# Patient Record
Sex: FEMALE | Race: WHITE | Employment: OTHER | URBAN - NONMETROPOLITAN AREA
[De-identification: names, ages, dates, MRNs, and addresses within clinical notes are randomized per-mention and may not be internally consistent; named-entity substitution may affect disease eponyms.]

---

## 2017-02-27 ENCOUNTER — OFFICE VISIT (OUTPATIENT)
Dept: FAMILY MEDICINE CLINIC | Age: 77
End: 2017-02-27

## 2017-02-27 VITALS
TEMPERATURE: 98.1 F | RESPIRATION RATE: 12 BRPM | SYSTOLIC BLOOD PRESSURE: 120 MMHG | OXYGEN SATURATION: 97 % | HEIGHT: 63 IN | DIASTOLIC BLOOD PRESSURE: 74 MMHG | BODY MASS INDEX: 28.88 KG/M2 | HEART RATE: 93 BPM | WEIGHT: 163 LBS

## 2017-02-27 DIAGNOSIS — L30.9 DERMATITIS: ICD-10-CM

## 2017-02-27 DIAGNOSIS — L73.9 HAIR FOLLICLE INFECTION: ICD-10-CM

## 2017-02-27 DIAGNOSIS — L29.9 PRURITUS: Primary | ICD-10-CM

## 2017-02-27 PROCEDURE — 1036F TOBACCO NON-USER: CPT | Performed by: NURSE PRACTITIONER

## 2017-02-27 PROCEDURE — 99214 OFFICE O/P EST MOD 30 MIN: CPT | Performed by: NURSE PRACTITIONER

## 2017-02-27 PROCEDURE — G8427 DOCREV CUR MEDS BY ELIG CLIN: HCPCS | Performed by: NURSE PRACTITIONER

## 2017-02-27 PROCEDURE — G8484 FLU IMMUNIZE NO ADMIN: HCPCS | Performed by: NURSE PRACTITIONER

## 2017-02-27 PROCEDURE — G8399 PT W/DXA RESULTS DOCUMENT: HCPCS | Performed by: NURSE PRACTITIONER

## 2017-02-27 PROCEDURE — 3014F SCREEN MAMMO DOC REV: CPT | Performed by: NURSE PRACTITIONER

## 2017-02-27 PROCEDURE — G8420 CALC BMI NORM PARAMETERS: HCPCS | Performed by: NURSE PRACTITIONER

## 2017-02-27 PROCEDURE — 1090F PRES/ABSN URINE INCON ASSESS: CPT | Performed by: NURSE PRACTITIONER

## 2017-02-27 PROCEDURE — 3017F COLORECTAL CA SCREEN DOC REV: CPT | Performed by: NURSE PRACTITIONER

## 2017-02-27 PROCEDURE — 4040F PNEUMOC VAC/ADMIN/RCVD: CPT | Performed by: NURSE PRACTITIONER

## 2017-02-27 PROCEDURE — 1123F ACP DISCUSS/DSCN MKR DOCD: CPT | Performed by: NURSE PRACTITIONER

## 2017-02-27 RX ORDER — SULFAMETHOXAZOLE AND TRIMETHOPRIM 800; 160 MG/1; MG/1
1 TABLET ORAL 2 TIMES DAILY
Qty: 20 TABLET | Refills: 0 | Status: SHIPPED | OUTPATIENT
Start: 2017-02-27 | End: 2017-03-09

## 2017-02-27 RX ORDER — METHYLPREDNISOLONE 4 MG/1
TABLET ORAL
Qty: 1 KIT | Refills: 0 | Status: SHIPPED | OUTPATIENT
Start: 2017-02-27 | End: 2017-03-05

## 2017-02-27 ASSESSMENT — ENCOUNTER SYMPTOMS
ALLERGIC/IMMUNOLOGIC NEGATIVE: 1
SINUS PRESSURE: 0
CHEST TIGHTNESS: 0
VOMITING: 0
NAUSEA: 0
EYES NEGATIVE: 1
RESPIRATORY NEGATIVE: 1
CONSTIPATION: 0
TROUBLE SWALLOWING: 0
COUGH: 0
DIARRHEA: 0
SHORTNESS OF BREATH: 0
GASTROINTESTINAL NEGATIVE: 1
ABDOMINAL PAIN: 0
COLOR CHANGE: 1

## 2017-03-21 ENCOUNTER — OFFICE VISIT (OUTPATIENT)
Dept: FAMILY MEDICINE CLINIC | Age: 77
End: 2017-03-21
Payer: MEDICARE

## 2017-03-21 VITALS
RESPIRATION RATE: 14 BRPM | DIASTOLIC BLOOD PRESSURE: 86 MMHG | HEIGHT: 63 IN | HEART RATE: 86 BPM | WEIGHT: 164.2 LBS | BODY MASS INDEX: 29.09 KG/M2 | SYSTOLIC BLOOD PRESSURE: 124 MMHG

## 2017-03-21 DIAGNOSIS — Z12.11 SCREENING FOR COLON CANCER: ICD-10-CM

## 2017-03-21 DIAGNOSIS — R21 RASH: Primary | ICD-10-CM

## 2017-03-21 DIAGNOSIS — I10 ESSENTIAL HYPERTENSION: ICD-10-CM

## 2017-03-21 PROCEDURE — 4040F PNEUMOC VAC/ADMIN/RCVD: CPT | Performed by: FAMILY MEDICINE

## 2017-03-21 PROCEDURE — 1036F TOBACCO NON-USER: CPT | Performed by: FAMILY MEDICINE

## 2017-03-21 PROCEDURE — 1123F ACP DISCUSS/DSCN MKR DOCD: CPT | Performed by: FAMILY MEDICINE

## 2017-03-21 PROCEDURE — 99213 OFFICE O/P EST LOW 20 MIN: CPT | Performed by: FAMILY MEDICINE

## 2017-03-21 PROCEDURE — G8420 CALC BMI NORM PARAMETERS: HCPCS | Performed by: FAMILY MEDICINE

## 2017-03-21 PROCEDURE — 3014F SCREEN MAMMO DOC REV: CPT | Performed by: FAMILY MEDICINE

## 2017-03-21 PROCEDURE — G8427 DOCREV CUR MEDS BY ELIG CLIN: HCPCS | Performed by: FAMILY MEDICINE

## 2017-03-21 PROCEDURE — 1090F PRES/ABSN URINE INCON ASSESS: CPT | Performed by: FAMILY MEDICINE

## 2017-03-21 PROCEDURE — G8484 FLU IMMUNIZE NO ADMIN: HCPCS | Performed by: FAMILY MEDICINE

## 2017-03-21 PROCEDURE — 3017F COLORECTAL CA SCREEN DOC REV: CPT | Performed by: FAMILY MEDICINE

## 2017-03-21 PROCEDURE — G8399 PT W/DXA RESULTS DOCUMENT: HCPCS | Performed by: FAMILY MEDICINE

## 2017-03-28 ENCOUNTER — TELEPHONE (OUTPATIENT)
Dept: FAMILY MEDICINE CLINIC | Age: 77
End: 2017-03-28

## 2017-03-30 ENCOUNTER — TELEPHONE (OUTPATIENT)
Dept: FAMILY MEDICINE CLINIC | Age: 77
End: 2017-03-30

## 2017-03-30 DIAGNOSIS — R21 RASH: Primary | ICD-10-CM

## 2017-03-30 RX ORDER — CEPHALEXIN 500 MG/1
500 CAPSULE ORAL 3 TIMES DAILY
Qty: 30 CAPSULE | Refills: 0 | Status: SHIPPED | OUTPATIENT
Start: 2017-03-30 | End: 2017-04-09

## 2017-04-04 ENCOUNTER — TELEPHONE (OUTPATIENT)
Dept: FAMILY MEDICINE CLINIC | Age: 77
End: 2017-04-04

## 2017-04-11 ENCOUNTER — TELEPHONE (OUTPATIENT)
Dept: FAMILY MEDICINE CLINIC | Age: 77
End: 2017-04-11

## 2017-04-18 ENCOUNTER — TELEPHONE (OUTPATIENT)
Dept: PRIMARY CARE CLINIC | Age: 77
End: 2017-04-18

## 2017-04-18 DIAGNOSIS — I10 ESSENTIAL HYPERTENSION: Primary | ICD-10-CM

## 2017-04-18 RX ORDER — LISINOPRIL 40 MG/1
40 TABLET ORAL DAILY
Qty: 30 TABLET | Refills: 3 | Status: SHIPPED | OUTPATIENT
Start: 2017-04-18 | End: 2017-04-26 | Stop reason: SDUPTHER

## 2017-04-19 LAB
ABSOLUTE EOS #: 0.5 K/UL (ref 0–0.4)
ABSOLUTE LYMPH #: 2 K/UL (ref 1–4.8)
ABSOLUTE MONO #: 0.7 K/UL (ref 0.1–1.2)
ALBUMIN SERPL-MCNC: 4.2 G/DL (ref 3.5–5.2)
ALBUMIN/GLOBULIN RATIO: 1.8 (ref 1–2.5)
ALP BLD-CCNC: 78 U/L (ref 35–104)
ALT SERPL-CCNC: 17 U/L (ref 5–33)
ANION GAP SERPL CALCULATED.3IONS-SCNC: 12 MMOL/L (ref 9–17)
AST SERPL-CCNC: 21 U/L
BASOPHILS # BLD: 1 % (ref 0–2)
BASOPHILS ABSOLUTE: 0 K/UL (ref 0–0.2)
BILIRUB SERPL-MCNC: 0.74 MG/DL (ref 0.3–1.2)
BUN BLDV-MCNC: 19 MG/DL (ref 8–23)
BUN/CREAT BLD: 24 (ref 9–20)
CALCIUM SERPL-MCNC: 9 MG/DL (ref 8.6–10.4)
CHLORIDE BLD-SCNC: 93 MMOL/L (ref 98–107)
CO2: 29 MMOL/L (ref 20–31)
CREAT SERPL-MCNC: 0.79 MG/DL (ref 0.5–0.9)
DIFFERENTIAL TYPE: ABNORMAL
EOSINOPHILS RELATIVE PERCENT: 9 % (ref 1–4)
GFR AFRICAN AMERICAN: >60 ML/MIN
GFR NON-AFRICAN AMERICAN: >60 ML/MIN
GFR SERPL CREATININE-BSD FRML MDRD: ABNORMAL ML/MIN/{1.73_M2}
GFR SERPL CREATININE-BSD FRML MDRD: ABNORMAL ML/MIN/{1.73_M2}
GLUCOSE BLD-MCNC: 110 MG/DL (ref 70–99)
HCT VFR BLD CALC: 41.4 % (ref 36–46)
HEMOGLOBIN: 13.6 G/DL (ref 12–16)
LACTATE DEHYDROGENASE: 189 U/L (ref 135–214)
LYMPHOCYTES # BLD: 35 % (ref 24–44)
MCH RBC QN AUTO: 29.8 PG (ref 26–34)
MCHC RBC AUTO-ENTMCNC: 32.8 G/DL (ref 31–37)
MCV RBC AUTO: 91 FL (ref 80–100)
MONOCYTES # BLD: 12 % (ref 1–7)
PDW BLD-RTO: 13.6 % (ref 11–14.5)
PLATELET # BLD: 269 K/UL (ref 140–450)
PLATELET ESTIMATE: ABNORMAL
PMV BLD AUTO: 7.2 FL (ref 6–12)
POTASSIUM SERPL-SCNC: 3.9 MMOL/L (ref 3.7–5.3)
RBC # BLD: 4.55 M/UL (ref 4–5.2)
RBC # BLD: ABNORMAL 10*6/UL
SEG NEUTROPHILS: 43 % (ref 36–66)
SEGMENTED NEUTROPHILS ABSOLUTE COUNT: 2.5 K/UL (ref 1.8–7.7)
SODIUM BLD-SCNC: 134 MMOL/L (ref 135–144)
TOTAL PROTEIN: 6.6 G/DL (ref 6.4–8.3)
TSH SERPL DL<=0.05 MIU/L-ACNC: 1.47 MIU/L (ref 0.3–5)
WBC # BLD: 5.7 K/UL (ref 3.5–11)
WBC # BLD: ABNORMAL 10*3/UL

## 2017-04-26 ENCOUNTER — OFFICE VISIT (OUTPATIENT)
Dept: FAMILY MEDICINE CLINIC | Age: 77
End: 2017-04-26
Payer: MEDICARE

## 2017-04-26 VITALS
BODY MASS INDEX: 29.23 KG/M2 | HEART RATE: 82 BPM | HEIGHT: 63 IN | DIASTOLIC BLOOD PRESSURE: 68 MMHG | RESPIRATION RATE: 16 BRPM | SYSTOLIC BLOOD PRESSURE: 142 MMHG | WEIGHT: 165 LBS

## 2017-04-26 DIAGNOSIS — I10 ESSENTIAL HYPERTENSION: Primary | ICD-10-CM

## 2017-04-26 DIAGNOSIS — M85.80 OSTEOPENIA: ICD-10-CM

## 2017-04-26 DIAGNOSIS — R73.01 ELEVATED FASTING GLUCOSE: ICD-10-CM

## 2017-04-26 PROCEDURE — 4040F PNEUMOC VAC/ADMIN/RCVD: CPT | Performed by: FAMILY MEDICINE

## 2017-04-26 PROCEDURE — G8420 CALC BMI NORM PARAMETERS: HCPCS | Performed by: FAMILY MEDICINE

## 2017-04-26 PROCEDURE — G8427 DOCREV CUR MEDS BY ELIG CLIN: HCPCS | Performed by: FAMILY MEDICINE

## 2017-04-26 PROCEDURE — 1036F TOBACCO NON-USER: CPT | Performed by: FAMILY MEDICINE

## 2017-04-26 PROCEDURE — 1123F ACP DISCUSS/DSCN MKR DOCD: CPT | Performed by: FAMILY MEDICINE

## 2017-04-26 PROCEDURE — 3017F COLORECTAL CA SCREEN DOC REV: CPT | Performed by: FAMILY MEDICINE

## 2017-04-26 PROCEDURE — G8399 PT W/DXA RESULTS DOCUMENT: HCPCS | Performed by: FAMILY MEDICINE

## 2017-04-26 PROCEDURE — 1090F PRES/ABSN URINE INCON ASSESS: CPT | Performed by: FAMILY MEDICINE

## 2017-04-26 PROCEDURE — 99214 OFFICE O/P EST MOD 30 MIN: CPT | Performed by: FAMILY MEDICINE

## 2017-04-26 PROCEDURE — 3014F SCREEN MAMMO DOC REV: CPT | Performed by: FAMILY MEDICINE

## 2017-04-26 RX ORDER — CETIRIZINE HYDROCHLORIDE 10 MG/1
10 TABLET ORAL DAILY
COMMUNITY
End: 2020-02-18

## 2017-04-26 RX ORDER — RANITIDINE 150 MG/1
150 TABLET ORAL 2 TIMES DAILY
COMMUNITY
End: 2020-02-18

## 2017-04-26 RX ORDER — LISINOPRIL 40 MG/1
40 TABLET ORAL DAILY
Qty: 30 TABLET | Refills: 6 | Status: SHIPPED | OUTPATIENT
Start: 2017-04-26 | End: 2017-05-17 | Stop reason: ALTCHOICE

## 2017-04-26 RX ORDER — AMLODIPINE BESYLATE 10 MG/1
10 TABLET ORAL DAILY
Qty: 90 TABLET | Refills: 1 | Status: SHIPPED | OUTPATIENT
Start: 2017-04-26 | End: 2017-06-28 | Stop reason: DRUGHIGH

## 2017-05-02 ENCOUNTER — TELEPHONE (OUTPATIENT)
Dept: FAMILY MEDICINE CLINIC | Age: 77
End: 2017-05-02

## 2017-05-10 ENCOUNTER — TELEPHONE (OUTPATIENT)
Dept: FAMILY MEDICINE CLINIC | Age: 77
End: 2017-05-10

## 2017-05-10 DIAGNOSIS — I10 ESSENTIAL HYPERTENSION: Primary | ICD-10-CM

## 2017-05-10 RX ORDER — METOPROLOL SUCCINATE 25 MG/1
25 TABLET, EXTENDED RELEASE ORAL DAILY
Qty: 30 TABLET | Refills: 3 | Status: SHIPPED | OUTPATIENT
Start: 2017-05-10 | End: 2017-08-29 | Stop reason: SDUPTHER

## 2017-05-16 DIAGNOSIS — Z12.11 SCREENING FOR COLON CANCER: ICD-10-CM

## 2017-05-16 LAB
DATE, STOOL #1: ABNORMAL
DATE, STOOL #2: ABNORMAL
DATE, STOOL #3: ABNORMAL
HEMOCCULT SP1 STL QL: POSITIVE
HEMOCCULT SP2 STL QL: ABNORMAL
HEMOCCULT SP3 STL QL: ABNORMAL
TIME, STOOL #1: ABNORMAL
TIME, STOOL #2: ABNORMAL
TIME, STOOL #3: ABNORMAL

## 2017-05-16 PROCEDURE — 82274 ASSAY TEST FOR BLOOD FECAL: CPT | Performed by: FAMILY MEDICINE

## 2017-05-17 ENCOUNTER — TELEPHONE (OUTPATIENT)
Dept: FAMILY MEDICINE CLINIC | Age: 77
End: 2017-05-17

## 2017-05-17 RX ORDER — LISINOPRIL AND HYDROCHLOROTHIAZIDE 25; 20 MG/1; MG/1
1 TABLET ORAL 2 TIMES DAILY
COMMUNITY
End: 2017-07-31 | Stop reason: SDUPTHER

## 2017-05-24 ENCOUNTER — TELEPHONE (OUTPATIENT)
Dept: FAMILY MEDICINE CLINIC | Age: 77
End: 2017-05-24

## 2017-05-24 DIAGNOSIS — K92.1 BLOOD IN STOOL: Primary | ICD-10-CM

## 2017-06-14 ENCOUNTER — INITIAL CONSULT (OUTPATIENT)
Dept: SURGERY | Age: 77
End: 2017-06-14
Payer: MEDICARE

## 2017-06-14 VITALS
RESPIRATION RATE: 16 BRPM | TEMPERATURE: 97.8 F | HEIGHT: 64 IN | BODY MASS INDEX: 27.9 KG/M2 | SYSTOLIC BLOOD PRESSURE: 128 MMHG | WEIGHT: 163.4 LBS | DIASTOLIC BLOOD PRESSURE: 68 MMHG

## 2017-06-14 DIAGNOSIS — K92.1 BLOOD IN THE STOOL: Primary | ICD-10-CM

## 2017-06-14 DIAGNOSIS — Z12.11 ENCOUNTER FOR SCREENING COLONOSCOPY: ICD-10-CM

## 2017-06-14 PROCEDURE — G8399 PT W/DXA RESULTS DOCUMENT: HCPCS | Performed by: SURGERY

## 2017-06-14 PROCEDURE — 1123F ACP DISCUSS/DSCN MKR DOCD: CPT | Performed by: SURGERY

## 2017-06-14 PROCEDURE — 1090F PRES/ABSN URINE INCON ASSESS: CPT | Performed by: SURGERY

## 2017-06-14 PROCEDURE — 3014F SCREEN MAMMO DOC REV: CPT | Performed by: SURGERY

## 2017-06-14 PROCEDURE — 3017F COLORECTAL CA SCREEN DOC REV: CPT | Performed by: SURGERY

## 2017-06-14 PROCEDURE — 1036F TOBACCO NON-USER: CPT | Performed by: SURGERY

## 2017-06-14 PROCEDURE — G8419 CALC BMI OUT NRM PARAM NOF/U: HCPCS | Performed by: SURGERY

## 2017-06-14 PROCEDURE — G8427 DOCREV CUR MEDS BY ELIG CLIN: HCPCS | Performed by: SURGERY

## 2017-06-14 PROCEDURE — 99203 OFFICE O/P NEW LOW 30 MIN: CPT | Performed by: SURGERY

## 2017-06-14 PROCEDURE — 4040F PNEUMOC VAC/ADMIN/RCVD: CPT | Performed by: SURGERY

## 2017-06-14 RX ORDER — BIOTIN 10 MG
10 TABLET ORAL DAILY
COMMUNITY
End: 2017-10-26

## 2017-06-28 DIAGNOSIS — I10 ESSENTIAL HYPERTENSION: ICD-10-CM

## 2017-06-28 RX ORDER — AMLODIPINE BESYLATE 5 MG/1
TABLET ORAL
Qty: 30 TABLET | Refills: 4 | Status: SHIPPED | OUTPATIENT
Start: 2017-06-28 | End: 2017-10-26 | Stop reason: SDUPTHER

## 2017-07-26 ENCOUNTER — ANESTHESIA (OUTPATIENT)
Dept: OPERATING ROOM | Age: 77
End: 2017-07-26
Payer: MEDICARE

## 2017-07-26 ENCOUNTER — ANESTHESIA EVENT (OUTPATIENT)
Dept: OPERATING ROOM | Age: 77
End: 2017-07-26
Payer: MEDICARE

## 2017-07-26 ENCOUNTER — HOSPITAL ENCOUNTER (OUTPATIENT)
Age: 77
Setting detail: OUTPATIENT SURGERY
Discharge: HOME | End: 2017-07-26
Attending: SURGERY | Admitting: SURGERY
Payer: MEDICARE

## 2017-07-26 VITALS
SYSTOLIC BLOOD PRESSURE: 123 MMHG | RESPIRATION RATE: 16 BRPM | HEIGHT: 64 IN | TEMPERATURE: 96.8 F | WEIGHT: 161.8 LBS | HEART RATE: 63 BPM | BODY MASS INDEX: 27.62 KG/M2 | DIASTOLIC BLOOD PRESSURE: 63 MMHG | OXYGEN SATURATION: 100 %

## 2017-07-26 VITALS
OXYGEN SATURATION: 100 % | SYSTOLIC BLOOD PRESSURE: 82 MMHG | RESPIRATION RATE: 10 BRPM | DIASTOLIC BLOOD PRESSURE: 44 MMHG

## 2017-07-26 PROCEDURE — 45380 COLONOSCOPY AND BIOPSY: CPT | Performed by: SURGERY

## 2017-07-26 PROCEDURE — 7100000010 HC PHASE II RECOVERY - FIRST 15 MIN: Performed by: SURGERY

## 2017-07-26 PROCEDURE — 88305 TISSUE EXAM BY PATHOLOGIST: CPT

## 2017-07-26 PROCEDURE — 2580000003 HC RX 258: Performed by: SURGERY

## 2017-07-26 PROCEDURE — 3700000000 HC ANESTHESIA ATTENDED CARE: Performed by: SURGERY

## 2017-07-26 PROCEDURE — 3609010300 HC COLONOSCOPY W/BIOPSY SINGLE/MULTIPLE: Performed by: SURGERY

## 2017-07-26 PROCEDURE — 6360000002 HC RX W HCPCS: Performed by: NURSE ANESTHETIST, CERTIFIED REGISTERED

## 2017-07-26 PROCEDURE — 00810 PR ANESTH,INTESTINE,SCOPE,LOW: CPT | Performed by: NURSE ANESTHETIST, CERTIFIED REGISTERED

## 2017-07-26 PROCEDURE — 7100000011 HC PHASE II RECOVERY - ADDTL 15 MIN: Performed by: SURGERY

## 2017-07-26 PROCEDURE — 3700000001 HC ADD 15 MINUTES (ANESTHESIA): Performed by: SURGERY

## 2017-07-26 RX ORDER — PROPOFOL 10 MG/ML
INJECTION, EMULSION INTRAVENOUS PRN
Status: DISCONTINUED | OUTPATIENT
Start: 2017-07-26 | End: 2017-07-26 | Stop reason: SDUPTHER

## 2017-07-26 RX ORDER — FENTANYL CITRATE 50 UG/ML
INJECTION, SOLUTION INTRAMUSCULAR; INTRAVENOUS PRN
Status: DISCONTINUED | OUTPATIENT
Start: 2017-07-26 | End: 2017-07-26 | Stop reason: SDUPTHER

## 2017-07-26 RX ORDER — MIDAZOLAM HYDROCHLORIDE 1 MG/ML
INJECTION INTRAMUSCULAR; INTRAVENOUS PRN
Status: DISCONTINUED | OUTPATIENT
Start: 2017-07-26 | End: 2017-07-26 | Stop reason: SDUPTHER

## 2017-07-26 RX ORDER — SODIUM CHLORIDE, SODIUM LACTATE, POTASSIUM CHLORIDE, CALCIUM CHLORIDE 600; 310; 30; 20 MG/100ML; MG/100ML; MG/100ML; MG/100ML
INJECTION, SOLUTION INTRAVENOUS CONTINUOUS
Status: DISCONTINUED | OUTPATIENT
Start: 2017-07-26 | End: 2017-07-26 | Stop reason: HOSPADM

## 2017-07-26 RX ADMIN — MIDAZOLAM 2 MG: 1 INJECTION INTRAMUSCULAR; INTRAVENOUS at 08:14

## 2017-07-26 RX ADMIN — FENTANYL CITRATE 100 MCG: 50 INJECTION, SOLUTION INTRAMUSCULAR; INTRAVENOUS at 08:14

## 2017-07-26 RX ADMIN — PROPOFOL 30 MG: 10 INJECTION, EMULSION INTRAVENOUS at 08:14

## 2017-07-26 RX ADMIN — PROPOFOL 50 MG: 10 INJECTION, EMULSION INTRAVENOUS at 08:31

## 2017-07-26 RX ADMIN — PROPOFOL 40 MG: 10 INJECTION, EMULSION INTRAVENOUS at 08:23

## 2017-07-26 RX ADMIN — SODIUM CHLORIDE, POTASSIUM CHLORIDE, SODIUM LACTATE AND CALCIUM CHLORIDE: 600; 310; 30; 20 INJECTION, SOLUTION INTRAVENOUS at 07:47

## 2017-07-26 RX ADMIN — PROPOFOL 30 MG: 10 INJECTION, EMULSION INTRAVENOUS at 08:20

## 2017-07-26 ASSESSMENT — PAIN - FUNCTIONAL ASSESSMENT: PAIN_FUNCTIONAL_ASSESSMENT: 0-10

## 2017-07-26 ASSESSMENT — PAIN SCALES - GENERAL
PAINLEVEL_OUTOF10: 0

## 2017-07-28 LAB — SURGICAL PATHOLOGY REPORT: NORMAL

## 2017-07-31 DIAGNOSIS — I10 ESSENTIAL HYPERTENSION: ICD-10-CM

## 2017-07-31 RX ORDER — LISINOPRIL AND HYDROCHLOROTHIAZIDE 25; 20 MG/1; MG/1
TABLET ORAL
Qty: 60 TABLET | Refills: 3 | Status: SHIPPED | OUTPATIENT
Start: 2017-07-31 | End: 2017-10-26 | Stop reason: SDUPTHER

## 2017-08-04 ENCOUNTER — OFFICE VISIT (OUTPATIENT)
Dept: SURGERY | Age: 77
End: 2017-08-04
Payer: MEDICARE

## 2017-08-04 VITALS
DIASTOLIC BLOOD PRESSURE: 62 MMHG | TEMPERATURE: 97.4 F | HEART RATE: 68 BPM | SYSTOLIC BLOOD PRESSURE: 112 MMHG | HEIGHT: 63 IN | WEIGHT: 166 LBS | BODY MASS INDEX: 29.41 KG/M2

## 2017-08-04 DIAGNOSIS — K92.1 BLOOD IN THE STOOL: ICD-10-CM

## 2017-08-04 DIAGNOSIS — D36.9 TUBULAR ADENOMA: Primary | ICD-10-CM

## 2017-08-04 PROCEDURE — 1036F TOBACCO NON-USER: CPT | Performed by: SURGERY

## 2017-08-04 PROCEDURE — 4040F PNEUMOC VAC/ADMIN/RCVD: CPT | Performed by: SURGERY

## 2017-08-04 PROCEDURE — 3014F SCREEN MAMMO DOC REV: CPT | Performed by: SURGERY

## 2017-08-04 PROCEDURE — G8419 CALC BMI OUT NRM PARAM NOF/U: HCPCS | Performed by: SURGERY

## 2017-08-04 PROCEDURE — 3017F COLORECTAL CA SCREEN DOC REV: CPT | Performed by: SURGERY

## 2017-08-04 PROCEDURE — 1090F PRES/ABSN URINE INCON ASSESS: CPT | Performed by: SURGERY

## 2017-08-04 PROCEDURE — G8427 DOCREV CUR MEDS BY ELIG CLIN: HCPCS | Performed by: SURGERY

## 2017-08-04 PROCEDURE — 99214 OFFICE O/P EST MOD 30 MIN: CPT | Performed by: SURGERY

## 2017-08-04 PROCEDURE — 1123F ACP DISCUSS/DSCN MKR DOCD: CPT | Performed by: SURGERY

## 2017-08-04 PROCEDURE — G8399 PT W/DXA RESULTS DOCUMENT: HCPCS | Performed by: SURGERY

## 2017-08-14 PROBLEM — D12.6 TUBULAR ADENOMA OF COLON: Status: ACTIVE | Noted: 2017-08-14

## 2017-08-29 DIAGNOSIS — I10 ESSENTIAL HYPERTENSION: ICD-10-CM

## 2017-08-29 RX ORDER — METOPROLOL SUCCINATE 25 MG/1
TABLET, EXTENDED RELEASE ORAL
Qty: 30 TABLET | Refills: 1 | Status: SHIPPED | OUTPATIENT
Start: 2017-08-29 | End: 2017-10-26 | Stop reason: SDUPTHER

## 2017-10-23 ENCOUNTER — HOSPITAL ENCOUNTER (OUTPATIENT)
Dept: LAB | Age: 77
Setting detail: SPECIMEN
Discharge: HOME | End: 2017-10-23
Payer: MEDICARE

## 2017-10-23 DIAGNOSIS — M85.80 OSTEOPENIA: ICD-10-CM

## 2017-10-23 DIAGNOSIS — R73.01 ELEVATED FASTING GLUCOSE: ICD-10-CM

## 2017-10-23 DIAGNOSIS — I10 ESSENTIAL HYPERTENSION: ICD-10-CM

## 2017-10-23 LAB
ALBUMIN SERPL-MCNC: 4.2 G/DL (ref 3.5–5.2)
ALBUMIN/GLOBULIN RATIO: 1.6 (ref 1–2.5)
ALP BLD-CCNC: 77 U/L (ref 35–104)
ALT SERPL-CCNC: 15 U/L (ref 5–33)
ANION GAP SERPL CALCULATED.3IONS-SCNC: 11 MMOL/L (ref 9–17)
AST SERPL-CCNC: 19 U/L
BILIRUB SERPL-MCNC: 0.58 MG/DL (ref 0.3–1.2)
BUN BLDV-MCNC: 16 MG/DL (ref 8–23)
BUN/CREAT BLD: 21 (ref 9–20)
CALCIUM SERPL-MCNC: 9.2 MG/DL (ref 8.6–10.4)
CHLORIDE BLD-SCNC: 91 MMOL/L (ref 98–107)
CHOLESTEROL/HDL RATIO: 2.8
CHOLESTEROL: 196 MG/DL
CO2: 30 MMOL/L (ref 20–31)
CREAT SERPL-MCNC: 0.77 MG/DL (ref 0.5–0.9)
ESTIMATED AVERAGE GLUCOSE: 105 MG/DL
GFR AFRICAN AMERICAN: >60 ML/MIN
GFR NON-AFRICAN AMERICAN: >60 ML/MIN
GFR SERPL CREATININE-BSD FRML MDRD: ABNORMAL ML/MIN/{1.73_M2}
GFR SERPL CREATININE-BSD FRML MDRD: ABNORMAL ML/MIN/{1.73_M2}
GLUCOSE BLD-MCNC: 101 MG/DL (ref 70–99)
HBA1C MFR BLD: 5.3 % (ref 4.8–5.9)
HDLC SERPL-MCNC: 69 MG/DL
LDL CHOLESTEROL: 106 MG/DL (ref 0–130)
POTASSIUM SERPL-SCNC: 4.4 MMOL/L (ref 3.7–5.3)
SODIUM BLD-SCNC: 132 MMOL/L (ref 135–144)
TOTAL PROTEIN: 6.9 G/DL (ref 6.4–8.3)
TRIGL SERPL-MCNC: 106 MG/DL
VITAMIN D 25-HYDROXY: 27.7 NG/ML (ref 30–100)
VLDLC SERPL CALC-MCNC: NORMAL MG/DL (ref 1–30)

## 2017-10-23 PROCEDURE — 80061 LIPID PANEL: CPT

## 2017-10-23 PROCEDURE — 80053 COMPREHEN METABOLIC PANEL: CPT

## 2017-10-23 PROCEDURE — 82306 VITAMIN D 25 HYDROXY: CPT

## 2017-10-23 PROCEDURE — 83036 HEMOGLOBIN GLYCOSYLATED A1C: CPT

## 2017-10-23 PROCEDURE — 36415 COLL VENOUS BLD VENIPUNCTURE: CPT

## 2017-10-26 ENCOUNTER — OFFICE VISIT (OUTPATIENT)
Dept: FAMILY MEDICINE CLINIC | Age: 77
End: 2017-10-26
Payer: MEDICARE

## 2017-10-26 VITALS
HEART RATE: 72 BPM | SYSTOLIC BLOOD PRESSURE: 118 MMHG | DIASTOLIC BLOOD PRESSURE: 68 MMHG | WEIGHT: 166 LBS | HEIGHT: 63 IN | RESPIRATION RATE: 16 BRPM | BODY MASS INDEX: 29.41 KG/M2

## 2017-10-26 DIAGNOSIS — L12.0 BULLOUS PEMPHIGOID: ICD-10-CM

## 2017-10-26 DIAGNOSIS — I10 ESSENTIAL HYPERTENSION: Primary | ICD-10-CM

## 2017-10-26 DIAGNOSIS — Z23 NEED FOR INFLUENZA VACCINATION: ICD-10-CM

## 2017-10-26 DIAGNOSIS — E55.9 VITAMIN D DEFICIENCY: ICD-10-CM

## 2017-10-26 DIAGNOSIS — R21 RASH: ICD-10-CM

## 2017-10-26 DIAGNOSIS — M94.9 DISORDER OF BONE AND ARTICULAR CARTILAGE: ICD-10-CM

## 2017-10-26 DIAGNOSIS — M89.9 DISORDER OF BONE AND ARTICULAR CARTILAGE: ICD-10-CM

## 2017-10-26 DIAGNOSIS — R73.01 ELEVATED FASTING GLUCOSE: ICD-10-CM

## 2017-10-26 DIAGNOSIS — M85.80 OSTEOPENIA, UNSPECIFIED LOCATION: ICD-10-CM

## 2017-10-26 PROCEDURE — 90662 IIV NO PRSV INCREASED AG IM: CPT | Performed by: FAMILY MEDICINE

## 2017-10-26 PROCEDURE — 4040F PNEUMOC VAC/ADMIN/RCVD: CPT | Performed by: FAMILY MEDICINE

## 2017-10-26 PROCEDURE — G0008 ADMIN INFLUENZA VIRUS VAC: HCPCS | Performed by: FAMILY MEDICINE

## 2017-10-26 PROCEDURE — G8399 PT W/DXA RESULTS DOCUMENT: HCPCS | Performed by: FAMILY MEDICINE

## 2017-10-26 PROCEDURE — 1123F ACP DISCUSS/DSCN MKR DOCD: CPT | Performed by: FAMILY MEDICINE

## 2017-10-26 PROCEDURE — G8484 FLU IMMUNIZE NO ADMIN: HCPCS | Performed by: FAMILY MEDICINE

## 2017-10-26 PROCEDURE — G8427 DOCREV CUR MEDS BY ELIG CLIN: HCPCS | Performed by: FAMILY MEDICINE

## 2017-10-26 PROCEDURE — 3017F COLORECTAL CA SCREEN DOC REV: CPT | Performed by: FAMILY MEDICINE

## 2017-10-26 PROCEDURE — 1090F PRES/ABSN URINE INCON ASSESS: CPT | Performed by: FAMILY MEDICINE

## 2017-10-26 PROCEDURE — G8417 CALC BMI ABV UP PARAM F/U: HCPCS | Performed by: FAMILY MEDICINE

## 2017-10-26 PROCEDURE — 1036F TOBACCO NON-USER: CPT | Performed by: FAMILY MEDICINE

## 2017-10-26 PROCEDURE — 99214 OFFICE O/P EST MOD 30 MIN: CPT | Performed by: FAMILY MEDICINE

## 2017-10-26 RX ORDER — HYDROXYZINE HYDROCHLORIDE 25 MG/1
25 TABLET, FILM COATED ORAL 3 TIMES DAILY PRN
Qty: 30 TABLET | Refills: 1 | Status: SHIPPED | OUTPATIENT
Start: 2017-10-26 | End: 2019-04-24 | Stop reason: SDUPTHER

## 2017-10-26 RX ORDER — TRIAMCINOLONE ACETONIDE 5 MG/G
CREAM TOPICAL
Qty: 1 TUBE | Refills: 0 | Status: SHIPPED | OUTPATIENT
Start: 2017-10-26 | End: 2018-06-28 | Stop reason: SDUPTHER

## 2017-10-26 RX ORDER — LISINOPRIL AND HYDROCHLOROTHIAZIDE 25; 20 MG/1; MG/1
TABLET ORAL
Qty: 60 TABLET | Refills: 6 | Status: SHIPPED | OUTPATIENT
Start: 2017-10-26 | End: 2018-03-29 | Stop reason: SDUPTHER

## 2017-10-26 RX ORDER — METOPROLOL SUCCINATE 25 MG/1
TABLET, EXTENDED RELEASE ORAL
Qty: 30 TABLET | Refills: 6 | Status: SHIPPED | OUTPATIENT
Start: 2017-10-26 | End: 2018-05-24 | Stop reason: SDUPTHER

## 2017-10-26 RX ORDER — AMLODIPINE BESYLATE 5 MG/1
TABLET ORAL
Qty: 30 TABLET | Refills: 6 | Status: SHIPPED | OUTPATIENT
Start: 2017-10-26 | End: 2018-06-28 | Stop reason: SDUPTHER

## 2017-10-26 NOTE — PATIENT INSTRUCTIONS
Warning:  One or more of the medications that you have been prescribed may cause drowsiness and impair your ability to operate vehicles or machinery. Do not drive or operate machinery while taking Atarax. Do not use in combination with alcohol.

## 2017-10-27 NOTE — PROGRESS NOTES
amLODIPine (NORVASC) 5 MG tablet TAKE ONE TABLET BY MOUTH ONCE DAILY 30 tablet 6    triamcinolone (ARISTOCORT) 0.5 % cream Apply topically 3 times daily. 1 Tube 0    hydrOXYzine (ATARAX) 25 MG tablet Take 1 tablet by mouth 3 times daily as needed for Itching 30 tablet 1    ranitidine (ZANTAC) 150 MG tablet Take 150 mg by mouth 2 times daily      cetirizine (ZYRTEC) 10 MG tablet Take 10 mg by mouth daily      albuterol sulfate  (90 BASE) MCG/ACT inhaler Inhale 2 puffs into the lungs every 4 hours as needed for Wheezing or Shortness of Breath 1 Inhaler 0    vitamin D-3 (CHOLECALCIFEROL) 5000 UNITS TABS Take 5,000 Units by mouth daily.  Coenzyme Q10 (CO Q 10 PO) Take  by mouth daily.  Psyllium (METAMUCIL PO) Take  by mouth. She is allergic to hydrochlorothiazide. She  reports that she has quit smoking. She has never used smokeless tobacco.      Objective:    Vitals:    10/26/17 1534   BP: 118/68   Site: Right Arm   Position: Sitting   Cuff Size: Large Adult   Pulse: 72   Resp: 16   Weight: 166 lb (75.3 kg)   Height: 5' 3\" (1.6 m)     Body mass index is 29.41 kg/m². Overweight elderly  female, healthy-appearing, alert, and cooperative and anxious, but otherwise in no acute distress. There are excoriations and some lichenified patches around the neck. Neck is supple, with no lymphadenopathy. Chest is clear to auscultation, no wheezes, rales, or rhonchi. Heart sounds are regular rate and rhythm, no murmurs. Lower extremities have no edema.     Labs done 10/23/2017 were reviewed with the patient:   Hospital Outpatient Visit on 10/23/2017   Component Date Value Ref Range Status    Glucose 10/23/2017 101* 70 - 99 mg/dL Final    BUN 10/23/2017 16  8 - 23 mg/dL Final    CREATININE 10/23/2017 0.77  0.50 - 0.90 mg/dL Final    Bun/Cre Ratio 10/23/2017 21* 9 - 20 Final    Calcium 10/23/2017 9.2  8.6 - 10.4 mg/dL Final    Sodium 10/23/2017 132* 135 - 144 mmol/L Final    Potassium 10/23/2017 4.4  3.7 - 5.3 mmol/L Final    Chloride 10/23/2017 91* 98 - 107 mmol/L Final    CO2 10/23/2017 30  20 - 31 mmol/L Final    Anion Gap 10/23/2017 11  9 - 17 mmol/L Final    Alkaline Phosphatase 10/23/2017 77  35 - 104 U/L Final    ALT 10/23/2017 15  5 - 33 U/L Final    AST 10/23/2017 19  <32 U/L Final    Total Bilirubin 10/23/2017 0.58  0.3 - 1.2 mg/dL Final    Total Protein 10/23/2017 6.9  6.4 - 8.3 g/dL Final    Alb 10/23/2017 4.2  3.5 - 5.2 g/dL Final    Albumin/Globulin Ratio 10/23/2017 1.6  1.0 - 2.5 Final    GFR Non- 10/23/2017 >60  >60 mL/min Final    GFR  10/23/2017 >60  >60 mL/min Final    GFR Comment 10/23/2017        Final    Comment: Average GFR for 79or more years old:   76 mL/min/1.73sq m  Chronic Kidney Disease:   <60 mL/min/1.73sq m  Kidney failure:   <15 mL/min/1.73sq m              eGFR calculated using average adult body mass. Additional eGFR calculator   available at:        [email or web address]        Performed at [facility] [address] [facility] [address] [place] [phone]      GFR Staging 10/23/2017 NOT REPORTED   Final    Hemoglobin A1C 10/23/2017 5.3  4.8 - 5.9 % Final    Estimated Avg Glucose 10/23/2017 105  mg/dL Final    Comment: Performed at [facility] [address] [place] [address] [address] [phone] [facility] Cholesterol 10/23/2017 196  <200 mg/dL Final    Comment:    Cholesterol Guidelines:      <200  Desirable   200-240  Borderline      >240  Undesirable         HDL 10/23/2017 69  >40 mg/dL Final    Comment:    HDL Guidelines:    <40     Undesirable   40-59    Borderline    >59     Desirable         LDL Cholesterol 10/23/2017 106  0 - 130 mg/dL Final    Comment:    LDL Guidelines:     <100    Desirable   100-129   Near to/above Desirable   130-159   Borderline      >159   Undesirable     Direct (measured) LDL and calculated LDL are not interchangeable tests.  Chol/HDL Ratio 10/23/2017 2.8  <5 Final    Triglycerides 10/23/2017 106  <150 mg/dL Final    Comment:    Triglyceride Guidelines:     <150   Desirable   150-199  Borderline   200-499  High     >499   Very high   Based on AHA Guidelines for fasting triglyceride, October 2012. Performed at [facility] [address] [address] [phone]      VLDL 10/23/2017 NOT REPORTED  1 - 30 mg/dL Final    Vit D, 25-Hydroxy 10/23/2017 27.7* 30.0 - 100.0 ng/mL Final    Comment:    Reference Range:  Vitamin D status         Range   Deficiency              <20 ng/mL   Mild Deficiency       20-30 ng/mL   Sufficiency           ng/mL   Toxicity               >100 ng/mL  Performed at [address] [place] [address], [address] [place] [address] [phone]         Assessment and Plan:    1. Essential hypertension  Her blood pressure is very well-controlled. (BP: 118/68)   She was advised to continue current medications. Toprol XL, Lisinopril-Hydrochlorothiazide and Norvasc were refilled:   - metoprolol succinate (TOPROL XL) 25 MG extended release tablet; TAKE ONE TABLET BY MOUTH ONCE DAILY  Dispense: 30 tablet; Refill: 6  - lisinopril-hydrochlorothiazide (PRINZIDE;ZESTORETIC) 20-25 MG per tablet; TAKE TWO TABLETS BY MOUTH ONCE DAILY  Dispense: 60 tablet; Refill: 6  - amLODIPine (NORVASC) 5 MG tablet; TAKE ONE TABLET BY MOUTH ONCE DAILY  Dispense: 30 tablet; Refill: 6    - Comprehensive Metabolic Panel; Future prior to her return visit in 6 months. 2. Osteopenia, unspecified location  3. Disorder of bone and articular cartilage  She is due for a DEXA scan; this was ordered:  - DEXA BONE DENSITY AXIAL SKELETON; Future    4. Rash  5. Bullous pemphigoid  I reviewed the notes and the pathology report from Dr. Alejandro Kay.   I recommended Triamcinolone cream and Atarax prn:  - triamcinolone (ARISTOCORT) 0.5 % cream; Apply topically 3 times daily. Dispense: 1 Tube; Refill: 0  - hydrOXYzine (ATARAX) 25 MG tablet; Take 1 tablet by mouth 3 times daily as needed for Itching  Dispense: 30 tablet; Refill: 1    If her symptoms do not improve, I have recommended follow up with dermatology and/or allergy. 6. Elevated fasting glucose  Her fasting glucose is elevated, but her HgbA1c is within normal limits. I recommended that she decrease her intake of processed carbohydrates, begin a regular exercise program, and attempt to lose weight.  - Hemoglobin A1C; Future prior to her return visit in 6 months. 7. Vitamin D deficiency  Her 25-hydroxy Vitamin D level was decreased (27.7 ng/mL) on  her recent lab work. She was advised to continue with her current dose of Vitamin D. Vitamin D was refilled:  - Vitamin D 25 Hydroxy; Future prior to her return visit in 6 months. 8.  Routine health maintenance  Health maintenance was reviewed with the patient. Annual influenza vaccine was recommended and ordered. Zostavax was discussed. She would like to wait to see if her insurance will cover the new shingles vaccine. All other health maintenance, including Pneumovax, Prevnar-13, and Tdap, is up to date at this time.          (Please note that portions of this note were completed with a voice-recognition program. Efforts were made to edit the dictation but occasionally words are mis-transcribed.)

## 2018-03-29 DIAGNOSIS — I10 ESSENTIAL HYPERTENSION: ICD-10-CM

## 2018-03-29 RX ORDER — LISINOPRIL AND HYDROCHLOROTHIAZIDE 25; 20 MG/1; MG/1
TABLET ORAL
Qty: 180 TABLET | Refills: 0 | Status: SHIPPED | OUTPATIENT
Start: 2018-03-29 | End: 2018-06-28 | Stop reason: SDUPTHER

## 2018-05-24 DIAGNOSIS — I10 ESSENTIAL HYPERTENSION: ICD-10-CM

## 2018-05-24 RX ORDER — METOPROLOL SUCCINATE 25 MG/1
TABLET, EXTENDED RELEASE ORAL
Qty: 30 TABLET | Refills: 1 | Status: SHIPPED | OUTPATIENT
Start: 2018-05-24 | End: 2018-06-28 | Stop reason: SDUPTHER

## 2018-06-01 ENCOUNTER — HOSPITAL ENCOUNTER (OUTPATIENT)
Dept: LAB | Age: 78
Setting detail: SPECIMEN
Discharge: HOME | End: 2018-06-01
Payer: MEDICARE

## 2018-06-01 DIAGNOSIS — I10 ESSENTIAL HYPERTENSION: ICD-10-CM

## 2018-06-01 DIAGNOSIS — R73.01 ELEVATED FASTING GLUCOSE: ICD-10-CM

## 2018-06-01 DIAGNOSIS — E55.9 VITAMIN D DEFICIENCY: ICD-10-CM

## 2018-06-01 LAB
ALBUMIN SERPL-MCNC: 3.9 G/DL (ref 3.5–5.2)
ALBUMIN/GLOBULIN RATIO: 1.5 (ref 1–2.5)
ALP BLD-CCNC: 69 U/L (ref 35–104)
ALT SERPL-CCNC: 11 U/L (ref 5–33)
ANION GAP SERPL CALCULATED.3IONS-SCNC: 14 MMOL/L (ref 9–17)
AST SERPL-CCNC: 17 U/L
BILIRUB SERPL-MCNC: 0.58 MG/DL (ref 0.3–1.2)
BUN BLDV-MCNC: 17 MG/DL (ref 8–23)
BUN/CREAT BLD: 20 (ref 9–20)
CALCIUM SERPL-MCNC: 9 MG/DL (ref 8.6–10.4)
CHLORIDE BLD-SCNC: 95 MMOL/L (ref 98–107)
CO2: 27 MMOL/L (ref 20–31)
CREAT SERPL-MCNC: 0.83 MG/DL (ref 0.5–0.9)
ESTIMATED AVERAGE GLUCOSE: 120 MG/DL
GFR AFRICAN AMERICAN: >60 ML/MIN
GFR NON-AFRICAN AMERICAN: >60 ML/MIN
GFR SERPL CREATININE-BSD FRML MDRD: ABNORMAL ML/MIN/{1.73_M2}
GFR SERPL CREATININE-BSD FRML MDRD: ABNORMAL ML/MIN/{1.73_M2}
GLUCOSE BLD-MCNC: 104 MG/DL (ref 70–99)
HBA1C MFR BLD: 5.8 % (ref 4.8–5.9)
POTASSIUM SERPL-SCNC: 3.7 MMOL/L (ref 3.7–5.3)
SODIUM BLD-SCNC: 136 MMOL/L (ref 135–144)
TOTAL PROTEIN: 6.5 G/DL (ref 6.4–8.3)
VITAMIN D 25-HYDROXY: 40.4 NG/ML (ref 30–100)

## 2018-06-01 PROCEDURE — 36415 COLL VENOUS BLD VENIPUNCTURE: CPT

## 2018-06-01 PROCEDURE — 82306 VITAMIN D 25 HYDROXY: CPT

## 2018-06-01 PROCEDURE — 80053 COMPREHEN METABOLIC PANEL: CPT

## 2018-06-01 PROCEDURE — 83036 HEMOGLOBIN GLYCOSYLATED A1C: CPT

## 2018-06-28 ENCOUNTER — OFFICE VISIT (OUTPATIENT)
Dept: FAMILY MEDICINE CLINIC | Age: 78
End: 2018-06-28
Payer: MEDICARE

## 2018-06-28 VITALS
BODY MASS INDEX: 30.83 KG/M2 | WEIGHT: 174 LBS | RESPIRATION RATE: 16 BRPM | HEART RATE: 68 BPM | DIASTOLIC BLOOD PRESSURE: 86 MMHG | SYSTOLIC BLOOD PRESSURE: 120 MMHG | HEIGHT: 63 IN

## 2018-06-28 DIAGNOSIS — Z12.31 ENCOUNTER FOR SCREENING MAMMOGRAM FOR BREAST CANCER: ICD-10-CM

## 2018-06-28 DIAGNOSIS — Z00.00 ROUTINE GENERAL MEDICAL EXAMINATION AT A HEALTH CARE FACILITY: Primary | ICD-10-CM

## 2018-06-28 DIAGNOSIS — K21.9 GASTROESOPHAGEAL REFLUX DISEASE, ESOPHAGITIS PRESENCE NOT SPECIFIED: ICD-10-CM

## 2018-06-28 DIAGNOSIS — Z23 NEED FOR SHINGLES VACCINE: ICD-10-CM

## 2018-06-28 DIAGNOSIS — R73.01 ELEVATED FASTING GLUCOSE: ICD-10-CM

## 2018-06-28 DIAGNOSIS — E55.9 VITAMIN D DEFICIENCY: ICD-10-CM

## 2018-06-28 DIAGNOSIS — I10 ESSENTIAL HYPERTENSION: ICD-10-CM

## 2018-06-28 DIAGNOSIS — R21 RASH: ICD-10-CM

## 2018-06-28 PROCEDURE — 4040F PNEUMOC VAC/ADMIN/RCVD: CPT | Performed by: FAMILY MEDICINE

## 2018-06-28 PROCEDURE — 1090F PRES/ABSN URINE INCON ASSESS: CPT | Performed by: FAMILY MEDICINE

## 2018-06-28 PROCEDURE — G8399 PT W/DXA RESULTS DOCUMENT: HCPCS | Performed by: FAMILY MEDICINE

## 2018-06-28 PROCEDURE — G8417 CALC BMI ABV UP PARAM F/U: HCPCS | Performed by: FAMILY MEDICINE

## 2018-06-28 PROCEDURE — 1123F ACP DISCUSS/DSCN MKR DOCD: CPT | Performed by: FAMILY MEDICINE

## 2018-06-28 PROCEDURE — G0439 PPPS, SUBSEQ VISIT: HCPCS | Performed by: FAMILY MEDICINE

## 2018-06-28 PROCEDURE — G8427 DOCREV CUR MEDS BY ELIG CLIN: HCPCS | Performed by: FAMILY MEDICINE

## 2018-06-28 PROCEDURE — 1036F TOBACCO NON-USER: CPT | Performed by: FAMILY MEDICINE

## 2018-06-28 PROCEDURE — 3017F COLORECTAL CA SCREEN DOC REV: CPT | Performed by: FAMILY MEDICINE

## 2018-06-28 PROCEDURE — 99214 OFFICE O/P EST MOD 30 MIN: CPT | Performed by: FAMILY MEDICINE

## 2018-06-28 RX ORDER — OMEPRAZOLE 20 MG/1
20 CAPSULE, DELAYED RELEASE ORAL DAILY PRN
Qty: 90 CAPSULE | Refills: 1 | Status: SHIPPED | OUTPATIENT
Start: 2018-06-28 | End: 2019-11-05 | Stop reason: SDUPTHER

## 2018-06-28 RX ORDER — TRIAMCINOLONE ACETONIDE 5 MG/G
CREAM TOPICAL
Qty: 1 TUBE | Refills: 0 | Status: SHIPPED | OUTPATIENT
Start: 2018-06-28 | End: 2020-02-18

## 2018-06-28 RX ORDER — LISINOPRIL AND HYDROCHLOROTHIAZIDE 25; 20 MG/1; MG/1
TABLET ORAL
Qty: 180 TABLET | Refills: 1 | Status: SHIPPED | OUTPATIENT
Start: 2018-06-28 | End: 2019-02-05 | Stop reason: SDUPTHER

## 2018-06-28 RX ORDER — OMEPRAZOLE 20 MG/1
20 CAPSULE, DELAYED RELEASE ORAL DAILY PRN
COMMUNITY
End: 2018-06-28 | Stop reason: SDUPTHER

## 2018-06-28 RX ORDER — AMLODIPINE BESYLATE 5 MG/1
TABLET ORAL
Qty: 90 TABLET | Refills: 1 | Status: SHIPPED | OUTPATIENT
Start: 2018-06-28 | End: 2018-12-20 | Stop reason: SDUPTHER

## 2018-06-28 RX ORDER — METOPROLOL SUCCINATE 25 MG/1
TABLET, EXTENDED RELEASE ORAL
Qty: 90 TABLET | Refills: 1 | Status: SHIPPED | OUTPATIENT
Start: 2018-06-28 | End: 2019-01-28 | Stop reason: SDUPTHER

## 2018-06-28 ASSESSMENT — LIFESTYLE VARIABLES: HOW OFTEN DO YOU HAVE A DRINK CONTAINING ALCOHOL: 0

## 2018-06-28 ASSESSMENT — ANXIETY QUESTIONNAIRES: GAD7 TOTAL SCORE: 0

## 2018-06-28 ASSESSMENT — PATIENT HEALTH QUESTIONNAIRE - PHQ9: SUM OF ALL RESPONSES TO PHQ QUESTIONS 1-9: 0

## 2018-07-06 ENCOUNTER — TELEPHONE (OUTPATIENT)
Dept: FAMILY MEDICINE CLINIC | Age: 78
End: 2018-07-06

## 2018-07-26 ENCOUNTER — HOSPITAL ENCOUNTER (OUTPATIENT)
Dept: MAMMOGRAPHY | Age: 78
Discharge: HOME | End: 2018-07-28
Payer: MEDICARE

## 2018-07-26 DIAGNOSIS — Z12.31 ENCOUNTER FOR SCREENING MAMMOGRAM FOR BREAST CANCER: ICD-10-CM

## 2018-07-26 PROCEDURE — 77067 SCR MAMMO BI INCL CAD: CPT

## 2018-10-19 ENCOUNTER — NURSE ONLY (OUTPATIENT)
Dept: LAB | Age: 78
End: 2018-10-19
Payer: MEDICARE

## 2018-10-19 DIAGNOSIS — Z23 NEED FOR VACCINATION: Primary | ICD-10-CM

## 2018-10-19 PROCEDURE — G0008 ADMIN INFLUENZA VIRUS VAC: HCPCS | Performed by: FAMILY MEDICINE

## 2018-10-19 PROCEDURE — 90662 IIV NO PRSV INCREASED AG IM: CPT | Performed by: FAMILY MEDICINE

## 2018-12-20 DIAGNOSIS — I10 ESSENTIAL HYPERTENSION: ICD-10-CM

## 2018-12-21 RX ORDER — AMLODIPINE BESYLATE 5 MG/1
TABLET ORAL
Qty: 90 TABLET | Refills: 0 | Status: SHIPPED | OUTPATIENT
Start: 2018-12-21 | End: 2019-02-05 | Stop reason: SDUPTHER

## 2018-12-28 ENCOUNTER — HOSPITAL ENCOUNTER (OUTPATIENT)
Dept: LAB | Age: 78
Discharge: HOME | End: 2018-12-28
Payer: MEDICARE

## 2018-12-28 DIAGNOSIS — R73.01 ELEVATED FASTING GLUCOSE: ICD-10-CM

## 2018-12-28 DIAGNOSIS — I10 ESSENTIAL HYPERTENSION: ICD-10-CM

## 2018-12-28 LAB
ALBUMIN SERPL-MCNC: 4.2 G/DL (ref 3.5–5.2)
ALBUMIN/GLOBULIN RATIO: 1.5 (ref 1–2.5)
ALP BLD-CCNC: 75 U/L (ref 35–104)
ALT SERPL-CCNC: 9 U/L (ref 5–33)
ANION GAP SERPL CALCULATED.3IONS-SCNC: 13 MMOL/L (ref 9–17)
AST SERPL-CCNC: 16 U/L
BILIRUB SERPL-MCNC: 0.47 MG/DL (ref 0.3–1.2)
BUN BLDV-MCNC: 20 MG/DL (ref 8–23)
BUN/CREAT BLD: 25 (ref 9–20)
CALCIUM SERPL-MCNC: 9 MG/DL (ref 8.6–10.4)
CHLORIDE BLD-SCNC: 96 MMOL/L (ref 98–107)
CHOLESTEROL/HDL RATIO: 2.9
CHOLESTEROL: 206 MG/DL
CO2: 26 MMOL/L (ref 20–31)
CREAT SERPL-MCNC: 0.79 MG/DL (ref 0.5–0.9)
ESTIMATED AVERAGE GLUCOSE: 117 MG/DL
GFR AFRICAN AMERICAN: >60 ML/MIN
GFR NON-AFRICAN AMERICAN: >60 ML/MIN
GFR SERPL CREATININE-BSD FRML MDRD: ABNORMAL ML/MIN/{1.73_M2}
GFR SERPL CREATININE-BSD FRML MDRD: ABNORMAL ML/MIN/{1.73_M2}
GLUCOSE BLD-MCNC: 97 MG/DL (ref 70–99)
HBA1C MFR BLD: 5.7 % (ref 4.8–5.9)
HDLC SERPL-MCNC: 71 MG/DL
LDL CHOLESTEROL: 117 MG/DL (ref 0–130)
POTASSIUM SERPL-SCNC: 4.2 MMOL/L (ref 3.7–5.3)
SODIUM BLD-SCNC: 135 MMOL/L (ref 135–144)
TOTAL PROTEIN: 7 G/DL (ref 6.4–8.3)
TRIGL SERPL-MCNC: 89 MG/DL
VLDLC SERPL CALC-MCNC: ABNORMAL MG/DL (ref 1–30)

## 2018-12-28 PROCEDURE — 36415 COLL VENOUS BLD VENIPUNCTURE: CPT

## 2018-12-28 PROCEDURE — 83036 HEMOGLOBIN GLYCOSYLATED A1C: CPT

## 2018-12-28 PROCEDURE — 80053 COMPREHEN METABOLIC PANEL: CPT

## 2018-12-28 PROCEDURE — 80061 LIPID PANEL: CPT

## 2019-01-27 DIAGNOSIS — I10 ESSENTIAL HYPERTENSION: ICD-10-CM

## 2019-01-28 RX ORDER — METOPROLOL SUCCINATE 25 MG/1
25 TABLET, EXTENDED RELEASE ORAL DAILY
Qty: 90 TABLET | Refills: 0 | Status: SHIPPED | OUTPATIENT
Start: 2019-01-28 | End: 2019-02-05 | Stop reason: SDUPTHER

## 2019-02-05 ENCOUNTER — OFFICE VISIT (OUTPATIENT)
Dept: FAMILY MEDICINE CLINIC | Age: 79
End: 2019-02-05
Payer: MEDICARE

## 2019-02-05 ENCOUNTER — HOSPITAL ENCOUNTER (OUTPATIENT)
Dept: GENERAL RADIOLOGY | Age: 79
Discharge: HOME | End: 2019-02-07
Payer: MEDICARE

## 2019-02-05 VITALS
WEIGHT: 169 LBS | DIASTOLIC BLOOD PRESSURE: 60 MMHG | BODY MASS INDEX: 29.95 KG/M2 | HEIGHT: 63 IN | SYSTOLIC BLOOD PRESSURE: 124 MMHG | HEART RATE: 72 BPM | RESPIRATION RATE: 16 BRPM

## 2019-02-05 DIAGNOSIS — M54.50 BILATERAL LOW BACK PAIN WITHOUT SCIATICA, UNSPECIFIED CHRONICITY: ICD-10-CM

## 2019-02-05 DIAGNOSIS — M25.531 BILATERAL WRIST PAIN: ICD-10-CM

## 2019-02-05 DIAGNOSIS — M25.532 BILATERAL WRIST PAIN: ICD-10-CM

## 2019-02-05 DIAGNOSIS — Z23 NEED FOR TDAP VACCINATION: ICD-10-CM

## 2019-02-05 DIAGNOSIS — Z23 NEED FOR SHINGLES VACCINE: ICD-10-CM

## 2019-02-05 DIAGNOSIS — I10 ESSENTIAL HYPERTENSION: Primary | ICD-10-CM

## 2019-02-05 DIAGNOSIS — M85.80 OSTEOPENIA, UNSPECIFIED LOCATION: ICD-10-CM

## 2019-02-05 DIAGNOSIS — M89.9 DISORDER OF BONE: ICD-10-CM

## 2019-02-05 PROCEDURE — G8427 DOCREV CUR MEDS BY ELIG CLIN: HCPCS | Performed by: FAMILY MEDICINE

## 2019-02-05 PROCEDURE — 1036F TOBACCO NON-USER: CPT | Performed by: FAMILY MEDICINE

## 2019-02-05 PROCEDURE — G8482 FLU IMMUNIZE ORDER/ADMIN: HCPCS | Performed by: FAMILY MEDICINE

## 2019-02-05 PROCEDURE — 1123F ACP DISCUSS/DSCN MKR DOCD: CPT | Performed by: FAMILY MEDICINE

## 2019-02-05 PROCEDURE — G8417 CALC BMI ABV UP PARAM F/U: HCPCS | Performed by: FAMILY MEDICINE

## 2019-02-05 PROCEDURE — 72100 X-RAY EXAM L-S SPINE 2/3 VWS: CPT

## 2019-02-05 PROCEDURE — 99214 OFFICE O/P EST MOD 30 MIN: CPT | Performed by: FAMILY MEDICINE

## 2019-02-05 PROCEDURE — 73120 X-RAY EXAM OF HAND: CPT

## 2019-02-05 PROCEDURE — 1101F PT FALLS ASSESS-DOCD LE1/YR: CPT | Performed by: FAMILY MEDICINE

## 2019-02-05 PROCEDURE — 1090F PRES/ABSN URINE INCON ASSESS: CPT | Performed by: FAMILY MEDICINE

## 2019-02-05 PROCEDURE — G8399 PT W/DXA RESULTS DOCUMENT: HCPCS | Performed by: FAMILY MEDICINE

## 2019-02-05 PROCEDURE — 73100 X-RAY EXAM OF WRIST: CPT

## 2019-02-05 PROCEDURE — 4040F PNEUMOC VAC/ADMIN/RCVD: CPT | Performed by: FAMILY MEDICINE

## 2019-02-05 RX ORDER — LISINOPRIL AND HYDROCHLOROTHIAZIDE 25; 20 MG/1; MG/1
TABLET ORAL
Qty: 180 TABLET | Refills: 1 | Status: SHIPPED | OUTPATIENT
Start: 2019-02-05 | End: 2019-08-17 | Stop reason: SDUPTHER

## 2019-02-05 RX ORDER — AMLODIPINE BESYLATE 5 MG/1
TABLET ORAL
Qty: 90 TABLET | Refills: 1 | Status: SHIPPED | OUTPATIENT
Start: 2019-02-05 | End: 2019-09-16 | Stop reason: SDUPTHER

## 2019-02-05 RX ORDER — METOPROLOL SUCCINATE 25 MG/1
25 TABLET, EXTENDED RELEASE ORAL DAILY
Qty: 90 TABLET | Refills: 1 | Status: SHIPPED | OUTPATIENT
Start: 2019-02-05 | End: 2019-10-23 | Stop reason: SDUPTHER

## 2019-02-05 ASSESSMENT — PATIENT HEALTH QUESTIONNAIRE - PHQ9
SUM OF ALL RESPONSES TO PHQ QUESTIONS 1-9: 0
SUM OF ALL RESPONSES TO PHQ9 QUESTIONS 1 & 2: 0
2. FEELING DOWN, DEPRESSED OR HOPELESS: 0
SUM OF ALL RESPONSES TO PHQ QUESTIONS 1-9: 0
1. LITTLE INTEREST OR PLEASURE IN DOING THINGS: 0

## 2019-02-06 DIAGNOSIS — M54.50 BILATERAL LOW BACK PAIN WITHOUT SCIATICA, UNSPECIFIED CHRONICITY: Primary | ICD-10-CM

## 2019-02-07 ENCOUNTER — HOSPITAL ENCOUNTER (OUTPATIENT)
Dept: BONE DENSITY | Age: 79
Discharge: HOME | End: 2019-02-09
Payer: MEDICARE

## 2019-02-07 DIAGNOSIS — M85.80 OSTEOPENIA, UNSPECIFIED LOCATION: ICD-10-CM

## 2019-02-07 DIAGNOSIS — M89.9 DISORDER OF BONE: ICD-10-CM

## 2019-02-07 PROCEDURE — 77085 DXA BONE DENSITY AXL VRT FX: CPT

## 2019-04-24 DIAGNOSIS — R21 RASH: ICD-10-CM

## 2019-04-24 RX ORDER — HYDROXYZINE HYDROCHLORIDE 25 MG/1
TABLET, FILM COATED ORAL
Qty: 90 TABLET | Refills: 0 | Status: SHIPPED | OUTPATIENT
Start: 2019-04-24 | End: 2020-08-19 | Stop reason: SDUPTHER

## 2019-07-30 ENCOUNTER — TELEPHONE (OUTPATIENT)
Dept: FAMILY MEDICINE CLINIC | Age: 79
End: 2019-07-30

## 2019-07-30 DIAGNOSIS — R73.01 ELEVATED FASTING GLUCOSE: ICD-10-CM

## 2019-07-30 DIAGNOSIS — I10 ESSENTIAL HYPERTENSION: ICD-10-CM

## 2019-07-30 DIAGNOSIS — E55.9 VITAMIN D DEFICIENCY: ICD-10-CM

## 2019-07-30 DIAGNOSIS — E66.3 OVERWEIGHT (BMI 25.0-29.9): ICD-10-CM

## 2019-07-30 DIAGNOSIS — I10 ESSENTIAL HYPERTENSION: Primary | ICD-10-CM

## 2019-07-30 DIAGNOSIS — M85.80 OSTEOPENIA, UNSPECIFIED LOCATION: Primary | ICD-10-CM

## 2019-08-01 ENCOUNTER — HOSPITAL ENCOUNTER (OUTPATIENT)
Dept: LAB | Age: 79
Discharge: HOME | End: 2019-08-01
Payer: MEDICARE

## 2019-08-01 DIAGNOSIS — E55.9 VITAMIN D DEFICIENCY: ICD-10-CM

## 2019-08-01 DIAGNOSIS — R73.01 ELEVATED FASTING GLUCOSE: ICD-10-CM

## 2019-08-01 DIAGNOSIS — I10 ESSENTIAL HYPERTENSION: ICD-10-CM

## 2019-08-01 LAB
ALBUMIN SERPL-MCNC: 4.1 G/DL (ref 3.5–5.2)
ALBUMIN/GLOBULIN RATIO: 1.4 (ref 1–2.5)
ALP BLD-CCNC: 82 U/L (ref 35–104)
ALT SERPL-CCNC: 9 U/L (ref 5–33)
ANION GAP SERPL CALCULATED.3IONS-SCNC: 13 MMOL/L (ref 9–17)
AST SERPL-CCNC: 15 U/L
BILIRUB SERPL-MCNC: 0.5 MG/DL (ref 0.3–1.2)
BUN BLDV-MCNC: 18 MG/DL (ref 8–23)
BUN/CREAT BLD: 22 (ref 9–20)
CALCIUM SERPL-MCNC: 9.3 MG/DL (ref 8.6–10.4)
CHLORIDE BLD-SCNC: 92 MMOL/L (ref 98–107)
CHOLESTEROL/HDL RATIO: 2.6
CHOLESTEROL: 177 MG/DL
CO2: 26 MMOL/L (ref 20–31)
CREAT SERPL-MCNC: 0.83 MG/DL (ref 0.5–0.9)
ESTIMATED AVERAGE GLUCOSE: 117 MG/DL
GFR AFRICAN AMERICAN: >60 ML/MIN
GFR NON-AFRICAN AMERICAN: >60 ML/MIN
GFR SERPL CREATININE-BSD FRML MDRD: ABNORMAL ML/MIN/{1.73_M2}
GFR SERPL CREATININE-BSD FRML MDRD: ABNORMAL ML/MIN/{1.73_M2}
GLUCOSE BLD-MCNC: 95 MG/DL (ref 70–99)
HBA1C MFR BLD: 5.7 % (ref 4.8–5.9)
HDLC SERPL-MCNC: 69 MG/DL
LDL CHOLESTEROL: 93 MG/DL (ref 0–130)
POTASSIUM SERPL-SCNC: 3.9 MMOL/L (ref 3.7–5.3)
SODIUM BLD-SCNC: 131 MMOL/L (ref 135–144)
TOTAL PROTEIN: 7.1 G/DL (ref 6.4–8.3)
TRIGL SERPL-MCNC: 76 MG/DL
VLDLC SERPL CALC-MCNC: NORMAL MG/DL (ref 1–30)

## 2019-08-01 PROCEDURE — 80061 LIPID PANEL: CPT

## 2019-08-01 PROCEDURE — 82306 VITAMIN D 25 HYDROXY: CPT

## 2019-08-01 PROCEDURE — 80053 COMPREHEN METABOLIC PANEL: CPT

## 2019-08-01 PROCEDURE — 83036 HEMOGLOBIN GLYCOSYLATED A1C: CPT

## 2019-08-01 PROCEDURE — 36415 COLL VENOUS BLD VENIPUNCTURE: CPT

## 2019-08-02 ENCOUNTER — TELEPHONE (OUTPATIENT)
Dept: FAMILY MEDICINE CLINIC | Age: 79
End: 2019-08-02

## 2019-08-02 DIAGNOSIS — E87.1 HYPONATREMIA: Primary | ICD-10-CM

## 2019-08-02 LAB — VITAMIN D 25-HYDROXY: 52 NG/ML (ref 30–100)

## 2019-08-06 ENCOUNTER — OFFICE VISIT (OUTPATIENT)
Dept: FAMILY MEDICINE CLINIC | Age: 79
End: 2019-08-06
Payer: MEDICARE

## 2019-08-06 ENCOUNTER — HOSPITAL ENCOUNTER (OUTPATIENT)
Dept: LAB | Age: 79
Discharge: HOME | End: 2019-08-06
Payer: MEDICARE

## 2019-08-06 VITALS
WEIGHT: 161.8 LBS | BODY MASS INDEX: 28.67 KG/M2 | SYSTOLIC BLOOD PRESSURE: 138 MMHG | DIASTOLIC BLOOD PRESSURE: 72 MMHG | HEIGHT: 63 IN | HEART RATE: 64 BPM

## 2019-08-06 DIAGNOSIS — Z12.39 SCREENING FOR BREAST CANCER: ICD-10-CM

## 2019-08-06 DIAGNOSIS — R73.01 ELEVATED FASTING GLUCOSE: ICD-10-CM

## 2019-08-06 DIAGNOSIS — E87.1 HYPONATREMIA: ICD-10-CM

## 2019-08-06 DIAGNOSIS — I10 ESSENTIAL HYPERTENSION: ICD-10-CM

## 2019-08-06 DIAGNOSIS — Z00.00 ROUTINE GENERAL MEDICAL EXAMINATION AT A HEALTH CARE FACILITY: Primary | ICD-10-CM

## 2019-08-06 LAB
ANION GAP SERPL CALCULATED.3IONS-SCNC: 14 MMOL/L (ref 9–17)
BUN BLDV-MCNC: 19 MG/DL (ref 8–23)
BUN/CREAT BLD: 24 (ref 9–20)
CALCIUM SERPL-MCNC: 9.4 MG/DL (ref 8.6–10.4)
CHLORIDE BLD-SCNC: 96 MMOL/L (ref 98–107)
CO2: 25 MMOL/L (ref 20–31)
CREAT SERPL-MCNC: 0.79 MG/DL (ref 0.5–0.9)
GFR AFRICAN AMERICAN: >60 ML/MIN
GFR NON-AFRICAN AMERICAN: >60 ML/MIN
GFR SERPL CREATININE-BSD FRML MDRD: ABNORMAL ML/MIN/{1.73_M2}
GFR SERPL CREATININE-BSD FRML MDRD: ABNORMAL ML/MIN/{1.73_M2}
GLUCOSE BLD-MCNC: 101 MG/DL (ref 70–99)
POTASSIUM SERPL-SCNC: 3.8 MMOL/L (ref 3.7–5.3)
SODIUM BLD-SCNC: 135 MMOL/L (ref 135–144)

## 2019-08-06 PROCEDURE — 1123F ACP DISCUSS/DSCN MKR DOCD: CPT | Performed by: FAMILY MEDICINE

## 2019-08-06 PROCEDURE — 1090F PRES/ABSN URINE INCON ASSESS: CPT | Performed by: FAMILY MEDICINE

## 2019-08-06 PROCEDURE — G0439 PPPS, SUBSEQ VISIT: HCPCS | Performed by: FAMILY MEDICINE

## 2019-08-06 PROCEDURE — 36415 COLL VENOUS BLD VENIPUNCTURE: CPT

## 2019-08-06 PROCEDURE — 99214 OFFICE O/P EST MOD 30 MIN: CPT | Performed by: FAMILY MEDICINE

## 2019-08-06 PROCEDURE — G8399 PT W/DXA RESULTS DOCUMENT: HCPCS | Performed by: FAMILY MEDICINE

## 2019-08-06 PROCEDURE — G8427 DOCREV CUR MEDS BY ELIG CLIN: HCPCS | Performed by: FAMILY MEDICINE

## 2019-08-06 PROCEDURE — 80048 BASIC METABOLIC PNL TOTAL CA: CPT

## 2019-08-06 PROCEDURE — 1036F TOBACCO NON-USER: CPT | Performed by: FAMILY MEDICINE

## 2019-08-06 PROCEDURE — G8417 CALC BMI ABV UP PARAM F/U: HCPCS | Performed by: FAMILY MEDICINE

## 2019-08-06 PROCEDURE — 4040F PNEUMOC VAC/ADMIN/RCVD: CPT | Performed by: FAMILY MEDICINE

## 2019-08-06 ASSESSMENT — PATIENT HEALTH QUESTIONNAIRE - PHQ9
SUM OF ALL RESPONSES TO PHQ QUESTIONS 1-9: 0
SUM OF ALL RESPONSES TO PHQ QUESTIONS 1-9: 0

## 2019-08-06 ASSESSMENT — LIFESTYLE VARIABLES: HOW OFTEN DO YOU HAVE A DRINK CONTAINING ALCOHOL: 0

## 2019-08-06 NOTE — PROGRESS NOTES
Medicare Annual Wellness Visit  Name: Renee Correa Date: 2019   MRN: [id number] Sex: Female   Age: 70 y.o. Ethnicity: Non-/Non    : 1940 Race: Kay Pham is here for Medicare AWV (medicare annual wellness) and Hypertension (6 month follow up)    Screenings for behavioral, psychosocial and functional/safety risks, and cognitive dysfunction are all negative except as indicated below. These results, as well as other patient data from the [address] form, are documented in Flowsheets linked to this Encounter. Allergies   Allergen Reactions    Hydrochlorothiazide Itching and Rash     See progress note from Dr. Dl Chavez (dermatogist) 2017. Patient restarted 17 not having any issues as of 17     Prior to Visit Medications    Medication Sig Taking? Authorizing Provider   hydrOXYzine (ATARAX) 25 MG tablet TAKE ONE TABLET BY MOUTH THREE TIMES DAILY AS NEEDED FOR ITCHING Yes Cecilia Galvez MD   metoprolol succinate (TOPROL XL) 25 MG extended release tablet Take 1 tablet by mouth daily Yes Cecilia Galvez MD   amLODIPine (NORVASC) 5 MG tablet TAKE 1 TABLET BY MOUTH ONCE DAILY Yes Cecilia Galvez MD   lisinopril-hydrochlorothiazide (PRINZIDE;ZESTORETIC) 20-25 MG per tablet TAKE TWO TABLETS BY MOUTH ONCE DAILY Yes Cecilia Galvez MD   Krill Oil (MAXIMUM RED KRILL PO) Take by mouth daily Yes Historical Provider, MD   triamcinolone (ARISTOCORT) 0.5 % cream Apply topically 3 times daily.  Yes Cecilia Galvez MD   omeprazole (PRILOSEC) 20 MG delayed release capsule Take 1 capsule by mouth daily as needed (reflux) Yes Cecilia Galvez MD   Multiple Vitamins-Minerals (PRESERVISION AREDS 2 PO) Take by mouth daily Yes Historical Provider, MD   ranitidine (ZANTAC) 150 MG tablet Take 150 mg by mouth 2 times daily Yes Historical Provider, MD   albuterol sulfate  (90 BASE) MCG/ACT inhaler Inhale 2 puffs into the lungs every 4 hours as needed for Wheezing or
that she does not need any refills at this time. Labs were ordered to be done prior to her return visit in 6 months:  - Comprehensive Metabolic Panel; Future  - Lipid Panel; Future    3. Elevated fasting glucose  Her fasting glucose is elevated, but her HgbA1c is within normal limits. I recommended that she decrease her intake of processed carbohydrates, begin a regular exercise program, and attempt to lose weight.  - Hemoglobin A1C; Future prior to her return visit in 6 months. 4. Hyponatremia  Her sodium is improved on repeat labs done today. She was encouraged to continue to add salt to her food. Printed information regarding Hyponatremia was provided to the patient with her after visit summary. 5.  Routine health maintenance  Health maintenance was reviewed with the patient. Screening mammogram was recommended and ordered. Tdap and Shingrix were recommended and declined. All other health maintenance, including Pneumovax and Prevnar-13, is up to date at this time.           (Please note that portions of this note were completed with a voice-recognition program. Efforts were made to edit the dictation but occasionally words are mis-transcribed.)

## 2019-08-06 NOTE — PATIENT INSTRUCTIONS
Personalized Preventive Plan for Lauren Akins - 8/6/2019  Medicare offers a range of preventive health benefits. Some of the tests and screenings are paid in full while other may be subject to a deductible, co-insurance, and/or copay. Some of these benefits include a comprehensive review of your medical history including lifestyle, illnesses that may run in your family, and various assessments and screenings as appropriate. After reviewing your medical record and screening and assessments performed today your provider may have ordered immunizations, labs, imaging, and/or referrals for you. A list of these orders (if applicable) as well as your Preventive Care list are included within your After Visit Summary for your review. Other Preventive Recommendations:    · A preventive eye exam performed by an eye specialist is recommended every 1-2 years to screen for glaucoma; cataracts, macular degeneration, and other eye disorders. · A preventive dental visit is recommended every 6 months. · Try to get at least 150 minutes of exercise per week or 10,000 steps per day on a pedometer . · Order or download the FREE \"Exercise & Physical Activity: Your Everyday Guide\" from [employer]. Call 1-831.687.4020 or search [employer] online. · You need 8544-0857 mg of calcium and 6775-2224 IU of vitamin D per day. It is possible to meet your calcium requirement with diet alone, but a vitamin D supplement is usually necessary to meet this goal.  · When exposed to the sun, use a sunscreen that protects against both UVA and UVB radiation with an SPF of 30 or greater. Reapply every 2 to 3 hours or after sweating, drying off with a towel, or swimming. · Always wear a seat belt when traveling in a car. Always wear a helmet when riding a bicycle or motorcycle. Learning About Living Shea Peck  What is a living will?     A living will is a legal form you use to write down the kind of care you want at the end of your life. It is used by the health professionals who will treat you if you aren't able to decide for yourself. If you put your wishes in writing, your loved ones and others will know what kind of care you want. They won't need to guess. This can ease your mind and be helpful to others. A living will is not the same as an estate or property will. An estate will explains what you want to happen with your money and property after you die. Is a living will a legal document? A living will is a legal document. Each state has its own laws about living [place]. If you move to another state, make sure that your living will is legal in the state where you now live. Or you might use a universal form that has been approved by many states. This kind of form can sometimes be completed and stored online. Your electronic copy will then be available wherever you have a connection to the Internet. In most cases, doctors will respect your wishes even if you have a form from a different state. You don't need an  to complete a living will. But legal advice can be helpful if your state's laws are unclear, your health history is complicated, or your family can't agree on what should be in your living will. You can change your living will at any time. Some people find that their wishes about end-of-life care change as their health changes. In addition to making a living will, think about completing a medical power of  form. This form lets you name the person you want to make end-of-life treatment decisions for you (your \"health care agent\") if you're not able to. Many hospitals and nursing homes will give you the forms you need to complete a living will and a medical power of . Your living will is used only if you can't make or communicate decisions for yourself anymore.  If you become able to make decisions again, you can accept or refuse any treatment, no matter what you wrote call if:    · You have a seizure.     · You passed out (lost consciousness).    Call your doctor now or seek immediate medical care if:    · You are confused or it is hard to focus.     · You have little or no appetite.     · You feel sick to your stomach or you vomit.     · You have a headache.     · You have mood changes.     · You feel more tired than usual.    Watch closely for changes in your health, and be sure to contact your doctor if:    · You do not get better as expected. Where can you learn more? Go to https://[employer]. org and sign in to your [employer] account. Enter D618 in the Social IQ (Social Influence Quotient) box to learn more about \"Hyponatremia: Care Instructions. \"     If you do not have an account, please click on the \"Sign Up Now\" link. Current as of: June 25, 2018  Content Version: 12.0  © 0389-1004 [employer]wise, Incorporated. Care instructions adapted under license by [facility] ([facility]). If you have questions about a medical condition or this instruction, always ask your healthcare professional. [address] any warranty or liability for your use of this information.

## 2019-08-08 ENCOUNTER — HOSPITAL ENCOUNTER (OUTPATIENT)
Dept: MAMMOGRAPHY | Age: 79
Discharge: HOME | End: 2019-08-10
Payer: MEDICARE

## 2019-08-08 DIAGNOSIS — Z12.39 SCREENING FOR BREAST CANCER: ICD-10-CM

## 2019-08-08 PROCEDURE — 77063 BREAST TOMOSYNTHESIS BI: CPT

## 2019-08-17 DIAGNOSIS — I10 ESSENTIAL HYPERTENSION: ICD-10-CM

## 2019-08-19 NOTE — TELEPHONE ENCOUNTER
Courtney Meza called requesting a refill of the below medication which has been pended for you:     Requested Prescriptions     Pending Prescriptions Disp Refills    lisinopril-hydrochlorothiazide (PRINZIDE;ZESTORETIC) 20-25 MG per tablet [Pharmacy Med Name: LISINOPRIL/HCTZ 20-25MG TAB] 180 tablet 1     Sig: TAKE 2 TABLETS BY MOUTH ONCE DAILY       Last Appointment Date: 8/6/2019  Next Appointment Date: 2/6/2020    Allergies   Allergen Reactions    Hydrochlorothiazide Itching and Rash     See progress note from Dr. Patrice Blizzard (dermatogist) 4/13/2017.     Patient restarted 7/31/17 not having any issues as of 8/4/17

## 2019-08-20 RX ORDER — LISINOPRIL AND HYDROCHLOROTHIAZIDE 25; 20 MG/1; MG/1
TABLET ORAL
Qty: 180 TABLET | Refills: 1 | Status: SHIPPED | OUTPATIENT
Start: 2019-08-20 | End: 2020-02-18 | Stop reason: SDUPTHER

## 2019-09-16 DIAGNOSIS — I10 ESSENTIAL HYPERTENSION: ICD-10-CM

## 2019-09-17 RX ORDER — AMLODIPINE BESYLATE 5 MG/1
TABLET ORAL
Qty: 90 TABLET | Refills: 1 | Status: SHIPPED | OUTPATIENT
Start: 2019-09-17 | End: 2020-02-18 | Stop reason: SDUPTHER

## 2019-10-01 ENCOUNTER — TELEPHONE (OUTPATIENT)
Dept: FAMILY MEDICINE CLINIC | Age: 79
End: 2019-10-01

## 2019-10-23 DIAGNOSIS — I10 ESSENTIAL HYPERTENSION: ICD-10-CM

## 2019-10-23 RX ORDER — METOPROLOL SUCCINATE 25 MG/1
TABLET, EXTENDED RELEASE ORAL
Qty: 90 TABLET | Refills: 1 | Status: SHIPPED | OUTPATIENT
Start: 2019-10-23 | End: 2020-02-18 | Stop reason: SDUPTHER

## 2019-11-05 DIAGNOSIS — K21.9 GASTROESOPHAGEAL REFLUX DISEASE, ESOPHAGITIS PRESENCE NOT SPECIFIED: ICD-10-CM

## 2019-11-05 RX ORDER — OMEPRAZOLE 20 MG/1
CAPSULE, DELAYED RELEASE ORAL
Qty: 90 CAPSULE | Refills: 1 | Status: SHIPPED | OUTPATIENT
Start: 2019-11-05 | End: 2020-02-18 | Stop reason: SDUPTHER

## 2019-11-07 ENCOUNTER — IMMUNIZATION (OUTPATIENT)
Dept: LAB | Age: 79
End: 2019-11-07
Payer: MEDICARE

## 2019-11-07 PROCEDURE — G0008 ADMIN INFLUENZA VIRUS VAC: HCPCS | Performed by: FAMILY MEDICINE

## 2019-11-07 PROCEDURE — 90653 IIV ADJUVANT VACCINE IM: CPT | Performed by: FAMILY MEDICINE

## 2020-01-30 ENCOUNTER — HOSPITAL ENCOUNTER (OUTPATIENT)
Dept: LAB | Age: 80
Discharge: HOME | End: 2020-01-30
Payer: MEDICARE

## 2020-01-30 LAB
ALBUMIN SERPL-MCNC: 4.4 G/DL (ref 3.5–5.2)
ALBUMIN/GLOBULIN RATIO: 1.4 (ref 1–2.5)
ALP BLD-CCNC: 90 U/L (ref 35–104)
ALT SERPL-CCNC: 13 U/L (ref 5–33)
ANION GAP SERPL CALCULATED.3IONS-SCNC: 13 MMOL/L (ref 9–17)
AST SERPL-CCNC: 19 U/L
BILIRUB SERPL-MCNC: 0.45 MG/DL (ref 0.3–1.2)
BUN BLDV-MCNC: 27 MG/DL (ref 8–23)
BUN/CREAT BLD: 34 (ref 9–20)
CALCIUM SERPL-MCNC: 9.2 MG/DL (ref 8.6–10.4)
CHLORIDE BLD-SCNC: 94 MMOL/L (ref 98–107)
CHOLESTEROL/HDL RATIO: 2.7
CHOLESTEROL: 185 MG/DL
CO2: 27 MMOL/L (ref 20–31)
CREAT SERPL-MCNC: 0.79 MG/DL (ref 0.5–0.9)
ESTIMATED AVERAGE GLUCOSE: 123 MG/DL
GFR AFRICAN AMERICAN: >60 ML/MIN
GFR NON-AFRICAN AMERICAN: >60 ML/MIN
GFR SERPL CREATININE-BSD FRML MDRD: ABNORMAL ML/MIN/{1.73_M2}
GFR SERPL CREATININE-BSD FRML MDRD: ABNORMAL ML/MIN/{1.73_M2}
GLUCOSE BLD-MCNC: 94 MG/DL (ref 70–99)
HBA1C MFR BLD: 5.9 % (ref 4.8–5.9)
HDLC SERPL-MCNC: 69 MG/DL
LDL CHOLESTEROL: 100 MG/DL (ref 0–130)
POTASSIUM SERPL-SCNC: 4.2 MMOL/L (ref 3.7–5.3)
SODIUM BLD-SCNC: 134 MMOL/L (ref 135–144)
TOTAL PROTEIN: 7.5 G/DL (ref 6.4–8.3)
TRIGL SERPL-MCNC: 81 MG/DL
VLDLC SERPL CALC-MCNC: NORMAL MG/DL (ref 1–30)

## 2020-01-30 PROCEDURE — 36415 COLL VENOUS BLD VENIPUNCTURE: CPT

## 2020-01-30 PROCEDURE — 80053 COMPREHEN METABOLIC PANEL: CPT

## 2020-01-30 PROCEDURE — 80061 LIPID PANEL: CPT

## 2020-01-30 PROCEDURE — 83036 HEMOGLOBIN GLYCOSYLATED A1C: CPT

## 2020-02-18 ENCOUNTER — OFFICE VISIT (OUTPATIENT)
Dept: FAMILY MEDICINE CLINIC | Age: 80
End: 2020-02-18
Payer: MEDICARE

## 2020-02-18 VITALS
BODY MASS INDEX: 30.41 KG/M2 | DIASTOLIC BLOOD PRESSURE: 68 MMHG | HEIGHT: 63 IN | WEIGHT: 171.6 LBS | SYSTOLIC BLOOD PRESSURE: 120 MMHG | RESPIRATION RATE: 16 BRPM | HEART RATE: 86 BPM

## 2020-02-18 PROCEDURE — 99212 OFFICE O/P EST SF 10 MIN: CPT

## 2020-02-18 PROCEDURE — G8417 CALC BMI ABV UP PARAM F/U: HCPCS | Performed by: FAMILY MEDICINE

## 2020-02-18 PROCEDURE — 1090F PRES/ABSN URINE INCON ASSESS: CPT | Performed by: FAMILY MEDICINE

## 2020-02-18 PROCEDURE — G8482 FLU IMMUNIZE ORDER/ADMIN: HCPCS | Performed by: FAMILY MEDICINE

## 2020-02-18 PROCEDURE — 99214 OFFICE O/P EST MOD 30 MIN: CPT | Performed by: FAMILY MEDICINE

## 2020-02-18 PROCEDURE — 4040F PNEUMOC VAC/ADMIN/RCVD: CPT | Performed by: FAMILY MEDICINE

## 2020-02-18 PROCEDURE — 1123F ACP DISCUSS/DSCN MKR DOCD: CPT | Performed by: FAMILY MEDICINE

## 2020-02-18 PROCEDURE — G8399 PT W/DXA RESULTS DOCUMENT: HCPCS | Performed by: FAMILY MEDICINE

## 2020-02-18 PROCEDURE — 1036F TOBACCO NON-USER: CPT | Performed by: FAMILY MEDICINE

## 2020-02-18 PROCEDURE — G8427 DOCREV CUR MEDS BY ELIG CLIN: HCPCS | Performed by: FAMILY MEDICINE

## 2020-02-18 RX ORDER — AMLODIPINE BESYLATE 5 MG/1
TABLET ORAL
Qty: 90 TABLET | Refills: 1 | Status: SHIPPED | OUTPATIENT
Start: 2020-02-18 | End: 2020-09-08

## 2020-02-18 RX ORDER — LISINOPRIL AND HYDROCHLOROTHIAZIDE 25; 20 MG/1; MG/1
TABLET ORAL
Qty: 180 TABLET | Refills: 1 | Status: SHIPPED | OUTPATIENT
Start: 2020-02-18 | End: 2020-08-11

## 2020-02-18 RX ORDER — OMEPRAZOLE 20 MG/1
CAPSULE, DELAYED RELEASE ORAL
Qty: 90 CAPSULE | Refills: 1 | Status: SHIPPED | OUTPATIENT
Start: 2020-02-18 | End: 2020-10-26

## 2020-02-18 RX ORDER — METOPROLOL SUCCINATE 25 MG/1
TABLET, EXTENDED RELEASE ORAL
Qty: 90 TABLET | Refills: 1 | Status: SHIPPED | OUTPATIENT
Start: 2020-02-18 | End: 2020-10-20

## 2020-02-18 ASSESSMENT — PATIENT HEALTH QUESTIONNAIRE - PHQ9
SUM OF ALL RESPONSES TO PHQ9 QUESTIONS 1 & 2: 1
2. FEELING DOWN, DEPRESSED OR HOPELESS: 1
SUM OF ALL RESPONSES TO PHQ QUESTIONS 1-9: 1
1. LITTLE INTEREST OR PLEASURE IN DOING THINGS: 0
SUM OF ALL RESPONSES TO PHQ QUESTIONS 1-9: 1

## 2020-02-18 NOTE — PROGRESS NOTES
[address], [address] [place] [address] Drive                        Telephone [phone]             Fax [phone]     Sohail Staton  1940  MRN:  [id number]  Date of visit:  2/18/2020    Subjective:    Sohail Staton is a 70 y.o.  female who presents to [facility] today (2/18/2020) for follow up/evaluation of:  Hypertension and Hyperglycemia      She states that she has been feeling well. She is tolerating her medications well. She states that she has been trying to exercise regularly. She denies chest pain with activity or at rest.  She states that she has had some dyspnea with activity. She has the following problem list:  Patient Active Problem List   Diagnosis    Essential hypertension    GERD (gastroesophageal reflux disease)    Elevated fasting glucose    Osteopenia    Overweight (BMI 25.0-29. 9)    Venous insufficiency (chronic) (peripheral)    Spider vein, symptomatic    Varicose vein    Hyponatremia, mild    Tubular adenoma of colon    Bullous pemphigoid    Vitamin D deficiency        Current medications are:  Outpatient Medications Marked as Taking for the 2/18/20 encounter (Office Visit) with Saeid Keys MD   Medication Sig Dispense Refill    omeprazole (PRILOSEC) 20 MG delayed release capsule TAKE 1 CAPSULE BY MOUTH ONCE DAILY AS NEEDED FOR REFLUX 90 capsule 1    metoprolol succinate (TOPROL XL) 25 MG extended release tablet TAKE 1 TABLET BY MOUTH ONCE DAILY 90 tablet 1    amLODIPine (NORVASC) 5 MG tablet TAKE 1 TABLET BY MOUTH ONCE DAILY 90 tablet 1    lisinopril-hydrochlorothiazide (PRINZIDE;ZESTORETIC) 20-25 MG per tablet TAKE 2 TABLETS BY MOUTH ONCE DAILY 180 tablet 1    hydrOXYzine (ATARAX) 25 MG tablet TAKE ONE TABLET BY MOUTH THREE TIMES DAILY AS NEEDED FOR ITCHING 90 tablet 0    Krill Oil (MAXIMUM RED KRILL PO) Take by mouth daily  Multiple Vitamins-Minerals (PRESERVISION AREDS 2 PO) Take by mouth daily      albuterol sulfate  (90 BASE) MCG/ACT inhaler Inhale 2 puffs into the lungs every 4 hours as needed for Wheezing or Shortness of Breath 1 Inhaler 0    vitamin D-3 (CHOLECALCIFEROL) 5000 UNITS TABS Take 5,000 Units by mouth daily.  Coenzyme Q10 (CO Q 10 PO) Take by mouth daily Indications: prn when remembers to take       Psyllium (METAMUCIL PO) Take  by mouth. She is allergic to hydrochlorothiazide. She is not currently a smoker. She  reports that she quit smoking about 31 years ago. Her smoking use included cigarettes. She has never used smokeless tobacco.      Objective:    Vitals:    02/18/20 1351   BP: 120/68   Site: Right Upper Arm   Position: Sitting   Cuff Size: Large Adult   Pulse: 86   Resp: 16   Weight: 171 lb 9.6 oz (77.8 kg)   Height: 5' 3\" (1.6 m)     Body mass index is 30.4 kg/m². Obese elderly  female, healthy-appearing, alert, cooperative and in no distress. Neck supple. No adenopathy. Thyroid symmetric, normal size. Chest:  Normal expansion. Clear to auscultation. No rales, rhonchi, or wheezing. Heart sounds are normal.  Regular rate and rhythm without murmur, gallop or rub. Lower extremities have no edema.     Labs done 1/30/2020 were reviewed with the patient:   Hospital Outpatient Visit on 01/30/2020   Component Date Value Ref Range Status    Hemoglobin A1C 01/30/2020 5.9  4.8 - 5.9 % Final    Estimated Avg Glucose 01/30/2020 123  mg/dL Final    Cholesterol 01/30/2020 185  <200 mg/dL Final    Comment:    Cholesterol Guidelines:      <200  Desirable   200-240  Borderline      >240  Undesirable         HDL 01/30/2020 69  >40 mg/dL Final    Comment:    HDL Guidelines:    <40     Undesirable   40-59    Borderline    >59     Desirable         LDL Cholesterol 01/30/2020 100  0 - 130 mg/dL Final    Comment:    LDL Guidelines:     <100    Desirable   100-129   Near to/above

## 2020-08-11 RX ORDER — LISINOPRIL AND HYDROCHLOROTHIAZIDE 25; 20 MG/1; MG/1
TABLET ORAL
Qty: 180 TABLET | Refills: 0 | Status: SHIPPED | OUTPATIENT
Start: 2020-08-11 | End: 2020-08-19

## 2020-08-12 ENCOUNTER — TELEPHONE (OUTPATIENT)
Dept: FAMILY MEDICINE CLINIC | Age: 80
End: 2020-08-12

## 2020-08-12 ENCOUNTER — HOSPITAL ENCOUNTER (OUTPATIENT)
Dept: LAB | Age: 80
Discharge: HOME | End: 2020-08-12
Payer: MEDICARE

## 2020-08-12 LAB
ALBUMIN SERPL-MCNC: 4.3 G/DL (ref 3.5–5.2)
ALBUMIN/GLOBULIN RATIO: 1.4 (ref 1–2.5)
ALP BLD-CCNC: 92 U/L (ref 35–104)
ALT SERPL-CCNC: 14 U/L (ref 5–33)
ANION GAP SERPL CALCULATED.3IONS-SCNC: 10 MMOL/L (ref 9–17)
AST SERPL-CCNC: 17 U/L
BILIRUB SERPL-MCNC: 0.49 MG/DL (ref 0.3–1.2)
BUN BLDV-MCNC: 26 MG/DL (ref 8–23)
BUN/CREAT BLD: 27 (ref 9–20)
CALCIUM SERPL-MCNC: 9.9 MG/DL (ref 8.6–10.4)
CHLORIDE BLD-SCNC: 95 MMOL/L (ref 98–107)
CHOLESTEROL, FASTING: 194 MG/DL
CHOLESTEROL/HDL RATIO: 2.9
CO2: 27 MMOL/L (ref 20–31)
CREAT SERPL-MCNC: 0.95 MG/DL (ref 0.5–0.9)
ESTIMATED AVERAGE GLUCOSE: 111 MG/DL
GFR AFRICAN AMERICAN: >60 ML/MIN
GFR NON-AFRICAN AMERICAN: 57 ML/MIN
GFR SERPL CREATININE-BSD FRML MDRD: ABNORMAL ML/MIN/{1.73_M2}
GFR SERPL CREATININE-BSD FRML MDRD: ABNORMAL ML/MIN/{1.73_M2}
GLUCOSE BLD-MCNC: 114 MG/DL (ref 70–99)
HBA1C MFR BLD: 5.5 % (ref 4.8–5.9)
HDLC SERPL-MCNC: 68 MG/DL
LDL CHOLESTEROL: 108 MG/DL (ref 0–130)
POTASSIUM SERPL-SCNC: 4.1 MMOL/L (ref 3.7–5.3)
SODIUM BLD-SCNC: 132 MMOL/L (ref 135–144)
TOTAL PROTEIN: 7.4 G/DL (ref 6.4–8.3)
TRIGLYCERIDE, FASTING: 92 MG/DL
VITAMIN D 25-HYDROXY: 65.7 NG/ML (ref 30–100)
VLDLC SERPL CALC-MCNC: NORMAL MG/DL (ref 1–30)

## 2020-08-12 PROCEDURE — 83036 HEMOGLOBIN GLYCOSYLATED A1C: CPT

## 2020-08-12 PROCEDURE — 80061 LIPID PANEL: CPT

## 2020-08-12 PROCEDURE — 36415 COLL VENOUS BLD VENIPUNCTURE: CPT

## 2020-08-12 PROCEDURE — 82306 VITAMIN D 25 HYDROXY: CPT

## 2020-08-12 PROCEDURE — 80053 COMPREHEN METABOLIC PANEL: CPT

## 2020-08-12 RX ORDER — LISINOPRIL 40 MG/1
40 TABLET ORAL DAILY
Qty: 30 TABLET | Refills: 0 | Status: SHIPPED | OUTPATIENT
Start: 2020-08-12 | End: 2020-09-08

## 2020-08-12 RX ORDER — FUROSEMIDE 20 MG/1
20 TABLET ORAL DAILY
Qty: 30 TABLET | Refills: 0 | Status: SHIPPED | OUTPATIENT
Start: 2020-08-12 | End: 2020-09-08

## 2020-08-12 NOTE — RESULT ENCOUNTER NOTE
Please advise Salinas Lacey that her sodium level is low. This may be due to the Hydrochlorothiazide component of her blood pressure medication. I recommend that she discontinue Lisinopril-Hydrochlorothiazide and begin Lisinopril alone. Please pend Lisinopril 40 mg daily to her pharmacy. Please order a basic metabolic panel to be done before her appointment on 8/19/2020. If she is able to check her blood pressure at home, I recommend that she check her blood pressure once or twice a day and bring the readings to her appointment.

## 2020-08-12 NOTE — TELEPHONE ENCOUNTER
----- Message from Essie Mancuso MD sent at 8/12/2020  4:06 PM EDT -----  Please advise Kyle Nair that her sodium level is low. This may be due to the Hydrochlorothiazide component of her blood pressure medication. I recommend that she discontinue Lisinopril-Hydrochlorothiazide and begin Lisinopril alone. Please pend Lisinopril 40 mg daily to her pharmacy. Please order a basic metabolic panel to be done before her appointment on 8/19/2020. If she is able to check her blood pressure at home, I recommend that she check her blood pressure once or twice a day and bring the readings to her appointment.

## 2020-08-19 ENCOUNTER — OFFICE VISIT (OUTPATIENT)
Dept: FAMILY MEDICINE CLINIC | Age: 80
End: 2020-08-19
Payer: MEDICARE

## 2020-08-19 ENCOUNTER — HOSPITAL ENCOUNTER (OUTPATIENT)
Dept: LAB | Age: 80
Discharge: HOME | End: 2020-08-19
Payer: MEDICARE

## 2020-08-19 VITALS
HEIGHT: 63 IN | BODY MASS INDEX: 29.68 KG/M2 | SYSTOLIC BLOOD PRESSURE: 138 MMHG | RESPIRATION RATE: 16 BRPM | HEART RATE: 74 BPM | DIASTOLIC BLOOD PRESSURE: 72 MMHG | WEIGHT: 167.5 LBS

## 2020-08-19 LAB
ANION GAP SERPL CALCULATED.3IONS-SCNC: 11 MMOL/L (ref 9–17)
BUN BLDV-MCNC: 24 MG/DL (ref 8–23)
BUN/CREAT BLD: 32 (ref 9–20)
CALCIUM SERPL-MCNC: 9.5 MG/DL (ref 8.6–10.4)
CHLORIDE BLD-SCNC: 100 MMOL/L (ref 98–107)
CO2: 27 MMOL/L (ref 20–31)
CREAT SERPL-MCNC: 0.74 MG/DL (ref 0.5–0.9)
GFR AFRICAN AMERICAN: >60 ML/MIN
GFR NON-AFRICAN AMERICAN: >60 ML/MIN
GFR SERPL CREATININE-BSD FRML MDRD: ABNORMAL ML/MIN/{1.73_M2}
GFR SERPL CREATININE-BSD FRML MDRD: ABNORMAL ML/MIN/{1.73_M2}
GLUCOSE BLD-MCNC: 106 MG/DL (ref 70–99)
POTASSIUM SERPL-SCNC: 3.9 MMOL/L (ref 3.7–5.3)
SODIUM BLD-SCNC: 138 MMOL/L (ref 135–144)

## 2020-08-19 PROCEDURE — 1123F ACP DISCUSS/DSCN MKR DOCD: CPT | Performed by: FAMILY MEDICINE

## 2020-08-19 PROCEDURE — G0439 PPPS, SUBSEQ VISIT: HCPCS | Performed by: FAMILY MEDICINE

## 2020-08-19 PROCEDURE — 4040F PNEUMOC VAC/ADMIN/RCVD: CPT | Performed by: FAMILY MEDICINE

## 2020-08-19 PROCEDURE — 80048 BASIC METABOLIC PNL TOTAL CA: CPT

## 2020-08-19 PROCEDURE — 36415 COLL VENOUS BLD VENIPUNCTURE: CPT

## 2020-08-19 RX ORDER — HYDROXYZINE HYDROCHLORIDE 25 MG/1
TABLET, FILM COATED ORAL
Qty: 90 TABLET | Refills: 1 | Status: SHIPPED | OUTPATIENT
Start: 2020-08-19 | End: 2021-10-06

## 2020-08-19 ASSESSMENT — LIFESTYLE VARIABLES: HOW OFTEN DO YOU HAVE A DRINK CONTAINING ALCOHOL: 0

## 2020-08-19 ASSESSMENT — PATIENT HEALTH QUESTIONNAIRE - PHQ9
SUM OF ALL RESPONSES TO PHQ QUESTIONS 1-9: 0
SUM OF ALL RESPONSES TO PHQ QUESTIONS 1-9: 0

## 2020-08-19 NOTE — PROGRESS NOTES
(CHOLECALCIFEROL) 5000 UNITS TABS Take 5,000 Units by mouth daily. Yes Historical Provider, MD   Psyllium (METAMUCIL PO) Take  by mouth. Yes Historical Provider, MD   Coenzyme Q10 (CO Q 10 PO) Take by mouth daily Indications: prn when remembers to take   Historical Provider, MD       Past Medical History:   Diagnosis Date    Elevated fasting glucose     Former smoker     quit in 1987    GERD (gastroesophageal reflux disease)     Hypertension     Osteopenia     took Boniva times 5 years, stopped 2012    Overweight(278.02)     wieght 174 pounds, height 65 inches, BMI 29, 3/31/2013       Past Surgical History:   Procedure Laterality Date    ABDOMINAL EXPLORATION SURGERY  1972    ectopic pregnancy, bilateral salpingectomy    CATARACT REMOVAL WITH IMPLANT Left 07/24/2018    per Dr Shae Early [place] COLONOSCOPY W/BIOPSY SINGLE/MULTIPLE N/A 7/26/2017    COLONOSCOPY WITH BIOPSY performed by Di Hurtado MD at [address] [place] [address], one tubular adenoma on pathology, repeat recommended in 11 years       Family History   Problem Relation Age of Onset    Cancer Mother         unknown type of cancer    Diabetes Mother     Diabetes Brother     Diabetes Brother     Heart Disease Father         CHF       CareTeam (Including outside providers/suppliers regularly involved in providing care):   Patient Care Team:  Kerwin Brown MD as PCP - General (Family Medicine)  Kerwin Brown MD as PCP - [facility] Empaneled Provider    Wt Readings from Last 3 Encounters:   08/19/20 167 lb 8 oz (76 kg)   02/18/20 171 lb 9.6 oz (77.8 kg)   08/06/19 161 lb 12.8 oz (73.4 kg)     Vitals:    08/19/20 1416   BP: 138/72   Site: Right Upper Arm   Position: Sitting   Cuff Size: Large Adult   Pulse: 74   Resp: 16   Weight: 167 lb 8 oz (76 kg)   Height: 5' 3\" (1.6 m)     Body mass index is 29.67 kg/m². Based upon direct observation of the patient, evaluation of cognition reveals recent and remote memory intact.         Patient's complete Health Risk Assessment and screening values have been reviewed and are found in Flowsheets. The following problems were reviewed today and where indicated follow up appointments were made and/or referrals ordered. Positive Risk Factor Screenings with Interventions:     General Health:  General  In general, how would you say your health is?: Good  In the past 7 days, have you experienced any of the following? New or Increased Pain, New or Increased Fatigue, Loneliness, Social Isolation, Stress or Anger?: None of These  Do you get the social and emotional support that you need?: Yes  Do you have a Living Will?: (!) No  General Health Risk Interventions:  · No Living Will: additional information provided with the after visit summary    Health Habits/Nutrition:  Health Habits/Nutrition  Do you exercise for at least 20 minutes 2-3 times per week?: (!) No  Have you lost any weight without trying in the past 3 months?: No  Do you eat fewer than 2 meals per day?: No  Have you seen a dentist within the past year?: (!) No  Body mass index is 29.67 kg/m².   Health Habits/Nutrition Interventions:  · Inadequate physical activity:  patient is not ready to increase his/her physical activity level at this time  · Dental exam overdue:  patient encouraged to make appointment with his/her dentist    Personalized Preventive Plan   Current Health Maintenance Status  Immunization History   Administered Date(s) Administered    Influenza Vaccine, unspecified formulation 10/25/2013, 10/31/2014, 10/29/2015    Influenza Virus Vaccine 12/04/2009, 11/16/2011, 10/31/2014    Influenza Whole 12/04/2009, 11/16/2011    Influenza, High Dose (Fluzone 65 yrs and older) 10/25/2013, 10/29/2015, 11/15/2016, 10/26/2017, 10/19/2018    Influenza, Triv, inactivated, subunit, adjuvanted, IM (Fluad 65 yrs and older) 11/07/2019    Pneumococcal Conjugate 13-valent (Unmidns31) 06/04/2015    Pneumococcal Polysaccharide (Bijrkrden30) 10/30/2012    Tdap (Boostrix, Adacel) 01/30/2009        Health Maintenance   Topic Date Due    Shingles Vaccine (1 of 2) 10/24/1992    DTaP/Tdap/Td vaccine (2 - Td) 01/30/2019    Annual Wellness Visit (AWV)  05/29/2019    Breast cancer screen  08/08/2020    Flu vaccine (1) 09/01/2020    Potassium monitoring  08/12/2021    Creatinine monitoring  08/12/2021    DEXA (modify frequency per FRAX score)  02/07/2022    Colon cancer screen colonoscopy  07/26/2022    Pneumococcal 65+ years Vaccine  Completed    Hepatitis A vaccine  Aged Out    Hepatitis B vaccine  Aged Out    Hib vaccine  Aged Out    Meningococcal (ACWY) vaccine  Aged Out     Recommendations for [employer] Due: see orders and patient instructions/AVS.  . Recommended screening schedule for the next 5-10 years is provided to the patient in written form: see Patient Binh Roche was seen today for medicare awv, hypertension, hyperglycemia and lower back pain.     Diagnoses and all orders for this visit:    Essential hypertension

## 2020-08-19 NOTE — PROGRESS NOTES
[address] [place], [address]                        Telephone [phone]             Fax [phone]     Gaurav Pinzon  1940  MRN:  [id number]  Date of visit:  8/19/2020    Subjective:    Gaurav Pinzon is a 70 y.o.  female who presents to [facility] today (8/19/2020) for follow up/evaluation of:  Medicare AWV; Hypertension; and Hyperglycemia      She was advised to discontinue Hydrochlorothiazide 8/14/2020 due to low sodium level. She has been checking her blood pressure at home, and her readings have been 13 to 130's over 60-70's. She is having intermittent low back pain that radiates down both legs. She states that she has difficulty walking at times. She felt unsteady using a cane, so she began using a walker. She has not had any recent falls. She requests a refill of Hydroxyzine. She uses this occasionally for a pruritic rash. She has the following problem list:  Patient Active Problem List   Diagnosis    Essential hypertension    GERD (gastroesophageal reflux disease)    Elevated fasting glucose    Osteopenia    Overweight (BMI 25.0-29. 9)    Venous insufficiency (chronic) (peripheral)    Spider vein, symptomatic    Varicose vein    Hyponatremia, mild    Tubular adenoma of colon    Bullous pemphigoid    Vitamin D deficiency        Current medications are:  Outpatient Medications Marked as Taking for the 8/19/20 encounter (Office Visit) with Foster Canas MD   Medication Sig Dispense Refill    Acetaminophen (TYLENOL ARTHRITIS PAIN PO) Take by mouth as needed      lisinopril (PRINIVIL;ZESTRIL) 40 MG tablet Take 1 tablet by mouth daily 30 tablet 0    furosemide (LASIX) 20 MG tablet Take 1 tablet by mouth daily 30 tablet 0    omeprazole (PRILOSEC) 20 MG delayed release capsule TAKE 1 CAPSULE BY MOUTH ONCE DAILY AS NEEDED FOR REFLUX 90 capsule 1    metoprolol succinate (TOPROL XL) 25 MG extended release tablet TAKE 1 TABLET BY MOUTH ONCE DAILY 90 tablet 1    amLODIPine (NORVASC) 5 MG tablet Take one tablet by mouth once a day 90 tablet 1    hydrOXYzine (ATARAX) 25 MG tablet TAKE ONE TABLET BY MOUTH THREE TIMES DAILY AS NEEDED FOR ITCHING 90 tablet 0    Krill Oil (MAXIMUM RED KRILL PO) Take by mouth daily      Multiple Vitamins-Minerals (PRESERVISION AREDS 2 PO) Take by mouth daily      albuterol sulfate  (90 BASE) MCG/ACT inhaler Inhale 2 puffs into the lungs every 4 hours as needed for Wheezing or Shortness of Breath 1 Inhaler 0    vitamin D-3 (CHOLECALCIFEROL) 5000 UNITS TABS Take 5,000 Units by mouth daily.  Psyllium (METAMUCIL PO) Take  by mouth. She is allergic to hydrochlorothiazide. She is not currently a smoker. She  reports that she quit smoking about 31 years ago. Her smoking use included cigarettes. She has never used smokeless tobacco.      Objective:    Vitals:    08/19/20 1416   BP: 138/72   Site: Right Upper Arm   Position: Sitting   Cuff Size: Large Adult   Pulse: 74   Resp: 16   Weight: 167 lb 8 oz (76 kg)   Height: 5' 3\" (1.6 m)     Body mass index is 29.67 kg/m². Overweight elderly  female, healthy-appearing, alert, cooperative and in no distress. Neck supple. No adenopathy. Thyroid symmetric, normal size. Chest:  Normal expansion. Clear to auscultation. No rales, rhonchi, or wheezing. Heart sounds are normal.  Regular rate and rhythm without murmur, gallop or rub. Lower extremities have no edema.     Labs done 8/12/2020 and 8/19/2020 were reviewed with the patient:   Hospital Outpatient Visit on 08/19/2020   Component Date Value Ref Range Status    Glucose 08/19/2020 106* 70 - 99 mg/dL Final    BUN 08/19/2020 24* 8 - 23 mg/dL Final    CREATININE 08/19/2020 0.74  0.50 - 0.90 mg/dL Final    Bun/Cre Ratio 08/19/2020 32* 9 - 20 Final    Calcium 08/19/2020 9.5  8.6 - 10.4 mg/dL Final    Sodium 08/19/2020 138  135 - 144 mmol/L Final    Potassium 08/19/2020 3.9  3.7 - 5.3 mmol/L Final    Chloride 08/19/2020 100  98 - 107 mmol/L Final    CO2 08/19/2020 27  20 - 31 mmol/L Final    Anion Gap 08/19/2020 11  9 - 17 mmol/L Final    GFR Non- 08/19/2020 >60  >60 mL/min Final    GFR  08/19/2020 >60  >60 mL/min Final    GFR Comment 08/19/2020        Final    Comment: Average GFR for 79or more years old:   76 mL/min/1.73sq m  Chronic Kidney Disease:   <60 mL/min/1.73sq m  Kidney failure:   <15 mL/min/1.73sq m        The equation has not been validated in patients older than 79, but an MDRD-derived eGFR may   still be a useful tool for providers caring for patients older than 70. GFR is a calculated value that has proven clinically to  be a more effective measure of   kidney function when reported with serum creatinine.  GFR Staging 08/19/2020 NOT REPORTED   Final   Hospital Outpatient Visit on 08/12/2020   Component Date Value Ref Range Status    Cholesterol, Fasting 08/12/2020 194  <200 mg/dL Final    Comment:    Cholesterol Guidelines:      <200  Desirable   200-240  Borderline      >240  Undesirable         HDL 08/12/2020 68  >40 mg/dL Final    Comment:    HDL Guidelines:    <40     Undesirable   40-59    Borderline    >59     Desirable         LDL Cholesterol 08/12/2020 108  0 - 130 mg/dL Final    Comment:    LDL Guidelines:     <100    Desirable   100-129   Near to/above Desirable   130-159   Borderline      >159   Undesirable     Direct (measured) LDL and calculated LDL are not interchangeable tests.  Chol/HDL Ratio 08/12/2020 2.9  <5 Final            Triglyceride, Fasting 08/12/2020 92  <150 mg/dL Final    Comment:    Triglyceride Guidelines:     <150   Desirable   150-199  Borderline   200-499  High     >499   Very high   Based on AHA Guidelines for fasting triglyceride, October 2012.          VLDL 08/12/2020 NOT REPORTED 1 - 30 mg/dL Final    Hemoglobin A1C 08/12/2020 5.5  4.8 - 5.9 % Final    Estimated Avg Glucose 08/12/2020 111  mg/dL Final    Glucose 08/12/2020 114* 70 - 99 mg/dL Final    BUN 08/12/2020 26* 8 - 23 mg/dL Final    CREATININE 08/12/2020 0.95* 0.50 - 0.90 mg/dL Final    Bun/Cre Ratio 08/12/2020 27* 9 - 20 Final    Calcium 08/12/2020 9.9  8.6 - 10.4 mg/dL Final    Sodium 08/12/2020 132* 135 - 144 mmol/L Final    Potassium 08/12/2020 4.1  3.7 - 5.3 mmol/L Final    Chloride 08/12/2020 95* 98 - 107 mmol/L Final    CO2 08/12/2020 27  20 - 31 mmol/L Final    Anion Gap 08/12/2020 10  9 - 17 mmol/L Final    Alkaline Phosphatase 08/12/2020 92  35 - 104 U/L Final    ALT 08/12/2020 14  5 - 33 U/L Final    AST 08/12/2020 17  <32 U/L Final    Total Bilirubin 08/12/2020 0.49  0.3 - 1.2 mg/dL Final    Total Protein 08/12/2020 7.4  6.4 - 8.3 g/dL Final    Alb 08/12/2020 4.3  3.5 - 5.2 g/dL Final    Albumin/Globulin Ratio 08/12/2020 1.4  1.0 - 2.5 Final    GFR Non- 08/12/2020 57* >60 mL/min Final    GFR  08/12/2020 >60  >60 mL/min Final    GFR Comment 08/12/2020        Final    Comment: Average GFR for 79or more years old:   76 mL/min/1.73sq m  Chronic Kidney Disease:   <60 mL/min/1.73sq m  Kidney failure:   <15 mL/min/1.73sq m              eGFR calculated using average adult body mass. Additional eGFR calculator available at:        [employer]            GFR Staging 08/12/2020 NOT REPORTED   Final    Vit D, 25-Hydroxy 08/12/2020 65.7  30.0 - 100.0 ng/mL Final    Comment:    Reference Range:  Vitamin D status         Range   Deficiency              <20 ng/mL   Mild Deficiency       20-30 ng/mL   Sufficiency           ng/mL   Toxicity               >100 ng/mL           Assessment and Plan:    1. Routine general medical examination at a health care facility  See separate progress note for Medicare Wellness Visit.      2. Essential hypertension  Her blood pressure is adequately-controlled. (BP: 138/72)   She was advised to continue current medications. She states that she does not need a refill today. 3. Hyponatremia, mild  Her sodium has improved since discontinuing Hydrochlorothiazide. 4. Rash  Hydroxyzine was refilled:  - hydrOXYzine (ATARAX) 25 MG tablet; TAKE ONE TABLET BY MOUTH THREE TIMES DAILY AS NEEDED FOR ITCHING  Dispense: 90 tablet; Refill: 1    5. Routine health maintenance  Health maintenance was reviewed with the patient. Annual influenza vaccine was recommended. She has an order for a mammogram.   Tdap and Shingrix were recommended and declined. All other health maintenance, including Pneumovax and Prevnar-13, is up to date at this time.         (Please note that portions of this note were completed with a voice-recognition program. Efforts were made to edit the dictation but occasionally words are mis-transcribed.)

## 2020-08-19 NOTE — PATIENT INSTRUCTIONS
Personalized Preventive Plan for Sascha Fass - 8/19/2020  Medicare offers a range of preventive health benefits. Some of the tests and screenings are paid in full while other may be subject to a deductible, co-insurance, and/or copay. Some of these benefits include a comprehensive review of your medical history including lifestyle, illnesses that may run in your family, and various assessments and screenings as appropriate. After reviewing your medical record and screening and assessments performed today your provider may have ordered immunizations, labs, imaging, and/or referrals for you. A list of these orders (if applicable) as well as your Preventive Care list are included within your After Visit Summary for your review. Other Preventive Recommendations:    · A preventive eye exam performed by an eye specialist is recommended every 1-2 years to screen for glaucoma; cataracts, macular degeneration, and other eye disorders. · A preventive dental visit is recommended every 6 months. · Try to get at least 150 minutes of exercise per week or 10,000 steps per day on a pedometer . · Order or download the FREE \"Exercise & Physical Activity: Your Everyday Guide\" from [employer]. Call 9-575.282.4503 or search [employer] online. · You need 2927-4321 mg of calcium and 2217-1738 IU of vitamin D per day. It is possible to meet your calcium requirement with diet alone, but a vitamin D supplement is usually necessary to meet this goal.  · When exposed to the sun, use a sunscreen that protects against both UVA and UVB radiation with an SPF of 30 or greater. Reapply every 2 to 3 hours or after sweating, drying off with a towel, or swimming. · Always wear a seat belt when traveling in a car. Always wear a helmet when riding a bicycle or motorcycle.     Keeping Home a [facility]       As we get older, changes in balance, gait, strength, vision, hearing, and cognition make even the most youthful senior more prone to accidents. Falls are one of the leading health risks for older people. This increased risk of falling is related to:   Aging process (eg, decreased muscle strength, slowed reflexes)   Higher incidence of chronic health problems (eg, arthritis, diabetes) that may limit mobility, agility or sensory awareness   Side effects of medicine (eg, dizziness, blurred vision)especially medicines like prescription pain medicines and drugs used to treat mental health conditions   Depending on the brittleness of your bones, the consequences of a fall can be serious and long lasting. Home Life   Research by the Association of Aging [facility]) shows that some home accidents among older adults can be prevented by making simple lifestyle changes and basic modifications and repairs to the home environment. Here are some lifestyle changes that experts recommend:   Have your hearing and vision checked regularly. Be sure to wear prescription glasses that are right for you. Speak to your doctor or pharmacist about the possible side effects of your medicines. A number of medicines can cause dizziness. If you have problems with sleep, talk to your doctor. Limit your intake of alcohol. If necessary, use a cane or walker to help maintain your balance. Wear supportive, rubber-soled shoes, even at home. If you live in a region that gets wintry weather, you may want to put special cleats on your shoes to prevent you from slipping on the snow and ice. Exercise regularly to help maintain muscle tone, agility, and balance. Always hold the banister when going up or down stairs. Also, use  bars when getting in or out of the bath or shower, or using the toilet. To avoid dizziness, get up slowly from a lying down position. Sit up first, dangling your legs for a minute or two before rising to a standing position.    Overall Home Safety Check   According to the Consumer Product Safety Commision's \"Older Consumer Home Safety Checklist,\" it is important to check for potential hazards in each room. And remember, proper lighting is an essential factor in home safety. If you cannot see clearly, you are more likely to fall. Important questions to ask yourself include:   Are lamp, electric, extension, and telephone cords placed out of the flow of traffic and maintained in good condition? Have frayed cords been replaced? Are all small rugs and runners slip resistant? If not, you can secure them to the floor with a special double-sided carpet tape. Are smoke detectors properly locatedone on every floor of your home and one outside of every sleeping area? Are they in good working order? Are batteries replaced at least once a year? Do you have a well-maintained carbon monoxide detector outside every sleeping are in your home? Does your furniture layout leave plenty of space to maneuver between and around chairs, tables, beds, and sofas? Are hallways, stairs and passages between rooms well lit? Can you reach a lamp without getting out of bed? Are floor surfaces well maintained? Shag rugs, high-pile carpeting, tile floors, and polished wood floors can be particularly slippery. Stairs should always have handrails and be carpeted or fitted with a non-skid tread. Is your telephone easily reachable. Is the cord safely tucked away? Room by Room   According to the Association of Aging, bathrooms and trice are the two most potentially hazardous rooms in your home. In the Kitchen    Be sure your stove is in proper working order and always make sure burners and the oven are off before you go out or go to sleep. Keep pots on the back burners, turn handles away from the front of the stove, and keep stove clean and free of grease build-up. Kitchen ventilation systems and range exhausts should be working properly.     Keep flammable objects such as towels and pot holders away from the cooking area except when in use. Make sure kitchen curtains are tied back. Move cords and appliances away from the sink and hot surfaces. If extension cords are needed, install wiring guides so they do not hang over the sink, range, or working areas. Look for coffee pots, kettles and toaster ovens with automatic shut-offs. Keep a mop handy in the kitchen so you can wipe up spills instantly. You should also have a small fire extinguisher. Arrange your kitchen with frequently used items on lower shelves to avoid the need to stand on a stepstool to reach them. Make sure countertops are well-lit to avoid injuries while cutting and preparing food. In the Bathroom    Use a non-slip mat or decals in the tub and shower, since wet, soapy tile or porcelain surfaces are extremely slippery. Make sure bathroom rugs are non-skid or tape them firmly to the floor. Bathtubs should have at least one, preferably two, grab bars, firmly attached to structural supports in the wall. (Do not use built-in soap holders or glass shower doors as grab bars.)    Tub seats fitted with non-slip material on the legs allow you to wash sitting down. For people with limited mobility, bathtub transfer benches allow you to slide safely into the tub. Raised toilet seats and toilet safety rails are helpful for those with knee or hip problems. In the [facility]    Make sure you use a nightlight and that the area around your bed is clear of potential obstacles. Be careful with electric blankets and never go to sleep with a heating pad, which can cause serious burns even if on a low setting. Use fire-resistant mattress covers and pillows, and NEVER smoke in bed. Keep a phone next to the bed that is programmed to dial 911 at the push of a button. If you have a chronic condition, you may want to sign on with an automatic call-in service.  Typically the system includes a small pendant that connects directly to an emergency medical voice-response system. You should also make arrangements to stay in contact with someonefriend, neighbor, family memberon a regular schedule. Fire Prevention   According to the [employer]. (Smoke Alarms for Every) [facility], senior citizens are one of the two highest risk groups for death and serious injuries due to residential fires. When cooking, wear short-sleeved items, never a bulky long-sleeved robe. The [facility]'s Safety Checklist for Older Consumers emphasizes the importance of checking basements, garages, workshops and storage areas for fire hazards, such as volatile liquids, piles of old rags or clothing and overloaded circuits. Never smoke in bed or when lying down on a couch or recliner chair. Small portable electric or kerosene heaters are responsible for many home fires and should be used cautiously if at all. If you do use one, be sure to keep them away from flammable materials. In case of fire, make sure you have a pre-established emergency exit plan. Have a professional check your fireplace and other fuel-burning appliances yearly. Helping Hands   Baby boomers entering the ledezma years will continue to see the development of new products to help older adults live safely and independently in spite of age-related changes. Making Life More Livable  , by [facility], lists over 1,000 products for \"living well in the mature years,\" such as bathing and mobility aids, household security devices, ergonomically designed knives and peelers, and faucet valves and knobs for temperature control. Medical supply stores and organizations are good sources of information about products that improve your quality of life and insure your safety. Last Reviewed: November 2009 Etelvina Catherine MD   Updated: 3/7/2011     ·        Learning About Living [place]  What is a living will? A living will, also called a declaration, is a legal form.  It tells your family and your doctor your wishes when you can't speak for yourself. It's used by the health professionals who will treat you as you near the end of your life or if you get seriously hurt or ill. If you put your wishes in writing, your loved ones and others will know what kind of care you want. They won't need to guess. This can ease your mind and be helpful to others. And you can change or cancel your living will at any time. A living will is not the same as an estate or property will. An estate will explains what you want to happen with your money and property after you die. How do you use it? A living will is used to describe the kinds of treatment or life support you want as you near the end of your life or if you get seriously hurt or ill. Keep these facts in mind about living arzola. Your living will is used only if you can't speak or make decisions for yourself. Most often, one or more doctors must certify that you can't speak or decide for yourself before your living will takes effect. If you get better and can speak for yourself again, you can accept or refuse any treatment. It doesn't matter what you said in your living will. Some states may limit your right to refuse treatment in certain cases. For example, you may need to clearly state in your living will that you don't want artificial hydration and nutrition, such as being fed through a tube. Is a living will a legal document? A living will is a legal document. Each state has its own laws about living arzola. And a living will may be called something else in your state. Here are some things to know about living arzola. You don't need an  to complete a living will. But legal advice can be helpful if your state's laws are unclear. It can also help if your health history is complicated or your family can't agree on what should be in your living will. You can change your living will at any time.  Some people find that their wishes about end-of-life care change as

## 2020-09-08 RX ORDER — FUROSEMIDE 20 MG/1
TABLET ORAL
Qty: 90 TABLET | Refills: 1 | Status: SHIPPED | OUTPATIENT
Start: 2020-09-08 | End: 2020-11-07 | Stop reason: ALTCHOICE

## 2020-09-08 RX ORDER — AMLODIPINE BESYLATE 5 MG/1
TABLET ORAL
Qty: 90 TABLET | Refills: 1 | Status: SHIPPED | OUTPATIENT
Start: 2020-09-08 | End: 2021-03-04

## 2020-09-08 RX ORDER — LISINOPRIL 40 MG/1
TABLET ORAL
Qty: 90 TABLET | Refills: 1 | Status: SHIPPED | OUTPATIENT
Start: 2020-09-08 | End: 2021-03-04

## 2020-09-10 ENCOUNTER — HOSPITAL ENCOUNTER (OUTPATIENT)
Dept: MAMMOGRAPHY | Age: 80
Discharge: HOME | End: 2020-09-12
Payer: MEDICARE

## 2020-09-10 PROCEDURE — 77063 BREAST TOMOSYNTHESIS BI: CPT

## 2020-09-30 ENCOUNTER — IMMUNIZATION (OUTPATIENT)
Dept: LAB | Age: 80
End: 2020-09-30
Payer: MEDICARE

## 2020-09-30 PROCEDURE — PBSHW INFLUENZA, QUADV, ADJUVANTED, 65 YRS +, IM, PF, PREFILL SYR, 0.5ML (FLUAD): Performed by: FAMILY MEDICINE

## 2020-09-30 PROCEDURE — 90694 VACC AIIV4 NO PRSRV 0.5ML IM: CPT | Performed by: FAMILY MEDICINE

## 2020-10-20 RX ORDER — METOPROLOL SUCCINATE 25 MG/1
TABLET, EXTENDED RELEASE ORAL
Qty: 90 TABLET | Refills: 1 | Status: SHIPPED | OUTPATIENT
Start: 2020-10-20 | End: 2021-04-15

## 2020-10-20 NOTE — TELEPHONE ENCOUNTER
Tomeka Gomez called requesting a refill of the below medication which has been pended for you:     Requested Prescriptions     Pending Prescriptions Disp Refills    metoprolol succinate (TOPROL XL) 25 MG extended release tablet [Pharmacy Med Name: Metoprolol Succinate ER 25 MG Oral Tablet Extended Release 24 Hour] 90 tablet 1     Sig: Take 1 tablet by mouth once daily       Last Appointment Date: 8/19/2020  Next Appointment Date: 2/24/2021    Allergies   Allergen Reactions    Hydrochlorothiazide Itching and Rash     See progress note from Dr. Washington Oglesby (dermatogist) 4/13/2017.     Patient restarted 7/31/17 not having any issues as of 8/4/17

## 2020-10-22 ENCOUNTER — TELEPHONE (OUTPATIENT)
Dept: FAMILY MEDICINE CLINIC | Age: 80
End: 2020-10-22

## 2020-10-22 NOTE — TELEPHONE ENCOUNTER
This AM her blood pressure was 150/76- she reports an increase in blood pressure since the medication change. She does not record them on paper. She is not taking daily weights, but was able to weigh herself today. Her weight is 165 lb     Patient notified of recommendations and verbalized understanding.  Future lab ordered

## 2020-10-22 NOTE — TELEPHONE ENCOUNTER
Pt calling stating she had a med change and was put on Lisinopril and Lasix and pt states prior to that she had a slight bit of swelling in her feet, but since the change she's had a lot of swelling in her feet to the point that the skin on her feet is tight and the swelling goes up to her knee and it's hard for pt to walk. States she has tired increasing and decreasing her water intake, elevating feet in recliner, walking more and there has been no improvement. Please call pt with recommendations at above number, pt uses [facility] pharmacy, please advise at above number.

## 2020-10-22 NOTE — TELEPHONE ENCOUNTER
Previously, she was taking Lisinopril-Hydrochlorothiazide 20-25, two daily. Hydrochlorothiazide was discontinued on 8/12/2020 due to hyponatremia. She was advised to continue Lisinopril 40 mg daily. Lasix 20 mg daily was prescribed also. Has she been checking her blood pressure at home? Has she weighed herself? What have those readings/weights been? I recommend that she increase Lasix to 40 mg daily, and have a basic metabolic panel done next week.

## 2020-10-29 ENCOUNTER — HOSPITAL ENCOUNTER (OUTPATIENT)
Dept: LAB | Age: 80
Discharge: HOME | End: 2020-10-29
Payer: MEDICARE

## 2020-10-29 LAB
ANION GAP SERPL CALCULATED.3IONS-SCNC: 10 MMOL/L (ref 9–17)
BUN BLDV-MCNC: 18 MG/DL (ref 8–23)
BUN/CREAT BLD: 24 (ref 9–20)
CALCIUM SERPL-MCNC: 9.7 MG/DL (ref 8.6–10.4)
CHLORIDE BLD-SCNC: 102 MMOL/L (ref 98–107)
CO2: 29 MMOL/L (ref 20–31)
CREAT SERPL-MCNC: 0.76 MG/DL (ref 0.5–0.9)
GFR AFRICAN AMERICAN: >60 ML/MIN
GFR NON-AFRICAN AMERICAN: >60 ML/MIN
GFR SERPL CREATININE-BSD FRML MDRD: ABNORMAL ML/MIN/{1.73_M2}
GFR SERPL CREATININE-BSD FRML MDRD: ABNORMAL ML/MIN/{1.73_M2}
GLUCOSE BLD-MCNC: 94 MG/DL (ref 70–99)
POTASSIUM SERPL-SCNC: 3.6 MMOL/L (ref 3.7–5.3)
SODIUM BLD-SCNC: 141 MMOL/L (ref 135–144)

## 2020-10-29 PROCEDURE — 36415 COLL VENOUS BLD VENIPUNCTURE: CPT

## 2020-10-29 PROCEDURE — 80048 BASIC METABOLIC PNL TOTAL CA: CPT

## 2020-11-05 ENCOUNTER — HOSPITAL ENCOUNTER (OUTPATIENT)
Dept: LAB | Age: 80
Discharge: HOME | End: 2020-11-05
Payer: MEDICARE

## 2020-11-05 LAB
ANION GAP SERPL CALCULATED.3IONS-SCNC: 7 MMOL/L (ref 9–17)
BUN BLDV-MCNC: 22 MG/DL (ref 8–23)
BUN/CREAT BLD: 28 (ref 9–20)
CALCIUM SERPL-MCNC: 9.6 MG/DL (ref 8.6–10.4)
CHLORIDE BLD-SCNC: 102 MMOL/L (ref 98–107)
CO2: 30 MMOL/L (ref 20–31)
CREAT SERPL-MCNC: 0.78 MG/DL (ref 0.5–0.9)
GFR AFRICAN AMERICAN: >60 ML/MIN
GFR NON-AFRICAN AMERICAN: >60 ML/MIN
GFR SERPL CREATININE-BSD FRML MDRD: ABNORMAL ML/MIN/{1.73_M2}
GFR SERPL CREATININE-BSD FRML MDRD: ABNORMAL ML/MIN/{1.73_M2}
GLUCOSE BLD-MCNC: 93 MG/DL (ref 70–99)
POTASSIUM SERPL-SCNC: 4.2 MMOL/L (ref 3.7–5.3)
SODIUM BLD-SCNC: 139 MMOL/L (ref 135–144)

## 2020-11-05 PROCEDURE — 80048 BASIC METABOLIC PNL TOTAL CA: CPT

## 2020-11-05 PROCEDURE — 36415 COLL VENOUS BLD VENIPUNCTURE: CPT

## 2020-11-05 NOTE — RESULT ENCOUNTER NOTE
Please advise Lizbeth Bryant that her potassium is within normal limits. How is her swelling? How have her blood pressure readings been?

## 2020-11-07 ENCOUNTER — HOSPITAL ENCOUNTER (OUTPATIENT)
Age: 80
Discharge: HOME | End: 2020-11-09
Payer: MEDICARE

## 2020-11-07 ENCOUNTER — HOSPITAL ENCOUNTER (OUTPATIENT)
Dept: GENERAL RADIOLOGY | Age: 80
Discharge: HOME | End: 2020-11-09
Payer: MEDICARE

## 2020-11-07 ENCOUNTER — OFFICE VISIT (OUTPATIENT)
Dept: PRIMARY CARE CLINIC | Age: 80
End: 2020-11-07
Payer: MEDICARE

## 2020-11-07 VITALS
DIASTOLIC BLOOD PRESSURE: 68 MMHG | HEIGHT: 65 IN | BODY MASS INDEX: 26.99 KG/M2 | HEART RATE: 66 BPM | OXYGEN SATURATION: 98 % | RESPIRATION RATE: 16 BRPM | SYSTOLIC BLOOD PRESSURE: 128 MMHG | TEMPERATURE: 97.3 F | WEIGHT: 162 LBS

## 2020-11-07 PROCEDURE — G8427 DOCREV CUR MEDS BY ELIG CLIN: HCPCS | Performed by: FAMILY MEDICINE

## 2020-11-07 PROCEDURE — G8399 PT W/DXA RESULTS DOCUMENT: HCPCS | Performed by: FAMILY MEDICINE

## 2020-11-07 PROCEDURE — 1090F PRES/ABSN URINE INCON ASSESS: CPT | Performed by: FAMILY MEDICINE

## 2020-11-07 PROCEDURE — 4040F PNEUMOC VAC/ADMIN/RCVD: CPT | Performed by: FAMILY MEDICINE

## 2020-11-07 PROCEDURE — 73502 X-RAY EXAM HIP UNI 2-3 VIEWS: CPT

## 2020-11-07 PROCEDURE — G8484 FLU IMMUNIZE NO ADMIN: HCPCS | Performed by: FAMILY MEDICINE

## 2020-11-07 PROCEDURE — 99212 OFFICE O/P EST SF 10 MIN: CPT | Performed by: FAMILY MEDICINE

## 2020-11-07 PROCEDURE — 1036F TOBACCO NON-USER: CPT | Performed by: FAMILY MEDICINE

## 2020-11-07 PROCEDURE — 99214 OFFICE O/P EST MOD 30 MIN: CPT | Performed by: FAMILY MEDICINE

## 2020-11-07 PROCEDURE — G8417 CALC BMI ABV UP PARAM F/U: HCPCS | Performed by: FAMILY MEDICINE

## 2020-11-07 PROCEDURE — 72100 X-RAY EXAM L-S SPINE 2/3 VWS: CPT

## 2020-11-07 PROCEDURE — 1123F ACP DISCUSS/DSCN MKR DOCD: CPT | Performed by: FAMILY MEDICINE

## 2020-11-07 RX ORDER — TORSEMIDE 20 MG/1
20 TABLET ORAL DAILY
Qty: 30 TABLET | Refills: 3 | Status: SHIPPED | OUTPATIENT
Start: 2020-11-07 | End: 2021-03-04

## 2020-11-07 NOTE — PROGRESS NOTES
[facility]             [address] [place] [address], [address]                        Telephone [phone]             Fax [phone]       Austin Cano  1940  MRN:  [id number]  Date of visit:  11/7/2020     Subjective:    Austin Cano is a 68 y.o.  female who presents to [facility] today (11/7/2020) for evaluation of:  Leg Swelling ((Chronic) BLE edema & pain & re-eval Rx d/t no improvement.)      She is here today due to issues with swelling in the lower legs for the past few weeks. She was advised to discontinue Hydrochlorothiazide on 8/12/2020 due to hyponatremia. Her sodium has improved, but she has had increased edema since discontinuing Hydrochlorothiazide. Lasix was prescribed, but the edema persisted. She was advised to increase her dose of Lasix from 20 mg daily to 40 mg daily for 3 days. She states that she had no significant improvement in the edema despite increasing Lasix. She also reports pain in her lower back and in the left hip. She reports pain radiating into the left leg. She has had weakness in the left leg. She states that she used to be very active. She used to walk for exercise. She has had difficulty walking recently. She has been using a walker. She has been seeing a chiropractor for the past few weeks. She is uncertain if she has had any improvement in the back or hip pain. She has the following problem list:  Patient Active Problem List   Diagnosis    Essential hypertension    GERD (gastroesophageal reflux disease)    Elevated fasting glucose    Osteopenia    Overweight (BMI 25.0-29. 9)    Venous insufficiency (chronic) (peripheral)    Spider vein, symptomatic    Varicose vein    Hyponatremia, mild    Tubular adenoma of colon    Bullous pemphigoid    Vitamin D deficiency        Current medications are:  Current Outpatient Medications   Medication Sig Dispense Refill    omeprazole (PRILOSEC) 20 MG delayed release capsule TAKE 1 CAPSULE BY MOUTH ONCE DAILY AS NEEDED FOR  REFLUX 90 capsule 1    metoprolol succinate (TOPROL XL) 25 MG extended release tablet Take 1 tablet by mouth once daily 90 tablet 1    amLODIPine (NORVASC) 5 MG tablet Take 1 tablet by mouth once daily 90 tablet 1    lisinopril (PRINIVIL;ZESTRIL) 40 MG tablet Take 1 tablet by mouth once daily 90 tablet 1    furosemide (LASIX) 20 MG tablet Take 1 tablet by mouth once daily 90 tablet 1    Acetaminophen (TYLENOL ARTHRITIS PAIN PO) Take by mouth as needed      hydrOXYzine (ATARAX) 25 MG tablet TAKE ONE TABLET BY MOUTH THREE TIMES DAILY AS NEEDED FOR ITCHING 90 tablet 1    Krill Oil (MAXIMUM RED KRILL PO) Take by mouth daily      Multiple Vitamins-Minerals (PRESERVISION AREDS 2 PO) Take by mouth daily      albuterol sulfate  (90 BASE) MCG/ACT inhaler Inhale 2 puffs into the lungs every 4 hours as needed for Wheezing or Shortness of Breath 1 Inhaler 0    vitamin D-3 (CHOLECALCIFEROL) 5000 UNITS TABS Take 5,000 Units by mouth daily.  Coenzyme Q10 (CO Q 10 PO) Take by mouth daily Indications: prn when remembers to take       Psyllium (METAMUCIL PO) Take  by mouth. No current facility-administered medications for this visit. She is allergic to hydrochlorothiazide. She  reports that she quit smoking about 31 years ago. Her smoking use included cigarettes. She started smoking about 62 years ago. She has a 77.50 pack-year smoking history. She has never used smokeless tobacco.      Objective:    Vitals:    11/07/20 1348   BP: 128/68   Site: Right Upper Arm   Position: Sitting   Cuff Size: Medium Adult   Pulse: 66   Resp: 16   Temp: 97.3 °F (36.3 °C)   TempSrc: Tympanic   SpO2: 98%   Weight: 162 lb (73.5 kg)   Height: 5' 5\" (1.651 m)     Body mass index is 26.96 kg/m². Overweight elderly  female healthy-appearing, alert, no distress, cooperative.   Neck supple. No adenopathy. Chest is clear to auscultation, no wheezes, rales, or rhonchi. Heart sounds are regular rate and rhythm, no murmurs. There is tenderness to palpation of the back in the lumbar area on the left. There is tenderness to palpation over the sciatic notch on the left. The left leg has decreased strength compared to the right. Lower extremities have 1-2+ edema to the knees bilaterally. Hospital Outpatient Visit on 11/05/2020   Component Date Value Ref Range Status    Glucose 11/05/2020 93  70 - 99 mg/dL Final    BUN 11/05/2020 22  8 - 23 mg/dL Final    CREATININE 11/05/2020 0.78  0.50 - 0.90 mg/dL Final    Bun/Cre Ratio 11/05/2020 28* 9 - 20 Final    Calcium 11/05/2020 9.6  8.6 - 10.4 mg/dL Final    Sodium 11/05/2020 139  135 - 144 mmol/L Final    Potassium 11/05/2020 4.2  3.7 - 5.3 mmol/L Final    Chloride 11/05/2020 102  98 - 107 mmol/L Final    CO2 11/05/2020 30  20 - 31 mmol/L Final    Anion Gap 11/05/2020 7* 9 - 17 mmol/L Final    GFR Non- 11/05/2020 >60  >60 mL/min Final    GFR  11/05/2020 >60  >60 mL/min Final    GFR Comment 11/05/2020        Final    Comment: Average GFR for 79or more years old:   76 mL/min/1.73sq m  Chronic Kidney Disease:   <60 mL/min/1.73sq m  Kidney failure:   <15 mL/min/1.73sq m              eGFR calculated using average adult body mass. Additional eGFR calculator available at:        [email or web address]            GFR Staging 11/05/2020 NOT REPORTED   Final          Assessment and Plan:    1. Bilateral lower extremity edema  As above, she continues to have edema despite using Lasix. She was advised to discontinue Lasix. Demadex was prescribed:  - torsemide (DEMADEX) 20 MG tablet; Take 1 tablet by mouth daily  Dispense: 30 tablet; Refill: 3    2. High risk medication use  - Basic Metabolic Panel; Future was ordered to be done next week.     3. Left-sided low back pain with left-sided sciatica, unspecified chronicity  4. Left hip pain  X-rays were ordered to be done today:  - XR LUMBAR SPINE (2-3 VIEWS); Future  - XR HIP 2-3 VW W PELVIS LEFT; Future    She will be contacted when the radiologist's interpretation of her x-rays is available.     She was also referred to physical therapy:  - Ambulatory referral to Physical Therapy          (Please note that portions of this note were completed with a voice-recognition program. Efforts were made to edit the dictation but occasionally words are mis-transcribed.)

## 2020-11-10 ENCOUNTER — TELEPHONE (OUTPATIENT)
Dept: FAMILY MEDICINE CLINIC | Age: 80
End: 2020-11-10

## 2020-11-11 ENCOUNTER — HOSPITAL ENCOUNTER (OUTPATIENT)
Dept: LAB | Age: 80
Discharge: HOME | End: 2020-11-11
Payer: MEDICARE

## 2020-11-11 LAB
ANION GAP SERPL CALCULATED.3IONS-SCNC: 10 MMOL/L (ref 9–17)
BUN BLDV-MCNC: 27 MG/DL (ref 8–23)
BUN/CREAT BLD: 30 (ref 9–20)
CALCIUM SERPL-MCNC: 9.2 MG/DL (ref 8.6–10.4)
CHLORIDE BLD-SCNC: 102 MMOL/L (ref 98–107)
CO2: 30 MMOL/L (ref 20–31)
CREAT SERPL-MCNC: 0.9 MG/DL (ref 0.5–0.9)
GFR AFRICAN AMERICAN: >60 ML/MIN
GFR NON-AFRICAN AMERICAN: >60 ML/MIN
GFR SERPL CREATININE-BSD FRML MDRD: ABNORMAL ML/MIN/{1.73_M2}
GFR SERPL CREATININE-BSD FRML MDRD: ABNORMAL ML/MIN/{1.73_M2}
GLUCOSE BLD-MCNC: 101 MG/DL (ref 70–99)
POTASSIUM SERPL-SCNC: 4 MMOL/L (ref 3.7–5.3)
SODIUM BLD-SCNC: 142 MMOL/L (ref 135–144)

## 2020-11-11 PROCEDURE — 36415 COLL VENOUS BLD VENIPUNCTURE: CPT

## 2020-11-11 PROCEDURE — 80048 BASIC METABOLIC PNL TOTAL CA: CPT

## 2020-11-11 NOTE — RESULT ENCOUNTER NOTE
Please advise Jennifer Cummnigs that her potassium level is within normal limits. How is the swelling? How have her blood pressure readings been?

## 2020-11-18 ENCOUNTER — HOSPITAL ENCOUNTER (OUTPATIENT)
Dept: PHYSICAL THERAPY | Age: 80
Setting detail: THERAPIES SERIES
Discharge: HOME | End: 2020-11-18
Payer: MEDICARE

## 2020-11-19 ENCOUNTER — TELEPHONE (OUTPATIENT)
Dept: FAMILY MEDICINE CLINIC | Age: 80
End: 2020-11-19

## 2020-11-19 NOTE — TELEPHONE ENCOUNTER
Result note from 11/11/2020:  Patient notified of results. She continues with \"heaviness\" in both her lower legs,swelling is slightly better. She forgot to mention at her last OV that when she was taking increased doses of Lasix she would have 3-4 loose stools a day. Since taking Demadex(11/07/2020) her bowels have improved-she has maybe one loose stool a day. Her blood pressure as follows:11/08/2020=114/52,11/09/2020=131/59,11/10/4422=021/61. Weight has been 162lb each day. Noted.

## 2020-11-19 NOTE — TELEPHONE ENCOUNTER
I recommend that she continue her current medications. How long has she had diarrhea? Has she had any fever or chills?

## 2021-01-26 ENCOUNTER — TELEPHONE (OUTPATIENT)
Dept: FAMILY MEDICINE CLINIC | Age: 81
End: 2021-01-26

## 2021-01-26 DIAGNOSIS — M16.12 OSTEOARTHRITIS OF LEFT HIP, UNSPECIFIED OSTEOARTHRITIS TYPE: Primary | ICD-10-CM

## 2021-01-26 DIAGNOSIS — M47.816 OSTEOARTHRITIS OF LUMBAR SPINE, UNSPECIFIED SPINAL OSTEOARTHRITIS COMPLICATION STATUS: ICD-10-CM

## 2021-01-28 ENCOUNTER — TELEPHONE (OUTPATIENT)
Dept: FAMILY MEDICINE CLINIC | Age: 81
End: 2021-01-28

## 2021-01-28 NOTE — TELEPHONE ENCOUNTER
Bianka Garrison calling stating the diagnosis on pt's [facility] referral is not mentioned in her face to face visit, so we either need to change diagnosis or do addendum to face to face, please advise at [phone], ext [phone]

## 2021-02-03 NOTE — TELEPHONE ENCOUNTER
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 Xrays were done on 11/7 as well.

## 2021-03-02 DIAGNOSIS — R60.0 BILATERAL LOWER EXTREMITY EDEMA: ICD-10-CM

## 2021-03-02 DIAGNOSIS — I10 ESSENTIAL HYPERTENSION: ICD-10-CM

## 2021-03-04 RX ORDER — AMLODIPINE BESYLATE 5 MG/1
TABLET ORAL
Qty: 90 TABLET | Refills: 0 | Status: SHIPPED | OUTPATIENT
Start: 2021-03-04 | End: 2021-06-07

## 2021-03-04 RX ORDER — TORSEMIDE 20 MG/1
TABLET ORAL
Qty: 30 TABLET | Refills: 0 | Status: SHIPPED | OUTPATIENT
Start: 2021-03-04 | End: 2021-03-31 | Stop reason: SDUPTHER

## 2021-03-04 RX ORDER — LISINOPRIL 40 MG/1
TABLET ORAL
Qty: 90 TABLET | Refills: 0 | Status: SHIPPED | OUTPATIENT
Start: 2021-03-04 | End: 2021-06-01

## 2021-03-04 NOTE — TELEPHONE ENCOUNTER
Judit Biswas called requesting a refill of the below medication which has been pended for you:     Requested Prescriptions     Pending Prescriptions Disp Refills    torsemide (DEMADEX) 20 MG tablet [Pharmacy Med Name: Torsemide 20 MG Oral Tablet] 30 tablet 0     Sig: Take 1 tablet by mouth once daily    lisinopril (PRINIVIL;ZESTRIL) 40 MG tablet [Pharmacy Med Name: Lisinopril 40 MG Oral Tablet] 90 tablet 0     Sig: Take 1 tablet by mouth once daily    amLODIPine (NORVASC) 5 MG tablet [Pharmacy Med Name: amLODIPine Besylate 5 MG Oral Tablet] 90 tablet 0     Sig: Take 1 tablet by mouth once daily       Last Appointment Date: 11/7/2020  Next Appointment Date: 3/31/2021    Allergies   Allergen Reactions    Hydrochlorothiazide Itching and Rash     See progress note from Dr. Tr Matthew (dermatogist) 4/13/2017.     Patient restarted 7/31/17 not having any issues as of 8/4/17

## 2021-03-19 ENCOUNTER — TELEPHONE (OUTPATIENT)
Dept: FAMILY MEDICINE CLINIC | Age: 81
End: 2021-03-19

## 2021-03-19 DIAGNOSIS — R73.01 ELEVATED FASTING GLUCOSE: Primary | ICD-10-CM

## 2021-03-19 DIAGNOSIS — I10 ESSENTIAL HYPERTENSION: ICD-10-CM

## 2021-03-19 NOTE — TELEPHONE ENCOUNTER
Lab called stated pt was scheduled for labs next week but that they did not have any labs ordered for the pt

## 2021-03-23 ENCOUNTER — TELEPHONE (OUTPATIENT)
Dept: FAMILY MEDICINE CLINIC | Age: 81
End: 2021-03-23

## 2021-03-23 NOTE — TELEPHONE ENCOUNTER
Writer spoke to patient and she states her daughter is helping her at this time. She is seeing about getting a ramp made (she is having an estimate today). Her next appt in 3/31- which was changed to a virtual. She would like to discuss home health at that time.

## 2021-03-31 ENCOUNTER — VIRTUAL VISIT (OUTPATIENT)
Dept: FAMILY MEDICINE CLINIC | Age: 81
End: 2021-03-31
Payer: MEDICARE

## 2021-03-31 DIAGNOSIS — M47.816 OSTEOARTHRITIS OF LUMBAR SPINE, UNSPECIFIED SPINAL OSTEOARTHRITIS COMPLICATION STATUS: Primary | ICD-10-CM

## 2021-03-31 DIAGNOSIS — I10 ESSENTIAL HYPERTENSION: ICD-10-CM

## 2021-03-31 DIAGNOSIS — M16.12 OSTEOARTHRITIS OF LEFT HIP, UNSPECIFIED OSTEOARTHRITIS TYPE: ICD-10-CM

## 2021-03-31 DIAGNOSIS — R60.0 BILATERAL LOWER EXTREMITY EDEMA: ICD-10-CM

## 2021-03-31 DIAGNOSIS — Z91.81 HISTORY OF FALLING: ICD-10-CM

## 2021-03-31 PROCEDURE — G8399 PT W/DXA RESULTS DOCUMENT: HCPCS | Performed by: FAMILY MEDICINE

## 2021-03-31 PROCEDURE — 1090F PRES/ABSN URINE INCON ASSESS: CPT | Performed by: FAMILY MEDICINE

## 2021-03-31 PROCEDURE — G8427 DOCREV CUR MEDS BY ELIG CLIN: HCPCS | Performed by: FAMILY MEDICINE

## 2021-03-31 PROCEDURE — 99213 OFFICE O/P EST LOW 20 MIN: CPT | Performed by: FAMILY MEDICINE

## 2021-03-31 PROCEDURE — 99212 OFFICE O/P EST SF 10 MIN: CPT

## 2021-03-31 PROCEDURE — 1123F ACP DISCUSS/DSCN MKR DOCD: CPT | Performed by: FAMILY MEDICINE

## 2021-03-31 PROCEDURE — 4040F PNEUMOC VAC/ADMIN/RCVD: CPT | Performed by: FAMILY MEDICINE

## 2021-03-31 RX ORDER — TORSEMIDE 20 MG/1
TABLET ORAL
Qty: 30 TABLET | Refills: 5 | Status: SHIPPED | OUTPATIENT
Start: 2021-03-31 | End: 2021-10-06 | Stop reason: SDUPTHER

## 2021-03-31 RX ORDER — CETIRIZINE HYDROCHLORIDE 10 MG/1
10 TABLET ORAL DAILY
COMMUNITY
End: 2022-04-06

## 2021-03-31 ASSESSMENT — PATIENT HEALTH QUESTIONNAIRE - PHQ9: SUM OF ALL RESPONSES TO PHQ QUESTIONS 1-9: 0

## 2021-03-31 NOTE — PROGRESS NOTES
Does have interest COVID vaccine. No interest in hep c screening or shingles vaccine at this time. Patient is having more issues walking and getting around. Having trouble with bilateral legs and hips. Also having lower back pain. Tried tylenol arthritis and it didn't help, has switched to Advil and that is helping slightly. She is questioning PT? Interested in home health due to not being able to get out of her house because of her stairs. Was wondering if home health could come and draw labs for her. she is needing a ramp for her house, she is on a 6-12 month wait list.
of exposure to COVID-19 and provide necessary medical care. The patient (and/or legal guardian) has also been advised to contact this office for worsening conditions or problems, and seek emergency medical treatment and/or call 911 if deemed necessary. Patient identification was verified at the start of the visit: Yes    Total time spent on this encounter: Not billed by time      Services were provided through a video synchronous discussion virtually to substitute for in-person clinic visit. Patient was in their home setting on their mobile device and I was in my office at [facility] on a secured video interface. --Brice Wu MD on 3/31/2021 at 4:53 PM    An electronic signature was used to authenticate this note.

## 2021-03-31 NOTE — Clinical Note
Please call the patient and schedule an appointment in 6 months. She has referrals for home health and orthopedics. She would like to have home physical therapy also. She is interested in getting a Covid-19 vaccine. Can home health give her a vaccine?

## 2021-04-13 ENCOUNTER — OFFICE VISIT (OUTPATIENT)
Dept: ORTHOPEDIC SURGERY | Age: 81
End: 2021-04-13
Payer: MEDICARE

## 2021-04-13 VITALS
SYSTOLIC BLOOD PRESSURE: 110 MMHG | DIASTOLIC BLOOD PRESSURE: 60 MMHG | HEART RATE: 73 BPM | BODY MASS INDEX: 26.99 KG/M2 | HEIGHT: 65 IN | WEIGHT: 162 LBS | OXYGEN SATURATION: 98 %

## 2021-04-13 DIAGNOSIS — M16.12 PRIMARY OSTEOARTHRITIS OF LEFT HIP: Primary | ICD-10-CM

## 2021-04-13 PROCEDURE — G8427 DOCREV CUR MEDS BY ELIG CLIN: HCPCS | Performed by: NURSE PRACTITIONER

## 2021-04-13 PROCEDURE — 4040F PNEUMOC VAC/ADMIN/RCVD: CPT | Performed by: NURSE PRACTITIONER

## 2021-04-13 PROCEDURE — G8399 PT W/DXA RESULTS DOCUMENT: HCPCS | Performed by: NURSE PRACTITIONER

## 2021-04-13 PROCEDURE — 99214 OFFICE O/P EST MOD 30 MIN: CPT | Performed by: NURSE PRACTITIONER

## 2021-04-13 PROCEDURE — 1123F ACP DISCUSS/DSCN MKR DOCD: CPT | Performed by: NURSE PRACTITIONER

## 2021-04-13 PROCEDURE — 1036F TOBACCO NON-USER: CPT | Performed by: NURSE PRACTITIONER

## 2021-04-13 PROCEDURE — 99203 OFFICE O/P NEW LOW 30 MIN: CPT | Performed by: NURSE PRACTITIONER

## 2021-04-13 PROCEDURE — G8417 CALC BMI ABV UP PARAM F/U: HCPCS | Performed by: NURSE PRACTITIONER

## 2021-04-13 PROCEDURE — 1090F PRES/ABSN URINE INCON ASSESS: CPT | Performed by: NURSE PRACTITIONER

## 2021-04-13 NOTE — H&P
History and Physical        CHIEF COMPLAINT: Left hip pain, left hip OA    HISTORY OF PRESENT ILLNESS:      The patient is a 68 y.o. female  who presents with left hip pain. Patient is here with her daughter and states that she has had hip pain for many years. She has tried Tylenol arthritis for pain. She states her pain is worse with activity and is relieved with rest.  She states she is to the point where she has had to transition from a cane to a walker and for now is even having difficulty walking short distances secondary to severe left groin pain. She rates her pain a 10 out of 10 at its worst.  She has trouble with prolonged walking standing going up and down stairs getting up from a seated position. She states her quality of life has significantly decreased. She is unable to do the things she wants to do. She has tried activity modifications and home exercises. All without relief. She denies taking blood thinners and denies any cardiac history. Past Medical History:    Past Medical History:   Diagnosis Date    Bullous pemphigoid     Elevated fasting glucose     Former smoker     quit in 1987    GERD (gastroesophageal reflux disease)     Hypertension     Osteopenia     took Boniva times 5 years, stopped 2012    Overweight(278.02)     wieght 174 pounds, height 65 inches, BMI 29, 3/31/2013       Past Surgical History:    Past Surgical History:   Procedure Laterality Date    ABDOMINAL EXPLORATION SURGERY  1972    ectopic pregnancy, bilateral salpingectomy    CATARACT REMOVAL WITH IMPLANT Left 07/24/2018    per Dr Amelie Knapp N/A 7/26/2017    COLONOSCOPY WITH BIOPSY performed by Marciano Bai MD at [address] [place] [address], one tubular adenoma on pathology, repeat recommended in 5 years       Medications Prior to Admission:   Prior to Admission medications    Medication Sig Start Date End Date Taking?  Authorizing Provider   torsemide (DEMADEX) 20 MG tablet Take 1 tablet by mouth once daily 3/31/21  Yes Annie Grady MD   lisinopril (PRINIVIL;ZESTRIL) 40 MG tablet Take 1 tablet by mouth once daily 3/4/21  Yes Annie Grady MD   amLODIPine (NORVASC) 5 MG tablet Take 1 tablet by mouth once daily 3/4/21  Yes Annie Grady MD   omeprazole (PRILOSEC) 20 MG delayed release capsule TAKE 1 CAPSULE BY MOUTH ONCE DAILY AS NEEDED FOR  REFLUX 10/27/20  Yes Annie Grady MD   metoprolol succinate (TOPROL XL) 25 MG extended release tablet Take 1 tablet by mouth once daily 10/20/20  Yes Annie Grady MD   Acetaminophen (TYLENOL ARTHRITIS PAIN PO) Take by mouth as needed   Yes Historical Provider, MD   hydrOXYzine (ATARAX) 25 MG tablet TAKE ONE TABLET BY MOUTH THREE TIMES DAILY AS NEEDED FOR ITCHING 8/19/20  Yes Annie Grady MD   Multiple Vitamins-Minerals (PRESERVISION AREDS 2 PO) Take by mouth daily   Yes Historical Provider, MD   vitamin D-3 (CHOLECALCIFEROL) 5000 UNITS TABS Take 5,000 Units by mouth daily. Yes Historical Provider, MD   cetirizine (ZYRTEC) 10 MG tablet Take 10 mg by mouth daily    Historical Provider, MD Hawley Dashawn Oil (MAXIMUM RED KRILL PO) Take by mouth daily    Historical Provider, MD   albuterol sulfate  (90 BASE) MCG/ACT inhaler Inhale 2 puffs into the lungs every 4 hours as needed for Wheezing or Shortness of Breath  Patient not taking: Reported on 4/13/2021 11/15/16   Annie Grady MD   Coenzyme Q10 (CO Q 10 PO) Take by mouth daily Indications: prn when remembers to take     Historical Provider, MD    Scheduled Meds:  Continuous Infusions:  PRN Meds:.     Allergies:  Hydrochlorothiazide    Social History:   Social History     Socioeconomic History    Marital status: Single     Spouse name: None    Number of children: None    Years of education: None    Highest education level: None   Occupational History    None   Social Needs    Financial resource strain: Not hard at all   [employer] insecurity     Worry: Never true     Inability: Never true   Armida Brunner Transportation needs     Medical: None     Non-medical: None   Tobacco Use    Smoking status: Former Smoker     Packs/day: 2.50     Years: 31.00     Pack years: 77.50     Types: Cigarettes     Start date:      Quit date:      Years since quittin.3    Smokeless tobacco: Never Used   Substance and Sexual Activity    Alcohol use: No     Alcohol/week: 0.0 standard drinks    Drug use: No    Sexual activity: None   Lifestyle    Physical activity     Days per week: None     Minutes per session: None    Stress: None   Relationships    Social connections     Talks on phone: None     Gets together: None     Attends Orthodoxy service: None     Active member of club or organization: None     Attends meetings of clubs or organizations: None     Relationship status: None    Intimate partner violence     Fear of current or ex partner: None     Emotionally abused: None     Physically abused: None     Forced sexual activity: None   Other Topics Concern    None   Social History Narrative    None     Social History     Tobacco Use   Smoking Status Former Smoker    Packs/day: 2.50    Years: 31.00    Pack years: 77.50    Types: Cigarettes    Start date:     Quit date:     Years since quittin.3   Smokeless Tobacco Never Used     Social History     Substance and Sexual Activity   Alcohol Use No    Alcohol/week: 0.0 standard drinks     Social History     Substance and Sexual Activity   Drug Use No       Family History:  Family History   Problem Relation Age of Onset    Cancer Mother         unknown type of cancer    Diabetes Mother     Diabetes Brother     Diabetes Brother     Heart Disease Father         CHF       REVIEW OF SYSTEMS:  Constitutional: Denies any fever, chills. Derm: Denies any rash or skin color change. Eyes: Denies blurred or decreased in vision. Ent: Denies any tinnitus or vertigo. Resp: Denies any cough or shortness of breath.   CV: Denies any syncope, palpitations or Osteophytes are also noted. ASSESSMENT:Active Problems:    * No active hospital problems. *  Resolved Problems:    * No resolved hospital problems. *  Bilateral hip severe bone-on-bone osteoarthritis    PLAN as discussed with Dr. Sean Syed:  1. Plan at this time did discuss options with patient. She would like to proceed with a left total hip replacement. Her left hip does hurt her more than the right. We will get her set up with her PCP for surgical clearance appointment and then plan for a left total hip replacement. Surgical risks and benefits were discussed with patient. She elected to proceed. Rotavirus precautions also discussed with patient. The patient was counseled at length about the risks of marina Covid-19 during their perioperative period and any recovery window from their procedure. The patient was made aware that marina Covid-19  may worsen their prognosis for recovering from their procedure  and lend to a higher morbidity and/or mortality risk. All material risks, benefits, and reasonable alternatives including postponing the procedure were discussed. The patient does wish to proceed with the procedure at this time.          Electronically signed by YANELY Ramirez CNP on 2021 at 12:27 PM

## 2021-04-15 DIAGNOSIS — K21.9 GASTROESOPHAGEAL REFLUX DISEASE: ICD-10-CM

## 2021-04-15 DIAGNOSIS — I10 ESSENTIAL HYPERTENSION: ICD-10-CM

## 2021-04-15 RX ORDER — METOPROLOL SUCCINATE 25 MG/1
TABLET, EXTENDED RELEASE ORAL
Qty: 90 TABLET | Refills: 0 | Status: SHIPPED | OUTPATIENT
Start: 2021-04-15 | End: 2021-07-15

## 2021-04-15 RX ORDER — OMEPRAZOLE 20 MG/1
CAPSULE, DELAYED RELEASE ORAL
Qty: 90 CAPSULE | Refills: 0 | Status: SHIPPED | OUTPATIENT
Start: 2021-04-15 | End: 2021-07-15

## 2021-04-15 NOTE — TELEPHONE ENCOUNTER
Olga Lim called requesting a refill of the below medication which has been pended for you:     Requested Prescriptions     Pending Prescriptions Disp Refills    metoprolol succinate (TOPROL XL) 25 MG extended release tablet [Pharmacy Med Name: Metoprolol Succinate ER 25 MG Oral Tablet Extended Release 24 Hour] 90 tablet 0     Sig: Take 1 tablet by mouth once daily    omeprazole (PRILOSEC) 20 MG delayed release capsule [Pharmacy Med Name: Omeprazole 20 MG Oral Capsule Delayed Release] 90 capsule 0     Sig: TAKE 1 CAPSULE BY MOUTH ONCE DAILY AS NEEDED FOR REFLUX       Last Appointment Date: 3/31/2021  Next Appointment Date: 10/6/2021    Allergies   Allergen Reactions    Hydrochlorothiazide Itching and Rash     See progress note from Dr. Macarena Wolfe (dermatogist) 4/13/2017.     Patient restarted 7/31/17 not having any issues as of 8/4/17

## 2021-04-19 ENCOUNTER — OFFICE VISIT (OUTPATIENT)
Dept: FAMILY MEDICINE CLINIC | Age: 81
End: 2021-04-19
Payer: MEDICARE

## 2021-04-19 VITALS
HEART RATE: 73 BPM | DIASTOLIC BLOOD PRESSURE: 70 MMHG | OXYGEN SATURATION: 100 % | TEMPERATURE: 96.7 F | SYSTOLIC BLOOD PRESSURE: 130 MMHG | WEIGHT: 158 LBS | BODY MASS INDEX: 28 KG/M2 | HEIGHT: 63 IN

## 2021-04-19 DIAGNOSIS — R60.0 BILATERAL LEG EDEMA: ICD-10-CM

## 2021-04-19 DIAGNOSIS — M16.0 PRIMARY OSTEOARTHRITIS OF BOTH HIPS: ICD-10-CM

## 2021-04-19 DIAGNOSIS — I10 ESSENTIAL HYPERTENSION: ICD-10-CM

## 2021-04-19 DIAGNOSIS — M47.816 SPONDYLOSIS OF LUMBAR REGION WITHOUT MYELOPATHY OR RADICULOPATHY: ICD-10-CM

## 2021-04-19 DIAGNOSIS — Z01.818 PRE-OP EXAMINATION: Primary | ICD-10-CM

## 2021-04-19 PROCEDURE — G8417 CALC BMI ABV UP PARAM F/U: HCPCS | Performed by: FAMILY MEDICINE

## 2021-04-19 PROCEDURE — 1036F TOBACCO NON-USER: CPT | Performed by: FAMILY MEDICINE

## 2021-04-19 PROCEDURE — 4040F PNEUMOC VAC/ADMIN/RCVD: CPT | Performed by: FAMILY MEDICINE

## 2021-04-19 PROCEDURE — 1123F ACP DISCUSS/DSCN MKR DOCD: CPT | Performed by: FAMILY MEDICINE

## 2021-04-19 PROCEDURE — 99215 OFFICE O/P EST HI 40 MIN: CPT | Performed by: FAMILY MEDICINE

## 2021-04-19 PROCEDURE — G8427 DOCREV CUR MEDS BY ELIG CLIN: HCPCS | Performed by: FAMILY MEDICINE

## 2021-04-19 PROCEDURE — G8399 PT W/DXA RESULTS DOCUMENT: HCPCS | Performed by: FAMILY MEDICINE

## 2021-04-19 PROCEDURE — 1090F PRES/ABSN URINE INCON ASSESS: CPT | Performed by: FAMILY MEDICINE

## 2021-04-19 PROCEDURE — 99214 OFFICE O/P EST MOD 30 MIN: CPT

## 2021-04-19 NOTE — PROGRESS NOTES
HPI:  Patient comes in today for   Chief Complaint   Patient presents with    Pre-op Exam     Left Hip replacement, Dr Ayleen Wagoner, no date set   Here for preop exam for left hip replacement had had problems with bilateral hip pain for sometime,has difficulty ambulating uses a walker or wheelchair. has had h/o chronic leg edema is on Demadex . No chest pain or SOB. h/o HTN and GERD are stable.     HISTORY:  Past Medical History:   Diagnosis Date    Bullous pemphigoid     Elevated fasting glucose     Former smoker     quit in 1987    GERD (gastroesophageal reflux disease)     Hypertension     Osteopenia     took Boniva times 5 years, stopped 2012    Overweight(278.02)     wieght 174 pounds, height 65 inches, BMI 29, 3/31/2013       Past Surgical History:   Procedure Laterality Date    ABDOMINAL EXPLORATION SURGERY  1972    ectopic pregnancy, bilateral salpingectomy    CATARACT REMOVAL WITH IMPLANT Left 07/24/2018    per Dr Hilda Knapp PA COLONOSCOPY W/BIOPSY SINGLE/MULTIPLE N/A 7/26/2017    COLONOSCOPY WITH BIOPSY performed by Richard Puga MD at [address] [place] [address], one tubular adenoma on pathology, repeat recommended in 5 years        Family History   Problem Relation Age of Onset    Cancer Mother         unknown type of cancer    Diabetes Mother     Diabetes Brother     Diabetes Brother     Heart Disease Father         CHF       Social History     Socioeconomic History    Marital status: Single     Spouse name: Not on file    Number of children: Not on file    Years of education: Not on file    Highest education level: Not on file   Occupational History    Not on file   Social Needs    Financial resource strain: Not hard at all   [employer] insecurity     Worry: Never true     Inability: Never true   [employer] needs     Medical: Not on file     Non-medical: Not on file   Tobacco Use    Smoking status: Former Smoker     Packs/day: 2.50     Years: 31.00     Pack years: 77.50     Types: Cigarettes     Start date:      Quit date:      Years since quittin.3    Smokeless tobacco: Never Used   Substance and Sexual Activity    Alcohol use: No     Alcohol/week: 0.0 standard drinks    Drug use: No    Sexual activity: Not on file   Lifestyle    Physical activity     Days per week: Not on file     Minutes per session: Not on file    Stress: Not on file   Relationships    Social connections     Talks on phone: Not on file     Gets together: Not on file     Attends Jain service: Not on file     Active member of club or organization: Not on file     Attends meetings of clubs or organizations: Not on file     Relationship status: Not on file    Intimate partner violence     Fear of current or ex partner: Not on file     Emotionally abused: Not on file     Physically abused: Not on file     Forced sexual activity: Not on file   Other Topics Concern    Not on file   Social History Narrative    Not on file       Current Outpatient Medications   Medication Sig Dispense Refill    metoprolol succinate (TOPROL XL) 25 MG extended release tablet Take 1 tablet by mouth once daily 90 tablet 0    omeprazole (PRILOSEC) 20 MG delayed release capsule TAKE 1 CAPSULE BY MOUTH ONCE DAILY AS NEEDED FOR REFLUX 90 capsule 0    cetirizine (ZYRTEC) 10 MG tablet Take 10 mg by mouth daily      torsemide (DEMADEX) 20 MG tablet Take 1 tablet by mouth once daily 30 tablet 5    lisinopril (PRINIVIL;ZESTRIL) 40 MG tablet Take 1 tablet by mouth once daily 90 tablet 0    amLODIPine (NORVASC) 5 MG tablet Take 1 tablet by mouth once daily 90 tablet 0    Acetaminophen (TYLENOL ARTHRITIS PAIN PO) Take by mouth as needed      hydrOXYzine (ATARAX) 25 MG tablet TAKE ONE TABLET BY MOUTH THREE TIMES DAILY AS NEEDED FOR ITCHING 90 tablet 1    Krill Oil (MAXIMUM RED KRILL PO) Take by mouth daily      Multiple Vitamins-Minerals (PRESERVISION AREDS 2 PO) Take by mouth daily      albuterol sulfate  (90 BASE) MCG/ACT inhaler Inhale 2 puffs into the lungs every 4 hours as needed for Wheezing or Shortness of Breath 1 Inhaler 0    vitamin D-3 (CHOLECALCIFEROL) 5000 UNITS TABS Take 5,000 Units by mouth daily.  Coenzyme Q10 (CO Q 10 PO) Take by mouth daily Indications: prn when remembers to take        No current facility-administered medications for this visit. Allergies   Allergen Reactions    Hydrochlorothiazide Itching and Rash     See progress note from Dr. Bernardo Bravo (dermatogist) 4/13/2017. Patient restarted 7/31/17 not having any issues as of 8/4/17       REVIEW OF SYSTEMS:  General: No fevers, chills, change in weight  HEENT: No double vision, blurry vision, runny nose, sore throat, tinnitus  Cardio: No chest pain, palpitations, POMPA, has chronic leg edema. Pulmonary: No cough, hemoptysis, SOB  GI: No nausea, vomiting, dysphagia, odynophagia, diarrhea, constipation. : No dysuria, hematuria, urgency, incontinence  Musculoskeletal: Chronic hip and lower back pain. No other muscle or joint aches, no joint swelling  Neuro: No dizziness/lightheadedness, no seizures  Endocrine: No polyuria, polydipsia, polyphagia, no temperature intolerance  Skin:h/o Bullous pemphigoid in past .No lesions or itching currently  No problems with ADLs,uses  A walker ov wheelchair due to hip pain  Sleep: Fair  Psychiatric: No depression or anxiety    PHYSICAL EXAM:  VS:  /70   Pulse 73   Temp 96.7 °F (35.9 °C)   Ht 5' 3\" (1.6 m)   Wt 158 lb (71.7 kg)   SpO2 100%   BMI 27.99 kg/m²   General:  Alert and oriented, NAD  HEENT:  TMs, KRISTINE, EOMI, Conjunctivae clear       Throat currently clear. NECK:  Supple without adenopathy or thyromegaly, no carotid bruits  LUNGS:  CTA all fields  HEART:  RRR without M, R, or G  ABDOMEN:  Soft and nontender without palpable abnormalities.   BACK:kyphotic ,non tender  EXTREMITIES:Decresed ROM in both hips ,arthritic changes in fingers,bilateral leg swelling ,minor varicosities in legs, no calf tenderness  NEURO:  No focal deficits. SKIN:  warm to touch,normal texture. No active lesions. ASSESSMENT/PLAN:     Diagnosis Orders   1. Pre-op examination  CBC Auto Differential    Urinalysis Reflex to Culture    EKG 12 Lead    Echocardiogram complete    Basic Metabolic Panel   2. Bilateral leg edema  Echocardiogram complete   3. Essential hypertension  Basic Metabolic Panel   4. Primary osteoarthritis of both hips     5. Spondylosis of lumbar region without myelopathy or radiculopathy         Orders Placed This Encounter   Procedures    CBC Auto Differential     Standing Status:   Future     Standing Expiration Date:   4/19/2022    Urinalysis Reflex to Culture     Standing Status:   Future     Standing Expiration Date:   4/19/2022     Order Specific Question:   SPECIFY(EX-CATH,MIDSTREAM,CYSTO,ETC)? Answer:   midstream    Basic Metabolic Panel     Standing Status:   Future     Standing Expiration Date:   4/19/2022    EKG 12 Lead     Standing Status:   Future     Standing Expiration Date:   10/19/2021     Order Specific Question:   Reason for Exam?     Answer:   Pre-op    Echocardiogram complete     Standing Status:   Future     Standing Expiration Date:   6/18/2021     Order Specific Question:   Reason for exam:     Answer:   Bilateral leg edema. Preop     Requested Prescriptions      No prescriptions requested or ordered in this encounter   Preop labs ,EKG . Echocardiogram to evaluate cardiac status  Continue home meds,  If Preop studies are WNL is medically ok for surgery. No follow-ups on file.     Electronically signed by Tanna Brambila MD

## 2021-04-21 ENCOUNTER — TELEPHONE (OUTPATIENT)
Dept: FAMILY MEDICINE CLINIC | Age: 81
End: 2021-04-21

## 2021-04-21 NOTE — TELEPHONE ENCOUNTER
Patient would like to wait and see what the next few days bring. She states she has had times of diarrhea since starting this medication. She feels it is a side effect from it.

## 2021-04-21 NOTE — TELEPHONE ENCOUNTER
I clarified with patient and she is taking torsemide 20 mg daily. She states she started this medication 4 months ago. She stated she started with diarrhea 5 days ago. She denies any fever, abdominal pain, nausea or vomiting, chills, or fever. She states she feels fine. No changes in appetite. She is drinking extra fluids. She is having her daughter get her some electrolyte replacement drinks as well. She denies any changes in diet or medications.

## 2021-04-21 NOTE — TELEPHONE ENCOUNTER
Pt calling stating she was started on Forsemide and states i'ts causing her to have diarrhea, pt states she's had it for 4 days and it's like water, pt uses loaded pharmacy, please advise at above number.

## 2021-04-27 ENCOUNTER — HOSPITAL ENCOUNTER (OUTPATIENT)
Dept: LAB | Age: 81
Discharge: HOME | End: 2021-04-27
Payer: MEDICARE

## 2021-04-27 ENCOUNTER — HOSPITAL ENCOUNTER (OUTPATIENT)
Dept: NON INVASIVE DIAGNOSTICS | Age: 81
Discharge: HOME | End: 2021-04-27
Payer: MEDICARE

## 2021-04-27 DIAGNOSIS — R60.0 BILATERAL LEG EDEMA: ICD-10-CM

## 2021-04-27 DIAGNOSIS — R73.01 ELEVATED FASTING GLUCOSE: ICD-10-CM

## 2021-04-27 DIAGNOSIS — Z01.818 PRE-OP EXAMINATION: ICD-10-CM

## 2021-04-27 DIAGNOSIS — I10 ESSENTIAL HYPERTENSION: ICD-10-CM

## 2021-04-27 LAB
-: NORMAL
ABSOLUTE EOS #: 0.16 K/UL (ref 0–0.44)
ABSOLUTE IMMATURE GRANULOCYTE: 0.04 K/UL (ref 0–0.3)
ABSOLUTE LYMPH #: 2.22 K/UL (ref 1.1–3.7)
ABSOLUTE MONO #: 0.89 K/UL (ref 0.1–1.2)
ALBUMIN SERPL-MCNC: 3.9 G/DL (ref 3.5–5.2)
ALBUMIN/GLOBULIN RATIO: 1.3 (ref 1–2.5)
ALP BLD-CCNC: 86 U/L (ref 35–104)
ALT SERPL-CCNC: 11 U/L (ref 5–33)
AMORPHOUS: NORMAL
ANION GAP SERPL CALCULATED.3IONS-SCNC: 9 MMOL/L (ref 9–17)
ANION GAP SERPL CALCULATED.3IONS-SCNC: 9 MMOL/L (ref 9–17)
AST SERPL-CCNC: 16 U/L
BACTERIA: NORMAL
BASOPHILS # BLD: 0 % (ref 0–2)
BASOPHILS ABSOLUTE: <0.03 K/UL (ref 0–0.2)
BILIRUB SERPL-MCNC: 0.4 MG/DL (ref 0.3–1.2)
BILIRUBIN URINE: NEGATIVE
BUN BLDV-MCNC: 35 MG/DL (ref 8–23)
BUN BLDV-MCNC: 35 MG/DL (ref 8–23)
BUN/CREAT BLD: 30 (ref 9–20)
BUN/CREAT BLD: 30 (ref 9–20)
CALCIUM SERPL-MCNC: 9.5 MG/DL (ref 8.6–10.4)
CALCIUM SERPL-MCNC: 9.5 MG/DL (ref 8.6–10.4)
CASTS UA: NORMAL /LPF (ref 0–2)
CHLORIDE BLD-SCNC: 105 MMOL/L (ref 98–107)
CHLORIDE BLD-SCNC: 105 MMOL/L (ref 98–107)
CO2: 28 MMOL/L (ref 20–31)
CO2: 28 MMOL/L (ref 20–31)
COLOR: NORMAL
COMMENT UA: NORMAL
CREAT SERPL-MCNC: 1.15 MG/DL (ref 0.5–0.9)
CREAT SERPL-MCNC: 1.15 MG/DL (ref 0.5–0.9)
CRYSTALS, UA: NORMAL /HPF
DIFFERENTIAL TYPE: NORMAL
EOSINOPHILS RELATIVE PERCENT: 2 % (ref 1–4)
EPITHELIAL CELLS UA: NORMAL /HPF (ref 0–5)
ESTIMATED AVERAGE GLUCOSE: 117 MG/DL
GFR AFRICAN AMERICAN: 55 ML/MIN
GFR AFRICAN AMERICAN: 55 ML/MIN
GFR NON-AFRICAN AMERICAN: 46 ML/MIN
GFR NON-AFRICAN AMERICAN: 46 ML/MIN
GFR SERPL CREATININE-BSD FRML MDRD: ABNORMAL ML/MIN/{1.73_M2}
GLUCOSE BLD-MCNC: 98 MG/DL (ref 70–99)
GLUCOSE BLD-MCNC: 98 MG/DL (ref 70–99)
GLUCOSE URINE: NEGATIVE
HBA1C MFR BLD: 5.7 % (ref 4–6)
HCT VFR BLD CALC: 38.1 % (ref 36.3–47.1)
HEMOGLOBIN: 12.3 G/DL (ref 11.9–15.1)
IMMATURE GRANULOCYTES: 0 %
KETONES, URINE: NEGATIVE
LEUKOCYTE ESTERASE, URINE: NEGATIVE
LV EF: 70 %
LVEF MODALITY: NORMAL
LYMPHOCYTES # BLD: 24 % (ref 24–43)
MCH RBC QN AUTO: 30.2 PG (ref 25.2–33.5)
MCHC RBC AUTO-ENTMCNC: 32.3 G/DL (ref 25.2–33.5)
MCV RBC AUTO: 93.6 FL (ref 82.6–102.9)
MONOCYTES # BLD: 10 % (ref 3–12)
MUCUS: NORMAL
NITRITE, URINE: NEGATIVE
NRBC AUTOMATED: 0 PER 100 WBC
OTHER OBSERVATIONS UA: NORMAL
PDW BLD-RTO: 12.3 % (ref 11.8–14.4)
PH UA: 5.5 (ref 5–6)
PLATELET # BLD: 258 K/UL (ref 138–453)
PLATELET ESTIMATE: NORMAL
PMV BLD AUTO: 10.1 FL (ref 8.1–13.5)
POTASSIUM SERPL-SCNC: 3.8 MMOL/L (ref 3.7–5.3)
POTASSIUM SERPL-SCNC: 3.8 MMOL/L (ref 3.7–5.3)
PROTEIN UA: NEGATIVE
RBC # BLD: 4.07 M/UL (ref 3.95–5.11)
RBC # BLD: NORMAL 10*6/UL
RBC UA: NORMAL /HPF (ref 0–4)
RENAL EPITHELIAL, UA: NORMAL /HPF
SEG NEUTROPHILS: 64 % (ref 36–65)
SEGMENTED NEUTROPHILS ABSOLUTE COUNT: 5.81 K/UL (ref 1.5–8.1)
SODIUM BLD-SCNC: 142 MMOL/L (ref 135–144)
SODIUM BLD-SCNC: 142 MMOL/L (ref 135–144)
SPECIFIC GRAVITY UA: 1.02 (ref 1.01–1.02)
TOTAL PROTEIN: 7 G/DL (ref 6.4–8.3)
TRICHOMONAS: NORMAL
TURBIDITY: NORMAL
URINE HGB: NEGATIVE
UROBILINOGEN, URINE: NORMAL
WBC # BLD: 9.1 K/UL (ref 3.5–11.3)
WBC # BLD: NORMAL 10*3/UL
WBC UA: NORMAL /HPF (ref 0–4)
YEAST: NORMAL

## 2021-04-27 PROCEDURE — 83036 HEMOGLOBIN GLYCOSYLATED A1C: CPT

## 2021-04-27 PROCEDURE — 93306 TTE W/DOPPLER COMPLETE: CPT

## 2021-04-27 PROCEDURE — 80053 COMPREHEN METABOLIC PANEL: CPT

## 2021-04-27 PROCEDURE — 85025 COMPLETE CBC W/AUTO DIFF WBC: CPT

## 2021-04-27 PROCEDURE — 81001 URINALYSIS AUTO W/SCOPE: CPT

## 2021-04-27 PROCEDURE — 36415 COLL VENOUS BLD VENIPUNCTURE: CPT

## 2021-04-28 ENCOUNTER — OFFICE VISIT (OUTPATIENT)
Dept: PRIMARY CARE CLINIC | Age: 81
End: 2021-04-28
Payer: MEDICARE

## 2021-04-28 ENCOUNTER — HOSPITAL ENCOUNTER (OUTPATIENT)
Age: 81
Setting detail: SPECIMEN
Discharge: HOME | End: 2021-04-28
Payer: MEDICARE

## 2021-04-28 ENCOUNTER — TELEPHONE (OUTPATIENT)
Dept: FAMILY MEDICINE CLINIC | Age: 81
End: 2021-04-28

## 2021-04-28 VITALS
SYSTOLIC BLOOD PRESSURE: 118 MMHG | HEART RATE: 88 BPM | OXYGEN SATURATION: 98 % | WEIGHT: 157.7 LBS | TEMPERATURE: 98.2 F | DIASTOLIC BLOOD PRESSURE: 74 MMHG | BODY MASS INDEX: 27.94 KG/M2

## 2021-04-28 DIAGNOSIS — R19.7 DIARRHEA, UNSPECIFIED TYPE: Primary | ICD-10-CM

## 2021-04-28 DIAGNOSIS — R19.7 DIARRHEA, UNSPECIFIED TYPE: ICD-10-CM

## 2021-04-28 PROCEDURE — 99212 OFFICE O/P EST SF 10 MIN: CPT | Performed by: FAMILY MEDICINE

## 2021-04-28 PROCEDURE — G8427 DOCREV CUR MEDS BY ELIG CLIN: HCPCS | Performed by: FAMILY MEDICINE

## 2021-04-28 PROCEDURE — G8399 PT W/DXA RESULTS DOCUMENT: HCPCS | Performed by: FAMILY MEDICINE

## 2021-04-28 PROCEDURE — 87506 IADNA-DNA/RNA PROBE TQ 6-11: CPT

## 2021-04-28 PROCEDURE — G8417 CALC BMI ABV UP PARAM F/U: HCPCS | Performed by: FAMILY MEDICINE

## 2021-04-28 PROCEDURE — 1090F PRES/ABSN URINE INCON ASSESS: CPT | Performed by: FAMILY MEDICINE

## 2021-04-28 PROCEDURE — 99213 OFFICE O/P EST LOW 20 MIN: CPT | Performed by: FAMILY MEDICINE

## 2021-04-28 PROCEDURE — 4040F PNEUMOC VAC/ADMIN/RCVD: CPT | Performed by: FAMILY MEDICINE

## 2021-04-28 PROCEDURE — 1123F ACP DISCUSS/DSCN MKR DOCD: CPT | Performed by: FAMILY MEDICINE

## 2021-04-28 PROCEDURE — 1036F TOBACCO NON-USER: CPT | Performed by: FAMILY MEDICINE

## 2021-04-28 PROCEDURE — 87324 CLOSTRIDIUM AG IA: CPT

## 2021-04-28 PROCEDURE — 87449 NOS EACH ORGANISM AG IA: CPT

## 2021-04-28 ASSESSMENT — PATIENT HEALTH QUESTIONNAIRE - PHQ9
SUM OF ALL RESPONSES TO PHQ QUESTIONS 1-9: 0
SUM OF ALL RESPONSES TO PHQ QUESTIONS 1-9: 0
SUM OF ALL RESPONSES TO PHQ9 QUESTIONS 1 & 2: 0
2. FEELING DOWN, DEPRESSED OR HOPELESS: 0

## 2021-04-28 NOTE — PROGRESS NOTES
[facility]             [address] [place] [address], [address] [place] [address] [address]                        Telephone [phone]             Fax [phone]       Satya Baez  1940  MRN:  [id number]  Date of visit:  4/28/2021     Subjective:    Satya Baez is a 68 y.o. female who presents to [facility] today (4/28/2021) for evaluation of:  Diarrhea      She states that she has had diarrhea for about a week. She reports intermittent abdominal pain. She denies fever. She reports occasional chills, but she states that this is not unusual for her. She denies nausea or vomiting. She states that her appetite is decreased, but she is eating. No one else in the household is ill. She denies recent hospitalization. She denies recent antibiotic use. She has the following problem list:  Patient Active Problem List   Diagnosis    Essential hypertension    GERD (gastroesophageal reflux disease)    Elevated fasting glucose    Osteopenia    Overweight (BMI 25.0-29. 9)    Venous insufficiency (chronic) (peripheral)    Spider vein, symptomatic    Varicose vein    Hyponatremia, mild    Tubular adenoma of colon    Bullous pemphigoid    Vitamin D deficiency        She has the following surgical history:  Past Surgical History:   Procedure Laterality Date    ABDOMINAL EXPLORATION SURGERY  1972    ectopic pregnancy, bilateral salpingectomy    CATARACT REMOVAL WITH IMPLANT Left 07/24/2018    per Dr Mary Mesa N/A 7/26/2017    COLONOSCOPY WITH BIOPSY performed by King Ronel MD at [address] [place] [address], one tubular adenoma on pathology, repeat recommended in 5 years        Current medications are:     Current Outpatient Medications   Medication Sig Dispense Refill    metoprolol succinate (TOPROL XL) 25 MG extended release tablet Take 1 tablet by mouth once daily 90 tablet 0    omeprazole (PRILOSEC) 20 MG delayed release capsule TAKE 1 CAPSULE BY MOUTH ONCE DAILY AS NEEDED FOR REFLUX 90 capsule 0    cetirizine (ZYRTEC) 10 MG tablet Take 10 mg by mouth daily      torsemide (DEMADEX) 20 MG tablet Take 1 tablet by mouth once daily 30 tablet 5    lisinopril (PRINIVIL;ZESTRIL) 40 MG tablet Take 1 tablet by mouth once daily 90 tablet 0    amLODIPine (NORVASC) 5 MG tablet Take 1 tablet by mouth once daily 90 tablet 0    Acetaminophen (TYLENOL ARTHRITIS PAIN PO) Take by mouth as needed      hydrOXYzine (ATARAX) 25 MG tablet TAKE ONE TABLET BY MOUTH THREE TIMES DAILY AS NEEDED FOR ITCHING 90 tablet 1    Krill Oil (MAXIMUM RED KRILL PO) Take by mouth daily      Multiple Vitamins-Minerals (PRESERVISION AREDS 2 PO) Take by mouth daily      albuterol sulfate  (90 BASE) MCG/ACT inhaler Inhale 2 puffs into the lungs every 4 hours as needed for Wheezing or Shortness of Breath 1 Inhaler 0    vitamin D-3 (CHOLECALCIFEROL) 5000 UNITS TABS Take 5,000 Units by mouth daily.  Coenzyme Q10 (CO Q 10 PO) Take by mouth daily Indications: prn when remembers to take        No current facility-administered medications for this visit. She is allergic to hydrochlorothiazide. She  reports that she quit smoking about 32 years ago. Her smoking use included cigarettes. She started smoking about 63 years ago. She has a 77.50 pack-year smoking history. She has never used smokeless tobacco..      Objective:    Vitals:    04/28/21 1757   BP: 118/74   Site: Left Upper Arm   Position: Sitting   Cuff Size: Large Adult   Pulse: 88   Temp: 98.2 °F (36.8 °C)   TempSrc: Tympanic   SpO2: 98%   Weight: 157 lb 11.2 oz (71.5 kg)     Body mass index is 27.94 kg/m². Well-nourished, well-developed overweight elderly female, healthy-appearing, alert, cooperative and in no acute distress. Mucus membranes are moist.  Neck is supple. There is no lymphadenopathy or thyromegaly. Chest is clear to auscultation bilaterally.   Heart sounds are regular rate and rhythm without murmur. Abdomen is soft, nondistended. There are no masses. There is no tenderness to palpation of the abdomen. Assessment and Plan:    Diarrhea, unspecified type  Stool samples were obtained today:  - Clostridium Difficile Toxin/Antigen; Future  - Gastrointestinal Panel, Molecular; Future       She will be contacted when the results are available. Printed information regarding Diarrhea was provided to the patient with her after visit summary. She was advised to drink plenty of fluids and eat a bland diet. She was advised to follow up if symptoms worsen or do not resolve.        (Please note that portions of this note were completed with a voice-recognition program. Efforts were made to edit the dictation but occasionally words are mis-transcribed.)

## 2021-04-28 NOTE — PATIENT INSTRUCTIONS
Patient Education        Diarrhea: Care Instructions  Your Care Instructions     Diarrhea is loose, watery stools (bowel movements). The exact cause is often hard to find. Sometimes diarrhea is your body's way of getting rid of what caused an upset stomach. Viruses, food poisoning, and many medicines can cause diarrhea. Some people get diarrhea in response to emotional stress, anxiety, or certain foods. Almost everyone has diarrhea now and then. It usually isn't serious, and your stools will return to normal soon. The important thing to do is replace the fluids you have lost, so you can prevent dehydration. The doctor has checked you carefully, but problems can develop later. If you notice any problems or new symptoms, get medical treatment right away. Follow-up care is a key part of your treatment and safety. Be sure to make and go to all appointments, and call your doctor if you are having problems. It's also a good idea to know your test results and keep a list of the medicines you take. How can you care for yourself at home? · Watch for signs of dehydration, which means your body has lost too much water. Dehydration is a serious condition and should be treated right away. Signs of dehydration are:  ? Increasing thirst and dry eyes and mouth. ? Feeling faint or lightheaded. ? A smaller amount of urine than normal.  · To prevent dehydration, drink plenty of fluids. Choose water and other caffeine-free clear liquids until you feel better. If you have kidney, heart, or liver disease and have to limit fluids, talk with your doctor before you increase the amount of fluids you drink. · Begin eating small amounts of mild foods the next day, if you feel like it. ? Try yogurt that has live cultures of Lactobacillus. (Check the label.)  ? Avoid spicy foods, fruits, alcohol, and caffeine until 48 hours after all symptoms are gone. ? Avoid chewing gum that contains sorbitol. ?  Avoid dairy products (except for yogurt with Lactobacillus) while you have diarrhea and for 3 days after symptoms are gone. · The doctor may recommend that you take over-the-counter medicine, such as loperamide (Imodium), if you still have diarrhea after 6 hours. Read and follow all instructions on the label. Do not use this medicine if you have bloody diarrhea, a high fever, or other signs of serious illness. Call your doctor if you think you are having a problem with your medicine. When should you call for help? Call 911 anytime you think you may need emergency care. For example, call if:    · You passed out (lost consciousness).     · Your stools are maroon or very bloody. Call your doctor now or seek immediate medical care if:    · You are dizzy or lightheaded, or you feel like you may faint.     · Your stools are black and look like tar, or they have streaks of blood.     · You have new or worse belly pain.     · You have symptoms of dehydration, such as:  ? Dry eyes and a dry mouth. ? Passing only a little urine. ? Feeling thirstier than usual.     · You have a new or higher fever. Watch closely for changes in your health, and be sure to contact your doctor if:    · Your diarrhea is getting worse.     · You see pus in the diarrhea.     · You are not getting better after 2 days (48 hours). Where can you learn more? Go to [email or web address] org and sign in to your [employer] account. Enter X655 in the GoodyTag box to learn more about \"Diarrhea: Care Instructions. \"     If you do not have an account, please click on the \"Sign Up Now\" link. Current as of: February 26, 2020               Content Version: 12.8  © 3087-2326 [employer]. Care instructions adapted under license by [facility] ([facility]).  If you have questions about a medical condition or this instruction, always ask your healthcare professional. Breonna Pettit any warranty or liability for your use of this information.

## 2021-04-28 NOTE — TELEPHONE ENCOUNTER
Pt calling stating she is still having diarrhea, it is basically all water, pt questioned if her being dehydrated was causing her diarrhea and I informed pt that actually the diarrhea could be causing her dehydration and since this could be due to an infection and pt has upcoming surgery then pt needs to come to [facility] to be seen.

## 2021-04-29 LAB
C DIFF AG + TOXIN: NEGATIVE
SPECIMEN DESCRIPTION: NORMAL

## 2021-04-30 LAB
CAMPYLOBACTER PCR: NORMAL
E COLI ENTEROTOXIGENIC PCR: NORMAL
PLESIOMONAS SHIGELLOIDES PCR: NORMAL
SALMONELLA PCR: NORMAL
SHIGATOXIN GENE PCR: NORMAL
SHIGELLA SP PCR: NORMAL
SPECIMEN DESCRIPTION: NORMAL
VIBRIO PCR: NORMAL
YERSINIA ENTEROCOLITICA PCR: NORMAL

## 2021-05-03 ENCOUNTER — TELEPHONE (OUTPATIENT)
Dept: ORTHOPEDIC SURGERY | Age: 81
End: 2021-05-03

## 2021-05-03 ENCOUNTER — TELEPHONE (OUTPATIENT)
Dept: FAMILY MEDICINE CLINIC | Age: 81
End: 2021-05-03

## 2021-05-03 DIAGNOSIS — R19.7 DIARRHEA, UNSPECIFIED TYPE: Primary | ICD-10-CM

## 2021-05-03 NOTE — TELEPHONE ENCOUNTER
Patient had seen Dr. Geni Moeller on 4-19-21 for pre op clearance for a left total hip replacement after reviewing pre op studies is she clear for the procedure?

## 2021-05-03 NOTE — TELEPHONE ENCOUNTER
Patient called and she decided she would like referral for generally surgery due to diarrhea ( see c diff/ gastro panel result note 4/28/21) Referral placed.

## 2021-05-04 ENCOUNTER — INITIAL CONSULT (OUTPATIENT)
Dept: SURGERY | Age: 81
End: 2021-05-04
Payer: MEDICARE

## 2021-05-04 VITALS
HEIGHT: 63 IN | RESPIRATION RATE: 18 BRPM | SYSTOLIC BLOOD PRESSURE: 124 MMHG | TEMPERATURE: 96.7 F | HEART RATE: 80 BPM | BODY MASS INDEX: 27.96 KG/M2 | DIASTOLIC BLOOD PRESSURE: 78 MMHG | WEIGHT: 157.8 LBS

## 2021-05-04 DIAGNOSIS — R19.7 DIARRHEA OF PRESUMED INFECTIOUS ORIGIN: Primary | ICD-10-CM

## 2021-05-04 PROCEDURE — G8417 CALC BMI ABV UP PARAM F/U: HCPCS | Performed by: SURGERY

## 2021-05-04 PROCEDURE — G8427 DOCREV CUR MEDS BY ELIG CLIN: HCPCS | Performed by: SURGERY

## 2021-05-04 PROCEDURE — 1090F PRES/ABSN URINE INCON ASSESS: CPT | Performed by: SURGERY

## 2021-05-04 PROCEDURE — 1036F TOBACCO NON-USER: CPT | Performed by: SURGERY

## 2021-05-04 PROCEDURE — 99203 OFFICE O/P NEW LOW 30 MIN: CPT | Performed by: SURGERY

## 2021-05-04 PROCEDURE — 1123F ACP DISCUSS/DSCN MKR DOCD: CPT | Performed by: SURGERY

## 2021-05-04 PROCEDURE — 4040F PNEUMOC VAC/ADMIN/RCVD: CPT | Performed by: SURGERY

## 2021-05-04 PROCEDURE — 99213 OFFICE O/P EST LOW 20 MIN: CPT | Performed by: SURGERY

## 2021-05-04 PROCEDURE — G8399 PT W/DXA RESULTS DOCUMENT: HCPCS | Performed by: SURGERY

## 2021-05-04 NOTE — TELEPHONE ENCOUNTER
Patient  preop labs with mild elavation of Creatinine secondary to chronic diuretic therapy. Echo  with Good EF has grade 2 diastolic dysfunction,continue with diuretic therapy . Is medically stable for surgery. Recommned follow up with her PCP post op for continued monitoring

## 2021-05-04 NOTE — PATIENT INSTRUCTIONS
Dr. Belkys Myers recommends taking probiotics. Contact us after 2 weeks if you are still having symptoms. Our clinic number is [phone].

## 2021-05-04 NOTE — PROGRESS NOTES
Otoniel Blum is a 68 y.o. female who presents today for evaluation of diarrhea. Patient states for approximately 1-1/2 to 2 weeks she has been having diarrhea. She states that when this started she did not have any fevers or chills. However she is reporting that just prior to start of diarrhea the day before she had an episode where she had several bouts of emesis. On further questioning she does report that she had eaten some food that she had been purchased from [employer] but that other people had eaten the same food and did not have any symptoms. However she is also reporting that just prior to this she had started a medication called Fungiclear for treatment of nail fungus which she was taking orally. When the diarrhea started she did stop this medication and it seems that may have had some improvement during this 2-week period but then had return of her symptoms. Denies any known sick contacts. No other environmental exposures identified. No prior history of similar. She does report over the past 24 hours that she seems to have had a vast improvement in her symptoms and has not had any significant diarrhea in that time whereas most days she would have multiple loose bowel movements each day. Last had colonoscopy approximately 3 to 4 years ago with one polyp found. No other medication changes.     Past Medical History:   Diagnosis Date    Bullous pemphigoid     Elevated fasting glucose     Former smoker     quit in 1987    GERD (gastroesophageal reflux disease)     Hypertension     Osteopenia     took Boniva times 5 years, stopped 2012    Overweight(278.02)     wieght 174 pounds, height 65 inches, BMI 29, 3/31/2013       Past Surgical History:   Procedure Laterality Date    ABDOMINAL EXPLORATION SURGERY  1972    ectopic pregnancy, bilateral salpingectomy    CATARACT REMOVAL WITH IMPLANT Left 07/24/2018    per Dr Kera Nobles 7/26/2017    COLONOSCOPY WITH BIOPSY performed by King Ronel MD at [address] [place] [address], one tubular adenoma on pathology, repeat recommended in 5 years       Current Outpatient Medications   Medication Sig Dispense Refill    metoprolol succinate (TOPROL XL) 25 MG extended release tablet Take 1 tablet by mouth once daily 90 tablet 0    omeprazole (PRILOSEC) 20 MG delayed release capsule TAKE 1 CAPSULE BY MOUTH ONCE DAILY AS NEEDED FOR REFLUX 90 capsule 0    cetirizine (ZYRTEC) 10 MG tablet Take 10 mg by mouth daily      torsemide (DEMADEX) 20 MG tablet Take 1 tablet by mouth once daily 30 tablet 5    lisinopril (PRINIVIL;ZESTRIL) 40 MG tablet Take 1 tablet by mouth once daily 90 tablet 0    amLODIPine (NORVASC) 5 MG tablet Take 1 tablet by mouth once daily 90 tablet 0    Acetaminophen (TYLENOL ARTHRITIS PAIN PO) Take by mouth as needed      hydrOXYzine (ATARAX) 25 MG tablet TAKE ONE TABLET BY MOUTH THREE TIMES DAILY AS NEEDED FOR ITCHING 90 tablet 1    Krill Oil (MAXIMUM RED KRILL PO) Take by mouth daily      Multiple Vitamins-Minerals (PRESERVISION AREDS 2 PO) Take by mouth daily      albuterol sulfate  (90 BASE) MCG/ACT inhaler Inhale 2 puffs into the lungs every 4 hours as needed for Wheezing or Shortness of Breath 1 Inhaler 0    vitamin D-3 (CHOLECALCIFEROL) 5000 UNITS TABS Take 5,000 Units by mouth daily.  Coenzyme Q10 (CO Q 10 PO) Take by mouth daily Indications: prn when remembers to take        No current facility-administered medications for this visit. Allergies   Allergen Reactions    Hydrochlorothiazide Itching and Rash     See progress note from Dr. Macarena Wolfe (dermatogist) 4/13/2017.     Patient restarted 7/31/17 not having any issues as of 8/4/17       Family History   Problem Relation Age of Onset    Cancer Mother         unknown type of cancer    Diabetes Mother     Diabetes Brother     Diabetes Brother     Heart Disease Father         CHF       Social History 0941   BP: 124/78   Pulse: 80   Resp: 18   Temp: 96.7 °F (35.9 °C)     General:in no apparent distress and well developed and well nourished  Eyes: No gross abnormalities. Ears, Nose, Throat: hearing grossly normal bilaterally  Neck: neck supple and non tender without mass  Lungs: clear to auscultation without wheezes or rales   Heart: S1S2, no mumurs, RRR  Abdomen: soft, nontender, no HSM, no guarding, no rebound, no masses  Extremity: negative  Neuro: CN II-XII grossly intact      Assessment     3  42-year-old female with 1 to 2-week history of diarrheaseems to be improving over past 24 hours      Plan     1. Based on the patient's presentation of symptoms I think this is likely infectious and most likely is a viral illness. However she does have a couple identifiable possible causes including this over-the-counter antifungal medication that she is taking as well as a possible food exposure. I think as her symptoms seem to be improving that we can monitor conservatively. I have recommended that she restart a probiotic. We will continue to monitor for 1 to 2 weeks and if still having symptoms at that time we will likely proceed with additional testing and possible repeat colonoscopy. Otherwise if she has resolution of symptoms no further follow-up with general surgery as needed.     Electronically signed by Andreas Hirsch DO on 5/4/2021 at 10:07 AM      (Please note that portions of this note were completed with a voice recognition program.  Efforts were made to edit the dictations but occasionally words are mis-transcribed.)

## 2021-05-17 ENCOUNTER — TELEPHONE (OUTPATIENT)
Dept: FAMILY MEDICINE CLINIC | Age: 81
End: 2021-05-17
Payer: MEDICARE

## 2021-05-17 DIAGNOSIS — M16.12 PRIMARY OSTEOARTHRITIS OF LEFT HIP: ICD-10-CM

## 2021-05-17 DIAGNOSIS — M46.92 CERVICAL SPONDYLITIS (HCC): Primary | ICD-10-CM

## 2021-05-17 DIAGNOSIS — I10 ESSENTIAL HYPERTENSION: ICD-10-CM

## 2021-05-17 PROCEDURE — G0180 MD CERTIFICATION HHA PATIENT: HCPCS | Performed by: FAMILY MEDICINE

## 2021-05-18 NOTE — TELEPHONE ENCOUNTER
I agree with the documentation of Barb Bah LPN.     Electronically signed by America Greenfield MD on 5/18/2021 at 10:57 AM.

## 2021-06-04 DIAGNOSIS — I10 ESSENTIAL HYPERTENSION: ICD-10-CM

## 2021-06-07 NOTE — TELEPHONE ENCOUNTER
[facility] called requesting a refill of the below medication which has been pended for you: DAKOTAH out of office     Requested Prescriptions     Pending Prescriptions Disp Refills    amLODIPine (NORVASC) 5 MG tablet [Pharmacy Med Name: amLODIPine Besylate 5 MG Oral Tablet] 30 tablet 0     Sig: Take 1 tablet by mouth once daily       Last Appointment Date: 4/28/2021  Next Appointment Date: 10/6/2021    Allergies   Allergen Reactions    Hydrochlorothiazide Itching and Rash     See progress note from Dr. Rolan Jones (dermatogist) 4/13/2017.     Patient restarted 7/31/17 not having any issues as of 8/4/17

## 2021-06-08 ENCOUNTER — OFFICE VISIT (OUTPATIENT)
Dept: SURGERY | Age: 81
End: 2021-06-08
Payer: MEDICARE

## 2021-06-08 VITALS — RESPIRATION RATE: 16 BRPM | SYSTOLIC BLOOD PRESSURE: 110 MMHG | DIASTOLIC BLOOD PRESSURE: 66 MMHG | HEART RATE: 72 BPM

## 2021-06-08 DIAGNOSIS — R19.7 DIARRHEA, UNSPECIFIED TYPE: Primary | ICD-10-CM

## 2021-06-08 PROCEDURE — 1123F ACP DISCUSS/DSCN MKR DOCD: CPT | Performed by: SURGERY

## 2021-06-08 PROCEDURE — 1090F PRES/ABSN URINE INCON ASSESS: CPT | Performed by: SURGERY

## 2021-06-08 PROCEDURE — 99214 OFFICE O/P EST MOD 30 MIN: CPT

## 2021-06-08 PROCEDURE — 99213 OFFICE O/P EST LOW 20 MIN: CPT | Performed by: SURGERY

## 2021-06-08 PROCEDURE — 4040F PNEUMOC VAC/ADMIN/RCVD: CPT | Performed by: SURGERY

## 2021-06-08 PROCEDURE — G8427 DOCREV CUR MEDS BY ELIG CLIN: HCPCS | Performed by: SURGERY

## 2021-06-08 PROCEDURE — G8417 CALC BMI ABV UP PARAM F/U: HCPCS | Performed by: SURGERY

## 2021-06-08 PROCEDURE — G8399 PT W/DXA RESULTS DOCUMENT: HCPCS | Performed by: SURGERY

## 2021-06-08 PROCEDURE — 1036F TOBACCO NON-USER: CPT | Performed by: SURGERY

## 2021-06-08 RX ORDER — AMLODIPINE BESYLATE 5 MG/1
TABLET ORAL
Qty: 90 TABLET | Refills: 0 | Status: SHIPPED | OUTPATIENT
Start: 2021-06-08 | End: 2021-09-13

## 2021-06-08 NOTE — PROGRESS NOTES
Subjective   Get Flurry is a 68 y.o. female who presents today for f/u for diarrhea. Pt was seen previously for same and had stool testing that showed no infectious causes. She was started on probiotic due to concern that it may be related to recent antibiotic use. Since last seen she states she has seen some improvement but not complete resolution. Now she states she is having some days with normal bowel movements and some days with loose stools. No other changes.     Past Medical History:   Diagnosis Date    Bullous pemphigoid     Elevated fasting glucose     Former smoker     quit in 1987    GERD (gastroesophageal reflux disease)     Hypertension     Osteopenia     took Boniva times 5 years, stopped 2012    Overweight(278.02)     wieght 174 pounds, height 65 inches, BMI 29, 3/31/2013       Past Surgical History:   Procedure Laterality Date    ABDOMINAL EXPLORATION SURGERY  1972    ectopic pregnancy, bilateral salpingectomy    CATARACT REMOVAL WITH IMPLANT Left 07/24/2018    per Dr Stoney Klein N/A 7/26/2017    COLONOSCOPY WITH BIOPSY performed by Yinka Gilliland MD at [address] [place] [address], one tubular adenoma on pathology, repeat recommended in 5 years       Current Outpatient Medications   Medication Sig Dispense Refill    amLODIPine (NORVASC) 5 MG tablet Take 1 tablet by mouth once daily 90 tablet 0    lisinopril (PRINIVIL;ZESTRIL) 40 MG tablet Take 1 tablet by mouth once daily 90 tablet 1    metoprolol succinate (TOPROL XL) 25 MG extended release tablet Take 1 tablet by mouth once daily 90 tablet 0    omeprazole (PRILOSEC) 20 MG delayed release capsule TAKE 1 CAPSULE BY MOUTH ONCE DAILY AS NEEDED FOR REFLUX 90 capsule 0    cetirizine (ZYRTEC) 10 MG tablet Take 10 mg by mouth daily      torsemide (DEMADEX) 20 MG tablet Take 1 tablet by mouth once daily 30 tablet 5    Acetaminophen (TYLENOL ARTHRITIS PAIN PO) Take by mouth as needed      hydrOXYzine (ATARAX) 25 MG tablet TAKE ONE TABLET BY MOUTH THREE TIMES DAILY AS NEEDED FOR ITCHING 90 tablet 1    Krill Oil (MAXIMUM RED KRILL PO) Take by mouth daily      Multiple Vitamins-Minerals (PRESERVISION AREDS 2 PO) Take by mouth daily      albuterol sulfate  (90 BASE) MCG/ACT inhaler Inhale 2 puffs into the lungs every 4 hours as needed for Wheezing or Shortness of Breath 1 Inhaler 0    vitamin D-3 (CHOLECALCIFEROL) 5000 UNITS TABS Take 5,000 Units by mouth daily.  Coenzyme Q10 (CO Q 10 PO) Take by mouth daily Indications: prn when remembers to take        No current facility-administered medications for this visit. Allergies   Allergen Reactions    Hydrochlorothiazide Itching and Rash     See progress note from Dr. Mabel Bryant (dermatogist) 2017.     Patient restarted 17 not having any issues as of 17       Family History   Problem Relation Age of Onset    Cancer Mother         unknown type of cancer    Diabetes Mother     Diabetes Brother     Diabetes Brother     Heart Disease Father         CHF       Social History     Socioeconomic History    Marital status: Single     Spouse name: Not on file    Number of children: Not on file    Years of education: Not on file    Highest education level: Not on file   Occupational History    Not on file   Tobacco Use    Smoking status: Former Smoker     Packs/day: 2.50     Years: 31.00     Pack years: 77.50     Types: Cigarettes     Start date:      Quit date:      Years since quittin.4    Smokeless tobacco: Never Used   Vaping Use    Vaping Use: Never used   Substance and Sexual Activity    Alcohol use: No     Alcohol/week: 0.0 standard drinks    Drug use: No    Sexual activity: Not on file   Other Topics Concern    Not on file   Social History Narrative    Not on file     Social Determinants of Health     Financial Resource Strain: Low Risk     Difficulty of Paying Living Expenses: Not hard at all   Food Insecurity: No Food Insecurity    Worried About Running Out of Food in the Last Year: Never true    [place] of Food in the Last Year: Never true   Transportation Needs:     Lack of Transportation (Medical):  Lack of Transportation (Non-Medical):    Physical Activity:     Days of Exercise per Week:     Minutes of Exercise per Session:    Stress:     Feeling of Stress :    Social Connections:     Frequency of Communication with Friends and Family:     Frequency of Social Gatherings with Friends and Family:     Attends L[employer] Services:     Active Member of Clubs or Organizations:     Attends Club or Organization Meetings:     Marital Status:    Intimate Partner Violence:     Fear of Current or Ex-Partner:     Emotionally Abused:     Physically Abused:     Sexually Abused:        ROS:   Review of Systems - General ROS: negative  Psychological ROS: negative  Ophthalmic ROS: negative  ENT ROS: negative  Respiratory ROS: no cough, shortness of breath, or wheezing  Cardiovascular ROS: no chest pain or dyspnea on exertion  Gastrointestinal ROS: no abdominal pain, change in bowel habits, or black or bloody stools  Genito-Urinary ROS: no dysuria, trouble voiding, or hematuria  Musculoskeletal ROS: negative  Dermatological ROS: negative      Objective   Vitals:    06/08/21 1529   BP: 110/66   Pulse: 72   Resp: 16     General:in no apparent distress  Eyes: No gross abnormalities. Ears, Nose, Throat: hearing grossly normal bilaterally  Neck: neck supple and non tender without mass  Lungs: clear to auscultation without wheezes or rales   Heart: S1S2, no mumurs, RRR  Abdomen: soft, nontender, no HSM, no guarding, no rebound, no masses  Extremity: negative  Neuro: CN II-XII grossly intact      Assessment     1. Diarrhea       Plan     1. Continue probiotic. 2. Discussed continued observation as pt is having improvement vs referral to GI for further work up.   Pt would like to begin referral to GI and monitor in that time and if still having symptoms in a few weeks proceed with GI consult. 3. Referral placed. F/u with general surgery as needed.     Electronically signed by Leti Palacio DO on 6/8/2021 at 6:46 PM      (Please note that portions of this note were completed with a voice recognition program.  Efforts were made to edit the dictations but occasionally words are mis-transcribed.)

## 2021-07-06 ENCOUNTER — HOSPITAL ENCOUNTER (OUTPATIENT)
Dept: LAB | Age: 81
Discharge: HOME | End: 2021-07-06
Payer: MEDICARE

## 2021-07-06 ENCOUNTER — OFFICE VISIT (OUTPATIENT)
Dept: FAMILY MEDICINE CLINIC | Age: 81
End: 2021-07-06
Payer: MEDICARE

## 2021-07-06 ENCOUNTER — HOSPITAL ENCOUNTER (OUTPATIENT)
Dept: NON INVASIVE DIAGNOSTICS | Age: 81
Discharge: HOME | End: 2021-07-06
Payer: MEDICARE

## 2021-07-06 VITALS
WEIGHT: 157 LBS | SYSTOLIC BLOOD PRESSURE: 132 MMHG | HEART RATE: 69 BPM | TEMPERATURE: 96.1 F | OXYGEN SATURATION: 98 % | DIASTOLIC BLOOD PRESSURE: 60 MMHG | BODY MASS INDEX: 27.82 KG/M2 | HEIGHT: 63 IN

## 2021-07-06 DIAGNOSIS — I51.89 DIASTOLIC DYSFUNCTION: ICD-10-CM

## 2021-07-06 DIAGNOSIS — Z01.818 PRE-OP EXAMINATION: Primary | ICD-10-CM

## 2021-07-06 DIAGNOSIS — M16.0 PRIMARY OSTEOARTHRITIS OF BOTH HIPS: ICD-10-CM

## 2021-07-06 DIAGNOSIS — Z01.818 PRE-OP EXAMINATION: ICD-10-CM

## 2021-07-06 DIAGNOSIS — I10 ESSENTIAL HYPERTENSION: ICD-10-CM

## 2021-07-06 LAB
-: NORMAL
ABSOLUTE EOS #: 0.14 K/UL (ref 0–0.44)
ABSOLUTE IMMATURE GRANULOCYTE: <0.03 K/UL (ref 0–0.3)
ABSOLUTE LYMPH #: 1.97 K/UL (ref 1.1–3.7)
ABSOLUTE MONO #: 0.64 K/UL (ref 0.1–1.2)
AMORPHOUS: NORMAL
ANION GAP SERPL CALCULATED.3IONS-SCNC: 11 MMOL/L (ref 9–17)
BACTERIA: NORMAL
BASOPHILS # BLD: 1 % (ref 0–2)
BASOPHILS ABSOLUTE: 0.04 K/UL (ref 0–0.2)
BILIRUBIN URINE: NEGATIVE
BUN BLDV-MCNC: 35 MG/DL (ref 8–23)
BUN/CREAT BLD: 31 (ref 9–20)
CALCIUM SERPL-MCNC: 9.3 MG/DL (ref 8.6–10.4)
CASTS UA: NORMAL /LPF (ref 0–2)
CHLORIDE BLD-SCNC: 102 MMOL/L (ref 98–107)
CO2: 27 MMOL/L (ref 20–31)
COLOR: NORMAL
COMMENT UA: NORMAL
CREAT SERPL-MCNC: 1.14 MG/DL (ref 0.5–0.9)
CRYSTALS, UA: NORMAL /HPF
DIFFERENTIAL TYPE: NORMAL
EOSINOPHILS RELATIVE PERCENT: 2 % (ref 1–4)
EPITHELIAL CELLS UA: NORMAL /HPF (ref 0–5)
GFR AFRICAN AMERICAN: 56 ML/MIN
GFR NON-AFRICAN AMERICAN: 46 ML/MIN
GFR SERPL CREATININE-BSD FRML MDRD: ABNORMAL ML/MIN/{1.73_M2}
GFR SERPL CREATININE-BSD FRML MDRD: ABNORMAL ML/MIN/{1.73_M2}
GLUCOSE BLD-MCNC: 120 MG/DL (ref 70–99)
GLUCOSE URINE: NEGATIVE
HCT VFR BLD CALC: 37.7 % (ref 36.3–47.1)
HEMOGLOBIN: 12.2 G/DL (ref 11.9–15.1)
IMMATURE GRANULOCYTES: 0 %
KETONES, URINE: NEGATIVE
LEUKOCYTE ESTERASE, URINE: NEGATIVE
LYMPHOCYTES # BLD: 25 % (ref 24–43)
MCH RBC QN AUTO: 30.1 PG (ref 25.2–33.5)
MCHC RBC AUTO-ENTMCNC: 32.4 G/DL (ref 25.2–33.5)
MCV RBC AUTO: 93.1 FL (ref 82.6–102.9)
MONOCYTES # BLD: 8 % (ref 3–12)
MUCUS: NORMAL
NITRITE, URINE: NEGATIVE
NRBC AUTOMATED: 0 PER 100 WBC
OTHER OBSERVATIONS UA: NORMAL
PDW BLD-RTO: 12.9 % (ref 11.8–14.4)
PH UA: 5.5 (ref 5–6)
PLATELET # BLD: 261 K/UL (ref 138–453)
PLATELET ESTIMATE: NORMAL
PMV BLD AUTO: 10.1 FL (ref 8.1–13.5)
POTASSIUM SERPL-SCNC: 3.7 MMOL/L (ref 3.7–5.3)
PROTEIN UA: NEGATIVE
RBC # BLD: 4.05 M/UL (ref 3.95–5.11)
RBC # BLD: NORMAL 10*6/UL
RBC UA: NORMAL /HPF (ref 0–4)
RENAL EPITHELIAL, UA: NORMAL /HPF
SEG NEUTROPHILS: 64 % (ref 36–65)
SEGMENTED NEUTROPHILS ABSOLUTE COUNT: 5.02 K/UL (ref 1.5–8.1)
SODIUM BLD-SCNC: 140 MMOL/L (ref 135–144)
SPECIFIC GRAVITY UA: 1.02 (ref 1.01–1.02)
TRICHOMONAS: NORMAL
TURBIDITY: NORMAL
URINE HGB: NEGATIVE
UROBILINOGEN, URINE: NORMAL
WBC # BLD: 7.8 K/UL (ref 3.5–11.3)
WBC # BLD: NORMAL 10*3/UL
WBC UA: NORMAL /HPF (ref 0–4)
YEAST: NORMAL

## 2021-07-06 PROCEDURE — 93005 ELECTROCARDIOGRAM TRACING: CPT

## 2021-07-06 PROCEDURE — 1090F PRES/ABSN URINE INCON ASSESS: CPT | Performed by: FAMILY MEDICINE

## 2021-07-06 PROCEDURE — G8427 DOCREV CUR MEDS BY ELIG CLIN: HCPCS | Performed by: FAMILY MEDICINE

## 2021-07-06 PROCEDURE — G8399 PT W/DXA RESULTS DOCUMENT: HCPCS | Performed by: FAMILY MEDICINE

## 2021-07-06 PROCEDURE — 81001 URINALYSIS AUTO W/SCOPE: CPT

## 2021-07-06 PROCEDURE — 36415 COLL VENOUS BLD VENIPUNCTURE: CPT

## 2021-07-06 PROCEDURE — 4040F PNEUMOC VAC/ADMIN/RCVD: CPT | Performed by: FAMILY MEDICINE

## 2021-07-06 PROCEDURE — 99212 OFFICE O/P EST SF 10 MIN: CPT | Performed by: FAMILY MEDICINE

## 2021-07-06 PROCEDURE — 1036F TOBACCO NON-USER: CPT | Performed by: FAMILY MEDICINE

## 2021-07-06 PROCEDURE — 80048 BASIC METABOLIC PNL TOTAL CA: CPT

## 2021-07-06 PROCEDURE — G8417 CALC BMI ABV UP PARAM F/U: HCPCS | Performed by: FAMILY MEDICINE

## 2021-07-06 PROCEDURE — 85025 COMPLETE CBC W/AUTO DIFF WBC: CPT

## 2021-07-06 PROCEDURE — 99214 OFFICE O/P EST MOD 30 MIN: CPT | Performed by: FAMILY MEDICINE

## 2021-07-06 PROCEDURE — 1123F ACP DISCUSS/DSCN MKR DOCD: CPT | Performed by: FAMILY MEDICINE

## 2021-07-06 NOTE — PROGRESS NOTES
HPI:  Patient comes in today for   Chief Complaint   Patient presents with    Pre-op Exam     left hip replacement dr Diego Owens   Here for preop exam for left hip replacement had had problems with bilateral hip pain for sometime,has difficulty ambulating uses a walker or wheelchair. Her surgery was postponed due to diarrhea has resolved currently thought was viral in etiology had seen general surgery. Has had h/o chronic leg edema is on Demadex,Recent echocardiogram in 4/2021 with LVEF of 70% has grade 2 diastolic dysfunction . No chest pain or SOB. h/o HTN and GERD are stable.     HISTORY:  Past Medical History:   Diagnosis Date    Bullous pemphigoid     Elevated fasting glucose     Former smoker     quit in 1987    GERD (gastroesophageal reflux disease)     Hypertension     Osteopenia     took Boniva times 5 years, stopped 2012    Overweight(278.02)     wieght 174 pounds, height 65 inches, BMI 29, 3/31/2013       Past Surgical History:   Procedure Laterality Date    ABDOMINAL EXPLORATION SURGERY  1972    ectopic pregnancy, bilateral salpingectomy    CATARACT REMOVAL WITH IMPLANT Left 07/24/2018    per Dr Manjula Abarca [place] COLONOSCOPY W/BIOPSY SINGLE/MULTIPLE N/A 7/26/2017    COLONOSCOPY WITH BIOPSY performed by Keller Fabry, MD at [address] [place] [address], one tubular adenoma on pathology, repeat recommended in 5 years        Family History   Problem Relation Age of Onset    Cancer Mother         unknown type of cancer    Diabetes Mother     Diabetes Brother     Diabetes Brother     Heart Disease Father         CHF       Social History     Socioeconomic History    Marital status: Single     Spouse name: Not on file    Number of children: Not on file    Years of education: Not on file    Highest education level: Not on file   Occupational History    Not on file   Tobacco Use    Smoking status: Former Smoker     Packs/day: 2.50     Years: 31.00     Pack years: 77.50     Types: Cigarettes     Start date: 1958 Quit date: 48     Years since quittin.5    Smokeless tobacco: Never Used   Vaping Use    Vaping Use: Never used   Substance and Sexual Activity    Alcohol use: No     Alcohol/week: 0.0 standard drinks    Drug use: No    Sexual activity: Not on file   Other Topics Concern    Not on file   Social History Narrative    Not on file     Social Determinants of Health     Financial Resource Strain: Low Risk     Difficulty of Paying Living Expenses: Not hard at all   Food Insecurity: No Food Insecurity    Worried About Running Out of Food in the Last Year: Never true    [address] in the Last Year: Never true   Transportation Needs:     Lack of Transportation (Medical):      Lack of Transportation (Non-Medical):    Physical Activity:     Days of Exercise per Week:     Minutes of Exercise per Session:    Stress:     Feeling of Stress :    Social Connections:     Frequency of Communication with Friends and Family:     Frequency of Social Gatherings with Friends and Family:     Attends Yarsani Services:     Active Member of Clubs or Organizations:     Attends Club or Organization Meetings:     Marital Status:    Intimate Partner Violence:     Fear of Current or Ex-Partner:     Emotionally Abused:     Physically Abused:     Sexually Abused:        Current Outpatient Medications   Medication Sig Dispense Refill    amLODIPine (NORVASC) 5 MG tablet Take 1 tablet by mouth once daily 90 tablet 0    lisinopril (PRINIVIL;ZESTRIL) 40 MG tablet Take 1 tablet by mouth once daily 90 tablet 1    metoprolol succinate (TOPROL XL) 25 MG extended release tablet Take 1 tablet by mouth once daily 90 tablet 0    omeprazole (PRILOSEC) 20 MG delayed release capsule TAKE 1 CAPSULE BY MOUTH ONCE DAILY AS NEEDED FOR REFLUX 90 capsule 0    cetirizine (ZYRTEC) 10 MG tablet Take 10 mg by mouth daily      torsemide (DEMADEX) 20 MG tablet Take 1 tablet by mouth once daily 30 tablet 5    Acetaminophen (TYLENOL ARTHRITIS PAIN PO) Take by mouth as needed      hydrOXYzine (ATARAX) 25 MG tablet TAKE ONE TABLET BY MOUTH THREE TIMES DAILY AS NEEDED FOR ITCHING 90 tablet 1    Krill Oil (MAXIMUM RED KRILL PO) Take by mouth daily      Multiple Vitamins-Minerals (PRESERVISION AREDS 2 PO) Take by mouth daily      albuterol sulfate  (90 BASE) MCG/ACT inhaler Inhale 2 puffs into the lungs every 4 hours as needed for Wheezing or Shortness of Breath 1 Inhaler 0    vitamin D-3 (CHOLECALCIFEROL) 5000 UNITS TABS Take 5,000 Units by mouth daily.  Coenzyme Q10 (CO Q 10 PO) Take by mouth daily Indications: prn when remembers to take        No current facility-administered medications for this visit. Allergies   Allergen Reactions    Hydrochlorothiazide Itching and Rash     See progress note from Dr. Dayami Alfred (dermatogist) 4/13/2017. Patient restarted 7/31/17 not having any issues as of 8/4/17       REVIEW OF SYSTEMS:  General: No fevers, chills, change in weight  HEENT: No double vision, blurry vision, runny nose, sore throat, tinnitus  Cardio: No chest pain, palpitations, POMPA, has chronic leg edema. Pulmonary: No cough, hemoptysis, SOB  GI: No nausea, vomiting, dysphagia, odynophagia, diarrhea, constipation. : No dysuria, hematuria, urgency, incontinence  Musculoskeletal: Chronic hip and lower back pain. No other muscle or joint aches, no joint swelling  Neuro: No dizziness/lightheadedness, no seizures  Endocrine: No polyuria, polydipsia, polyphagia, no temperature intolerance  Skin:h/o Bullous pemphigoid in past .No lesions or itching currently  No problems with ADLs,uses  A walker ov wheelchair due to hip pain  Sleep: Fair  Psychiatric: No depression or anxiety    PHYSICAL EXAM:  VS:  /60   Pulse 69   Temp 96.1 °F (35.6 °C)   Ht 5' 3\" (1.6 m)   Wt 157 lb (71.2 kg) Comment: patient unable to stand on scale due to weakness  SpO2 98%   BMI 27.81 kg/m²   General:  Alert and oriented, NAD  HEENT:  TMs, KRISTINE, EOMI, Conjunctivae clear       Throat currently clear. NECK:  Supple without adenopathy or thyromegaly, no carotid bruits  LUNGS:  CTA all fields  HEART:  RRR without M, R, or G  ABDOMEN:  Soft and nontender without palpable abnormalities. BACK:kyphotic ,non tender  EXTREMITIES:Decresed ROM in both hips ,arthritic changes in fingers,bilateral leg swelling ,minor varicosities in legs, no calf tenderness  NEURO:  No focal deficits. SKIN:  warm to touch,normal texture. No active lesions. ASSESSMENT/PLAN:     Diagnosis Orders   1. Pre-op examination     2. Primary osteoarthritis of both hips     3. Essential hypertension     4. Diastolic dysfunction         No orders of the defined types were placed in this encounter. Requested Prescriptions      No prescriptions requested or ordered in this encounter   Preop labs ,EKG ordered. Recent echo report reviewed has good EF  With diastolic dysfunction. .  Continue home meds,  Is medically stable for surgery pending preop labs  Return if symptoms worsen or fail to improve.     Electronically signed by Andres Polk MD

## 2021-07-07 DIAGNOSIS — Z01.818 PREOPERATIVE EXAMINATION: Primary | ICD-10-CM

## 2021-07-07 LAB
EKG ATRIAL RATE: 64 BPM
EKG P AXIS: 66 DEGREES
EKG P-R INTERVAL: 136 MS
EKG Q-T INTERVAL: 394 MS
EKG QRS DURATION: 88 MS
EKG QTC CALCULATION (BAZETT): 406 MS
EKG R AXIS: 12 DEGREES
EKG T AXIS: 29 DEGREES
EKG VENTRICULAR RATE: 64 BPM

## 2021-07-15 ENCOUNTER — HOSPITAL ENCOUNTER (OUTPATIENT)
Dept: PHYSICAL THERAPY | Age: 81
Setting detail: THERAPIES SERIES
Discharge: HOME | End: 2021-07-15
Payer: MEDICARE

## 2021-07-15 ENCOUNTER — TELEPHONE (OUTPATIENT)
Dept: ORTHOPEDIC SURGERY | Age: 81
End: 2021-07-15

## 2021-07-15 ENCOUNTER — NURSE ONLY (OUTPATIENT)
Dept: ORTHOPEDIC SURGERY | Age: 81
End: 2021-07-15
Payer: MEDICARE

## 2021-07-15 ENCOUNTER — HOSPITAL ENCOUNTER (OUTPATIENT)
Age: 81
Setting detail: SPECIMEN
Discharge: HOME | End: 2021-07-15
Payer: MEDICARE

## 2021-07-15 DIAGNOSIS — Z01.818 PRE-OP TESTING: ICD-10-CM

## 2021-07-15 DIAGNOSIS — Z01.818 PREOPERATIVE EXAMINATION: ICD-10-CM

## 2021-07-15 DIAGNOSIS — K21.9 GASTROESOPHAGEAL REFLUX DISEASE: ICD-10-CM

## 2021-07-15 DIAGNOSIS — I10 ESSENTIAL HYPERTENSION: ICD-10-CM

## 2021-07-15 PROCEDURE — 87641 MR-STAPH DNA AMP PROBE: CPT

## 2021-07-15 PROCEDURE — 9990000011 HC NO CHARGE THERAPY VISIT: Performed by: PHYSICAL THERAPIST

## 2021-07-15 PROCEDURE — 99215 OFFICE O/P EST HI 40 MIN: CPT | Performed by: ORTHOPAEDIC SURGERY

## 2021-07-15 RX ORDER — METOPROLOL SUCCINATE 25 MG/1
TABLET, EXTENDED RELEASE ORAL
Qty: 90 TABLET | Refills: 0 | Status: SHIPPED | OUTPATIENT
Start: 2021-07-15 | End: 2021-10-06 | Stop reason: SDUPTHER

## 2021-07-15 RX ORDER — OMEPRAZOLE 20 MG/1
CAPSULE, DELAYED RELEASE ORAL
Qty: 90 CAPSULE | Refills: 0 | Status: SHIPPED | OUTPATIENT
Start: 2021-07-15 | End: 2021-10-06 | Stop reason: SDUPTHER

## 2021-07-15 ASSESSMENT — PAIN DESCRIPTION - ORIENTATION: ORIENTATION: LEFT

## 2021-07-15 ASSESSMENT — PAIN DESCRIPTION - PROGRESSION: CLINICAL_PROGRESSION: GRADUALLY WORSENING

## 2021-07-15 ASSESSMENT — PAIN SCALES - GENERAL: PAINLEVEL_OUTOF10: 10

## 2021-07-15 ASSESSMENT — PAIN DESCRIPTION - PAIN TYPE: TYPE: CHRONIC PAIN

## 2021-07-15 ASSESSMENT — PAIN DESCRIPTION - DESCRIPTORS: DESCRIPTORS: SHARP

## 2021-07-15 ASSESSMENT — PAIN DESCRIPTION - LOCATION: LOCATION: HIP

## 2021-07-15 ASSESSMENT — PAIN DESCRIPTION - ONSET: ONSET: PROGRESSIVE

## 2021-07-15 ASSESSMENT — PAIN - FUNCTIONAL ASSESSMENT: PAIN_FUNCTIONAL_ASSESSMENT: PREVENTS OR INTERFERES WITH ALL ACTIVE AND SOME PASSIVE ACTIVITIES

## 2021-07-15 ASSESSMENT — PAIN DESCRIPTION - FREQUENCY: FREQUENCY: CONTINUOUS

## 2021-07-15 NOTE — TELEPHONE ENCOUNTER
Barbara Record called requesting a refill of the below medication which has been pended for you:     Requested Prescriptions     Pending Prescriptions Disp Refills    metoprolol succinate (TOPROL XL) 25 MG extended release tablet [Pharmacy Med Name: Metoprolol Succinate ER 25 MG Oral Tablet Extended Release 24 Hour] 90 tablet 0     Sig: Take 1 tablet by mouth once daily    omeprazole (PRILOSEC) 20 MG delayed release capsule [Pharmacy Med Name: Omeprazole 20 MG Oral Capsule Delayed Release] 90 capsule 0     Sig: TAKE 1 CAPSULE BY MOUTH ONCE DAILY AS NEEDED FOR  REFLUX       Last Appointment Date: 4/28/2021  Next Appointment Date: 10/6/2021    Allergies   Allergen Reactions    Hydrochlorothiazide Itching and Rash     See progress note from Dr. Evie De Santiago (dermatogist) 4/13/2017.     Patient restarted 7/31/17 not having any issues as of 8/4/17

## 2021-07-15 NOTE — PROGRESS NOTES
Ambulation  Ambulation?: Yes  WB Status: WBAT  Ambulation 1  Surface: level tile  Device: Rolling Walker  Comments: Reviewed WBAT  Stairs/Curb  Stairs?: Yes  Stairs  # Steps : 4  Stairs Height: 6\"  Comment: Reviewed proper up& down        Assessment   Conditions Requiring Skilled Therapeutic Intervention  Body structures, Functions, Activity limitations: Decreased ROM; Decreased strength  Prognosis: Good  Decision Making: Low Complexity  REQUIRES PT FOLLOW UP: No  Discharge Recommendations: ECF with PT  Activity Tolerance  Activity Tolerance: Patient limited by pain; Patient Tolerated treatment well         Goals  Short term goals  Time Frame for Short term goals: 1 day  Short term goal 1: Review JEFF pre-op instructions       Therapy Time   Individual Concurrent Group Co-treatment   Time In 1400         Time Out 1424         Minutes 24                 Deborra Medicine, PT

## 2021-07-15 NOTE — PROGRESS NOTES
Left JEFF scheduled with Dr China Wilkes  Consent signed and reviewed  MRSA culture obtained  Surgery scheduled for 08/03/21

## 2021-07-15 NOTE — TELEPHONE ENCOUNTER
[facility]    Pre-Operative Evaluation/Consultation    Name:  Nicholas Christie                                         Age:  68 y.o. MRN:  [id number]       :  1940   Date:  7/15/2021         Sex: female    There were no encounter diagnoses. Surgeon:  Dr. Alonzo Dorsey  Procedure (Planned): total hip arthroplasty  Date Scheduled surgery: 21    Attending : No att. providers found    Primary Physician: Crystal Ernandez  Cardiologist: None    Type of Anesthesia Requested: General    Patient Medical history: Allergies   Allergen Reactions    Hydrochlorothiazide Itching and Rash     See progress note from Dr. Flavia Birmingham (dermatogist) 2017. Patient restarted 17 not having any issues as of 17     Patient Active Problem List   Diagnosis    Essential hypertension    GERD (gastroesophageal reflux disease)    Elevated fasting glucose    Osteopenia    Overweight (BMI 25.0-29. 9)    Venous insufficiency (chronic) (peripheral)    Spider vein, symptomatic    Varicose vein    Hyponatremia, mild    Tubular adenoma of colon    Bullous pemphigoid    Vitamin D deficiency     Past Medical History:   Diagnosis Date    Bullous pemphigoid     Elevated fasting glucose     Former smoker     quit in     GERD (gastroesophageal reflux disease)     Hypertension     Osteopenia     took Boniva times 5 years, stopped     Overweight(278.02)     wieght 174 pounds, height 65 inches, BMI 29, 3/31/2013     Past Surgical History:   Procedure Laterality Date    ABDOMINAL EXPLORATION SURGERY      ectopic pregnancy, bilateral salpingectomy    CATARACT REMOVAL WITH IMPLANT Left 2018    per Dr Jolene Rutledge N/A 2017    COLONOSCOPY WITH BIOPSY performed by Carmelita Verma MD at [address] [place] [address], one tubular adenoma on pathology, repeat recommended in 5 years     Social History     Tobacco Use    Smoking status: Former Smoker     Packs/day: 2.50 Years: 31.00     Pack years: 77.50     Types: Cigarettes     Start date: 82     Quit date:      Years since quittin.5    Smokeless tobacco: Never Used   Vaping Use    Vaping Use: Never used   Substance Use Topics    Alcohol use: No     Alcohol/week: 0.0 standard drinks    Drug use: No     Medications:  Current Outpatient Medications   Medication Sig Dispense Refill    metoprolol succinate (TOPROL XL) 25 MG extended release tablet Take 1 tablet by mouth once daily 90 tablet 0    omeprazole (PRILOSEC) 20 MG delayed release capsule TAKE 1 CAPSULE BY MOUTH ONCE DAILY AS NEEDED FOR  REFLUX 90 capsule 0    amLODIPine (NORVASC) 5 MG tablet Take 1 tablet by mouth once daily 90 tablet 0    lisinopril (PRINIVIL;ZESTRIL) 40 MG tablet Take 1 tablet by mouth once daily 90 tablet 1    cetirizine (ZYRTEC) 10 MG tablet Take 10 mg by mouth daily      torsemide (DEMADEX) 20 MG tablet Take 1 tablet by mouth once daily 30 tablet 5    Acetaminophen (TYLENOL ARTHRITIS PAIN PO) Take by mouth as needed      hydrOXYzine (ATARAX) 25 MG tablet TAKE ONE TABLET BY MOUTH THREE TIMES DAILY AS NEEDED FOR ITCHING 90 tablet 1    Krill Oil (MAXIMUM RED KRILL PO) Take by mouth daily      Multiple Vitamins-Minerals (PRESERVISION AREDS 2 PO) Take by mouth daily      albuterol sulfate  (90 BASE) MCG/ACT inhaler Inhale 2 puffs into the lungs every 4 hours as needed for Wheezing or Shortness of Breath 1 Inhaler 0    vitamin D-3 (CHOLECALCIFEROL) 5000 UNITS TABS Take 5,000 Units by mouth daily.  Coenzyme Q10 (CO Q 10 PO) Take by mouth daily Indications: prn when remembers to take        No current facility-administered medications for this visit. Scheduled Meds:  Continuous Infusions:  PRN Meds:. Prior to Admission medications    Medication Sig Start Date End Date Taking?  Authorizing Provider   metoprolol succinate (TOPROL XL) 25 MG extended release tablet Take 1 tablet by mouth once daily 7/15/21   Mati Scott MD   omeprazole (PRILOSEC) 20 MG delayed release capsule TAKE 1 CAPSULE BY MOUTH ONCE DAILY AS NEEDED FOR  REFLUX 7/15/21   Oksana Zarate MD   amLODIPine (NORVASC) 5 MG tablet Take 1 tablet by mouth once daily 6/8/21   Nash Bernheim Black, DO   lisinopril (PRINIVIL;ZESTRIL) 40 MG tablet Take 1 tablet by mouth once daily 6/1/21   Zenia Yoder MD   cetirizine (ZYRTEC) 10 MG tablet Take 10 mg by mouth daily    Historical Provider, MD   torsemide (DEMADEX) 20 MG tablet Take 1 tablet by mouth once daily 3/31/21   Zenia Yoder MD   Acetaminophen (TYLENOL ARTHRITIS PAIN PO) Take by mouth as needed    Historical Provider, MD   hydrOXYzine (ATARAX) 25 MG tablet TAKE ONE TABLET BY MOUTH THREE TIMES DAILY AS NEEDED FOR ITCHING 8/19/20   MD Sarita Hernandez Abena Oil (MAXIMUM RED KRILL PO) Take by mouth daily    Historical Provider, MD   Multiple Vitamins-Minerals (PRESERVISION AREDS 2 PO) Take by mouth daily    Historical Provider, MD   albuterol sulfate  (90 BASE) MCG/ACT inhaler Inhale 2 puffs into the lungs every 4 hours as needed for Wheezing or Shortness of Breath 11/15/16   Danna Goetz MD   vitamin D-3 (CHOLECALCIFEROL) 5000 UNITS TABS Take 5,000 Units by mouth daily. Historical Provider, MD   Coenzyme Q10 (CO Q 10 PO) Take by mouth daily Indications: prn when remembers to take     Historical Provider, MD     Vital Signs (Current) [unfilled]    Weight:   Wt Readings from Last 1 Encounters:   07/06/21 157 lb (71.2 kg)     Height:   Ht Readings from Last 1 Encounters:   07/06/21 5' 3\" (1.6 m)      BMI:  There is no height or weight on file to calculate BMI. Estimated body mass index is 27.81 kg/m² as calculated from the following:    Height as of 7/6/21: 5' 3\" (1.6 m). Weight as of 7/6/21: 157 lb (71.2 kg). body mass index is unknown because there is no height or weight on file.      Medical Clearance:cleared by    Appointment for surgery Clearance scheduled for:Date:07/06/21     Preoperative Testing: These are the current and completed labs:  CBC:   Lab Results   Component Value Date    WBC 7.8 07/06/2021    RBC 4.05 07/06/2021    HGB 12.2 07/06/2021    HCT 37.7 07/06/2021    MCV 93.1 07/06/2021    RDW 12.9 07/06/2021     07/06/2021     CMP:   Lab Results   Component Value Date     07/06/2021    K 3.7 07/06/2021     07/06/2021    CO2 27 07/06/2021    BUN 35 07/06/2021    CREATININE 1.14 07/06/2021    GFRAA 56 07/06/2021    LABGLOM 46 07/06/2021    GLUCOSE 120 07/06/2021    PROT 7.0 04/27/2021    CALCIUM 9.3 07/06/2021    BILITOT 0.40 04/27/2021    ALKPHOS 86 04/27/2021    AST 16 04/27/2021    ALT 11 04/27/2021     POC Tests: No results for input(s): POCGLU, POCNA, POCK, POCCL, POCBUN, POCHEMO, POCHCT in the last 72 hours.   Coags  No results found for: PROTIME, INR, APTT  HCG (If Applicable) No results found for: PREGTESTUR, PREGSERUM, HCG, HCGQUANT   ABGs No results found for: PHART, PO2ART, BOL4LQR, YZZ9VTN, BEART, R2NJSQGV   Type & Screen (If Applicable)  No results found for: Jeferson Rees    Additional ordered pre-operative testing:  [x]CBC    []ABG      [] BMP   [x]URINALYSIS   [x]CMP    []HCG   []COAGS PT/INR  []T&C  []LFTs   [x]TYPE AND SCREEN    [x] EKG  [] Chest X-Ray  [] Other Radiology      [] Sent to Anesthesia for your review: 07/15/21   [] Additional Orders: None     Comments:None   Requests: None    Signed: Beatriz Soliz LPN 4/30/2931 2:70 PM

## 2021-07-16 LAB
MRSA, DNA, NASAL: NORMAL
SPECIMEN DESCRIPTION: NORMAL

## 2021-07-16 NOTE — TELEPHONE ENCOUNTER
Pt medically cleared by Dr Ashley Alexander. Abnormal ECG. Pt denies SOB, chest pain per Dr Purvi Loera note. Please have him review ECG and give his opinion.

## 2021-07-19 ENCOUNTER — TELEPHONE (OUTPATIENT)
Dept: FAMILY MEDICINE CLINIC | Age: 81
End: 2021-07-19

## 2021-07-19 DIAGNOSIS — Z01.818 PRE-OP EXAM: Primary | ICD-10-CM

## 2021-07-19 NOTE — TELEPHONE ENCOUNTER
Ortho calling stating pt was cleared by Dr Dyan Ernandez but anesthesia is wanting Dr Dyan Ernandez to review pt's EKG again and put in a note to clear pt.

## 2021-07-19 NOTE — TELEPHONE ENCOUNTER
EKG with NSR has had echo in 4/2021 with good EF has diastolic dysfunction if anaesthesia is concerned recommend cardiac clearence.

## 2021-07-29 ENCOUNTER — HOSPITAL ENCOUNTER (OUTPATIENT)
Dept: PREADMISSION TESTING | Age: 81
Setting detail: SPECIMEN
Discharge: HOME | End: 2021-08-02
Payer: MEDICARE

## 2021-07-29 DIAGNOSIS — Z11.59 ENCOUNTER FOR SCREENING FOR OTHER VIRAL DISEASES: Primary | ICD-10-CM

## 2021-07-29 PROCEDURE — U0005 INFEC AGEN DETEC AMPLI PROBE: HCPCS

## 2021-07-29 PROCEDURE — U0003 INFECTIOUS AGENT DETECTION BY NUCLEIC ACID (DNA OR RNA); SEVERE ACUTE RESPIRATORY SYNDROME CORONAVIRUS 2 (SARS-COV-2) (CORONAVIRUS DISEASE [COVID-19]), AMPLIFIED PROBE TECHNIQUE, MAKING USE OF HIGH THROUGHPUT TECHNOLOGIES AS DESCRIBED BY CMS-2020-01-R: HCPCS

## 2021-07-30 ENCOUNTER — OFFICE VISIT (OUTPATIENT)
Dept: CARDIOLOGY | Age: 81
End: 2021-07-30
Payer: MEDICARE

## 2021-07-30 VITALS
BODY MASS INDEX: 27.82 KG/M2 | HEART RATE: 69 BPM | DIASTOLIC BLOOD PRESSURE: 58 MMHG | WEIGHT: 157 LBS | SYSTOLIC BLOOD PRESSURE: 122 MMHG | HEIGHT: 63 IN

## 2021-07-30 DIAGNOSIS — I07.1 MODERATE TRICUSPID REGURGITATION: ICD-10-CM

## 2021-07-30 DIAGNOSIS — I34.0 MILD MITRAL REGURGITATION: ICD-10-CM

## 2021-07-30 DIAGNOSIS — R94.31 ABNORMAL ECG: ICD-10-CM

## 2021-07-30 DIAGNOSIS — Z01.810 PREPROCEDURAL CARDIOVASCULAR EXAMINATION: ICD-10-CM

## 2021-07-30 DIAGNOSIS — I10 ESSENTIAL HYPERTENSION: Primary | ICD-10-CM

## 2021-07-30 LAB
SARS-COV-2: NORMAL
SARS-COV-2: NOT DETECTED
SOURCE: NORMAL

## 2021-07-30 PROCEDURE — 1090F PRES/ABSN URINE INCON ASSESS: CPT | Performed by: INTERNAL MEDICINE

## 2021-07-30 PROCEDURE — 1036F TOBACCO NON-USER: CPT | Performed by: INTERNAL MEDICINE

## 2021-07-30 PROCEDURE — G8427 DOCREV CUR MEDS BY ELIG CLIN: HCPCS | Performed by: INTERNAL MEDICINE

## 2021-07-30 PROCEDURE — G8417 CALC BMI ABV UP PARAM F/U: HCPCS | Performed by: INTERNAL MEDICINE

## 2021-07-30 PROCEDURE — 1123F ACP DISCUSS/DSCN MKR DOCD: CPT | Performed by: INTERNAL MEDICINE

## 2021-07-30 PROCEDURE — 4040F PNEUMOC VAC/ADMIN/RCVD: CPT | Performed by: INTERNAL MEDICINE

## 2021-07-30 PROCEDURE — G8399 PT W/DXA RESULTS DOCUMENT: HCPCS | Performed by: INTERNAL MEDICINE

## 2021-07-30 PROCEDURE — 99212 OFFICE O/P EST SF 10 MIN: CPT | Performed by: INTERNAL MEDICINE

## 2021-07-30 PROCEDURE — 99204 OFFICE O/P NEW MOD 45 MIN: CPT | Performed by: INTERNAL MEDICINE

## 2021-07-30 NOTE — PROGRESS NOTES
Cardiology Consultation/Follow Up. [address] [facility]  1940  [id number]    Today: 21    CC: Patient is here for new consult for Preop for L Hip replacement. HPI:   Raman Mcmanus is here for new consult for Preop for L Hip replacement. Has been dealing with hip issues for years. Has history of HTN. Denies any cp, sob, orthopnea, pnd, le edema, palpitations. Before hip issues, was very active, cleaning house and walking.      Past Medical:  Past Medical History:   Diagnosis Date    Bullous pemphigoid     Elevated fasting glucose     Former smoker     quit in     GERD (gastroesophageal reflux disease)     Hypertension     Osteopenia     took Boniva times 5 years, stopped     Overweight(278.02)     wieght 174 pounds, height 65 inches, BMI 29, 3/31/2013       Past Surgical:  Past Surgical History:   Procedure Laterality Date    ABDOMINAL EXPLORATION SURGERY  1972    ectopic pregnancy, bilateral salpingectomy    CATARACT REMOVAL WITH IMPLANT Left 2018    per Dr Jo-Ann Crawford [address] [place] [address] N/A 2017    COLONOSCOPY WITH BIOPSY performed by Nathen Alfaro MD at [address] [place] [address], one tubular adenoma on pathology, repeat recommended in 5 years       Family History:  Family History   Problem Relation Age of Onset   [employer] [place] Cancer Mother         unknown type of cancer    Diabetes Mother     Diabetes Brother     Diabetes Brother     Heart Disease Father         CHF     Social History:  Social History     Socioeconomic History    Marital status: Single     Spouse name: None    Number of children: None    Years of education: None    Highest education level: None   Occupational History    None   Tobacco Use    Smoking status: Former Smoker     Packs/day: 2.50     Years: 31.00     Pack years: 77.50     Types: Cigarettes     Start date:      Quit date:      Years since quittin.5    Smokeless tobacco: Never Used Vaping Use    Vaping Use: Never used   Substance and Sexual Activity    Alcohol use: No     Alcohol/week: 0.0 standard drinks    Drug use: No    Sexual activity: None   Other Topics Concern    None   Social History Narrative    None     Social Determinants of Health     Financial Resource Strain: Low Risk     Difficulty of Paying Living Expenses: Not hard at all   Food Insecurity: No Food Insecurity    Worried About Running Out of Food in the Last Year: Never true    [place] of Food in the Last Year: Never true   Transportation Needs:     Lack of Transportation (Medical):  Lack of Transportation (Non-Medical):    Physical Activity:     Days of Exercise per Week:     Minutes of Exercise per Session:    Stress:     Feeling of Stress :    Social Connections:     Frequency of Communication with Friends and Family:     Frequency of Social Gatherings with Friends and Family:     Attends Buddhism Services:     Active Member of Clubs or Organizations:     Attends Club or Organization Meetings:     Marital Status:    Intimate Partner Violence:     Fear of Current or Ex-Partner:     Emotionally Abused:     Physically Abused:     Sexually Abused:      REVIEW OF SYSTEMS:    · Constitutional: there has been no unanticipated weight loss. There's been No change in energy level, No change in activity level. · Eyes: No visual changes or diplopia. No scleral icterus. · ENT: No Headaches, hearing loss or vertigo. No mouth sores or sore throat. · Cardiovascular: AS HPI  · Respiratory: AS HPI  · Gastrointestinal: No abdominal pain, appetite loss, blood in stools. No change in bowel or bladder habits. · Genitourinary: No dysuria, trouble voiding, or hematuria. · Musculoskeletal:  No gait disturbance, No weakness or joint complaints. · Integumentary: No rash or pruritis. · Neurological: No headache, diplopia, change in muscle strength, numbness or tingling.  No change in gait, balance, coordination, mood, affect, memory, mentation, behavior. · Psychiatric: No new anxiety or depression. · Endocrine: No temperature intolerance. No excessive thirst, fluid intake, or urination. No tremor. · Hematologic/Lymphatic: No abnormal bruising or bleeding, blood clots or swollen lymph nodes. · Allergic/Immunologic: No nasal congestion or hives. Medications:  Outpatient Medications Marked as Taking for the 7/30/21 encounter (Office Visit) with Wesley Hurd, DO   Medication Sig Dispense Refill    metoprolol succinate (TOPROL XL) 25 MG extended release tablet Take 1 tablet by mouth once daily 90 tablet 0    omeprazole (PRILOSEC) 20 MG delayed release capsule TAKE 1 CAPSULE BY MOUTH ONCE DAILY AS NEEDED FOR  REFLUX 90 capsule 0    amLODIPine (NORVASC) 5 MG tablet Take 1 tablet by mouth once daily 90 tablet 0    lisinopril (PRINIVIL;ZESTRIL) 40 MG tablet Take 1 tablet by mouth once daily 90 tablet 1    cetirizine (ZYRTEC) 10 MG tablet Take 10 mg by mouth daily      torsemide (DEMADEX) 20 MG tablet Take 1 tablet by mouth once daily 30 tablet 5    Acetaminophen (TYLENOL ARTHRITIS PAIN PO) Take by mouth as needed      hydrOXYzine (ATARAX) 25 MG tablet TAKE ONE TABLET BY MOUTH THREE TIMES DAILY AS NEEDED FOR ITCHING 90 tablet 1    Krill Oil (MAXIMUM RED KRILL PO) Take by mouth daily      Multiple Vitamins-Minerals (PRESERVISION AREDS 2 PO) Take by mouth daily      albuterol sulfate  (90 BASE) MCG/ACT inhaler Inhale 2 puffs into the lungs every 4 hours as needed for Wheezing or Shortness of Breath 1 Inhaler 0    vitamin D-3 (CHOLECALCIFEROL) 5000 UNITS TABS Take 5,000 Units by mouth daily.       Coenzyme Q10 (CO Q 10 PO) Take by mouth daily Indications: prn when remembers to take           Physical Exam:   Vitals: BP (!) 122/58   Pulse 69   Ht 5' 3\" (1.6 m)   Wt 157 lb (71.2 kg)   BMI 27.81 kg/m²   General appearance: alert and cooperative with exam  HEENT: Head: Normocephalic, no lesions, without obvious abnormality. Neck: no carotid bruit, no JVD  Lungs: clear to auscultation bilaterally  Heart: regular rate and rhythm, S1, S2 normal, no Murmur  Abdomen: soft, non-tender; bowel sounds normal; no masses,  no organomegaly  Extremities: no site injection hematoma, extremities normal, atraumatic, no cyanosis. no edema  Neurologic: Mental status: Alert, oriented, thought content appropriate    Labs:  Lab Results   Component Value Date    CHOL 185 01/30/2020    TRIG 81 01/30/2020    HDL 68 08/12/2020    LDLCHOLESTEROL 108 08/12/2020    VLDL NOT REPORTED 08/12/2020    CHOLHDLRATIO 2.9 08/12/2020       Lab Results   Component Value Date     07/06/2021    K 3.7 07/06/2021     07/06/2021    CO2 27 07/06/2021    BUN 35 (H) 07/06/2021    CREATININE 1.14 (H) 07/06/2021    GLUCOSE 120 (H) 07/06/2021    CALCIUM 9.3 07/06/2021    PROT 7.0 04/27/2021    LABALBU 3.9 04/27/2021    BILITOT 0.40 04/27/2021    ALKPHOS 86 04/27/2021    AST 16 04/27/2021    ALT 11 04/27/2021    LABGLOM 46 (L) 07/06/2021    GFRAA 56 (L) 07/06/2021       EKG:  Normal sinus rhythm  Poor R wave progression. ECHO:   Results for orders placed or performed during the hospital encounter of 04/27/21   Echocardiogram complete  Normal left ventricular diameter. Hyperdynamic left ventricular function. Left ventricular ejection fraction 70 %. Grade II (moderate) left ventricular diastolic dysfunction. Left atrium is mildly dilated. Mitral annular calcification. Mild mitral regurgitation. Normal tricuspid valve leaflets. Moderate tricuspid regurgitation. Estimated right ventricular systolic pressure is 38 mmHg. Normal function of other valves. No pericardial effusion. Past Medical and Surgical History, Problem List, Allergies, Medications, Labs, Imaging, all reviewed extensively in EMR and with the patient.     Assessment:  - Preop risk for L Total Hip  - HTN  - Abnormal ECG  - Diastolic Dysfunction  - Echo 4/21- EF 70%, GIIDD, Mild MR, Mod TR    Plan:  - RCRI score is 0  - ECG doesn't show old anterior MI, more of Poor R wave progression   - Can proceed at intermediate risk  - continue current medications    The patient is to continue heart healthy diet, weight loss and exercise as tolerated. Patient's medications and side effects were discussed. Medication refills were provided if needed. Follow up appointment timing was discussed. All questions and concerns were addressed to patient's satisfaction. The patient is to follow up in 6-12 months or sooner if necessary. Thank you for allowing me to participate in the care of this patient, please do not hesitate to call if you have any questions. Nubia Thomas DO, [facility] [place], [address], [place] [address] [facility]  [email or web address]  [phone]

## 2021-07-30 NOTE — LETTER
Cardiology Consultation  [facility] [address] Jodi [place]  [phone]      07/30/21       Regarding:  Trena Bertram  1940      To Whom It May Concern,      Patient is Intermediate Cardiac Risk for planned surgery. Special instructions:      Please continue other meds.         Thank you,      Viraj De Leon DO, [facility] [place], [facility]  Cardiology  [facility]    Electronically signed by Viraj De Leon DO

## 2021-08-02 ENCOUNTER — ANESTHESIA EVENT (OUTPATIENT)
Dept: OPERATING ROOM | Age: 81
DRG: 470 | End: 2021-08-02
Payer: MEDICARE

## 2021-08-02 ENCOUNTER — HOSPITAL ENCOUNTER (OUTPATIENT)
Dept: LAB | Age: 81
Discharge: HOME | DRG: 470 | End: 2021-08-02
Payer: MEDICARE

## 2021-08-02 DIAGNOSIS — Z01.818 PREOPERATIVE EXAMINATION: ICD-10-CM

## 2021-08-02 LAB
ABO/RH: NORMAL
ABSOLUTE EOS #: 0.2 K/UL (ref 0–0.44)
ABSOLUTE IMMATURE GRANULOCYTE: 0.03 K/UL (ref 0–0.3)
ABSOLUTE LYMPH #: 2.16 K/UL (ref 1.1–3.7)
ABSOLUTE MONO #: 0.73 K/UL (ref 0.1–1.2)
ANTIBODY SCREEN: NEGATIVE
ARM BAND NUMBER: NORMAL
BASOPHILS # BLD: 0 % (ref 0–2)
BASOPHILS ABSOLUTE: <0.03 K/UL (ref 0–0.2)
DIFFERENTIAL TYPE: ABNORMAL
EOSINOPHILS RELATIVE PERCENT: 3 % (ref 1–4)
EXPIRATION DATE: NORMAL
HCT VFR BLD CALC: 37.9 % (ref 36.3–47.1)
HEMOGLOBIN: 11.7 G/DL (ref 11.9–15.1)
IMMATURE GRANULOCYTES: 0 %
LYMPHOCYTES # BLD: 31 % (ref 24–43)
MCH RBC QN AUTO: 29.7 PG (ref 25.2–33.5)
MCHC RBC AUTO-ENTMCNC: 30.9 G/DL (ref 25.2–33.5)
MCV RBC AUTO: 96.2 FL (ref 82.6–102.9)
MONOCYTES # BLD: 11 % (ref 3–12)
NRBC AUTOMATED: 0 PER 100 WBC
PDW BLD-RTO: 12.8 % (ref 11.8–14.4)
PLATELET # BLD: 265 K/UL (ref 138–453)
PLATELET ESTIMATE: ABNORMAL
PMV BLD AUTO: 10 FL (ref 8.1–13.5)
RBC # BLD: 3.94 M/UL (ref 3.95–5.11)
RBC # BLD: ABNORMAL 10*6/UL
SEG NEUTROPHILS: 55 % (ref 36–65)
SEGMENTED NEUTROPHILS ABSOLUTE COUNT: 3.82 K/UL (ref 1.5–8.1)
WBC # BLD: 7 K/UL (ref 3.5–11.3)
WBC # BLD: ABNORMAL 10*3/UL

## 2021-08-02 PROCEDURE — 86900 BLOOD TYPING SEROLOGIC ABO: CPT

## 2021-08-02 PROCEDURE — 36415 COLL VENOUS BLD VENIPUNCTURE: CPT

## 2021-08-02 PROCEDURE — 85025 COMPLETE CBC W/AUTO DIFF WBC: CPT

## 2021-08-02 PROCEDURE — 86901 BLOOD TYPING SEROLOGIC RH(D): CPT

## 2021-08-02 PROCEDURE — 86850 RBC ANTIBODY SCREEN: CPT

## 2021-08-03 ENCOUNTER — HOSPITAL ENCOUNTER (INPATIENT)
Age: 81
LOS: 1 days | Discharge: NURSING, REHAB OR LONG-TERM CARE | DRG: 470 | End: 2021-08-04
Attending: ORTHOPAEDIC SURGERY | Admitting: ORTHOPAEDIC SURGERY
Payer: MEDICARE

## 2021-08-03 ENCOUNTER — APPOINTMENT (OUTPATIENT)
Dept: GENERAL RADIOLOGY | Age: 81
DRG: 470 | End: 2021-08-03
Attending: ORTHOPAEDIC SURGERY
Payer: MEDICARE

## 2021-08-03 ENCOUNTER — ANESTHESIA (OUTPATIENT)
Dept: OPERATING ROOM | Age: 81
DRG: 470 | End: 2021-08-03
Payer: MEDICARE

## 2021-08-03 VITALS
DIASTOLIC BLOOD PRESSURE: 53 MMHG | RESPIRATION RATE: 5 BRPM | TEMPERATURE: 95.5 F | SYSTOLIC BLOOD PRESSURE: 112 MMHG | OXYGEN SATURATION: 100 %

## 2021-08-03 DIAGNOSIS — Z96.642 STATUS POST TOTAL REPLACEMENT OF LEFT HIP: Primary | ICD-10-CM

## 2021-08-03 PROBLEM — M16.12 OSTEOARTHRITIS OF LEFT HIP: Status: ACTIVE | Noted: 2021-08-03

## 2021-08-03 PROCEDURE — 94761 N-INVAS EAR/PLS OXIMETRY MLT: CPT

## 2021-08-03 PROCEDURE — 27130 TOTAL HIP ARTHROPLASTY: CPT | Performed by: ORTHOPAEDIC SURGERY

## 2021-08-03 PROCEDURE — 97161 PT EVAL LOW COMPLEX 20 MIN: CPT | Performed by: PHYSICAL THERAPIST

## 2021-08-03 PROCEDURE — 99221 1ST HOSP IP/OBS SF/LOW 40: CPT | Performed by: INTERNAL MEDICINE

## 2021-08-03 PROCEDURE — 27130 TOTAL HIP ARTHROPLASTY: CPT | Performed by: PHYSICIAN ASSISTANT

## 2021-08-03 PROCEDURE — 3600000004 HC SURGERY LEVEL 4 BASE: Performed by: ORTHOPAEDIC SURGERY

## 2021-08-03 PROCEDURE — 73502 X-RAY EXAM HIP UNI 2-3 VIEWS: CPT

## 2021-08-03 PROCEDURE — 97116 GAIT TRAINING THERAPY: CPT | Performed by: PHYSICAL THERAPIST

## 2021-08-03 PROCEDURE — 6370000000 HC RX 637 (ALT 250 FOR IP): Performed by: NURSE ANESTHETIST, CERTIFIED REGISTERED

## 2021-08-03 PROCEDURE — 2500000003 HC RX 250 WO HCPCS: Performed by: ORTHOPAEDIC SURGERY

## 2021-08-03 PROCEDURE — 2580000003 HC RX 258: Performed by: NURSE PRACTITIONER

## 2021-08-03 PROCEDURE — 0SRB0JA REPLACEMENT OF LEFT HIP JOINT WITH SYNTHETIC SUBSTITUTE, UNCEMENTED, OPEN APPROACH: ICD-10-PCS | Performed by: ORTHOPAEDIC SURGERY

## 2021-08-03 PROCEDURE — 6360000002 HC RX W HCPCS: Performed by: NURSE PRACTITIONER

## 2021-08-03 PROCEDURE — 6370000000 HC RX 637 (ALT 250 FOR IP): Performed by: NURSE PRACTITIONER

## 2021-08-03 PROCEDURE — 2709999900 HC NON-CHARGEABLE SUPPLY: Performed by: ORTHOPAEDIC SURGERY

## 2021-08-03 PROCEDURE — 2060000000 HC ICU INTERMEDIATE R&B

## 2021-08-03 PROCEDURE — 6360000002 HC RX W HCPCS: Performed by: NURSE ANESTHETIST, CERTIFIED REGISTERED

## 2021-08-03 PROCEDURE — 7100000000 HC PACU RECOVERY - FIRST 15 MIN: Performed by: ORTHOPAEDIC SURGERY

## 2021-08-03 PROCEDURE — 2700000000 HC OXYGEN THERAPY PER DAY

## 2021-08-03 PROCEDURE — 6360000002 HC RX W HCPCS: Performed by: ORTHOPAEDIC SURGERY

## 2021-08-03 PROCEDURE — 7100000001 HC PACU RECOVERY - ADDTL 15 MIN: Performed by: ORTHOPAEDIC SURGERY

## 2021-08-03 PROCEDURE — 2580000003 HC RX 258: Performed by: ORTHOPAEDIC SURGERY

## 2021-08-03 PROCEDURE — 97165 OT EVAL LOW COMPLEX 30 MIN: CPT | Performed by: OCCUPATIONAL THERAPIST

## 2021-08-03 PROCEDURE — 3700000000 HC ANESTHESIA ATTENDED CARE: Performed by: ORTHOPAEDIC SURGERY

## 2021-08-03 PROCEDURE — C1776 JOINT DEVICE (IMPLANTABLE): HCPCS | Performed by: ORTHOPAEDIC SURGERY

## 2021-08-03 PROCEDURE — 3600000014 HC SURGERY LEVEL 4 ADDTL 15MIN: Performed by: ORTHOPAEDIC SURGERY

## 2021-08-03 PROCEDURE — 2500000003 HC RX 250 WO HCPCS: Performed by: NURSE ANESTHETIST, CERTIFIED REGISTERED

## 2021-08-03 PROCEDURE — C1713 ANCHOR/SCREW BN/BN,TIS/BN: HCPCS | Performed by: ORTHOPAEDIC SURGERY

## 2021-08-03 PROCEDURE — 3700000001 HC ADD 15 MINUTES (ANESTHESIA): Performed by: ORTHOPAEDIC SURGERY

## 2021-08-03 DEVICE — IMPLANTABLE DEVICE: Type: IMPLANTABLE DEVICE | Site: HIP | Status: FUNCTIONAL

## 2021-08-03 DEVICE — STEM FEM SZ 5 L108MM NK L35MM 44MM OFFSET 127DEG HIP TI: Type: IMPLANTABLE DEVICE | Site: HIP | Status: FUNCTIONAL

## 2021-08-03 DEVICE — COMPONENT TOT HIP CAPPED LNR MTL CERM H1STRYKER] STRYKER CORP]: Type: IMPLANTABLE DEVICE | Status: FUNCTIONAL

## 2021-08-03 DEVICE — SHELL ACET SZ G DIA60MM 5 CLUS H TRITANIUM PRESSFIT PRI: Type: IMPLANTABLE DEVICE | Site: HIP | Status: FUNCTIONAL

## 2021-08-03 RX ORDER — FENTANYL CITRATE 50 UG/ML
INJECTION, SOLUTION INTRAMUSCULAR; INTRAVENOUS PRN
Status: DISCONTINUED | OUTPATIENT
Start: 2021-08-03 | End: 2021-08-03 | Stop reason: SDUPTHER

## 2021-08-03 RX ORDER — TRANEXAMIC ACID 100 MG/ML
INJECTION, SOLUTION INTRAVENOUS PRN
Status: DISCONTINUED | OUTPATIENT
Start: 2021-08-03 | End: 2021-08-03 | Stop reason: ALTCHOICE

## 2021-08-03 RX ORDER — KETOROLAC TROMETHAMINE 30 MG/ML
15 INJECTION, SOLUTION INTRAMUSCULAR; INTRAVENOUS EVERY 6 HOURS
Status: DISCONTINUED | OUTPATIENT
Start: 2021-08-03 | End: 2021-08-04 | Stop reason: HOSPADM

## 2021-08-03 RX ORDER — METOPROLOL SUCCINATE 25 MG/1
25 TABLET, EXTENDED RELEASE ORAL DAILY
Status: DISCONTINUED | OUTPATIENT
Start: 2021-08-03 | End: 2021-08-04 | Stop reason: HOSPADM

## 2021-08-03 RX ORDER — ONDANSETRON 2 MG/ML
4 INJECTION INTRAMUSCULAR; INTRAVENOUS PRN
Status: DISCONTINUED | OUTPATIENT
Start: 2021-08-03 | End: 2021-08-03 | Stop reason: HOSPADM

## 2021-08-03 RX ORDER — VANCOMYCIN HYDROCHLORIDE 1 G/20ML
INJECTION, POWDER, LYOPHILIZED, FOR SOLUTION INTRAVENOUS PRN
Status: DISCONTINUED | OUTPATIENT
Start: 2021-08-03 | End: 2021-08-03 | Stop reason: ALTCHOICE

## 2021-08-03 RX ORDER — SODIUM CHLORIDE 9 MG/ML
25 INJECTION, SOLUTION INTRAVENOUS PRN
Status: DISCONTINUED | OUTPATIENT
Start: 2021-08-03 | End: 2021-08-03 | Stop reason: HOSPADM

## 2021-08-03 RX ORDER — MORPHINE SULFATE 2 MG/ML
2 INJECTION, SOLUTION INTRAMUSCULAR; INTRAVENOUS
Status: DISCONTINUED | OUTPATIENT
Start: 2021-08-03 | End: 2021-08-04 | Stop reason: HOSPADM

## 2021-08-03 RX ORDER — AMLODIPINE BESYLATE 5 MG/1
5 TABLET ORAL DAILY
Status: DISCONTINUED | OUTPATIENT
Start: 2021-08-03 | End: 2021-08-04 | Stop reason: HOSPADM

## 2021-08-03 RX ORDER — PHENYLEPHRINE HYDROCHLORIDE 10 MG/ML
INJECTION INTRAVENOUS PRN
Status: DISCONTINUED | OUTPATIENT
Start: 2021-08-03 | End: 2021-08-03 | Stop reason: SDUPTHER

## 2021-08-03 RX ORDER — ALBUTEROL SULFATE 2.5 MG/3ML
2.5 SOLUTION RESPIRATORY (INHALATION)
Status: DISCONTINUED | OUTPATIENT
Start: 2021-08-03 | End: 2021-08-04 | Stop reason: HOSPADM

## 2021-08-03 RX ORDER — ANTIOX #8/OM3/DHA/EPA/LUT/ZEAX 250-2.5 MG
1 CAPSULE ORAL DAILY
Status: DISCONTINUED | OUTPATIENT
Start: 2021-08-04 | End: 2021-08-04 | Stop reason: HOSPADM

## 2021-08-03 RX ORDER — OXYCODONE HYDROCHLORIDE AND ACETAMINOPHEN 5; 325 MG/1; MG/1
2 TABLET ORAL EVERY 4 HOURS PRN
Status: DISCONTINUED | OUTPATIENT
Start: 2021-08-03 | End: 2021-08-04 | Stop reason: HOSPADM

## 2021-08-03 RX ORDER — FENTANYL CITRATE 50 UG/ML
25 INJECTION, SOLUTION INTRAMUSCULAR; INTRAVENOUS EVERY 5 MIN PRN
Status: DISCONTINUED | OUTPATIENT
Start: 2021-08-03 | End: 2021-08-03 | Stop reason: HOSPADM

## 2021-08-03 RX ORDER — RIVAROXABAN 10 MG/1
10 TABLET, FILM COATED ORAL EVERY 24 HOURS
Qty: 18 TABLET | Refills: 0 | Status: SHIPPED | OUTPATIENT
Start: 2021-08-03 | End: 2021-08-18

## 2021-08-03 RX ORDER — VITAMIN B COMPLEX
5000 TABLET ORAL DAILY
Status: DISCONTINUED | OUTPATIENT
Start: 2021-08-04 | End: 2021-08-04 | Stop reason: HOSPADM

## 2021-08-03 RX ORDER — METOPROLOL TARTRATE 5 MG/5ML
INJECTION INTRAVENOUS PRN
Status: DISCONTINUED | OUTPATIENT
Start: 2021-08-03 | End: 2021-08-03 | Stop reason: SDUPTHER

## 2021-08-03 RX ORDER — PROPOFOL 10 MG/ML
INJECTION, EMULSION INTRAVENOUS PRN
Status: DISCONTINUED | OUTPATIENT
Start: 2021-08-03 | End: 2021-08-03 | Stop reason: SDUPTHER

## 2021-08-03 RX ORDER — DIPHENHYDRAMINE HYDROCHLORIDE 50 MG/ML
12.5 INJECTION INTRAMUSCULAR; INTRAVENOUS
Status: DISCONTINUED | OUTPATIENT
Start: 2021-08-03 | End: 2021-08-03 | Stop reason: HOSPADM

## 2021-08-03 RX ORDER — OXYCODONE HYDROCHLORIDE 5 MG/1
10 TABLET ORAL PRN
Status: DISCONTINUED | OUTPATIENT
Start: 2021-08-03 | End: 2021-08-03 | Stop reason: HOSPADM

## 2021-08-03 RX ORDER — MIDAZOLAM HYDROCHLORIDE 1 MG/ML
INJECTION INTRAMUSCULAR; INTRAVENOUS PRN
Status: DISCONTINUED | OUTPATIENT
Start: 2021-08-03 | End: 2021-08-03 | Stop reason: SDUPTHER

## 2021-08-03 RX ORDER — DEXAMETHASONE SODIUM PHOSPHATE 10 MG/ML
INJECTION INTRAMUSCULAR; INTRAVENOUS PRN
Status: DISCONTINUED | OUTPATIENT
Start: 2021-08-03 | End: 2021-08-03 | Stop reason: SDUPTHER

## 2021-08-03 RX ORDER — SODIUM CHLORIDE FOR INHALATION 0.9 %
3 VIAL, NEBULIZER (ML) INHALATION
Status: DISCONTINUED | OUTPATIENT
Start: 2021-08-03 | End: 2021-08-04 | Stop reason: HOSPADM

## 2021-08-03 RX ORDER — DOCUSATE SODIUM 100 MG/1
100 CAPSULE, LIQUID FILLED ORAL DAILY
Status: DISCONTINUED | OUTPATIENT
Start: 2021-08-03 | End: 2021-08-04 | Stop reason: HOSPADM

## 2021-08-03 RX ORDER — MORPHINE SULFATE 2 MG/ML
2 INJECTION, SOLUTION INTRAMUSCULAR; INTRAVENOUS EVERY 5 MIN PRN
Status: DISCONTINUED | OUTPATIENT
Start: 2021-08-03 | End: 2021-08-03 | Stop reason: HOSPADM

## 2021-08-03 RX ORDER — HYDROCODONE BITARTRATE AND ACETAMINOPHEN 5; 325 MG/1; MG/1
1-2 TABLET ORAL
Qty: 42 TABLET | Refills: 0 | Status: SHIPPED | OUTPATIENT
Start: 2021-08-03 | End: 2021-08-10

## 2021-08-03 RX ORDER — CETIRIZINE HYDROCHLORIDE 5 MG/1
5 TABLET ORAL DAILY
Status: DISCONTINUED | OUTPATIENT
Start: 2021-08-03 | End: 2021-08-04 | Stop reason: HOSPADM

## 2021-08-03 RX ORDER — CYCLOBENZAPRINE HCL 10 MG
10 TABLET ORAL 3 TIMES DAILY PRN
Status: DISCONTINUED | OUTPATIENT
Start: 2021-08-03 | End: 2021-08-04 | Stop reason: HOSPADM

## 2021-08-03 RX ORDER — LISINOPRIL 20 MG/1
40 TABLET ORAL DAILY
Status: DISCONTINUED | OUTPATIENT
Start: 2021-08-03 | End: 2021-08-04 | Stop reason: HOSPADM

## 2021-08-03 RX ORDER — ACETAMINOPHEN 325 MG/1
650 TABLET ORAL EVERY 4 HOURS PRN
Status: DISCONTINUED | OUTPATIENT
Start: 2021-08-03 | End: 2021-08-04 | Stop reason: HOSPADM

## 2021-08-03 RX ORDER — SODIUM CHLORIDE, SODIUM LACTATE, POTASSIUM CHLORIDE, CALCIUM CHLORIDE 600; 310; 30; 20 MG/100ML; MG/100ML; MG/100ML; MG/100ML
INJECTION, SOLUTION INTRAVENOUS CONTINUOUS
Status: DISCONTINUED | OUTPATIENT
Start: 2021-08-03 | End: 2021-08-04 | Stop reason: HOSPADM

## 2021-08-03 RX ORDER — ROCURONIUM BROMIDE 10 MG/ML
INJECTION, SOLUTION INTRAVENOUS PRN
Status: DISCONTINUED | OUTPATIENT
Start: 2021-08-03 | End: 2021-08-03 | Stop reason: SDUPTHER

## 2021-08-03 RX ORDER — LIDOCAINE HYDROCHLORIDE 20 MG/ML
INJECTION, SOLUTION EPIDURAL; INFILTRATION; INTRACAUDAL; PERINEURAL PRN
Status: DISCONTINUED | OUTPATIENT
Start: 2021-08-03 | End: 2021-08-03 | Stop reason: SDUPTHER

## 2021-08-03 RX ORDER — ACETAMINOPHEN 500 MG
TABLET ORAL PRN
Status: DISCONTINUED | OUTPATIENT
Start: 2021-08-03 | End: 2021-08-03 | Stop reason: SDUPTHER

## 2021-08-03 RX ORDER — HYDROCODONE BITARTRATE AND ACETAMINOPHEN 5; 325 MG/1; MG/1
1 TABLET ORAL EVERY 4 HOURS PRN
Status: DISCONTINUED | OUTPATIENT
Start: 2021-08-03 | End: 2021-08-03

## 2021-08-03 RX ORDER — ONDANSETRON 2 MG/ML
4 INJECTION INTRAMUSCULAR; INTRAVENOUS EVERY 6 HOURS PRN
Status: DISCONTINUED | OUTPATIENT
Start: 2021-08-03 | End: 2021-08-04 | Stop reason: HOSPADM

## 2021-08-03 RX ORDER — SODIUM CHLORIDE 0.9 % (FLUSH) 0.9 %
5-40 SYRINGE (ML) INJECTION PRN
Status: DISCONTINUED | OUTPATIENT
Start: 2021-08-03 | End: 2021-08-03 | Stop reason: HOSPADM

## 2021-08-03 RX ORDER — POLYETHYLENE GLYCOL 3350 17 G/17G
17 POWDER, FOR SOLUTION ORAL DAILY
Status: DISCONTINUED | OUTPATIENT
Start: 2021-08-03 | End: 2021-08-04 | Stop reason: HOSPADM

## 2021-08-03 RX ORDER — KETAMINE HCL IN NACL, ISO-OSM 100MG/10ML
SYRINGE (ML) INJECTION PRN
Status: DISCONTINUED | OUTPATIENT
Start: 2021-08-03 | End: 2021-08-03 | Stop reason: SDUPTHER

## 2021-08-03 RX ORDER — MORPHINE SULFATE 4 MG/ML
4 INJECTION, SOLUTION INTRAMUSCULAR; INTRAVENOUS
Status: DISCONTINUED | OUTPATIENT
Start: 2021-08-03 | End: 2021-08-04 | Stop reason: HOSPADM

## 2021-08-03 RX ORDER — OXYCODONE HYDROCHLORIDE AND ACETAMINOPHEN 5; 325 MG/1; MG/1
1 TABLET ORAL EVERY 4 HOURS PRN
Status: DISCONTINUED | OUTPATIENT
Start: 2021-08-03 | End: 2021-08-04 | Stop reason: HOSPADM

## 2021-08-03 RX ORDER — OXYCODONE HYDROCHLORIDE 5 MG/1
5 TABLET ORAL PRN
Status: DISCONTINUED | OUTPATIENT
Start: 2021-08-03 | End: 2021-08-03 | Stop reason: HOSPADM

## 2021-08-03 RX ORDER — TRANEXAMIC ACID 100 MG/ML
INJECTION, SOLUTION INTRAVENOUS PRN
Status: DISCONTINUED | OUTPATIENT
Start: 2021-08-03 | End: 2021-08-03 | Stop reason: SDUPTHER

## 2021-08-03 RX ORDER — GABAPENTIN 300 MG/1
CAPSULE ORAL PRN
Status: DISCONTINUED | OUTPATIENT
Start: 2021-08-03 | End: 2021-08-03 | Stop reason: SDUPTHER

## 2021-08-03 RX ORDER — HYDROCODONE BITARTRATE AND ACETAMINOPHEN 5; 325 MG/1; MG/1
2 TABLET ORAL EVERY 4 HOURS PRN
Status: DISCONTINUED | OUTPATIENT
Start: 2021-08-03 | End: 2021-08-03

## 2021-08-03 RX ORDER — SODIUM CHLORIDE 0.9 % (FLUSH) 0.9 %
5-40 SYRINGE (ML) INJECTION EVERY 12 HOURS SCHEDULED
Status: DISCONTINUED | OUTPATIENT
Start: 2021-08-03 | End: 2021-08-03 | Stop reason: HOSPADM

## 2021-08-03 RX ORDER — TORSEMIDE 5 MG/1
20 TABLET ORAL DAILY
Status: DISCONTINUED | OUTPATIENT
Start: 2021-08-03 | End: 2021-08-04 | Stop reason: HOSPADM

## 2021-08-03 RX ORDER — PANTOPRAZOLE SODIUM 40 MG/1
40 TABLET, DELAYED RELEASE ORAL
Status: DISCONTINUED | OUTPATIENT
Start: 2021-08-04 | End: 2021-08-04 | Stop reason: HOSPADM

## 2021-08-03 RX ORDER — ONDANSETRON 2 MG/ML
INJECTION INTRAMUSCULAR; INTRAVENOUS PRN
Status: DISCONTINUED | OUTPATIENT
Start: 2021-08-03 | End: 2021-08-03 | Stop reason: SDUPTHER

## 2021-08-03 RX ORDER — FENTANYL CITRATE 50 UG/ML
50 INJECTION, SOLUTION INTRAMUSCULAR; INTRAVENOUS EVERY 5 MIN PRN
Status: DISCONTINUED | OUTPATIENT
Start: 2021-08-03 | End: 2021-08-03 | Stop reason: HOSPADM

## 2021-08-03 RX ADMIN — TRANEXAMIC ACID 1000 MG: 1 INJECTION, SOLUTION INTRAVENOUS at 08:19

## 2021-08-03 RX ADMIN — LIDOCAINE HYDROCHLORIDE 60 MG: 20 INJECTION, SOLUTION EPIDURAL; INFILTRATION; INTRACAUDAL; PERINEURAL at 08:11

## 2021-08-03 RX ADMIN — SUGAMMADEX 200 MG: 100 INJECTION, SOLUTION INTRAVENOUS at 09:11

## 2021-08-03 RX ADMIN — FENTANYL CITRATE 50 MCG: 50 INJECTION, SOLUTION INTRAMUSCULAR; INTRAVENOUS at 08:11

## 2021-08-03 RX ADMIN — PHENYLEPHRINE HYDROCHLORIDE 100 MCG: 10 INJECTION INTRAVENOUS at 09:05

## 2021-08-03 RX ADMIN — DEXAMETHASONE SODIUM PHOSPHATE 10 MG: 10 INJECTION INTRAMUSCULAR; INTRAVENOUS at 08:11

## 2021-08-03 RX ADMIN — RIVAROXABAN 10 MG: 10 TABLET, FILM COATED ORAL at 17:55

## 2021-08-03 RX ADMIN — PROPOFOL 100 MG: 10 INJECTION, EMULSION INTRAVENOUS at 08:11

## 2021-08-03 RX ADMIN — SODIUM CHLORIDE, POTASSIUM CHLORIDE, SODIUM LACTATE AND CALCIUM CHLORIDE: 600; 310; 30; 20 INJECTION, SOLUTION INTRAVENOUS at 07:03

## 2021-08-03 RX ADMIN — PHENYLEPHRINE HYDROCHLORIDE 200 MCG: 10 INJECTION INTRAVENOUS at 08:52

## 2021-08-03 RX ADMIN — Medication 2000 MG: at 15:28

## 2021-08-03 RX ADMIN — FENTANYL CITRATE 25 MCG: 50 INJECTION, SOLUTION INTRAMUSCULAR; INTRAVENOUS at 09:55

## 2021-08-03 RX ADMIN — ACETAMINOPHEN 500 MG: 500 TABLET, FILM COATED ORAL at 07:43

## 2021-08-03 RX ADMIN — Medication 20 MG: at 08:11

## 2021-08-03 RX ADMIN — ROCURONIUM BROMIDE 30 MG: 10 INJECTION, SOLUTION INTRAVENOUS at 08:11

## 2021-08-03 RX ADMIN — SODIUM CHLORIDE, POTASSIUM CHLORIDE, SODIUM LACTATE AND CALCIUM CHLORIDE 100 ML/HR: 600; 310; 30; 20 INJECTION, SOLUTION INTRAVENOUS at 18:24

## 2021-08-03 RX ADMIN — KETOROLAC TROMETHAMINE 15 MG: 30 INJECTION, SOLUTION INTRAMUSCULAR; INTRAVENOUS at 15:26

## 2021-08-03 RX ADMIN — Medication 2000 MG: at 08:17

## 2021-08-03 RX ADMIN — ONDANSETRON 4 MG: 2 INJECTION INTRAMUSCULAR; INTRAVENOUS at 12:25

## 2021-08-03 RX ADMIN — FENTANYL CITRATE 50 MCG: 50 INJECTION, SOLUTION INTRAMUSCULAR; INTRAVENOUS at 09:36

## 2021-08-03 RX ADMIN — Medication 1 MG: at 08:33

## 2021-08-03 RX ADMIN — ONDANSETRON 4 MG: 2 INJECTION INTRAMUSCULAR; INTRAVENOUS at 08:48

## 2021-08-03 RX ADMIN — PHENYLEPHRINE HYDROCHLORIDE 200 MCG: 10 INJECTION INTRAVENOUS at 08:24

## 2021-08-03 RX ADMIN — KETOROLAC TROMETHAMINE 15 MG: 30 INJECTION, SOLUTION INTRAMUSCULAR; INTRAVENOUS at 21:01

## 2021-08-03 RX ADMIN — FENTANYL CITRATE 25 MCG: 50 INJECTION, SOLUTION INTRAMUSCULAR; INTRAVENOUS at 10:02

## 2021-08-03 RX ADMIN — METOPROLOL TARTRATE 2 MG: 5 INJECTION, SOLUTION INTRAVENOUS at 08:35

## 2021-08-03 RX ADMIN — GABAPENTIN 300 MG: 300 CAPSULE ORAL at 07:44

## 2021-08-03 RX ADMIN — SODIUM CHLORIDE, POTASSIUM CHLORIDE, SODIUM LACTATE AND CALCIUM CHLORIDE: 600; 310; 30; 20 INJECTION, SOLUTION INTRAVENOUS at 08:34

## 2021-08-03 RX ADMIN — METOPROLOL TARTRATE 1 MG: 5 INJECTION, SOLUTION INTRAVENOUS at 08:39

## 2021-08-03 RX ADMIN — MIDAZOLAM HYDROCHLORIDE 1 MG: 1 INJECTION, SOLUTION INTRAMUSCULAR; INTRAVENOUS at 08:04

## 2021-08-03 ASSESSMENT — PULMONARY FUNCTION TESTS
PIF_VALUE: 16
PIF_VALUE: 18
PIF_VALUE: 1
PIF_VALUE: 18
PIF_VALUE: 18
PIF_VALUE: 6
PIF_VALUE: 21
PIF_VALUE: 17
PIF_VALUE: 20
PIF_VALUE: 18
PIF_VALUE: 15
PIF_VALUE: 15
PIF_VALUE: 13
PIF_VALUE: 17
PIF_VALUE: 1
PIF_VALUE: 18
PIF_VALUE: 15
PIF_VALUE: 10
PIF_VALUE: 17
PIF_VALUE: 18
PIF_VALUE: 16
PIF_VALUE: 6
PIF_VALUE: 16
PIF_VALUE: 11
PIF_VALUE: 15
PIF_VALUE: 16
PIF_VALUE: 20
PIF_VALUE: 17
PIF_VALUE: 15
PIF_VALUE: 18
PIF_VALUE: 19
PIF_VALUE: 15
PIF_VALUE: 18
PIF_VALUE: 15
PIF_VALUE: 15
PIF_VALUE: 13
PIF_VALUE: 15
PIF_VALUE: 15
PIF_VALUE: 1
PIF_VALUE: 18
PIF_VALUE: 14
PIF_VALUE: 18
PIF_VALUE: 18
PIF_VALUE: 15
PIF_VALUE: 16
PIF_VALUE: 21
PIF_VALUE: 16
PIF_VALUE: 15
PIF_VALUE: 15
PIF_VALUE: 1
PIF_VALUE: 17
PIF_VALUE: 15
PIF_VALUE: 16
PIF_VALUE: 18
PIF_VALUE: 1
PIF_VALUE: 15
PIF_VALUE: 14
PIF_VALUE: 14
PIF_VALUE: 15
PIF_VALUE: 18
PIF_VALUE: 15
PIF_VALUE: 20
PIF_VALUE: 18
PIF_VALUE: 15
PIF_VALUE: 19
PIF_VALUE: 18
PIF_VALUE: 1
PIF_VALUE: 18
PIF_VALUE: 19

## 2021-08-03 ASSESSMENT — PAIN SCALES - GENERAL
PAINLEVEL_OUTOF10: 0
PAINLEVEL_OUTOF10: 0
PAINLEVEL_OUTOF10: 7
PAINLEVEL_OUTOF10: 3
PAINLEVEL_OUTOF10: 5
PAINLEVEL_OUTOF10: 0
PAINLEVEL_OUTOF10: 5
PAINLEVEL_OUTOF10: 8

## 2021-08-03 ASSESSMENT — PAIN DESCRIPTION - PAIN TYPE
TYPE: SURGICAL PAIN

## 2021-08-03 ASSESSMENT — PAIN DESCRIPTION - ORIENTATION
ORIENTATION: LEFT

## 2021-08-03 ASSESSMENT — PAIN DESCRIPTION - LOCATION
LOCATION: HIP

## 2021-08-03 ASSESSMENT — PAIN - FUNCTIONAL ASSESSMENT: PAIN_FUNCTIONAL_ASSESSMENT: 0-10

## 2021-08-03 NOTE — PROGRESS NOTES
Incentive Spirometry education and demonstration given by Respiratory Therapy. Pt achieving 1500 mL at time of instruction. Incentive Spirometer left at bedside and   Patient instructed to do a minimum of 10 breaths every hour.       Sam Staton RCP  5:07 PM

## 2021-08-03 NOTE — PROGRESS NOTES
Occupational Therapy   Occupational Therapy Initial Assessment  Date: 8/3/2021   Patient Name: Florencia Olvera  MRN: [id number]     : 1940    Date of Service: 8/3/2021    Discharge Recommendations:  ECF with OT       Assessment   Performance deficits / Impairments: Decreased functional mobility ; Decreased ADL status; Decreased ROM; Decreased strength;Decreased endurance;Decreased balance;Decreased high-level IADLs  Treatment Diagnosis: LTHA  Prognosis: Good  Decision Making: Low Complexity  REQUIRES OT FOLLOW UP: Yes  Safety Devices  Safety Devices in place: Yes  Type of devices: Call light within reach; Left in bed;Nurse notified           Patient Diagnosis(es): The encounter diagnosis was Status post total replacement of left hip.     has a past medical history of Bullous pemphigoid, Elevated fasting glucose, Former smoker, GERD (gastroesophageal reflux disease), Hypertension, Osteopenia, and Overweight(278.02). has a past surgical history that includes Abdominal exploration surgery (); pr colonoscopy w/biopsy single/multiple (N/A, 2017); Cataract removal with implant (Left, 2018); and Hysterectomy.     Treatment Diagnosis: LTHA      Restrictions  Restrictions/Precautions  Restrictions/Precautions: Weight Bearing  Lower Extremity Weight Bearing Restrictions  Left Lower Extremity Weight Bearing: Weight Bearing As Tolerated  Position Activity Restriction  Hip Precautions: No hip flexion > 90 degrees    Subjective   General  Chart Reviewed: Yes  Patient assessed for rehabilitation services?: Yes  Family / Caregiver Present: No  Referring Practitioner: Hannah Patterson  Diagnosis: LTHA  Subjective  Subjective: Patient rec'd in bed, pleasant and cooperative 68 yr old female with LTHA  Patient Currently in Pain: Yes  Pain Assessment  Pain Assessment: 0-10  Pain Level: 0  Pain Type: Surgical pain  Pain Location: Hip  Pain Orientation: Left  Vital Signs  Temp: 96 °F (35.6 °C)  Temp Source: Tympanic  Pulse: 56  Heart Rate Source: Apical  Resp: 16  BP: (!) 106/57  BP Location: Left upper arm  MAP (mmHg): 72  Patient Position: Semi fowlers  Patient Currently in Pain: Yes     Social/Functional History  Social/Functional History  Lives With: Family  Type of Home: House  Home Layout: One level  Home Access: Stairs to enter with rails  Entrance Stairs - Number of Steps: 4  Bathroom Shower/Tub: Walk-in shower  Bathroom Toilet: Standard  Bathroom Equipment: Shower chair, Toilet raiser  Bathroom Accessibility: Accessible  Home Equipment: Rolling walker, 4 wheeled walker  Receives Help From: Family  ADL Assistance: Independent  Homemaking Assistance: Independent  Homemaking Responsibilities: Yes  Meal Prep Responsibility: Primary  Laundry Responsibility: Primary  Cleaning Responsibility: Primary  Shopping Responsibility: No  Ambulation Assistance: Independent  Transfer Assistance: Independent  Active : No  Patient's  Info: daughter  Occupation: Retired  IADL Comments: needs assist to safely navigate stairs       Objective   Vision: Impaired  Vision Exceptions: Wears glasses at all times  Hearing: Within functional limits    Orientation  Overall Orientation Status: Within Functional Limits  Observation/Palpation  Observation: In bed on O2     ADL  LE Dressing: Maximum assistance  Toileting: Minimal assistance        Bed mobility  Supine to Sit: Minimal assistance  Sit to Supine: Minimal assistance  Scooting: Minimal assistance  Transfers  Sit to stand: Minimal assistance  Stand to sit: Minimal assistance     Cognition  Overall Cognitive Status: WFL                 LUE AROM (degrees)  LUE AROM : WFL  Left Hand AROM (degrees)  Left Hand AROM: WFL  RUE AROM (degrees)  RUE AROM : WFL  Right Hand AROM (degrees)  Right Hand AROM: WFL  LUE Strength  Gross LUE Strength: WFL  RUE Strength  Gross RUE Strength: WFL        Plan    1-2    Goals  Short term goals  Time Frame for Short term goals: duration of hospital stay  Short term goal 1: Patient to complete toilet transfer and toileting tasks with SBA using a/e as needed  Short term goal 2: Patient to complete LB dressing with SBA using a/e as needed       Therapy Time   Individual Concurrent Group Co-treatment   Time In 1500         Time Out 1526         Minutes 26         Timed Code Treatment Minutes: 0 Minutes       [address], OT

## 2021-08-03 NOTE — PROGRESS NOTES
Hospitalist Progress Note    Patient:  Diamond Medellin     YOB: 1940    MRN: [id number]   Admit date: 8/3/2021     Acct: [de-identified]     PCP: Merlin Kil, MD    CC--Interval History: POD 0--left JEFF for OA----8.3.2021----[place]    HTN  IFG  GERD  Remote tobacco abuse    Patient sidc-syepooeimt-dsxejkux-available records reviewed, including, but not limited to,  OR reports--labs--imaging---office records---personal notes    All other ROS negative except noted in HPI    Diet:  ADULT DIET;  Regular    Medications:  Scheduled Meds:   ketorolac  15 mg Intravenous Q6H    rivaroxaban  10 mg Oral Daily    docusate sodium  100 mg Oral Daily    polyethylene glycol  17 g Oral Daily    ceFAZolin  2,000 mg Intravenous Q8H     Continuous Infusions:   lactated ringers 100 mL/hr at 08/03/21 0703     PRN Meds:cyclobenzaprine, ondansetron, morphine **OR** morphine, HYDROcodone 5 mg - acetaminophen **OR** HYDROcodone 5 mg - acetaminophen, magnesium hydroxide, acetaminophen    Objective:  Labs:  CBC with Differential:    Lab Results   Component Value Date    WBC 7.0 08/02/2021    RBC 3.94 08/02/2021    HGB 11.7 08/02/2021    HCT 37.9 08/02/2021     08/02/2021    MCV 96.2 08/02/2021    MCH 29.7 08/02/2021    MCHC 30.9 08/02/2021    RDW 12.8 08/02/2021    LYMPHOPCT 31 08/02/2021    MONOPCT 11 08/02/2021    BASOPCT 0 08/02/2021    MONOSABS 0.73 08/02/2021    LYMPHSABS 2.16 08/02/2021    EOSABS 0.20 08/02/2021    BASOSABS <0.03 08/02/2021    DIFFTYPE NOT REPORTED 08/02/2021     BMP:    Lab Results   Component Value Date     07/06/2021    K 3.7 07/06/2021     07/06/2021    CO2 27 07/06/2021    BUN 35 07/06/2021    LABALBU 3.9 04/27/2021    CREATININE 1.14 07/06/2021    CALCIUM 9.3 07/06/2021    GFRAA 56 07/06/2021    LABGLOM 46 07/06/2021    GLUCOSE 120 07/06/2021           Physical Exam:  Vitals: BP (!) 103/47   Pulse 60   Temp 96.2 °F (35.7 °C) (Tympanic)   Resp 16   Ht 5' 3\" (1.6 m)   Wt 157 lb (71.2 kg)   SpO2 100%   BMI 27.81 kg/m²   24 hour intake/output:    Intake/Output Summary (Last 24 hours) at 8/3/2021 1158  Last data filed at 8/3/2021 1022  Gross per 24 hour   Intake 1800 ml   Output 200 ml   Net 1600 ml     Last 3 weights: Wt Readings from Last 3 Encounters:   08/03/21 157 lb (71.2 kg)   07/30/21 157 lb (71.2 kg)   07/06/21 157 lb (71.2 kg)     HEENT: Normocephalic and Atraumatic  Neck: Supple, No Masses, Tenderness, Nodularity and No Lymphadenopathy  Chest/Lungs: Clear to Auscultation without Rales, Rhonchi, or Wheezes  Cardiac: Regular Rate and Rhythm  GI/Abdomen: Bowel Sounds Present and Soft, Non-tender, without Guarding or Rebound Tenderness  : Not examined  EXT/Skin: sp Left JEFF--CDI, No Edema, No Cyanosis and No Clubbing  Neuro: alert---but for gait-balance instability due to JEFF--- and No Localizing Signs/Symptoms      Assessment:    Active Problems:    Osteoarthritis of left hip  Resolved Problems:    * No resolved hospital problems. *    Maritza Salazar  80  WF  [FATUMA [place], Orthopedics;  DC Cardiology---[facility]]  FULL CODE    Liliana Singleton     Anti-infectives: Ancef IV x 3 dose    POD ____   left JEFF----8.3.2021  Severe OA left hip    Hypertension             EKG--7.6.2021---NSR---64  Elevated fasting glucose  GERD  Overweight  Tobacco abuse---2.5 PPD--77,5 PYH---quit--1.1.1989  PMH:  osteopenia, chronic venous insufficiency, spider veins---symptomatic, hyponatremia,              tubular adenoma--colon, bullous pemphigoid,  Vitamin D deficiency  PSH:   left cataract---IOL---2018, colonoscopy---biopsy--2017,  abdominal exploration               surgery--ectopic pregnancy---BSO    Allergies: HCTZ--rash--itching      Plan:  1. Home medications reviewed  2. Respiratory regimen  3. Bowel regimen  4.   See orders     Electronically signed by Annette Kenny on 8/3/2021 at 11:58 AM    Hospitalist

## 2021-08-03 NOTE — OP NOTE
Operative Note      Patient: Jessica Puckett  YOB: 1940  MRN: [id number]    Date of Procedure: 8/3/2021    Pre-Op Diagnosis: Left hip primary osteoarthritis    Post-Op Diagnosis: Same       Procedure(s):  Left Total Hip Arthroplasty 96048    Surgeon(s):  Naomi Chang MD    Assistant:   Physician Assistant: Shira Sagastume PA-C    Anesthesia: General    Estimated Blood Loss (mL): 348     Complications: None    Specimens:   * No specimens in log *    Implants:  Implant Name Type Inv. Item Serial No.  Lot No. LRB No. Used Action   SHELL ACET SZ G [id number] CLUS H TRITANIUM PRESSFIT PRABHU Hip SHELL ACET SZ G [id number] CLUS H TRITANIUM PRESSFIT PRABHU  [employer] [facility] [id number] Left 1 Implanted   INSERT ACET G 0 DEG 44 MM HIP X3 TRIDENT Hip INSERT ACET G 0 DEG 44 MM HIP X3 TRIDENT  [employer] [facility] [id number] Left 1 Implanted   SCREW BNE LP 6.5X50 MM HEX TRIDENT II Hip SCREW BNE LP 6.5X50 MM HEX TRIDENT II  [employer] [id number] Left 1 Implanted   STEM FEM SZ 5 L108MM NK L35MM 44MM OFFSET 127DEG HIP TI Hip STEM FEM SZ 5 L108MM NK L35MM 44MM OFFSET 127DEG HIP TI  [employer] [facility] [id number] Left 1 Implanted   HEAD FEM [id number] +0MM OFFSET HIP CO CHROM V40 TAPR LO FRIC Hip HEAD FEM INK63DS +0MM OFFSET HIP CO CHROM V40 TAPR LO FRIC  [employer] CloudSlidesCerephex [id number] Left 1 Implanted         Drains: * No LDAs found *    Findings: Confirmed    Detailed Description of Procedure:       INDICATION FOR PROCEDURE     The patient is a 68y.o.-year-old with Left hip pain for quite some time. The patient has tried activity modification, pain medication without relief. Occasionally assistive devices such as rails are required to mobilize. NSAIDS have not been satisfactory. On physical exam there is limited ROM, pain, and crepitus. X-rays demonstrate bone on bone arthritis.  Discussed the option of doing a total hip replacement and we have elected to proceed     PROCEDURE DETAILS The patient was taken to the operating room, underwent a general anesthetic, placed in lateral position Left side up. Care was taken to pad all bony prominences. Timeout was taken, consent was confirmed. The patient received Ancef 2 grams within 30 minutes of incision. Started with a lateral approach to the hip with skin knife followed by electrocautery down to the iliotibial band. The iliotibial band was then split. Charnley C retractor was put in position and took down the anterior third of the gluteus medius tendon. Placed the leg in a figure-of-four, made a cut about a fingerbreadth above the lesser trochanter. We removed the remaining head and neck. Placed Hohmann's around the acetabulum, cleaned soft tissue from the acetabulum deepened the acetabulum. Reamed up sequentially to size 60. Implanted size 60 cup at 45 degrees abduction, 20 degrees of anteversion. We placed one 50 mm screw between the tables of the pelvis. Nice fixation was obtained. Standard 44 mm liner. We then used the  followed by the canal finder. We broached to a size 5 . We implanted the same size stem. We trialed head and neck lengths, elected to go with a 0 neck length. It was stable throughout all range of motion. Limb lengths appear to be appropriate. Wounds were thoroughly irrigated. Repaired the abductor and capsule back with #5 Ethibond through bone tunnels. There was a intracapsular with injected with morphine, Toradol, marcaine with epinephrine and tranexamic acid. The iliotibial band was repaired with #2 [place]. Skin was repaired with 0 Quill, Prineo, and dry dressings. The patient was then awakened and returned to the recovery room in fair condition. POSTOPERATIVE PLAN: Weightbearing as tolerated, total hip arthroplasty protocol. First postop visit will be a clinical exam, no x-rays in 8 weeks.      The physician assistant assisted throughout the procedure with positioning, draping, retraction, wound closure, and dressings.     Almas Miller MD      Electronically signed by Almas Miller MD on 8/3/2021 at 8:59 AM

## 2021-08-03 NOTE — FLOWSHEET NOTE
08/03/21 1030   Oxygen Therapy   SpO2 100 %   Pulse Oximeter Device Mode Intermittent   O2 Device Nasal cannula   O2 Flow Rate (L/min) 2 L/min   Pulse ox dropped to 84% when sleeping.   O2 reapplied at 2 l

## 2021-08-03 NOTE — ANESTHESIA PRE PROCEDURE
Current Facility-Administered Medications   Medication Dose Route Frequency Provider Last Rate Last Admin    lactated ringers infusion   Intravenous Continuous Hina Bonner  mL/hr at 08/03/21 0703 New Bag at 08/03/21 0703       Allergies: Allergies   Allergen Reactions    Hydrochlorothiazide Itching and Rash     See progress note from Dr. Akhil Tobin (dermatogist) 4/13/2017. Patient restarted 7/31/17 not having any issues as of 8/4/17       Problem List:    Patient Active Problem List   Diagnosis Code    Essential hypertension I10    GERD (gastroesophageal reflux disease) K21.9    Elevated fasting glucose R73.01    Osteopenia M85.80    Overweight (BMI 25.0-29. 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.90    Hyponatremia, mild E87.1    Tubular adenoma of colon D12.6    Bullous pemphigoid L12.0    Vitamin D deficiency E55.9       Past Medical History:        Diagnosis Date    Bullous pemphigoid     Elevated fasting glucose     Former smoker     quit in 1987    GERD (gastroesophageal reflux disease)     Hypertension     Osteopenia     took Boniva times 5 years, stopped 2012    Overweight(278.02)     wieght 174 pounds, height 65 inches, BMI 29, 3/31/2013       Past Surgical History:        Procedure Laterality Date    ABDOMINAL EXPLORATION SURGERY  1972    ectopic pregnancy, bilateral salpingectomy    CATARACT REMOVAL WITH IMPLANT Left 07/24/2018    per Dr Inge Hood      partial    [place] COLONOSCOPY W/BIOPSY SINGLE/MULTIPLE N/A 7/26/2017    COLONOSCOPY WITH BIOPSY performed by Tima Santana MD at [address] [place] [address], one tubular adenoma on pathology, repeat recommended in 5 years       Social History:    Social History     Tobacco Use    Smoking status: Former Smoker     Packs/day: 2.50     Years: 31.00     Pack years: 77.50     Types: Cigarettes     Start date: 1958     Quit date: 1989     Years since quittin.6    Smokeless tobacco: Never Used   Substance Use Topics    Alcohol use: No     Alcohol/week: 0.0 standard drinks                                Counseling given: Not Answered      Vital Signs (Current):   Vitals:    21 0643   BP: 138/62   Pulse: 63   Resp: 16   Temp: 36.3 °C (97.4 °F)   TempSrc: Temporal   SpO2: 100%   Weight: 180 lb (81.6 kg)   Height: 5' 2\" (1.575 m)                                              BP Readings from Last 3 Encounters:   21 138/62   21 (!) 122/58   21 132/60       NPO Status: Time of last liquid consumption:                         Time of last solid consumption:                         Date of last liquid consumption: 21                        Date of last solid food consumption: 21    BMI:   Wt Readings from Last 3 Encounters:   21 180 lb (81.6 kg)   21 157 lb (71.2 kg)   21 157 lb (71.2 kg)     Body mass index is 32.92 kg/m². CBC:   Lab Results   Component Value Date    WBC 7.0 2021    RBC 3.94 2021    HGB 11.7 2021    HCT 37.9 2021    MCV 96.2 2021    RDW 12.8 2021     2021       CMP:   Lab Results   Component Value Date     2021    K 3.7 2021     2021    CO2 27 2021    BUN 35 2021    CREATININE 1.14 2021    GFRAA 56 2021    LABGLOM 46 2021    GLUCOSE 120 2021    PROT 7.0 2021    CALCIUM 9.3 2021    BILITOT 0.40 2021    ALKPHOS 86 2021    AST 16 2021    ALT 11 2021       POC Tests: No results for input(s): POCGLU, POCNA, POCK, POCCL, POCBUN, POCHEMO, POCHCT in the last 72 hours.     Coags: No results found for: PROTIME, INR, APTT    HCG (If Applicable): No results found for: PREGTESTUR, PREGSERUM, HCG, HCGQUANT     ABGs: No results found for: PHART, PO2ART, ZBL6ISY, PVO0VED, BEART, K1ZPJZIY     Type & Screen (If Applicable):  No results found for: LABABO, [address]    Drug/Infectious Status (If Applicable):  No results found for: HIV, HEPCAB    COVID-19 Screening (If Applicable):   Lab Results   Component Value Date    COVID19 Not Detected 07/29/2021           Anesthesia Evaluation  Patient summary reviewed no history of anesthetic complications:   Airway: Mallampati: II  TM distance: >3 FB   Neck ROM: full  Mouth opening: > = 3 FB Dental:    (+) edentulous, upper dentures and lower dentures      Pulmonary:normal exam                               Cardiovascular:  Exercise tolerance: good (>4 METS),   (+) hypertension:, CHF: diastolic and no interval change,       ECG reviewed      Echocardiogram reviewed    Cleared by cardiology              Neuro/Psych:   Negative Neuro/Psych ROS              GI/Hepatic/Renal:   (+) GERD: no interval change, renal disease: CRI,           Endo/Other:                     Abdominal:             Vascular:           ROS comment: Venous insufficiency. Other Findings:             Anesthesia Plan      general     ASA 2       Induction: intravenous. MIPS: Postoperative opioids intended and Prophylactic antiemetics administered. Anesthetic plan and risks discussed with patient, child/children and sibling. Use of blood products discussed with patient whom consented to blood products.    Plan discussed with surgical team.                  China Galdamez, APRN - CRNA   8/3/2021

## 2021-08-03 NOTE — PROGRESS NOTES
Physical Therapy    Facility/Department: [facility]  PROGRESSIVE CARE  Initial Assessment    NAME: Florencia Olvera  : 1940  MRN: [id number]    Date of Service: 8/3/2021    Discharge Recommendations:  ECF with PT        Assessment   Body structures, Functions, Activity limitations: Decreased functional mobility ; Decreased balance  Prognosis: Good  Decision Making: Low Complexity  REQUIRES PT FOLLOW UP: Yes  Activity Tolerance  Activity Tolerance: Patient Tolerated treatment well       Patient Diagnosis(es): The encounter diagnosis was Status post total replacement of left hip.     has a past medical history of Bullous pemphigoid, Elevated fasting glucose, Former smoker, GERD (gastroesophageal reflux disease), Hypertension, Osteopenia, and Overweight(278.02). has a past surgical history that includes Abdominal exploration surgery (); pr colonoscopy w/biopsy single/multiple (N/A, 2017); Cataract removal with implant (Left, 2018); and Hysterectomy.     Restrictions  Restrictions/Precautions  Restrictions/Precautions: Weight Bearing  Lower Extremity Weight Bearing Restrictions  Left Lower Extremity Weight Bearing: Weight Bearing As Tolerated  Position Activity Restriction  Hip Precautions: No hip flexion > 90 degrees  Vision/Hearing        Subjective  General  Chart Reviewed: Yes  Patient assessed for rehabilitation services?: Yes  Response To Previous Treatment: Not applicable  Family / Caregiver Present: No  Follows Commands: Within Functional Limits  Pain Screening  Patient Currently in Pain: Yes  Pain Assessment  Pain Assessment: 0-10  Pain Level: 3  Pain Type: Surgical pain  Pain Location: Hip  Pain Orientation: Left  Vital Signs  Patient Currently in Pain: Yes       Orientation  Orientation  Overall Orientation Status: Within Normal Limits  Social/Functional History  Social/Functional History  Lives With: Family  Type of Home: House  Home Layout: One level  Home Access: Stairs to enter with rails  Entrance Stairs - Number of Steps: 4  Bathroom Shower/Tub: Walk-in shower  Bathroom Toilet: Standard  Bathroom Equipment: Shower chair, Toilet raiser  Bathroom Accessibility: Accessible  Home Equipment: Rolling walker, 4 wheeled walker  Receives Help From: Family  ADL Assistance: Independent  Homemaking Assistance: Independent  Homemaking Responsibilities: Yes  Meal Prep Responsibility: Primary  Laundry Responsibility: Primary  Cleaning Responsibility: Primary  Shopping Responsibility: No  Ambulation Assistance: Independent  Transfer Assistance: Independent  Active : No  Patient's  Info: daughter  Occupation: Retired  IADL Comments: needs assist to safely navigate stairs  Cognition        Objective     Observation/Palpation  Observation: In bed on O2    PROM RLE (degrees)  RLE PROM: WFL  PROM LLE (degrees)  L Hip Flexion 0-125: 70  L Hip Extension 0-10: 0  L Hip ABduction 0-45: 25  Strength RLE  Strength RLE: WFL  Strength LLE  Comment: 3-/5 hip flex, ext, 2/5 abd        Bed mobility  Supine to Sit: Minimal assistance  Sit to Supine: Minimal assistance  Scooting: Minimal assistance  Transfers  Sit to Stand: Minimal Assistance  Stand to sit: Minimal Assistance  Ambulation  Ambulation?: Yes  Ambulation 1  Surface: level tile  Device: Rolling Walker  Assistance: Contact guard assistance  Distance: 10 ft  Comments: Stood 4 min     Balance  Sitting - Static: Good  Sitting - Dynamic: Good  Standing - Static: Fair  Standing - Dynamic: Fair  Exercises  Hamstring Sets: 5  Quad Sets: 5  Heelslides: 5  Gluteal Sets: 5  Ankle Pumps: 10     Plan   Plan  Times per day: Twice a day  Current Treatment Recommendations: Gait Training, ROM, Transfer Training  Safety Devices  Type of devices: Left in bed, Call light within reach    G-Code       OutComes Score                                                  AM-PAC Score             Goals  Short term goals  Time Frame for Short term goals: 1 day  Short term goal 1: Assess functional status  Long term goals  Time Frame for Long term goals : 3 days  Long term goal 1: Bed mobility SBA  Long term goal 2: Transfer SBA  Long term goal 3: Gait with RW 50f       Therapy Time   Individual Concurrent Group Co-treatment   Time In 1500         Time Out 1532         Minutes [place], [place]

## 2021-08-03 NOTE — ANESTHESIA POSTPROCEDURE EVALUATION
Department of Anesthesiology  Postprocedure Note    Patient: Jessica Kline  MRN: [id number]  YOB: 1940  Date of evaluation: 8/3/2021  Time:  10:20 AM     Procedure Summary     Date: 08/03/21 Room / Location: [address] [place] [address]    Anesthesia Start: Wesley Herrera Anesthesia Stop: 5196    Procedure: Left Total Hip Arthroplasty (Left ) Diagnosis: (osteoarthritis)    Surgeons: Genie Santiago MD Responsible Provider: YANELY Hyman CRNA    Anesthesia Type: general ASA Status: 2          Anesthesia Type: general    Neal Phase I: Neal Score: 8    Neal Phase II:      Last vitals: Reviewed and per EMR flowsheets.        Anesthesia Post Evaluation    Patient location during evaluation: PACU  Patient participation: complete - patient participated  Level of consciousness: awake and alert  Pain score: 5  Airway patency: patent  Nausea & Vomiting: no nausea and no vomiting  Complications: no  Cardiovascular status: hemodynamically stable  Respiratory status: room air

## 2021-08-03 NOTE — PROGRESS NOTES
RT Inhaler-Nebulizer Bronchodilator Protocol Note    There is a bronchodilator order in the chart from a provider indicating to follow the RT Bronchodilator Protocol and there is an Initiate RT Bronchodilator Protocol order as well (see protocol at bottom of note). The findings from the last RT Protocol Assessment were as follows:  Smoking: Non smoker  Surgical Status: (P) General surgery/Lower abdominal  Xray: (P) Clear  Respiratory Pattern: (P) RR 12-20  Mental Status: (P) Alert and Oriented  Breath Sounds: (P) Clear  Cough: (P) Strong, spontaneous, non-productive  Activity Level: (P) Walking with assistance  Oxygen Requirement: (P) Room Air - 2LNC/28% or home setting  Indication for Bronchodilator Therapy: (P) None  Bronchodilator Assessment Score: 0    Aerosolized bronchodilator medication orders have been revised according to the RT Bronchodilator Protocol. RT Inhaler-Nebulizer Bronchodilator Protocol:    Respiratory Therapist to perform RT Therapy Protocol Assessment then follow the protocol. No Indications - adjust the frequency to every 6 hours PRN wheezing or bronchospasm, if no treatments needed after 48 hours then discontinue using Per Protocol order mode. If indication present, adjust the RT bronchodilator orders based on the Bronchodilator Assessment Score as follows:    0-6 - enter or revise RT bronchodilator order to Albuterol Inhaler order with frequency of every 2 hours PRN for wheezing or increased work of breathing using Per Protocol order mode. If Albuterol Inhaler not tolerated or not effective, then discontinue the Albuterol Inhaler order and enter Albuterol Nebulizer order with same frequency and PRN reasons. Repeat RT Therapy Protocol Assessment as needed.     7-10 - discontinue any other Inpatient aerosolized bronchodilator medication orders and enter or revise two Albuterol Inhaler orders, one with BID frequency and one with frequency of every 2 hours PRN wheezing or increased work of breathing using Per Protocol order mode. Repeat RT Therapy Protocol Assessment with second treatment then BID and as needed. If Albuterol Inhaler not tolerated or not effective, then discontinue the Albuterol Inhaler orders and enter two Albuterol Nebulizer orders with same frequencies and PRN reasons. 11-13 - discontinue any other Inpatient aerosolized bronchodilator medication orders and enter DuoNeb Nebulizer orders QID frequency and an Albuterol Nebulizer order every 2 hours PRN wheezing or increased work of breathing using Per Protocol order mode. Repeat RT Therapy Protocol Assessment with second treatment then QID and as needed. Greater than 13 - discontinue any other Inpatient bronchodilator aerosolized medication orders and enter DuoNeb Nebulizer order every 4 hours frequency and Albuterol Nebulizer every 2 hours PRN wheezing or increased work of breathing using Per Protocol order mode. Repeat RT Therapy Protocol Assessment with second treatment then every 4 hours and as needed. RT to enter RT Home Evaluation for COPD & MDI Assessment order using Per Protocol order mode.     Electronically signed by Ponce Dobson RCP on 8/3/2021 at 5:06 PM

## 2021-08-03 NOTE — CARE COORDINATION
Case Management Initial Discharge Plan  Carmelo Kramer             Met with:patient to discuss discharge plans. Information verified: address, contacts, phone number, , and insurance: Yes  Insurance Provider: Primary:  Payor: MEDICARE / Plan: MEDICARE PART A AND B / Product Type: *No Product type* /                                         Emergency Contact/Next of Kin name & number: [id number]  Who are involved in patient's support system? children    PCP: Vibha Gordon MD  Date of last visit: 21    Discharge Planning    Living Arrangements:  [address] 1 [address]  4 stairs to climb to get into front door,   Location of bedroom/bathroom in home 1st floor    Patient able to perform ADL's:Independent    Current Services (outpatient & in home) n/a  DME equipment: walker  DME provider: n/a    Is patient receiving oral anticoagulation therapy?  no      Potential Assistance Needed:  Dewayne Man    Patient agreeable to home care: Yes  Freedom of choice provided:  yes    Prior SNF/Rehab Placement and Facility: no  Agreeable to SNF/Rehab: Yes  [place] of choice provided: yes      Expected Discharge date:  21    Patient expects to be discharged to: [facility]        Follow Up Appointment: Best Day/ Time:      Transportation provider: family  Transportation arrangements needed for discharge: No    Readmission Risk              Risk of Unplanned Readmission:  8             Does patient have a readmission risk score greater than 20?: No  If yes, follow-up appointment must be made within 7 days of discharge.      Goals of Care:       Educated patient on transitional options, provided freedom of choice and are agreeable with plan      Discharge Plan: [facility]          Electronically signed by Angelo Grant RN on 8/3/21 at 1:10 PM EDT

## 2021-08-03 NOTE — H&P
metoprolol succinate (TOPROL XL) 25 MG extended release tablet Take 1 tablet by mouth once daily 7/15/21  Yes Wendy Ramirez MD   omeprazole (PRILOSEC) 20 MG delayed release capsule TAKE 1 CAPSULE BY MOUTH ONCE DAILY AS NEEDED FOR  REFLUX 7/15/21  Yes Wendy Ramirez MD   amLODIPine (NORVASC) 5 MG tablet Take 1 tablet by mouth once daily 6/8/21  Yes Michael Dao DO   lisinopril (PRINIVIL;ZESTRIL) 40 MG tablet Take 1 tablet by mouth once daily 6/1/21  Yes Kandace Chou MD   torsemide (DEMADEX) 20 MG tablet Take 1 tablet by mouth once daily 3/31/21  Yes Kandace Chou MD   Ann Lush Oil (MAXIMUM RED KRILL PO) Take by mouth daily   Yes Historical Provider, MD   Multiple Vitamins-Minerals (PRESERVISION AREDS 2 PO) Take by mouth daily   Yes Historical Provider, MD   vitamin D-3 (CHOLECALCIFEROL) 5000 UNITS TABS Take 5,000 Units by mouth daily.    Yes Historical Provider, MD   Coenzyme Q10 (CO Q 10 PO) Take by mouth daily Indications: prn when remembers to take    Yes Historical Provider, MD   cetirizine (ZYRTEC) 10 MG tablet Take 10 mg by mouth daily    Historical Provider, MD   Acetaminophen (TYLENOL ARTHRITIS PAIN PO) Take by mouth as needed    Historical Provider, MD   hydrOXYzine (ATARAX) 25 MG tablet TAKE ONE TABLET BY MOUTH THREE TIMES DAILY AS NEEDED FOR ITCHING 8/19/20   Jamison Goetz MD   albuterol sulfate  (90 BASE) MCG/ACT inhaler Inhale 2 puffs into the lungs every 4 hours as needed for Wheezing or Shortness of Breath 11/15/16   Kandace Chou MD    Scheduled Meds:   sodium chloride flush  5-40 mL Intravenous 2 times per day    ceFAZolin (ANCEF) IVPB  2,000 mg Intravenous On Call to OR     Continuous Infusions:   lactated ringers 100 mL/hr at 08/03/21 0703    sodium chloride       PRN Meds:.sodium chloride flush, sodium chloride    Allergies:  Hydrochlorothiazide    Social History:   Social History     Socioeconomic History    Marital status: Single     Spouse name: None    Number of children: None Family History:  Family History   Problem Relation Age of Onset   [place] Cancer Mother         unknown type of cancer    Diabetes Mother     Diabetes Brother     Diabetes Brother     Heart Disease Father         CHF       REVIEW OF SYSTEMS:  Constitutional: Denies any fever, chills. Derm: Denies any rash or skin color change. Eyes: Denies blurred or decreased in vision. Ent: Denies any tinnitus or vertigo. Resp: Denies any cough or shortness of breath. CV: Denies any syncope, palpitations or chest pain. GI:  Denies any abdominal pain, nausea, vomiting, constipation or diarrhea. : Denies any hematuria, hesitancy, or dysuria. Heme/Lymph: Denies any bleeding. Musculoskeletal: Positive for left hip pain. Neuro: Denies any dizziness, paresthesia or weakness. PHYSICAL EXAM:  Patient Vitals for the past 24 hrs:   BP Temp Temp src Pulse Resp SpO2 Height Weight   08/03/21 0643 138/62 97.4 °F (36.3 °C) Temporal 63 16 100 % 5' 2\" (1.575 m) 180 lb (81.6 kg)     General appearance:  Alert and oriented x 3. No apparent distress, appears stated age and cooperative. HEENT:  Normal cephalic, atraumatic without obvious deformity. Pupils equal, round, and reactive to light. Conjunctivae/corneas clear. Neck: Supple, with full range of motion. Trachea midline. Respiratory:  Normal respiratory effort. No audible Wheezes or Rhonchi. Cardiovascular:  Regular rate and rhythm. Abdomen: Soft, non-tender, non-distended. Musculoskeletal: Left lower E:  Denies calf pain to palpation. Decreased range of motion without deformity. Severe pain to internal or external rotation of the left hip and the right hip. Pt can flex and extend left toes. Limited range of motion to the left hip. Skin: Skin color, texture, turgor normal.  No rashes or lesions. Neurologic:  Neurovascularly intact without any focal sensory/motor deficits. Sensation intact.        DATA:  CBC:   Lab Results   Component Value Date    WBC 7.0 08/02/2021    HGB 11.7 08/02/2021     08/02/2021     BMP:    Lab Results   Component Value Date     07/06/2021    K 3.7 07/06/2021     07/06/2021    CO2 27 07/06/2021    BUN 35 07/06/2021    CREATININE 1.14 07/06/2021    CALCIUM 9.3 07/06/2021    GLUCOSE 120 07/06/2021     PT/INR:  No results found for: PROTIME, INR  Troponin:  No results found for: TROPONINI  No results for input(s): LIPASE, AMYLASE in the last 72 hours. No results for input(s): AST, ALT, BILIDIR, BILITOT, ALKPHOS in the last 72 hours. Radiology:  X-ray AP of the pelvis and 2 views of the left hip does show left and right severe bone-on-bone osteoarthritis to bilateral hip joints. Osteophytes are also noted. ASSESSMENT:Active Problems:    * No active hospital problems. *  Resolved Problems:    * No resolved hospital problems. 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 Amanda Hooper:  1. Surgery for L JEFF     The patient was counseled at length about the risks of marina Covid-19 during their perioperative period and any recovery window from their procedure. The patient was made aware that marina Covid-19  may worsen their prognosis for recovering from their procedure  and lend to a higher morbidity and/or mortality risk. All material risks, benefits, and reasonable alternatives including postponing the procedure were discussed. The patient does wish to proceed with the procedure at this time.          Electronically signed by YANELY Cooper CNP on 8/3/2021 at 8:06 AM

## 2021-08-04 VITALS
WEIGHT: 165 LBS | OXYGEN SATURATION: 97 % | BODY MASS INDEX: 29.23 KG/M2 | DIASTOLIC BLOOD PRESSURE: 63 MMHG | TEMPERATURE: 96.5 F | RESPIRATION RATE: 16 BRPM | HEIGHT: 63 IN | SYSTOLIC BLOOD PRESSURE: 101 MMHG | HEART RATE: 67 BPM

## 2021-08-04 LAB
ABSOLUTE EOS #: <0.03 K/UL (ref 0–0.44)
ABSOLUTE IMMATURE GRANULOCYTE: 0.05 K/UL (ref 0–0.3)
ABSOLUTE LYMPH #: 0.98 K/UL (ref 1.1–3.7)
ABSOLUTE MONO #: 0.68 K/UL (ref 0.1–1.2)
ANION GAP SERPL CALCULATED.3IONS-SCNC: 9 MMOL/L (ref 9–17)
BASOPHILS # BLD: 0 % (ref 0–2)
BASOPHILS ABSOLUTE: <0.03 K/UL (ref 0–0.2)
BUN BLDV-MCNC: 37 MG/DL (ref 8–23)
BUN/CREAT BLD: 31 (ref 9–20)
CALCIUM SERPL-MCNC: 8.3 MG/DL (ref 8.6–10.4)
CHLORIDE BLD-SCNC: 105 MMOL/L (ref 98–107)
CO2: 27 MMOL/L (ref 20–31)
CREAT SERPL-MCNC: 1.2 MG/DL (ref 0.5–0.9)
DIFFERENTIAL TYPE: ABNORMAL
EOSINOPHILS RELATIVE PERCENT: 0 % (ref 1–4)
GFR AFRICAN AMERICAN: 53 ML/MIN
GFR NON-AFRICAN AMERICAN: 43 ML/MIN
GFR SERPL CREATININE-BSD FRML MDRD: ABNORMAL ML/MIN/{1.73_M2}
GFR SERPL CREATININE-BSD FRML MDRD: ABNORMAL ML/MIN/{1.73_M2}
GLUCOSE BLD-MCNC: 131 MG/DL (ref 70–99)
HCT VFR BLD CALC: 29.2 % (ref 36.3–47.1)
HEMOGLOBIN: 9.4 G/DL (ref 11.9–15.1)
IMMATURE GRANULOCYTES: 1 %
LYMPHOCYTES # BLD: 13 % (ref 24–43)
MCH RBC QN AUTO: 30.7 PG (ref 25.2–33.5)
MCHC RBC AUTO-ENTMCNC: 32.2 G/DL (ref 25.2–33.5)
MCV RBC AUTO: 95.4 FL (ref 82.6–102.9)
MONOCYTES # BLD: 9 % (ref 3–12)
NRBC AUTOMATED: 0 PER 100 WBC
PDW BLD-RTO: 12.9 % (ref 11.8–14.4)
PLATELET # BLD: 205 K/UL (ref 138–453)
PLATELET ESTIMATE: ABNORMAL
PMV BLD AUTO: 10.5 FL (ref 8.1–13.5)
POTASSIUM SERPL-SCNC: 4.8 MMOL/L (ref 3.7–5.3)
RBC # BLD: 3.06 M/UL (ref 3.95–5.11)
RBC # BLD: ABNORMAL 10*6/UL
SEG NEUTROPHILS: 78 % (ref 36–65)
SEGMENTED NEUTROPHILS ABSOLUTE COUNT: 5.93 K/UL (ref 1.5–8.1)
SODIUM BLD-SCNC: 141 MMOL/L (ref 135–144)
WBC # BLD: 7.6 K/UL (ref 3.5–11.3)
WBC # BLD: ABNORMAL 10*3/UL

## 2021-08-04 PROCEDURE — 97116 GAIT TRAINING THERAPY: CPT

## 2021-08-04 PROCEDURE — 99232 SBSQ HOSP IP/OBS MODERATE 35: CPT | Performed by: INTERNAL MEDICINE

## 2021-08-04 PROCEDURE — 36415 COLL VENOUS BLD VENIPUNCTURE: CPT

## 2021-08-04 PROCEDURE — 85025 COMPLETE CBC W/AUTO DIFF WBC: CPT

## 2021-08-04 PROCEDURE — 80048 BASIC METABOLIC PNL TOTAL CA: CPT

## 2021-08-04 PROCEDURE — 6370000000 HC RX 637 (ALT 250 FOR IP): Performed by: NURSE PRACTITIONER

## 2021-08-04 PROCEDURE — 2580000003 HC RX 258: Performed by: NURSE PRACTITIONER

## 2021-08-04 PROCEDURE — 6370000000 HC RX 637 (ALT 250 FOR IP): Performed by: INTERNAL MEDICINE

## 2021-08-04 PROCEDURE — 6360000002 HC RX W HCPCS: Performed by: NURSE PRACTITIONER

## 2021-08-04 RX ORDER — 0.9 % SODIUM CHLORIDE 0.9 %
1000 INTRAVENOUS SOLUTION INTRAVENOUS ONCE
Status: COMPLETED | OUTPATIENT
Start: 2021-08-04 | End: 2021-08-04

## 2021-08-04 RX ADMIN — KETOROLAC TROMETHAMINE 15 MG: 30 INJECTION, SOLUTION INTRAMUSCULAR; INTRAVENOUS at 10:42

## 2021-08-04 RX ADMIN — KETOROLAC TROMETHAMINE 15 MG: 30 INJECTION, SOLUTION INTRAMUSCULAR; INTRAVENOUS at 02:57

## 2021-08-04 RX ADMIN — Medication 2000 MG: at 00:09

## 2021-08-04 RX ADMIN — SODIUM CHLORIDE 1000 ML: 9 INJECTION, SOLUTION INTRAVENOUS at 07:08

## 2021-08-04 RX ADMIN — DOCUSATE SODIUM 100 MG: 100 CAPSULE, LIQUID FILLED ORAL at 10:43

## 2021-08-04 RX ADMIN — SODIUM CHLORIDE, POTASSIUM CHLORIDE, SODIUM LACTATE AND CALCIUM CHLORIDE: 600; 310; 30; 20 INJECTION, SOLUTION INTRAVENOUS at 04:11

## 2021-08-04 RX ADMIN — Medication 1000 UNITS: at 10:43

## 2021-08-04 RX ADMIN — PANTOPRAZOLE SODIUM 40 MG: 40 TABLET, DELAYED RELEASE ORAL at 06:14

## 2021-08-04 ASSESSMENT — PAIN SCALES - GENERAL
PAINLEVEL_OUTOF10: 0

## 2021-08-04 NOTE — PROGRESS NOTES
Orthopaedic Progress Note      SUBJECTIVE   Ms. Kendall Xavier is post op day # 1 s/p L JEFF   Pt seen in bed  Left hip incision dry and intact  Pt is working well with therapy  Pt denies pain at this time  Satisfactory post-op x-rays  Pt working well with PT/OT  hgb 9.4       Allergies:     Allergies as of 07/15/2021 - Fully Reviewed 07/15/2021   Allergen Reaction Noted    Hydrochlorothiazide Itching and Rash 04/26/2017     Current Inpatient Medications:  Current Facility-Administered Medications: 0.9 % sodium chloride bolus, 1,000 mL, Intravenous, Once  lactated ringers infusion, , Intravenous, Continuous  ketorolac (TORADOL) injection 15 mg, 15 mg, Intravenous, Q6H  cyclobenzaprine (FLEXERIL) tablet 10 mg, 10 mg, Oral, TID PRN  ondansetron (ZOFRAN) injection 4 mg, 4 mg, Intravenous, Q6H PRN  rivaroxaban (XARELTO) tablet 10 mg, 10 mg, Oral, Daily  morphine (PF) injection 2 mg, 2 mg, Intravenous, Q2H PRN **OR** morphine injection 4 mg, 4 mg, Intravenous, Q2H PRN  docusate sodium (COLACE) capsule 100 mg, 100 mg, Oral, Daily  magnesium hydroxide (MILK OF MAGNESIA) 400 MG/5ML suspension 30 mL, 30 mL, Oral, Daily PRN  acetaminophen (TYLENOL) tablet 650 mg, 650 mg, Oral, Q4H PRN  polyethylene glycol (GLYCOLAX) packet 17 g, 17 g, Oral, Daily  oxyCODONE-acetaminophen (PERCOCET) 5-325 MG per tablet 1 tablet, 1 tablet, Oral, Q4H PRN  oxyCODONE-acetaminophen (PERCOCET) 5-325 MG per tablet 2 tablet, 2 tablet, Oral, Q4H PRN  amLODIPine (NORVASC) tablet 5 mg, 5 mg, Oral, Daily  cetirizine (ZYRTEC) tablet 5 mg, 5 mg, Oral, Daily  lisinopril (PRINIVIL;ZESTRIL) tablet 40 mg, 40 mg, Oral, Daily  metoprolol succinate (TOPROL XL) extended release tablet 25 mg, 25 mg, Oral, Daily  PreserVision AREDS 2 CAPS 1 tablet, 1 tablet, Oral, Daily  pantoprazole (PROTONIX) tablet 40 mg, 40 mg, Oral, QAM AC  torsemide (DEMADEX) tablet 20 mg, 20 mg, Oral, Daily  Vitamin D (CHOLECALCIFEROL) tablet 5,000 Units, 5,000 Units, Oral, Daily  albuterol (PROVENTIL) nebulizer solution 2.5 mg, 2.5 mg, Nebulization, As Directed RT PRN  sodium chloride nebulizer 0.9 % solution 3 mL, 3 mL, Nebulization, As Directed RT PRN  albuterol (PROVENTIL) nebulizer solution 2.5 mg, 2.5 mg, Nebulization, As Directed RT PRN    REVIEW OF SYSTEMS:  Constitutional: Denies any fever, chills. Derm: Denies any rash or skin color change. Musculoskeletal: Denies numbness and tingling LLE, Pain to L hip. Neuro: Denies any dizziness, paresthesia or weakness.     OBJECTIVE    Patient Vitals for the past 24 hrs:   BP Temp Temp src Pulse Resp SpO2 Height Weight   08/04/21 0628 (!) 96/50 96.8 °F (36 °C) Tympanic 56 18 96 % -- --   08/04/21 0540 -- -- -- -- -- -- -- 165 lb (74.8 kg)   08/04/21 0412 (!) 96/50 -- -- 61 -- -- -- --   08/04/21 0346 (!) 86/41 97.7 °F (36.5 °C) Tympanic 59 18 96 % -- --   08/04/21 0015 (!) 97/44 -- -- 54 -- 97 % -- --   08/04/21 0009 -- -- -- -- -- 96 % -- --   08/03/21 2341 (!) 94/45 -- -- 66 18 94 % -- --   08/03/21 2005 -- -- -- -- -- 97 % -- --   08/03/21 1845 (!) 106/44 97.5 °F (36.4 °C) Tympanic 65 -- 96 % -- --   08/03/21 1657 -- -- -- -- 14 98 % -- --   08/03/21 1650 (!) 100/48 96.5 °F (35.8 °C) Tympanic 62 -- 97 % -- --   08/03/21 1530 (!) 106/57 96 °F (35.6 °C) Tympanic 56 16 -- -- --   08/03/21 1030 (!) 103/47 96.2 °F (35.7 °C) Tympanic 60 16 100 % 5' 3\" (1.6 m) 157 lb (71.2 kg)   08/03/21 1020 (!) 98/52 96.9 °F (36.1 °C) -- 62 16 99 % -- --   08/03/21 1015 (!) 97/49 -- -- 59 16 99 % -- --   08/03/21 1010 -- -- -- 57 -- 97 % -- --   08/03/21 1005 (!) 101/50 -- -- 58 16 98 % -- --   08/03/21 1000 (!) 107/52 -- -- 62 18 98 % -- --   08/03/21 0955 (!) 114/55 -- -- 66 16 100 % -- --   08/03/21 0950 (!) 91/50 -- -- 88 18 99 % -- --   08/03/21 0945 (!) 94/45 -- -- 57 16 100 % -- --   08/03/21 0940 (!) 101/51 -- -- 62 16 99 % -- --   08/03/21 0935 (!) 102/49 -- -- 65 18 97 % -- --   08/03/21 0930 106/77 96.9 °F (36.1 °C) -- 69 16 98 % -- --     INTAKE/OUTPUT:    Intake/Output Summary (Last 24 hours) at 8/4/2021 0802  Last data filed at 8/4/2021 0540  Gross per 24 hour   Intake 3488.09 ml   Output 200 ml   Net 3288.09 ml     I/O last 3 completed shifts: In: 3488.1 [P.O.:560; I.V.:2928.1]  Out: 200 [Blood:200]    PHYSICAL EXAM:  General appearance:  Alert and oriented x 3. No apparent distress, appears stated age and cooperative. Musculoskeletal: LLE:  Denies calf pain to palpation. good range of motion without deformity. Pt can flex and extend left toes. Left hip drsg dry and intact. No redness or drainage noted from incision site. Skin: Skin color normal.  No rashes or lesions. Neurologic:  Neurovascularly intact without any focal sensory/motor deficits. Sensation intact. Data  CBC:   Lab Results   Component Value Date    WBC 7.6 08/04/2021    HGB 9.4 08/04/2021     08/04/2021     BMP:    Lab Results   Component Value Date     08/04/2021    K 4.8 08/04/2021     08/04/2021    CO2 27 08/04/2021    BUN 37 08/04/2021    CREATININE 1.20 08/04/2021    CALCIUM 8.3 08/04/2021    GLUCOSE 131 08/04/2021     Uric Acid:  No components found for: URIC  PT/INR:  No results found for: PROTIME, INR  Troponin:  No results found for: TROPONINI  Urine Culture:  No components found for: CURINE    ASSESSMENT:  Active Hospital Problems    Diagnosis Date Noted    Osteoarthritis of left hip [M16.12] 08/03/2021    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 Dry drsg changes as needed   2. WBAT  3. PT/OT to eval and treat   4. Continue current medical management   5. Discharge to swing bed   6.  F/U 8 weeks       Electronically signed by YANELY Nobles CNP on 8/4/2021 at 8:01 AM

## 2021-08-04 NOTE — PROGRESS NOTES
Physical Therapy  Facility/Department: [facility]  PROGRESSIVE CARE  Daily Treatment Note  NAME: Austin Cano  : 1940  MRN: [id number]    Date of Service: 2021    Discharge Recommendations:  Continue to assess pending progress        Assessment   Body structures, Functions, Activity limitations: Decreased functional mobility ; Decreased balance  Activity Tolerance  Activity Tolerance: Patient Tolerated treatment well     Patient Diagnosis(es): The encounter diagnosis was Status post total replacement of left hip.     has a past medical history of Bullous pemphigoid, Elevated fasting glucose, Former smoker, GERD (gastroesophageal reflux disease), Hypertension, Osteopenia, and Overweight(278.02). has a past surgical history that includes Abdominal exploration surgery (); pr colonoscopy w/biopsy single/multiple (N/A, 2017); Cataract removal with implant (Left, 2018); and Hysterectomy. Restrictions  Restrictions/Precautions  Restrictions/Precautions: Weight Bearing  Lower Extremity Weight Bearing Restrictions  Left Lower Extremity Weight Bearing: Weight Bearing As Tolerated  Position Activity Restriction  Hip Precautions: No hip flexion > 90 degrees  Subjective   Subjective  Subjective: Pateint supine in bed. Agreeable to therapy. Pain Assessment  Pain Assessment: 0-10  Pain Level: 0  Pre Treatment Pain Screening  Pain at present: 0    Orientation  Orientation  Overall Orientation Status: Within Normal Limits  Cognition      Objective   Bed mobility  Rolling to Left: Minimal assistance; Moderate assistance  Supine to Sit: Minimal assistance;Contact guard assistance  Sit to Supine: Minimal assistance  Scooting: Minimal assistance  Transfers  Sit to Stand: Minimal Assistance  Stand to sit: Minimal Assistance  Ambulation  Ambulation?: Yes  Ambulation 1  Surface: level tile  Device: Rolling Walker  Assistance: Contact guard assistance  Quality of Gait: forward flexed posture, slow larisa speed  Gait Deviations: Slow Maureen  Distance: 75 ft.                                 G-Code     OutComes Score                                                     AM-PAC Score             Goals  Short term goals  Time Frame for Short term goals: 1 day  Short term goal 1: Assess functional status  Long term goals  Time Frame for Long term goals : 3 days  Long term goal 1: Bed mobility SBA  Long term goal 2: Transfer SBA  Long term goal 3: Gait with RW 50f    Plan    Plan  Times per day: Twice a day  Current Treatment Recommendations: Strengthening, Transfer Training, Gait Training  Safety Devices  Type of devices: Left in bed, Nurse notified, Call light within reach, Gait belt     Therapy Time   Individual Concurrent Group Co-treatment   Time In 0846         Time Out 0906         Minutes [address], [place]

## 2021-08-04 NOTE — PROGRESS NOTES
Hospitalist Progress Note    Patient:  Raman Mcmanus     YOB: 1940    MRN: [id number]   Admit date: 8/3/2021     Acct: [de-identified]     PCP: Luis A Del Rio MD    CC--Interval History:   POD 1 left JEFF---no new complaints or concerns---to swing beds----8.4.2021    HTN---90/50-----had received IVF----orthostatics stable and asymptomatic    IFG----blood glucose = 131 within goal 140-180 for post op patient    See orders    All other ROS negative except noted in HPI    Diet:  ADULT DIET;  Regular    Medications:  Scheduled Meds:   ketorolac  15 mg Intravenous Q6H    rivaroxaban  10 mg Oral Daily    docusate sodium  100 mg Oral Daily    polyethylene glycol  17 g Oral Daily    amLODIPine  5 mg Oral Daily    cetirizine  5 mg Oral Daily    lisinopril  40 mg Oral Daily    metoprolol succinate  25 mg Oral Daily    PreserVision AREDS 2  1 tablet Oral Daily    pantoprazole  40 mg Oral QAM AC    torsemide  20 mg Oral Daily    Vitamin D  5,000 Units Oral Daily     Continuous Infusions:   lactated ringers 100 mL/hr at 08/04/21 0411     PRN Meds:cyclobenzaprine, ondansetron, morphine **OR** morphine, magnesium hydroxide, acetaminophen, oxyCODONE-acetaminophen, oxyCODONE-acetaminophen, albuterol, sodium chloride nebulizer, albuterol    Objective:  Labs:  CBC with Differential:    Lab Results   Component Value Date    WBC 7.6 08/04/2021    RBC 3.06 08/04/2021    HGB 9.4 08/04/2021    HCT 29.2 08/04/2021     08/04/2021    MCV 95.4 08/04/2021    MCH 30.7 08/04/2021    MCHC 32.2 08/04/2021    RDW 12.9 08/04/2021    LYMPHOPCT 13 08/04/2021    MONOPCT 9 08/04/2021    BASOPCT 0 08/04/2021    MONOSABS 0.68 08/04/2021    LYMPHSABS 0.98 08/04/2021    EOSABS <0.03 08/04/2021    BASOSABS <0.03 08/04/2021    DIFFTYPE NOT REPORTED 08/04/2021     BMP:    Lab Results   Component Value Date     08/04/2021    K 4.8 08/04/2021     08/04/2021    CO2 27 08/04/2021    BUN 37 08/04/2021    LABALBU 3.9 04/27/2021    CREATININE 1.20 08/04/2021    CALCIUM 8.3 08/04/2021    GFRAA 53 08/04/2021    LABGLOM 43 08/04/2021    GLUCOSE 131 08/04/2021           Physical Exam:  Vitals: BP (!) 96/50   Pulse 56   Temp 96.8 °F (36 °C) (Tympanic)   Resp 18   Ht 5' 3\" (1.6 m)   Wt 165 lb (74.8 kg)   SpO2 96%   BMI 29.23 kg/m²   24 hour intake/output:    Intake/Output Summary (Last 24 hours) at 8/4/2021 0943  Last data filed at 8/4/2021 0540  Gross per 24 hour   Intake 1888.09 ml   Output --   Net 1888.09 ml     Last 3 weights: Wt Readings from Last 3 Encounters:   08/04/21 165 lb (74.8 kg)   07/30/21 157 lb (71.2 kg)   07/06/21 157 lb (71.2 kg)     HEENT: Normocephalic and Atraumatic  Neck: Supple, No Masses, Tenderness, Nodularity and No Lymphadenopathy  Chest/Lungs: Clear to Auscultation without Rales, Rhonchi, or Wheezes  Cardiac: Regular Rate and Rhythm  GI/Abdomen: Bowel Sounds Present and Soft, Non-tender, without Guarding or Rebound Tenderness  : Not examined  EXT/Skin: sp left JEFF--CDI--, No Edema, No Cyanosis and No Clubbing  Neuro: but for gait-balance instability --JEFF---alert--- and No Localizing Signs/Symptoms      Assessment:    Principal Problem:    Status post total replacement of left hip  Active Problems:    Osteoarthritis of left hip  Resolved Problems:    * No resolved hospital problems. *    Joselyn Ballantine  80  WF  [FATUMA [place], Orthopedics;  DC Cardiology---[facility]]  FULL CODE     Graeme [place]    Anti-infectives: Ancef IV x 3 doses    POD ____   left JEFF----8.3.2021  Severe OA left hip    Hypertension             EKG--7.6.2021---NSR---66  Anemia---expected acute blood loss---surgery--8. 3.2021  Elevated fasting glucose  GERD  Overweight  Tobacco abuse---2.5 PPD--77,5 PYH---quit--1.1.1989  PMH:  osteopenia, chronic venous insufficiency, spider veins---symptomatic, hyponatremia,              tubular adenoma--colon, bullous pemphigoid,  Vitamin D deficiency  PSH:   left cataract---IOL---2018, colonoscopy---biopsy--2017,  abdominal exploration               surgery--ectopic pregnancy---BSO    Allergies: HCTZ--rash--itching      Plan:  1. To rehabilitation---8.4.2021  2. Medications reviewed  3. Follow up Dr. Derrek Newell, Orthopedics  4.   See orders    Electronically signed by Audra Delgadillo on 8/4/2021 at 9:43 AM    Hospitalist

## 2021-08-04 NOTE — PROGRESS NOTES
Physical Therapy  Facility/Department: [facility]  PROGRESSIVE CARE  Daily Treatment Note  NAME: Chance Thornton  : 1940  MRN: [id number]    Date of Service: 2021    Discharge Recommendations:  Home with assist PRN        Assessment   Body structures, Functions, Activity limitations: Decreased functional mobility ; Decreased balance  Activity Tolerance  Activity Tolerance: Patient Tolerated treatment well     Patient Diagnosis(es): The encounter diagnosis was Status post total replacement of left hip.     has a past medical history of Bullous pemphigoid, Elevated fasting glucose, Former smoker, GERD (gastroesophageal reflux disease), Hypertension, Osteopenia, and Overweight(278.02). has a past surgical history that includes Abdominal exploration surgery (); pr colonoscopy w/biopsy single/multiple (N/A, 2017); Cataract removal with implant (Left, 2018); Hysterectomy; and Total hip arthroplasty (Left, 8/3/2021).     Restrictions  Restrictions/Precautions  Restrictions/Precautions: Weight Bearing  Lower Extremity Weight Bearing Restrictions  Left Lower Extremity Weight Bearing: Weight Bearing As Tolerated  Position Activity Restriction  Hip Precautions: No hip flexion > 90 degrees  Subjective   Subjective  Subjective: Pateint in bedside chain upon arrival.  Pain Assessment  Pain Assessment: 0-10  Pain Level: 0  Pre Treatment Pain Screening  Pain at present: 0    Orientation  Orientation  Overall Orientation Status: Within Normal Limits  Cognition      Objective   Bed mobility  Bridging: Unable to assess  Rolling to Left: Unable to assess  Rolling to Right: Unable to assess  Supine to Sit: Unable to assess  Sit to Supine: Unable to assess  Scooting: Unable to assess  Transfers  Sit to Stand: Minimal Assistance;Contact guard assistance  Stand to sit: Contact guard assistance;Minimal Assistance  Stand Pivot Transfers: Minimal Assistance  Comment: Patient slow to perform transfers  Ambulation  Ambulation?: Yes  Ambulation 1  Surface: level tile  Device: Rolling Walker  Assistance: Contact guard assistance  Quality of Gait: slow larisa speed. Gait Deviations: Slow Larisa  Distance: 48' x2  Comments: Stated she had no pain with ambulation and it is way easier to ambulate now after surgery than before.                                  G-Code     OutComes Score                                                     AM-PAC Score             Goals  Short term goals  Time Frame for Short term goals: 1 day  Short term goal 1: Assess functional status  Long term goals  Time Frame for Long term goals : 3 days  Long term goal 1: Bed mobility SBA  Long term goal 2: Transfer SBA  Long term goal 3: Gait with RW 50f    Plan    Plan  Times per day: Twice a day  Current Treatment Recommendations: Strengthening, Transfer Training, Gait Training, Balance Training  Safety Devices  Type of devices: Left in chair, Nurse notified, Gait belt, Call light within reach     Therapy Time   Individual Concurrent Group Co-treatment   Time In 0159         Time Out 0209         Minutes [address], [place]

## 2021-08-04 NOTE — PLAN OF CARE
Problem: Falls - Risk of:  Goal: Will remain free from falls  Description: Will remain free from falls  Outcome: Ongoing  Goal: Absence of physical injury  Description: Absence of physical injury  Outcome: Ongoing     Problem: Discharge Planning:  Goal: Knowledge of discharge instructions  Description: Knowledge of discharge instructions  Outcome: Ongoing     Problem: Infection - Surgical Site:  Goal: Signs of wound healing will improve  Description: Signs of wound healing will improve  Outcome: Ongoing     Problem: Mobility - Impaired:  Goal: Achieve maximum mobility level  Description: Achieve maximum mobility level  Outcome: Ongoing     Problem: Pain - Acute:  Goal: Pain level will decrease  Description: Pain level will decrease  Outcome: Ongoing     Problem: Pain:  Goal: Pain level will decrease  Description: Pain level will decrease  Outcome: Ongoing  Goal: Control of acute pain  Description: Control of acute pain  Outcome: Ongoing  Goal: Control of chronic pain  Description: Control of chronic pain  Outcome: Ongoing

## 2021-08-04 NOTE — FLOWSHEET NOTE
rounding on PCU. Assessment: Patient is sitting up in bed and reports feeling very well. She is not experiencing pain that she expected. She is not presently connected to a Orthodoxy but hopes to attend with a friend when she is able to walk again. Intervention: Engaged in conversation.  prayed with patient. Patient expressed appreciation for visit and offer of continued prayer. Plan: Chaplains are available on site or on call 24/7 for spiritual and emotional support.

## 2021-08-04 NOTE — PROGRESS NOTES
Reason for Referral:  SW completed a Psychosocial Assessment for evaluation of patient's mental health, social status, and functional capacity within the community. Patient is a 68year old female admitted due to Status post total replacement of left hip. Patient discussed positive support from friends and family. Mental Status:  Alert, oriented, and engaging during assessment. Decision Making:  Makes own decisions. Family/Social/Home Environment:  Lives with her daughter Donna Shay in [facility] [place]. Current Services: [employer]  Current DMEs:  Walker, rollator, raised toilet seat, lift chair, shower chair, sock aid, and reacher. PCP: Eva Guerrero MD and repots no issues affording medication.  status:  None   ADLs and means of transportation: Independent in ADLs. Does not drive however, her daughter assists her with transportation. Food insecurity or needed financial assistance: Patient denies any food insecurity or financial concerns at this time. ACP and Code Status: Patient is a full code status and does not have an Advance Directive. SW discussed an Advance Directive which included the patient's choices for care and treatment in the case of a health event that adversely affects decision-making abilities. SW provided education and resources. Patient has no additional questions at this time and has agreed to contact SW should anything change. Collaborative List of SNF/ECF/HH were provided: Patient state she will be going to [facility] swing bed on discharge   Anticipated Needs/Discharge Plan:  Spoke with patient about discharge plan. Patient verbalizes understanding of the plan of care and denies discharge needs or further services at this time. SW provided business card. SW will continue to monitor needs and assist as appropriate.         Electronically signed by SHIRA Corona Mc on 8/4/2021 at 1:21 PM

## 2021-08-05 ENCOUNTER — TELEPHONE (OUTPATIENT)
Dept: FAMILY MEDICINE CLINIC | Age: 81
End: 2021-08-05

## 2021-08-05 NOTE — DISCHARGE SUMMARY
Orthopaedic Discharge Summary     Patient ID:  Meredith Cheema  [id number]  12 y.o.  1940    Admit date: 8/3/2021    Discharge date and time: 8/4/2021  4:38 PM     Admitting Physician: Kayla Amato MD     Discharge Physician: Kaiden Meade    Admission Diagnoses: Osteoarthritis of left hip, unspecified osteoarthritis type [M16.12]    Discharge Diagnoses: s/p L JEFF    Admission Condition: good    Discharged Condition: good    Indication for Admission: The patient is a 68y.o.-year-old with Left hip pain for quite some time. The patient has tried activity modification, pain medication without relief. Occasionally assistive devices such as rails are required to mobilize. NSAIDS have not been satisfactory. On physical exam there is limited ROM, pain, and crepitus. X-rays demonstrate bone on bone arthritis. Discussed the option of doing a total hip replacement and we have elected to proceed     Surgical procedure: L JEFF    Consults: IM        Disposition: Swing bed     Patient Instructions:   Discharge Medication List as of 8/4/2021  4:45 PM      START taking these medications    Details   HYDROcodone-acetaminophen (NORCO) 5-325 MG per tablet Take 1-2 tablets by mouth every 4-6 hours as needed for Pain for up to 7 days. , Disp-42 tablet, R-0Print      rivaroxaban (XARELTO) 10 MG TABS tablet Take 1 tablet by mouth every 24 hours, Disp-18 tablet, R-0Print         CONTINUE these medications which have NOT CHANGED    Details   metoprolol succinate (TOPROL XL) 25 MG extended release tablet Take 1 tablet by mouth once daily, Disp-90 tablet, R-0Normal      omeprazole (PRILOSEC) 20 MG delayed release capsule TAKE 1 CAPSULE BY MOUTH ONCE DAILY AS NEEDED FOR  REFLUX, Disp-90 capsule, R-0Normal      amLODIPine (NORVASC) 5 MG tablet Take 1 tablet by mouth once daily, Disp-90 tablet, R-0Normal      lisinopril (PRINIVIL;ZESTRIL) 40 MG tablet Take 1 tablet by mouth once daily, Disp-90 tablet, R-1Normal      cetirizine (ZYRTEC) 10 MG tablet Take 10 mg by mouth dailyHistorical Med      torsemide (DEMADEX) 20 MG tablet Take 1 tablet by mouth once daily, Disp-30 tablet, R-5Normal      Acetaminophen (TYLENOL ARTHRITIS PAIN PO) Take by mouth as neededHistorical Med      hydrOXYzine (ATARAX) 25 MG tablet TAKE ONE TABLET BY MOUTH THREE TIMES DAILY AS NEEDED FOR ITCHING, Disp-90 tablet,R-1Please consider 90 day supplies to promote better adherenceNormal      Krill Oil (MAXIMUM RED KRILL PO) Take by mouth dailyHistorical Med      Multiple Vitamins-Minerals (PRESERVISION AREDS 2 PO) Take by mouth dailyHistorical Med      albuterol sulfate  (90 BASE) MCG/ACT inhaler Inhale 2 puffs into the lungs every 4 hours as needed for Wheezing or Shortness of Breath, Disp-1 Inhaler, R-0      vitamin D-3 (CHOLECALCIFEROL) 5000 UNITS TABS Take 5,000 Units by mouth daily. Coenzyme Q10 (CO Q 10 PO) Take by mouth daily Indications: prn when remembers to take Historical Med           Activity: activity as tolerated  Diet: regular diet  Wound Care: keep wound clean and dry    Follow-up with Christy in 8 weeks.     Signed:  YANELY Weaver - CNP  8/5/2021  10:00 AM

## 2021-08-12 ENCOUNTER — TELEPHONE (OUTPATIENT)
Dept: FAMILY MEDICINE CLINIC | Age: 81
End: 2021-08-12

## 2021-08-12 NOTE — TELEPHONE ENCOUNTER
----- Message from [place] sent at 8/12/2021  8:50 AM EDT -----  Subject: Appointment Request    Reason for Call: Routine Hospital Follow Up    QUESTIONS  Type of Appointment? Established Patient  Reason for appointment request? Available appointments did not meet   patient need  Additional Information for Provider? Patient is being discharged today   from [facility] for a hip replacement. Needs a follow up first   available is 9/29. Please advise  ---------------------------------------------------------------------------  --------------  CALL BACK INFO  What is the best way for the office to contact you? OK to leave message on   voicemail  Preferred Call Back Phone Number? [phone]  ---------------------------------------------------------------------------  --------------  SCRIPT ANSWERS  Relationship to Patient? Third Party  Representative Name? Sangeeta Chandler  (Patient requests to see provider urgently. )? No  (Has the patient been discharged from the hospital within 2 business days   AND does not have a Telephone Encounter  Follow Up From [address] [place] [address]   documented in [address]?)? No  Do you have any questions for your primary care provider that need to be   answered prior to your appointment? (Use RN Triage if question pertains to   anything on the red flag list)? No  (Patient needs follow up visit after hospital discharge) Book first   available appointment within 7 days OF DISCHARGE, if no appt, proceed to   book the next available time slot within 14 days OF DISCHARGE AND Send   Message to Provider. [facility] Follow Up appointment cannot be booked   beyond 14 Days and should result in a Message to Provider. ? Yes   Have you been diagnosed with, awaiting test results for, or told that you   are suspected of having COVID-19 (Coronavirus)? (If patient has tested   negative or was tested as a requirement for work, school, or travel and   not based on symptoms, answer no)?  No  Do you currently have flu-like symptoms including fever or chills, cough,   shortness of breath, difficulty breathing, or new loss of taste or smell? No  Have you had close contact with someone with COVID-19 in the last 14 days? No  (Service Expert  click yes below to proceed with [employer]   Scheduling)?  Yes

## 2021-08-12 NOTE — TELEPHONE ENCOUNTER
[facility] Transitions Initial Follow Up Call    Outreach made within 2 business days of discharge: Yes    Patient: Edd Nazario   Patient : 1940   MRN: [id number]    Reason for Admission: Hip replacement   Discharge Date: 21       Spoke with: Deana Lira    Discharge department/facility: [facility]   TCM Interactive Patient Contact:  Was patient able to fill all prescriptions: Yes  Was patient instructed to bring all medications to the follow-up visit: Yes  Is patient taking all medications as directed in the discharge summary?  Yes  Does patient understand their discharge instructions: Yes  Does patient have questions or concerns that need addressed prior to 7-14 day follow up office visit: no    Scheduled appointment with PCP within 7-14 days    Follow Up  Future Appointments   Date Time Provider Jairon Kinsey   [facility]  2:[facility]   2021  9:30 AM Tia Eddy MD [facility]   10/6/2021  4:20 PM [facility]   2022  3:30 PM DO PATIENCE [facility]       Brady Butler LPN

## 2021-08-18 ENCOUNTER — OFFICE VISIT (OUTPATIENT)
Dept: FAMILY MEDICINE CLINIC | Age: 81
End: 2021-08-18
Payer: MEDICARE

## 2021-08-18 VITALS
RESPIRATION RATE: 16 BRPM | DIASTOLIC BLOOD PRESSURE: 52 MMHG | BODY MASS INDEX: 27.14 KG/M2 | TEMPERATURE: 98 F | WEIGHT: 153.2 LBS | HEART RATE: 68 BPM | HEIGHT: 63 IN | SYSTOLIC BLOOD PRESSURE: 110 MMHG

## 2021-08-18 DIAGNOSIS — I95.9 HYPOTENSION, UNSPECIFIED HYPOTENSION TYPE: ICD-10-CM

## 2021-08-18 DIAGNOSIS — D64.9 POSTOPERATIVE ANEMIA: ICD-10-CM

## 2021-08-18 DIAGNOSIS — R79.89 ELEVATED SERUM CREATININE: ICD-10-CM

## 2021-08-18 DIAGNOSIS — I10 ESSENTIAL HYPERTENSION: ICD-10-CM

## 2021-08-18 DIAGNOSIS — Z96.642 HISTORY OF TOTAL HIP ARTHROPLASTY, LEFT: Primary | ICD-10-CM

## 2021-08-18 PROCEDURE — 1111F DSCHRG MED/CURRENT MED MERGE: CPT | Performed by: FAMILY MEDICINE

## 2021-08-18 PROCEDURE — 99495 TRANSJ CARE MGMT MOD F2F 14D: CPT | Performed by: FAMILY MEDICINE

## 2021-08-18 RX ORDER — LISINOPRIL 20 MG/1
20 TABLET ORAL DAILY
Qty: 30 TABLET | Refills: 2 | Status: SHIPPED | OUTPATIENT
Start: 2021-08-18 | End: 2021-10-06 | Stop reason: SDUPTHER

## 2021-08-18 RX ORDER — ASPIRIN 325 MG
325 TABLET ORAL DAILY
COMMUNITY
End: 2021-10-06

## 2021-08-18 RX ORDER — HYDROCODONE BITARTRATE AND ACETAMINOPHEN 5; 325 MG/1; MG/1
2 TABLET ORAL EVERY 4 HOURS PRN
COMMUNITY
End: 2021-10-06

## 2021-08-18 ASSESSMENT — PATIENT HEALTH QUESTIONNAIRE - PHQ9
1. LITTLE INTEREST OR PLEASURE IN DOING THINGS: 0
SUM OF ALL RESPONSES TO PHQ QUESTIONS 1-9: 0
SUM OF ALL RESPONSES TO PHQ QUESTIONS 1-9: 0
SUM OF ALL RESPONSES TO PHQ9 QUESTIONS 1 & 2: 0
SUM OF ALL RESPONSES TO PHQ QUESTIONS 1-9: 0
2. FEELING DOWN, DEPRESSED OR HOPELESS: 0

## 2021-08-18 NOTE — PROGRESS NOTES
Transitional Care Office Visit    Date of Face-to-Face: 8/18/2021    Here for follow after hospitalization for:left hip replacement 08/03/2021 at [facility] [place], then transferred to [address] [place] [address] 08/04/2021 to 08/12/2021    Persons at visit: Daughter    Medication changes during hospitalization:  mg daily for 30 days,Norco 5-325 mg 2 tablets every 4- 6 hours as needed     Procedures during hospitalization: left hip replacement 08/03/2021 at [facility] [place]    Activity: as tolerated    Any medication changes since post-hospitalization phone call? No.    Any treatment changes since post-hospitalization phone call? Yes,physical therapy outpatient twice a week. .    Other follow-up appointments scheduled:  Dr Edgardo Barboza 09/28/2021

## 2021-08-18 NOTE — PROGRESS NOTES
While at Inpatient rehab her blood pressure was running low,so they held lisinopril,amlodipine and diuretic. When discharged she was advised to start her routine home medications.

## 2021-08-18 NOTE — PROGRESS NOTES
Post-Discharge Transitional Care Management Services or Hospital Follow Up      Jessica Puckett   YOB: 1940    Date of Office Visit:  8/18/2021  Date of Hospital Admission: 8/3/21  Date of Hospital Discharge: 8/4/21  Risk of hospital readmission (high >=14%. Medium >=10%) :Readmission Risk Score: 12      Care management risk score Rising risk (score 2-5) and Complex Care (Scores >=6): 0     Non face to face  following discharge, date last encounter closed (first attempt may have been earlier): 8/12/2021 11:51 AM    Call initiated 2 business days of discharge: Yes    Patient Active Problem List   Diagnosis    Essential hypertension    GERD (gastroesophageal reflux disease)    Elevated fasting glucose    Osteopenia    Overweight (BMI 25.0-29. 9)    Venous insufficiency (chronic) (peripheral)    Spider vein, symptomatic    Varicose vein    Hyponatremia, mild    Tubular adenoma of colon    Bullous pemphigoid    Vitamin D deficiency    Osteoarthritis of left hip    Status post total replacement of left hip       No Known Allergies    Medications listed as ordered at the time of discharge from hospital   [facility] Home Medication Instructions BKY:[id number]    Printed on:08/18/21 7378   Medication Information                      Acetaminophen (TYLENOL ARTHRITIS PAIN PO)  Take by mouth as needed             albuterol sulfate  (90 BASE) MCG/ACT inhaler  Inhale 2 puffs into the lungs every 4 hours as needed for Wheezing or Shortness of Breath             amLODIPine (NORVASC) 5 MG tablet  Take 1 tablet by mouth once daily             aspirin 325 MG tablet  Take 325 mg by mouth daily             cetirizine (ZYRTEC) 10 MG tablet  Take 10 mg by mouth daily             Coenzyme Q10 (CO Q 10 PO)  Take by mouth daily Indications: prn when remembers to take              HYDROcodone-acetaminophen (NORCO) 5-325 MG per tablet  Take 2 tablets by mouth every 4 hours as needed. hydrOXYzine (ATARAX) 25 MG tablet  TAKE ONE TABLET BY MOUTH THREE TIMES DAILY AS NEEDED FOR ITCHING             Krill Oil (MAXIMUM RED KRILL PO)  Take by mouth daily             lisinopril (PRINIVIL;ZESTRIL) 40 MG tablet  Take 1 tablet by mouth once daily             metoprolol succinate (TOPROL XL) 25 MG extended release tablet  Take 1 tablet by mouth once daily             Multiple Vitamins-Minerals (PRESERVISION AREDS 2 PO)  Take by mouth daily             omeprazole (PRILOSEC) 20 MG delayed release capsule  TAKE 1 CAPSULE BY MOUTH ONCE DAILY AS NEEDED FOR  REFLUX             torsemide (DEMADEX) 20 MG tablet  Take 1 tablet by mouth once daily             vitamin D-3 (CHOLECALCIFEROL) 5000 UNITS TABS  Take 5,000 Units by mouth daily. Medications marked \"taking\" at this time  Outpatient Medications Marked as Taking for the 8/18/21 encounter (Office Visit) with Mary Barker MD   Medication Sig Dispense Refill    HYDROcodone-acetaminophen (NORCO) 5-325 MG per tablet Take 2 tablets by mouth every 4 hours as needed.       aspirin 325 MG tablet Take 325 mg by mouth daily      metoprolol succinate (TOPROL XL) 25 MG extended release tablet Take 1 tablet by mouth once daily 90 tablet 0    omeprazole (PRILOSEC) 20 MG delayed release capsule TAKE 1 CAPSULE BY MOUTH ONCE DAILY AS NEEDED FOR  REFLUX 90 capsule 0    amLODIPine (NORVASC) 5 MG tablet Take 1 tablet by mouth once daily 90 tablet 0    lisinopril (PRINIVIL;ZESTRIL) 40 MG tablet Take 1 tablet by mouth once daily 90 tablet 1    cetirizine (ZYRTEC) 10 MG tablet Take 10 mg by mouth daily      torsemide (DEMADEX) 20 MG tablet Take 1 tablet by mouth once daily 30 tablet 5    Multiple Vitamins-Minerals (PRESERVISION AREDS 2 PO) Take by mouth daily      albuterol sulfate  (90 BASE) MCG/ACT inhaler Inhale 2 puffs into the lungs every 4 hours as needed for Wheezing or Shortness of Breath 1 Inhaler 0    vitamin D-3 (CHOLECALCIFEROL) 5000 UNITS TABS Take 5,000 Units by mouth daily.  Coenzyme Q10 (CO Q 10 PO) Take by mouth daily Indications: prn when remembers to take           Medications patient taking as of now reconciled against medications ordered at time of hospital discharge: Yes    Chief Complaint   Patient presents with   [address] from Hospital       History of Present illness - Follow up of Hospital diagnosis(es): left total hip arthroplasty     Inpatient course: Discharge summary reviewed- see chart. Interval history/Current status: She went from [facility] to [facility] for rehab. She was discharged to home on 8/12/2021. She states that she is doing well at home. She has been doing physical therapy at [facility]. She states that her antihypertensive medications were held while she was at [facility] due to low blood pressure readings. She re-started her medications when she went home, but she had some low blood pressure readings and dizzy spells. She was prescribed Xarelto, but she did not fill the prescription due to cost.  She was advised to take an Aspirin daily for 30 days. Vitals:    08/18/21 1424   BP: (!) 110/52   Site: Right Upper Arm   Position: Sitting   Cuff Size: Large Adult   Pulse: 68   Resp: 16   Temp: 98 °F (36.7 °C)   TempSrc: Skin   Weight: 153 lb 3.2 oz (69.5 kg)   Height: 5' 3\" (1.6 m)     Body mass index is 27.14 kg/m². Wt Readings from Last 3 Encounters:   08/18/21 153 lb 3.2 oz (69.5 kg)   08/04/21 165 lb (74.8 kg)   07/30/21 157 lb (71.2 kg)     BP Readings from Last 3 Encounters:   08/18/21 (!) 110/52   08/04/21 101/63   08/03/21 (!) 112/53        Physical Exam:  Well-nourished, well-developed overweight elderly female, in no acute distress. Neck is supple. There is no lymphadenopathy or thyromegaly. Chest is clear to auscultation bilaterally. Heart sounds are regular rate and rhythm without murmur. Lower extremities have trace edema bilaterally.     Labs done 8/4/2021 were reviewed with the patient:   Admission on 08/03/2021, Discharged on 08/04/2021   Component Date Value Ref Range Status    WBC 08/04/2021 7.6  3.5 - 11.3 k/uL Final    RBC 08/04/2021 3.06* 3.95 - 5.11 m/uL Final    Hemoglobin 08/04/2021 9.4* 11.9 - 15.1 g/dL Final    Hematocrit 08/04/2021 29.2* 36.3 - 47.1 % Final    MCV 08/04/2021 95.4  82.6 - 102.9 fL Final    MCH 08/04/2021 30.7  25.2 - 33.5 pg Final    MCHC 08/04/2021 32.2  25.2 - 33.5 g/dL Final    RDW 08/04/2021 12.9  11.8 - 14.4 % Final    Platelets 94/34/2602 205  138 - 453 k/uL Final    MPV 08/04/2021 10.5  8.1 - 13.5 fL Final    NRBC Automated 08/04/2021 0.0  0.0 per 100 WBC Final    Differential Type 08/04/2021 NOT REPORTED   Final    WBC Morphology 08/04/2021 NOT REPORTED   Final    RBC Morphology 08/04/2021 NOT REPORTED   Final    Platelet Estimate 35/53/6622 NOT REPORTED   Final    Seg Neutrophils 08/04/2021 78* 36 - 65 % Final    Lymphocytes 08/04/2021 13* 24 - 43 % Final    Monocytes 08/04/2021 9  3 - 12 % Final    Eosinophils % 08/04/2021 0* 1 - 4 % Final    Basophils 08/04/2021 0  0 - 2 % Final    Immature Granulocytes 08/04/2021 1* 0 % Final    Segs Absolute 08/04/2021 5.93  1.50 - 8.10 k/uL Final    Absolute Lymph # 08/04/2021 0.98* 1.10 - 3.70 k/uL Final    Absolute Mono # 08/04/2021 0.68  0.10 - 1.20 k/uL Final    Absolute Eos # 08/04/2021 <0.03  0.00 - 0.44 k/uL Final    Basophils Absolute 08/04/2021 <0.03  0.00 - 0.20 k/uL Final    Absolute Immature Granulocyte 08/04/2021 0.05  0.00 - 0.30 k/uL Final    Glucose 08/04/2021 131* 70 - 99 mg/dL Final    BUN 08/04/2021 37* 8 - 23 mg/dL Final    CREATININE 08/04/2021 1.20* 0.50 - 0.90 mg/dL Final    Bun/Cre Ratio 08/04/2021 31* 9 - 20 Final    Calcium 08/04/2021 8.3* 8.6 - 10.4 mg/dL Final    Sodium 08/04/2021 141  135 - 144 mmol/L Final    Potassium 08/04/2021 4.8  3.7 - 5.3 mmol/L Final    Chloride 08/04/2021 105  98 - 107 mmol/L Final    CO2 08/04/2021 27  20 - 31 mmol/L Final    Anion Gap 08/04/2021 9  9 - 17 mmol/L Final    GFR Non- 08/04/2021 43* >60 mL/min Final    GFR  08/04/2021 53* >60 mL/min Final    GFR Comment 08/04/2021        Final    Comment: Average GFR for 79or more years old:   76 mL/min/1.73sq m  Chronic Kidney Disease:   <60 mL/min/1.73sq m  Kidney failure:   <15 mL/min/1.73sq m              eGFR calculated using average adult body mass.  Additional eGFR calculator available at:        [employer]            GFR Staging 08/04/2021 NOT REPORTED   Final   Hospital Outpatient Visit on 08/02/2021   Component Date Value Ref Range Status    Expiration Date 08/02/2021 08/05/2021,2359   Final    Arm Band Number 08/02/2021 BE [id number]   Final    ABO/Rh 08/02/2021 O POSITIVE   Final    Antibody Screen 08/02/2021 NEGATIVE   Final    WBC 08/02/2021 7.0  3.5 - 11.3 k/uL Final    RBC 08/02/2021 3.94* 3.95 - 5.11 m/uL Final    Hemoglobin 08/02/2021 11.7* 11.9 - 15.1 g/dL Final    Hematocrit 08/02/2021 37.9  36.3 - 47.1 % Final    MCV 08/02/2021 96.2  82.6 - 102.9 fL Final    MCH 08/02/2021 29.7  25.2 - 33.5 pg Final    MCHC 08/02/2021 30.9  25.2 - 33.5 g/dL Final    RDW 08/02/2021 12.8  11.8 - 14.4 % Final    Platelets 27/87/1046 265  138 - 453 k/uL Final    MPV 08/02/2021 10.0  8.1 - 13.5 fL Final    NRBC Automated 08/02/2021 0.0  0.0 per 100 WBC Final    Differential Type 08/02/2021 NOT REPORTED   Final    Seg Neutrophils 08/02/2021 55  36 - 65 % Final    Lymphocytes 08/02/2021 31  24 - 43 % Final    Monocytes 08/02/2021 11  3 - 12 % Final    Eosinophils % 08/02/2021 3  1 - 4 % Final    Basophils 08/02/2021 0  0 - 2 % Final    Immature Granulocytes 08/02/2021 0  0 % Final    Segs Absolute 08/02/2021 3.82  1.50 - 8.10 k/uL Final    Absolute Lymph # 08/02/2021 2.16  1.10 - 3.70 k/uL Final    Absolute Mono # 08/02/2021 0.73  0.10 - 1.20 k/uL Final    Absolute Eos # 08/02/2021 0.20  0.00 - 0.44 k/uL Final    Basophils Absolute 08/02/2021 <0.03  0.00 - 0.20 k/uL Final    Absolute Immature Granulocyte 08/02/2021 0.03  0.00 - 0.30 k/uL Final    WBC Morphology 08/02/2021 NOT REPORTED   Final    RBC Morphology 08/02/2021 NOT REPORTED   Final    Platelet Estimate 64/25/8258 NOT REPORTED   Final   Hospital Outpatient Visit on 07/29/2021   Component Date Value Ref Range Status    SARS-CoV-2 07/29/2021 Not Detected  Not Detected Final    Comment:       The specimen is NEGATIVE for SARS-CoV-2, the novel coronavirus associated with COVID-19. A negative result does not rule out COVID-19. This test has been authorized by the FDA under an Emergency Use Authorization (EUA) for use   by authorized laboratories. [employer] SARS-CoV-2 Reagents for [employer] MAX System are designed to detect the virus that causes   COVID-19 in patients with signs and symptoms of infection who are suspected of COVID-19. An individual without symptoms of COVID-19 and who is not shedding SARS-CoV-2 virus would   expect to have a negative (not detected) result in this assay. Fact sheet for Healthcare Providers: Edin.jeane  Fact sheet for Patients: Edin.jeane        METHODOLOGY: RT-PCR      Source 07/29/2021 . NASOPHARYNGEAL SWAB   Final    SARS-CoV-2 07/29/2021        Final     Labs done at [address] [place] [address] 8/10/2021 were also reviewed with the patient:  8/12/2021  2:08 PM - System User, Default    Contains abnormal data CBC without diff  Order: [id number]  (suggestion)  Information displayed in this report will not trend and will not trigger automated decision support.     Ref Range & Units Value   White Blood Cells 4.0 - 11.0 X10E9/L 7.4        RBC count 3.80 - 5.20 X10E12/L 3.43Low         Hemoglobin 11.7 - 15.5 g/dL 10.2Low         Hematocrit 35 - 47 % 30.8Low         MCV 80.0 - 100.0 fL 90        MCH 27.0 - 34.0 pg 29.7        MCHC 32 - 36 g/dL 33.0        RDW 11.5 - 15.0 % 13.3        Platelets 002 - 513 Z49G8/R 248        MPV 7 - 12 fL 8.2        Resulting [facility]       8/12/2021  2:08 PM - System User, Default    Contains abnormal data Basic Metabolic Panel  Order: [id number]  (suggestion)  Information displayed in this report will not trend and will not trigger automated decision support. Ref Range & Units Value   Sodium 134 - 146 mmol/L 140        Potassium, Bld 3.5 - 5.0 mmol/L 3.6        Chloride 98 - 109 mmol/L 96Low         CO2 22 - 32 mmol/L 32        Anion gap 5 - 15 mmol/L 12        BUN 5 - 27 mg/dL 19        Creatinine 0.40 - 1.00 mg/dL 0.86    Comment: METHOD TRACEABLE TO IDMS STANDARD       Glucose 65 - 99 mg/dL 101High         Calcium 8.5 - 10.5 mg/dL 8.2Low         GFR MDRD Non Af Amer >59 ml/min/1.73sq.m >60        GFR MDRD Af Amer >59 ml/min/1.73sq.m >60        Resulting [facility]         Assessment and Plan:    1. History of total hip arthroplasty, left  She is participating in physical therapy. Her pain is controlled. - ME DISCHARGE MEDS RECONCILED W/ CURRENT OUTPATIENT MED LIST    2. Hypotension, unspecified hypotension type  3. Essential hypertension  As above, she has had symptomatic hypotension since re-starting all of her previous antihypertensive medications. She was advised to continue Toprol XL 25 mg daily, Amlodipine 5 mg daily, and Demadex 20 mg daily. She was advised to decrease her dose or Lisinopril from 40 mg daily to 20 mg daily:  - lisinopril (PRINIVIL;ZESTRIL) 20 MG tablet; Take 1 tablet by mouth daily  Dispense: 30 tablet; Refill: 2    - ME DISCHARGE MEDS RECONCILED W/ CURRENT OUTPATIENT MED LIST    4. Postoperative anemia  Her hemoglobin improved on her recent labs done at [address] 5. Elevated serum creatinine  The creatinine improved on the recent labs done at [address]         This document was produced, in part, using voice-recognition software. While efforts are made to avoid release of the document with an error, the software occasionally misinterprets dictation, which leads to errors that can alter the intended meaning. If such an error is found, please contact my office at [phone].      Medical Decision Making: moderate complexity

## 2021-08-26 ENCOUNTER — NURSE ONLY (OUTPATIENT)
Dept: ORTHOPEDIC SURGERY | Age: 81
End: 2021-08-26
Payer: MEDICARE

## 2021-08-26 VITALS
OXYGEN SATURATION: 98 % | DIASTOLIC BLOOD PRESSURE: 60 MMHG | BODY MASS INDEX: 27.11 KG/M2 | HEIGHT: 63 IN | WEIGHT: 153 LBS | HEART RATE: 70 BPM | SYSTOLIC BLOOD PRESSURE: 120 MMHG

## 2021-08-26 DIAGNOSIS — Z09 STATUS POST ORTHOPEDIC SURGERY, FOLLOW-UP EXAM: ICD-10-CM

## 2021-08-26 PROCEDURE — 99213 OFFICE O/P EST LOW 20 MIN: CPT

## 2021-08-26 RX ORDER — TORSEMIDE 20 MG/1
20 TABLET ORAL DAILY
COMMUNITY
End: 2021-10-06

## 2021-08-26 NOTE — PROGRESS NOTES
Patient was here for removal of surgical drsg to Lt hip from Left Total Hip on 8-3-21. Drsg was removed at this time. Tolerated procedure well. Edges were well approximated. No redness, drainage, warmth, or edema at this time. Patient states that she is still taking her pain medication due to therapy. Explained to patient and daughter that was in the room for sign of infection. Verbalized understanding. Patient has a follow up schedule on 9-28-21.

## 2021-09-13 DIAGNOSIS — I10 ESSENTIAL HYPERTENSION: ICD-10-CM

## 2021-09-13 NOTE — TELEPHONE ENCOUNTER
[facility] called requesting a refill of the below medication which has been pended for you:     Requested Prescriptions     Pending Prescriptions Disp Refills    amLODIPine (NORVASC) 5 MG tablet [Pharmacy Med Name: amLODIPine Besylate 5 MG Oral Tablet] 90 tablet 0     Sig: Take 1 tablet by mouth once daily       Last Appointment Date: 8/18/21  Next Appointment Date: 10/6/21    No Known Allergies

## 2021-09-14 ENCOUNTER — TELEPHONE (OUTPATIENT)
Dept: FAMILY MEDICINE CLINIC | Age: 81
End: 2021-09-14

## 2021-09-14 DIAGNOSIS — I10 ESSENTIAL HYPERTENSION: ICD-10-CM

## 2021-09-14 DIAGNOSIS — D64.9 ANEMIA, UNSPECIFIED TYPE: ICD-10-CM

## 2021-09-14 DIAGNOSIS — R73.01 ELEVATED FASTING GLUCOSE: ICD-10-CM

## 2021-09-14 DIAGNOSIS — E55.9 VITAMIN D DEFICIENCY: Primary | ICD-10-CM

## 2021-09-14 RX ORDER — AMLODIPINE BESYLATE 5 MG/1
TABLET ORAL
Qty: 90 TABLET | Refills: 0 | Status: SHIPPED | OUTPATIENT
Start: 2021-09-14 | End: 2021-10-06 | Stop reason: SDUPTHER

## 2021-09-14 NOTE — TELEPHONE ENCOUNTER
Labs were ordered:   Lipid, Fasting    Comprehensive Metabolic Panel    Hemoglobin A1C    Vitamin D 25 Hydroxy    CBC With Auto Differential

## 2021-09-14 NOTE — TELEPHONE ENCOUNTER
----- Message from [place] sent at 9/14/2021 11:39 AM EDT -----  Subject: Message to Provider    QUESTIONS  Information for Provider? Pt called stating she has a Follow Up   appointment in Oct. 2021 and stated she usually has Labs done prior to her   appointment. She stated that this time she did not receive Labs to have   done and her and her Daughter always go together to get the Labs and have   Office appointment at the same time to go together. She stated her   daughter was scheduled for Labs on 09-29-21 @ 7:30am and pt asked if she   was supposed to have Labs done, Can she get scheduled that date and time   also. Please call pt to let her know  ---------------------------------------------------------------------------  --------------  CALL BACK INFO  What is the best way for the office to contact you? OK to leave message on   voicemail  Preferred Call Back Phone Number? [phone]  ---------------------------------------------------------------------------  --------------  SCRIPT ANSWERS  Relationship to Patient?  Self

## 2021-09-28 ENCOUNTER — OFFICE VISIT (OUTPATIENT)
Dept: ORTHOPEDIC SURGERY | Age: 81
End: 2021-09-28
Payer: MEDICARE

## 2021-09-28 VITALS
BODY MASS INDEX: 27.11 KG/M2 | HEIGHT: 63 IN | SYSTOLIC BLOOD PRESSURE: 128 MMHG | WEIGHT: 153 LBS | DIASTOLIC BLOOD PRESSURE: 74 MMHG

## 2021-09-28 DIAGNOSIS — M16.11 PRIMARY OSTEOARTHRITIS OF RIGHT HIP: Primary | ICD-10-CM

## 2021-09-28 PROCEDURE — 99024 POSTOP FOLLOW-UP VISIT: CPT | Performed by: PHYSICIAN ASSISTANT

## 2021-09-28 PROCEDURE — 99215 OFFICE O/P EST HI 40 MIN: CPT | Performed by: PHYSICIAN ASSISTANT

## 2021-09-28 NOTE — PROGRESS NOTES
History and Physical        CHIEF COMPLAINT: Right hip primary osteoarthritis, status post left total hip arthroplasty 8/3/2021    HISTORY OF PRESENT ILLNESS:      The patient is a 68 y.o. female  who presents with history of severe bilateral hip osteoarthritis patient underwent left total hip replacement on 8/3/2021 she is doing well at this time she does not have any complaints of pain. She did not have any wound complications. She would like to proceed with undergoing a right total hip arthroplasty as she is having increasing difficulty getting up to seated position going up or down steps pain with physical therapy. Utilizes wheeled walker for mobilization. Past Medical History:    Past Medical History:   Diagnosis Date    Bullous pemphigoid     Elevated fasting glucose     Former smoker     quit in 1987    GERD (gastroesophageal reflux disease)     Hypertension     Osteopenia     took Boniva times 5 years, stopped 2012    Overweight(278.02)     wieght 174 pounds, height 65 inches, BMI 29, 3/31/2013       Past Surgical History:    Past Surgical History:   Procedure Laterality Date    ABDOMINAL EXPLORATION SURGERY  1972    ectopic pregnancy, bilateral salpingectomy    CATARACT REMOVAL WITH IMPLANT Left 07/24/2018    per Dr Mahin Lopez      partial    [place] COLONOSCOPY W/BIOPSY SINGLE/MULTIPLE N/A 7/26/2017    COLONOSCOPY WITH BIOPSY performed by Lexx Izquierdo MD at [address] [place] [address], one tubular adenoma on pathology, repeat recommended in 5 years    TOTAL HIP ARTHROPLASTY Left 8/3/2021    Left Total Hip Arthroplasty performed by Kayla Amato MD at [address] [place] [address]       Medications Prior to Admission:   Prior to Admission medications    Medication Sig Start Date End Date Taking?  Authorizing Provider   amLODIPine (NORVASC) 5 MG tablet Take 1 tablet by mouth once daily 9/14/21  Yes Lisa Atkinson MD   torsemide (DEMADEX) 20 MG tablet Take 20 mg by mouth daily   Yes Historical Provider, MD HYDROcodone-acetaminophen (NORCO) 5-325 MG per tablet Take 2 tablets by mouth every 4 hours as needed. Yes Historical Provider, MD   aspirin 325 MG tablet Take 325 mg by mouth daily   Yes Historical Provider, MD   lisinopril (PRINIVIL;ZESTRIL) 20 MG tablet Take 1 tablet by mouth daily 8/18/21  Yes Edi Reece MD   metoprolol succinate (TOPROL XL) 25 MG extended release tablet Take 1 tablet by mouth once daily 7/15/21  Yes Jevon Hicks MD   omeprazole (PRILOSEC) 20 MG delayed release capsule TAKE 1 CAPSULE BY MOUTH ONCE DAILY AS NEEDED FOR  REFLUX 7/15/21  Yes Jevon Hicks MD   cetirizine (ZYRTEC) 10 MG tablet Take 10 mg by mouth daily   Yes Historical Provider, MD   torsemide (DEMADEX) 20 MG tablet Take 1 tablet by mouth once daily 3/31/21  Yes Edi Reece MD   Acetaminophen (TYLENOL ARTHRITIS PAIN PO) Take by mouth as needed    Yes Historical Provider, MD   hydrOXYzine (ATARAX) 25 MG tablet TAKE ONE TABLET BY MOUTH THREE TIMES DAILY AS NEEDED FOR ITCHING 8/19/20  Yes Edi Reece MD   [place] Ip Oil (MAXIMUM RED KRILL PO) Take by mouth daily    Yes Historical Provider, MD   Multiple Vitamins-Minerals (PRESERVISION AREDS 2 PO) Take by mouth daily   Yes Historical Provider, MD   albuterol sulfate  (90 BASE) MCG/ACT inhaler Inhale 2 puffs into the lungs every 4 hours as needed for Wheezing or Shortness of Breath 11/15/16  Yes Edi Reece MD   vitamin D-3 (CHOLECALCIFEROL) 5000 UNITS TABS Take 5,000 Units by mouth daily. Yes Historical Provider, MD   Coenzyme Q10 (CO Q 10 PO) Take by mouth daily Indications: prn when remembers to take    Yes Historical Provider, MD    Scheduled Meds:  Continuous Infusions:  PRN Meds:. Allergies:  Patient has no known allergies.     Social History:   Social History     Socioeconomic History    Marital status: Single     Spouse name: None    Number of children: None    Years of education: None    Highest education level: None   Occupational History    None Tobacco Use    Smoking status: Former Smoker     Packs/day: 2.50     Years: 31.00     Pack years: 77.50     Types: Cigarettes     Start date:      Quit date:      Years since quittin.7    Smokeless tobacco: Never Used   Vaping Use    Vaping Use: Never used   Substance and Sexual Activity    Alcohol use: No     Alcohol/week: 0.0 standard drinks    Drug use: No    Sexual activity: None   Other Topics Concern    None   Social History Narrative    None     Social Determinants of Health     Financial Resource Strain: Low Risk     Difficulty of Paying Living Expenses: Not hard at all   Food Insecurity: No Food Insecurity    Worried About Running Out of Food in the Last Year: Never true    [place] of Food in the Last Year: Never true   Transportation Needs:     Lack of Transportation (Medical):      Lack of Transportation (Non-Medical):    Physical Activity:     Days of Exercise per Week:     Minutes of Exercise per Session:    Stress:     Feeling of Stress :    Social Connections:     Frequency of Communication with Friends and Family:     Frequency of Social Gatherings with Friends and Family:     Attends [employer] Services:     Active Member of Clubs or Organizations:     Attends Club or Organization Meetings:     Marital Status:    Intimate Partner Violence:     Fear of Current or Ex-Partner:     Emotionally Abused:     Physically Abused:     Sexually Abused:      Social History     Tobacco Use   Smoking Status Former Smoker    Packs/day: 2.50    Years: 31.00    Pack years: 77.50    Types: Cigarettes    Start date:     Quit date:     Years since quittin.7   Smokeless Tobacco Never Used     Social History     Substance and Sexual Activity   Alcohol Use No    Alcohol/week: 0.0 standard drinks     Social History     Substance and Sexual Activity   Drug Use No       Family History:  Family History   Problem Relation Age of Onset    Cancer Mother         unknown type of cancer    Diabetes Mother     Diabetes Brother     Diabetes Brother     Heart Disease Father         CHF         REVIEW OF SYSTEMS:  Gen: Negative for nausea, vomiting, diarrhea, fever, chills, night sweats  Heart: Negative for HTN, palpitations, chest pain  Lungs: Negative for wheezes, asthma or SOB  GI: Negative for nausea, vomiting  Endo: Negative for diabetes  Heme: Negative for DVT       PHYSICAL EXAM:  [unfilled]  Gen: alert and oriented  Head: normorcephalic, atraumatic  Neck: supple  Heart: RRR  Lungs: No audible wheezes  Abdomen: soft  Pelvis: stable  Extremity right hip significant pain with internal or external rotation. Left hip denies any pain with any type of range of motion of this left hip. DATA:  CBC:   Lab Results   Component Value Date    WBC 7.6 08/04/2021    HGB 9.4 08/04/2021     08/04/2021     BMP:    Lab Results   Component Value Date     08/04/2021    K 4.8 08/04/2021     08/04/2021    CO2 27 08/04/2021    BUN 37 08/04/2021    CREATININE 1.20 08/04/2021    CALCIUM 8.3 08/04/2021    GLUCOSE 131 08/04/2021     PT/INR:  No results found for: PROTIME, INR  Troponin:  No results found for: [address]    Radiology: Severe osteoarthritis with collapse right hip. Left total hip replacement in satisfactory alignment    ASSESSMENT:Active Problems:    * No active hospital problems. *  Resolved Problems:    * No resolved hospital problems. *       PLAN:  1) as discussed with Dr. Sheldon Sánchez would like to proceed with right total hip arthroplasty risk and benefits were discussed with patient patient would like to proceed at this time.         Lani Ritchie PA-C

## 2021-09-29 ENCOUNTER — HOSPITAL ENCOUNTER (OUTPATIENT)
Dept: LAB | Age: 81
Discharge: HOME | End: 2021-09-29
Payer: MEDICARE

## 2021-09-29 DIAGNOSIS — D64.9 ANEMIA, UNSPECIFIED TYPE: ICD-10-CM

## 2021-09-29 DIAGNOSIS — I10 ESSENTIAL HYPERTENSION: ICD-10-CM

## 2021-09-29 DIAGNOSIS — E55.9 VITAMIN D DEFICIENCY: ICD-10-CM

## 2021-09-29 DIAGNOSIS — R73.01 ELEVATED FASTING GLUCOSE: ICD-10-CM

## 2021-09-29 LAB
ABSOLUTE EOS #: 0.32 K/UL (ref 0–0.44)
ABSOLUTE IMMATURE GRANULOCYTE: 0.04 K/UL (ref 0–0.3)
ABSOLUTE LYMPH #: 2.97 K/UL (ref 1.1–3.7)
ABSOLUTE MONO #: 0.75 K/UL (ref 0.1–1.2)
ALBUMIN SERPL-MCNC: 4.1 G/DL (ref 3.5–5.2)
ALBUMIN/GLOBULIN RATIO: 1.4 (ref 1–2.5)
ALP BLD-CCNC: 100 U/L (ref 35–104)
ALT SERPL-CCNC: 9 U/L (ref 5–33)
ANION GAP SERPL CALCULATED.3IONS-SCNC: 13 MMOL/L (ref 9–17)
AST SERPL-CCNC: 16 U/L
BASOPHILS # BLD: 1 % (ref 0–2)
BASOPHILS ABSOLUTE: 0.04 K/UL (ref 0–0.2)
BILIRUB SERPL-MCNC: 0.25 MG/DL (ref 0.3–1.2)
BUN BLDV-MCNC: 33 MG/DL (ref 8–23)
BUN/CREAT BLD: 34 (ref 9–20)
CALCIUM SERPL-MCNC: 9.2 MG/DL (ref 8.6–10.4)
CHLORIDE BLD-SCNC: 101 MMOL/L (ref 98–107)
CHOLESTEROL, FASTING: 178 MG/DL
CHOLESTEROL/HDL RATIO: 3.1
CO2: 26 MMOL/L (ref 20–31)
CREAT SERPL-MCNC: 0.96 MG/DL (ref 0.5–0.9)
DIFFERENTIAL TYPE: ABNORMAL
EOSINOPHILS RELATIVE PERCENT: 4 % (ref 1–4)
ESTIMATED AVERAGE GLUCOSE: 111 MG/DL
GFR AFRICAN AMERICAN: >60 ML/MIN
GFR NON-AFRICAN AMERICAN: 56 ML/MIN
GFR SERPL CREATININE-BSD FRML MDRD: ABNORMAL ML/MIN/{1.73_M2}
GFR SERPL CREATININE-BSD FRML MDRD: ABNORMAL ML/MIN/{1.73_M2}
GLUCOSE BLD-MCNC: 99 MG/DL (ref 70–99)
HBA1C MFR BLD: 5.5 % (ref 4–6)
HCT VFR BLD CALC: 38.5 % (ref 36.3–47.1)
HDLC SERPL-MCNC: 57 MG/DL
HEMOGLOBIN: 11.9 G/DL (ref 11.9–15.1)
IMMATURE GRANULOCYTES: 1 %
LDL CHOLESTEROL: 104 MG/DL (ref 0–130)
LYMPHOCYTES # BLD: 37 % (ref 24–43)
MCH RBC QN AUTO: 29 PG (ref 25.2–33.5)
MCHC RBC AUTO-ENTMCNC: 30.9 G/DL (ref 25.2–33.5)
MCV RBC AUTO: 93.7 FL (ref 82.6–102.9)
MONOCYTES # BLD: 9 % (ref 3–12)
NRBC AUTOMATED: 0 PER 100 WBC
PDW BLD-RTO: 12.9 % (ref 11.8–14.4)
PLATELET # BLD: 338 K/UL (ref 138–453)
PLATELET ESTIMATE: ABNORMAL
PMV BLD AUTO: 9.8 FL (ref 8.1–13.5)
POTASSIUM SERPL-SCNC: 4.2 MMOL/L (ref 3.7–5.3)
RBC # BLD: 4.11 M/UL (ref 3.95–5.11)
RBC # BLD: ABNORMAL 10*6/UL
SEG NEUTROPHILS: 48 % (ref 36–65)
SEGMENTED NEUTROPHILS ABSOLUTE COUNT: 3.99 K/UL (ref 1.5–8.1)
SODIUM BLD-SCNC: 140 MMOL/L (ref 135–144)
TOTAL PROTEIN: 7.1 G/DL (ref 6.4–8.3)
TRIGLYCERIDE, FASTING: 86 MG/DL
VITAMIN D 25-HYDROXY: 44.6 NG/ML (ref 30–100)
VLDLC SERPL CALC-MCNC: NORMAL MG/DL (ref 1–30)
WBC # BLD: 8.1 K/UL (ref 3.5–11.3)
WBC # BLD: ABNORMAL 10*3/UL

## 2021-09-29 PROCEDURE — 36415 COLL VENOUS BLD VENIPUNCTURE: CPT

## 2021-09-29 PROCEDURE — 80053 COMPREHEN METABOLIC PANEL: CPT

## 2021-09-29 PROCEDURE — 82306 VITAMIN D 25 HYDROXY: CPT

## 2021-09-29 PROCEDURE — 80061 LIPID PANEL: CPT

## 2021-09-29 PROCEDURE — 83036 HEMOGLOBIN GLYCOSYLATED A1C: CPT

## 2021-09-29 PROCEDURE — 85025 COMPLETE CBC W/AUTO DIFF WBC: CPT

## 2021-10-06 ENCOUNTER — OFFICE VISIT (OUTPATIENT)
Dept: FAMILY MEDICINE CLINIC | Age: 81
End: 2021-10-06
Payer: MEDICARE

## 2021-10-06 VITALS
DIASTOLIC BLOOD PRESSURE: 86 MMHG | WEIGHT: 151 LBS | SYSTOLIC BLOOD PRESSURE: 130 MMHG | TEMPERATURE: 95.8 F | HEIGHT: 63 IN | HEART RATE: 74 BPM | RESPIRATION RATE: 16 BRPM | BODY MASS INDEX: 26.75 KG/M2

## 2021-10-06 DIAGNOSIS — Z01.818 PRE-OP TESTING: ICD-10-CM

## 2021-10-06 DIAGNOSIS — L60.8 DISCOLORATION AND THICKENING OF NAILS BOTH FEET: ICD-10-CM

## 2021-10-06 DIAGNOSIS — K21.9 GASTROESOPHAGEAL REFLUX DISEASE, UNSPECIFIED WHETHER ESOPHAGITIS PRESENT: ICD-10-CM

## 2021-10-06 DIAGNOSIS — R60.0 BILATERAL LOWER EXTREMITY EDEMA: ICD-10-CM

## 2021-10-06 DIAGNOSIS — Z12.31 SCREENING MAMMOGRAM FOR BREAST CANCER: ICD-10-CM

## 2021-10-06 DIAGNOSIS — R73.01 ELEVATED FASTING GLUCOSE: ICD-10-CM

## 2021-10-06 DIAGNOSIS — Z23 NEED FOR INFLUENZA VACCINATION: ICD-10-CM

## 2021-10-06 DIAGNOSIS — Z01.818 PRE-OP EVALUATION: Primary | ICD-10-CM

## 2021-10-06 DIAGNOSIS — I10 ESSENTIAL HYPERTENSION: ICD-10-CM

## 2021-10-06 PROCEDURE — 1090F PRES/ABSN URINE INCON ASSESS: CPT | Performed by: FAMILY MEDICINE

## 2021-10-06 PROCEDURE — 93005 ELECTROCARDIOGRAM TRACING: CPT | Performed by: FAMILY MEDICINE

## 2021-10-06 PROCEDURE — 1123F ACP DISCUSS/DSCN MKR DOCD: CPT | Performed by: FAMILY MEDICINE

## 2021-10-06 PROCEDURE — G8484 FLU IMMUNIZE NO ADMIN: HCPCS | Performed by: FAMILY MEDICINE

## 2021-10-06 PROCEDURE — 99214 OFFICE O/P EST MOD 30 MIN: CPT | Performed by: FAMILY MEDICINE

## 2021-10-06 PROCEDURE — 99212 OFFICE O/P EST SF 10 MIN: CPT | Performed by: FAMILY MEDICINE

## 2021-10-06 PROCEDURE — 93010 ELECTROCARDIOGRAM REPORT: CPT | Performed by: FAMILY MEDICINE

## 2021-10-06 PROCEDURE — G8399 PT W/DXA RESULTS DOCUMENT: HCPCS | Performed by: FAMILY MEDICINE

## 2021-10-06 PROCEDURE — 4040F PNEUMOC VAC/ADMIN/RCVD: CPT | Performed by: FAMILY MEDICINE

## 2021-10-06 PROCEDURE — G8417 CALC BMI ABV UP PARAM F/U: HCPCS | Performed by: FAMILY MEDICINE

## 2021-10-06 PROCEDURE — G0008 ADMIN INFLUENZA VIRUS VAC: HCPCS | Performed by: FAMILY MEDICINE

## 2021-10-06 PROCEDURE — 1036F TOBACCO NON-USER: CPT | Performed by: FAMILY MEDICINE

## 2021-10-06 PROCEDURE — G8427 DOCREV CUR MEDS BY ELIG CLIN: HCPCS | Performed by: FAMILY MEDICINE

## 2021-10-06 PROCEDURE — PBSHW INFLUENZA, QUADV, ADJUVANTED, 65 YRS +, IM, PF, PREFILL SYR, 0.5ML (FLUAD): Performed by: FAMILY MEDICINE

## 2021-10-06 RX ORDER — TORSEMIDE 20 MG/1
TABLET ORAL
Qty: 90 TABLET | Refills: 1 | Status: ON HOLD | OUTPATIENT
Start: 2021-10-06 | End: 2021-10-27 | Stop reason: SDUPTHER

## 2021-10-06 RX ORDER — METOPROLOL SUCCINATE 25 MG/1
TABLET, EXTENDED RELEASE ORAL
Qty: 90 TABLET | Refills: 1 | Status: ON HOLD | OUTPATIENT
Start: 2021-10-06 | End: 2021-10-27 | Stop reason: SDUPTHER

## 2021-10-06 RX ORDER — OMEPRAZOLE 20 MG/1
CAPSULE, DELAYED RELEASE ORAL
Qty: 90 CAPSULE | Refills: 1 | Status: SHIPPED | OUTPATIENT
Start: 2021-10-06 | End: 2022-04-06 | Stop reason: SDUPTHER

## 2021-10-06 RX ORDER — LISINOPRIL 20 MG/1
20 TABLET ORAL DAILY
Qty: 90 TABLET | Refills: 1 | Status: ON HOLD | OUTPATIENT
Start: 2021-10-06 | End: 2021-10-27 | Stop reason: SDUPTHER

## 2021-10-06 RX ORDER — AMLODIPINE BESYLATE 5 MG/1
TABLET ORAL
Qty: 90 TABLET | Refills: 1 | Status: ON HOLD | OUTPATIENT
Start: 2021-10-06 | End: 2021-10-27 | Stop reason: SDUPTHER

## 2021-10-06 ASSESSMENT — PATIENT HEALTH QUESTIONNAIRE - PHQ9
1. LITTLE INTEREST OR PLEASURE IN DOING THINGS: 0
2. FEELING DOWN, DEPRESSED OR HOPELESS: 0
SUM OF ALL RESPONSES TO PHQ QUESTIONS 1-9: 0
SUM OF ALL RESPONSES TO PHQ9 QUESTIONS 1 & 2: 0
SUM OF ALL RESPONSES TO PHQ QUESTIONS 1-9: 0
SUM OF ALL RESPONSES TO PHQ QUESTIONS 1-9: 0

## 2021-10-06 ASSESSMENT — ENCOUNTER SYMPTOMS
SINUS PAIN: 0
ABDOMINAL PAIN: 0
NAUSEA: 0
VOMITING: 0
EYE PAIN: 0
COUGH: 0
EYE ITCHING: 0
SINUS PRESSURE: 0
EYE DISCHARGE: 0
SHORTNESS OF BREATH: 0

## 2021-10-06 NOTE — PROGRESS NOTES
[address] [place], [address]                        Telephone [phone]             Fax [phone]      Ellie Prasad  1940  MRN:  [id number]  Date of visit:  10/6/2021     Subjective:    Ellie Prasad is a 68 y.o. female who presents to [facility] today (10/6/2021) for a pre-operative evaluation. She had an appointment scheduled today for follow up of hypertension, elevated glucose, and edema. She would also like a pre-operative evaluation today. She is planning to have right total hip arthroplasty with Dr. Venancio Montgomery. She does not have a date scheduled. She states that she has generally been feeling well. She is tolerating her medications well. She denies chest pain or shortness of breath with activity or at rest.  She denies issues with anesthesia or with wound healing with any of her previous surgeries. She is concerned about toenail fungus. She took an over the counter supplement for fungus. She stopped it after she developed diarrhea. She has the following problem list:  Patient Active Problem List   Diagnosis    Essential hypertension    GERD (gastroesophageal reflux disease)    Elevated fasting glucose    Osteopenia    Overweight (BMI 25.0-29. 9)    Venous insufficiency (chronic) (peripheral)    Spider vein, symptomatic    Varicose vein    Hyponatremia, mild    Tubular adenoma of colon    Bullous pemphigoid    Vitamin D deficiency    Osteoarthritis of left hip    Status post total replacement of left hip        Past Surgical History:   Procedure Laterality Date    ABDOMINAL EXPLORATION SURGERY  1972    ectopic pregnancy, bilateral salpingectomy    CATARACT REMOVAL WITH IMPLANT Left 07/24/2018    per Dr Christie Memory      partial    [place] COLONOSCOPY W/BIOPSY SINGLE/MULTIPLE N/A 7/26/2017    COLONOSCOPY WITH BIOPSY performed by Phani Longoria MD at [address] [place] [address], one tubular adenoma on pathology, repeat recommended in 5 years    TOTAL HIP ARTHROPLASTY Left 8/3/2021    Left Total Hip Arthroplasty performed by Grace Graves MD at [address] [place] [address]       Current Outpatient Medications   Medication Sig Dispense Refill    amLODIPine (NORVASC) 5 MG tablet Take 1 tablet by mouth once daily 90 tablet 0    lisinopril (PRINIVIL;ZESTRIL) 20 MG tablet Take 1 tablet by mouth daily 30 tablet 2    metoprolol succinate (TOPROL XL) 25 MG extended release tablet Take 1 tablet by mouth once daily 90 tablet 0    omeprazole (PRILOSEC) 20 MG delayed release capsule TAKE 1 CAPSULE BY MOUTH ONCE DAILY AS NEEDED FOR  REFLUX 90 capsule 0    cetirizine (ZYRTEC) 10 MG tablet Take 10 mg by mouth daily      torsemide (DEMADEX) 20 MG tablet Take 1 tablet by mouth once daily 30 tablet 5    Acetaminophen (TYLENOL ARTHRITIS PAIN PO) Take by mouth as needed       Multiple Vitamins-Minerals (PRESERVISION AREDS 2 PO) Take by mouth daily      albuterol sulfate  (90 BASE) MCG/ACT inhaler Inhale 2 puffs into the lungs every 4 hours as needed for Wheezing or Shortness of Breath 1 Inhaler 0    vitamin D-3 (CHOLECALCIFEROL) 5000 UNITS TABS Take 5,000 Units by mouth daily. No current facility-administered medications for this visit. She has No Known Allergies. She  reports that she quit smoking about 32 years ago. Her smoking use included cigarettes. She started smoking about 63 years ago. She has a 77.50 pack-year smoking history. She has never used smokeless tobacco..    Review of Systems:  Review of Systems   Constitutional: Negative for chills and fever. HENT: Negative for nosebleeds, sinus pressure and sinus pain. Eyes: Negative for pain, discharge and itching. Respiratory: Negative for cough and shortness of breath. Cardiovascular: Negative for chest pain and palpitations.    Gastrointestinal: Negative for abdominal pain, nausea and vomiting. Endocrine: Negative for cold intolerance and heat intolerance. Genitourinary: Negative for dysuria and hematuria. Musculoskeletal: Positive for arthralgias and gait problem. Skin: Negative for rash and wound. Neurological: Negative for dizziness and headaches. Hematological: Negative for adenopathy. Does not bruise/bleed easily. Psychiatric/Behavioral: Negative for sleep disturbance. The patient is not nervous/anxious. Objective:    Vitals:    10/06/21 1628   BP: 130/86   Pulse: 74   Resp: 16   Temp: 95.8 °F (35.4 °C)     Body mass index is 26.75 kg/m². Overweight elderly female  healthy-appearing, alert, cooperative and in no acute distress. Neck is supple with no lymphadenopathy. Chest is clear to auscultation, no wheezes, rales, or rhonchi. Heart sounds are regular rate and rhythm, no murmurs  Abdomen soft, non-tender. No masses or organomegaly. Lower extremities have no edema. There are thickened toenails on the feet bilaterally. Results of the ECG done today were reviewed:  Sinus rhythm, rate 63, low voltage--possible pulmonary disease.     Results of the labs done 9/29/2021 were reviewed with the patient:  Hospital Outpatient Visit on 09/29/2021   Component Date Value Ref Range Status    WBC 09/29/2021 8.1  3.5 - 11.3 k/uL Final    RBC 09/29/2021 4.11  3.95 - 5.11 m/uL Final    Hemoglobin 09/29/2021 11.9  11.9 - 15.1 g/dL Final    Hematocrit 09/29/2021 38.5  36.3 - 47.1 % Final    MCV 09/29/2021 93.7  82.6 - 102.9 fL Final    MCH 09/29/2021 29.0  25.2 - 33.5 pg Final    MCHC 09/29/2021 30.9  25.2 - 33.5 g/dL Final    RDW 09/29/2021 12.9  11.8 - 14.4 % Final    Platelets 51/03/6595 338  138 - 453 k/uL Final    MPV 09/29/2021 9.8  8.1 - 13.5 fL Final    NRBC Automated 09/29/2021 0.0  0.0 per 100 WBC Final    Differential Type 09/29/2021 NOT REPORTED   Final    Seg Neutrophils 09/29/2021 48  36 - 65 % Final    Lymphocytes 09/29/2021 37  24 - 43 % Final    Monocytes 09/29/2021 9  3 - 12 % Final    Eosinophils % 09/29/2021 4  1 - 4 % Final    Basophils 09/29/2021 1  0 - 2 % Final    Immature Granulocytes 09/29/2021 1* 0 % Final    Segs Absolute 09/29/2021 3.99  1.50 - 8.10 k/uL Final    Absolute Lymph # 09/29/2021 2.97  1.10 - 3.70 k/uL Final    Absolute Mono # 09/29/2021 0.75  0.10 - 1.20 k/uL Final    Absolute Eos # 09/29/2021 0.32  0.00 - 0.44 k/uL Final    Basophils Absolute 09/29/2021 0.04  0.00 - 0.20 k/uL Final    Absolute Immature Granulocyte 09/29/2021 0.04  0.00 - 0.30 k/uL Final    WBC Morphology 09/29/2021 NOT REPORTED   Final    RBC Morphology 09/29/2021 NOT REPORTED   Final    Platelet Estimate 30/11/3628 NOT REPORTED   Final    Vit D, 25-Hydroxy 09/29/2021 44.6  30.0 - 100.0 ng/mL Final    Comment:    Reference Range:  Vitamin D status         Range   Deficiency              <20 ng/mL   Mild Deficiency       20-30 ng/mL   Sufficiency           ng/mL   Toxicity               >100 ng/mL      Hemoglobin A1C 09/29/2021 5.5  4.0 - 6.0 % Final    Estimated Avg Glucose 09/29/2021 111  mg/dL Final    Comment: The ADA and AACC recommend providing the estimated average glucose result to permit better   patient understanding of their HBA1c result.       Glucose 09/29/2021 99  70 - 99 mg/dL Final    BUN 09/29/2021 33* 8 - 23 mg/dL Final    CREATININE 09/29/2021 0.96* 0.50 - 0.90 mg/dL Final    Bun/Cre Ratio 09/29/2021 34* 9 - 20 Final    Calcium 09/29/2021 9.2  8.6 - 10.4 mg/dL Final    Sodium 09/29/2021 140  135 - 144 mmol/L Final    Potassium 09/29/2021 4.2  3.7 - 5.3 mmol/L Final    Chloride 09/29/2021 101  98 - 107 mmol/L Final    CO2 09/29/2021 26  20 - 31 mmol/L Final    Anion Gap 09/29/2021 13  9 - 17 mmol/L Final    Alkaline Phosphatase 09/29/2021 100  35 - 104 U/L Final    ALT 09/29/2021 9  5 - 33 U/L Final    AST 09/29/2021 16  <32 U/L Final    Total Bilirubin 09/29/2021 0.25* 0.3 - 1.2 mg/dL Final    Total Protein 09/29/2021 7.1  6.4 - 8.3 g/dL Final    Albumin 09/29/2021 4.1  3.5 - 5.2 g/dL Final    Albumin/Globulin Ratio 09/29/2021 1.4  1.0 - 2.5 Final    GFR Non- 09/29/2021 56* >60 mL/min Final    GFR  09/29/2021 >60  >60 mL/min Final    GFR Comment 09/29/2021        Final    Comment: Average GFR for 79or more years old:   76 mL/min/1.73sq m  Chronic Kidney Disease:   <60 mL/min/1.73sq m  Kidney failure:   <15 mL/min/1.73sq m              eGFR calculated using average adult body mass. Additional eGFR calculator available at:        [employer]            GFR Staging 09/29/2021 NOT REPORTED   Final    Cholesterol, Fasting 09/29/2021 178  <200 mg/dL Final    Comment:    Cholesterol Guidelines:      <200  Desirable   200-240  Borderline      >240  Undesirable         HDL 09/29/2021 57  >40 mg/dL Final    Comment:    HDL Guidelines:    <40     Undesirable   40-59    Borderline    >59     Desirable         LDL Cholesterol 09/29/2021 104  0 - 130 mg/dL Final    Comment:    LDL Guidelines:     <100    Desirable   100-129   Near to/above Desirable   130-159   Borderline      >159   Undesirable     Direct (measured) LDL and calculated LDL are not interchangeable tests.  Chol/HDL Ratio 09/29/2021 3.1  <5 Final            Triglyceride, Fasting 09/29/2021 86  <150 mg/dL Final    Comment:    Triglyceride Guidelines:     <150   Desirable   150-199  Borderline   200-499  High     >499   Very high   Based on AHA Guidelines for fasting triglyceride, October 2012.  VLDL 09/29/2021 NOT REPORTED  1 - 30 mg/dL Final          Assessment and Plan:        1. Pre-op evaluation  Kaylee Patricia is a low-risk candidate for the planned surgery. 2. Essential hypertension  Her blood pressure is adequately-controlled today. (BP: 130/86)   She was advised to continue current medications.   Amlodipine, Lisinopril, and Metoprolol were refilled:   - amLODIPine (NORVASC) 5 MG tablet; Take 1 tablet by mouth once daily  Dispense: 90 tablet; Refill: 1  - lisinopril (PRINIVIL;ZESTRIL) 20 MG tablet; Take 1 tablet by mouth daily  Dispense: 90 tablet; Refill: 1  - metoprolol succinate (TOPROL XL) 25 MG extended release tablet; Take 1 tablet by mouth once daily  Dispense: 90 tablet; Refill: 1    Labs were ordered to be done prior to her return visit in 6 months:   - Lipid, Fasting; Future  - Comprehensive Metabolic Panel; Future    3. Elevated fasting glucose  Her fasting glucose and HgbA1c are both within normal limits on her recent lab work. I recommended that she avoid highly processed carbohydrates, begin a regular exercise program, and attempt to lose weight. Labs were ordered to be done prior to her return visit in 6 months:    - Comprehensive Metabolic Panel; Future  - Hemoglobin A1C; Future    4. Gastroesophageal reflux disease  She is doing well with her current dose of Omeprazole; this was refilled:  - omeprazole (PRILOSEC) 20 MG delayed release capsule; TAKE 1 CAPSULE BY MOUTH ONCE DAILY AS NEEDED FOR  REFLUX  Dispense: 90 capsule; Refill: 1    5. Bilateral lower extremity edema  She is doing well with her current dose of Demadex; this was refilled:  - torsemide (DEMADEX) 20 MG tablet; Take 1 tablet by mouth once daily  Dispense: 90 tablet; Refill: 1    6. Discoloration and thickening of nails both feet  Onychomycosis vs. other  A clipping of the nails was sent for fungal culture:  - Culture, Fungus, Dermatophytes; Future    She will be contacted when the results are available. 8.  Routine health maintenance  Health maintenance was reviewed with the patient. She has received two doses of the Pfizer Covid-19 vaccine. She received the second dose on 9/2/2021. She was advised that she is eligible for a third dose of the Pfizer Covid-19 vaccine 6 months after the second dose. Screening mammogram was recommended and ordered.    Annual influenza vaccine was recommended and ordered. Hepatitis C  screening was recommended and declined. Shingrix and Tdap were recommended and declined. All other health maintenance, including Pneumovax and Prevnar-13, is up to date at this time. This note was created using voice-recognition software, and may contain inaccuracies of transcription which were inadvertently overlooked prior to signature. For any questions, please contact me.

## 2021-10-07 DIAGNOSIS — Z01.818 PREOPERATIVE EXAMINATION: Primary | ICD-10-CM

## 2021-10-07 NOTE — PROGRESS NOTES
Have you had an allergic reaction to the flu (influenza) shot? no  Are you allergic to eggs or any component of the flu vaccine? no  Do you have a history of Guillain-[place] Syndrome (GBS), which is paralysis after receiving the flu vaccine? no  Are you feeling well today? yes  Flu vaccine given as ordered. Patient tolerated it well. No questions re: VIS information.

## 2021-10-13 ENCOUNTER — TELEPHONE (OUTPATIENT)
Dept: FAMILY MEDICINE CLINIC | Age: 81
End: 2021-10-13

## 2021-10-13 ENCOUNTER — HOSPITAL ENCOUNTER (OUTPATIENT)
Dept: LAB | Age: 81
Discharge: HOME | End: 2021-10-13
Payer: MEDICARE

## 2021-10-13 ENCOUNTER — HOSPITAL ENCOUNTER (OUTPATIENT)
Age: 81
Setting detail: SPECIMEN
Discharge: HOME | End: 2021-10-13
Payer: MEDICARE

## 2021-10-13 ENCOUNTER — TELEPHONE (OUTPATIENT)
Dept: ORTHOPEDIC SURGERY | Age: 81
End: 2021-10-13

## 2021-10-13 ENCOUNTER — NURSE ONLY (OUTPATIENT)
Dept: ORTHOPEDIC SURGERY | Age: 81
End: 2021-10-13
Payer: MEDICARE

## 2021-10-13 DIAGNOSIS — L60.8 DISCOLORATION AND THICKENING OF NAILS BOTH FEET: ICD-10-CM

## 2021-10-13 DIAGNOSIS — Z01.818 PREOPERATIVE EXAMINATION: ICD-10-CM

## 2021-10-13 LAB
-: NORMAL
AMORPHOUS: NORMAL
BACTERIA: NORMAL
BILIRUBIN URINE: NEGATIVE
CASTS UA: NORMAL /LPF (ref 0–2)
COLOR: NORMAL
COMMENT UA: NORMAL
CRYSTALS, UA: NORMAL /HPF
EPITHELIAL CELLS UA: NORMAL /HPF (ref 0–5)
GLUCOSE URINE: NEGATIVE
KETONES, URINE: NEGATIVE
LEUKOCYTE ESTERASE, URINE: NEGATIVE
MUCUS: NORMAL
NITRITE, URINE: NEGATIVE
OTHER OBSERVATIONS UA: NORMAL
PH UA: 5.5 (ref 5–6)
PROTEIN UA: NEGATIVE
RBC UA: NORMAL /HPF (ref 0–4)
RENAL EPITHELIAL, UA: NORMAL /HPF
SPECIFIC GRAVITY UA: 1.02 (ref 1.01–1.02)
TRICHOMONAS: NORMAL
TURBIDITY: NORMAL
URINE HGB: NEGATIVE
UROBILINOGEN, URINE: NORMAL
WBC UA: NORMAL /HPF (ref 0–4)
YEAST: NORMAL

## 2021-10-13 PROCEDURE — 81001 URINALYSIS AUTO W/SCOPE: CPT

## 2021-10-13 PROCEDURE — 87107 FUNGI IDENTIFICATION MOLD: CPT

## 2021-10-13 PROCEDURE — 87101 SKIN FUNGI CULTURE: CPT

## 2021-10-13 PROCEDURE — 87641 MR-STAPH DNA AMP PROBE: CPT

## 2021-10-13 NOTE — TELEPHONE ENCOUNTER
[facility]    Pre-Operative Evaluation/Consultation    Name:  Eva Grey                                         Age:  68 y.o. MRN:  [id number]       :  1940   Date:  10/13/2021         Sex: female    There were no encounter diagnoses. Surgeon:  Dr. Wes Causey  Procedure (Planned):  Right JEFF  Date Scheduled surgery: 10/26/21    Attending : No att. providers found    Primary Physician: Nena Heranndez  Cardiologist: None    Type of Anesthesia Requested: General    Patient Medical history:  No Known Allergies  Patient Active Problem List   Diagnosis    Essential hypertension    GERD (gastroesophageal reflux disease)    Elevated fasting glucose    Osteopenia    Overweight (BMI 25.0-29. 9)    Venous insufficiency (chronic) (peripheral)    Spider vein, symptomatic    Varicose vein    Hyponatremia, mild    Tubular adenoma of colon    Bullous pemphigoid    Vitamin D deficiency    Osteoarthritis of left hip    Status post total replacement of left hip     Past Medical History:   Diagnosis Date    Bullous pemphigoid     Elevated fasting glucose     Former smoker     quit in     GERD (gastroesophageal reflux disease)     Hypertension     Osteopenia     took Boniva times 5 years, stopped     Overweight(278.02)     wieght 174 pounds, height 65 inches, BMI 29, 3/31/2013     Past Surgical History:   Procedure Laterality Date    ABDOMINAL EXPLORATION SURGERY  1972    ectopic pregnancy, bilateral salpingectomy    CATARACT REMOVAL WITH IMPLANT Left 2018    per Dr Sakina Herrera      partial    MA COLONOSCOPY W/BIOPSY SINGLE/MULTIPLE N/A 2017    COLONOSCOPY WITH BIOPSY performed by Montserrat Potts MD at [address] [place] [address], one tubular adenoma on pathology, repeat recommended in 5 years    TOTAL HIP ARTHROPLASTY Left 8/3/2021    Left Total Hip Arthroplasty performed by Dilan Mesa MD at [facility] OR     Social History     Tobacco Use    Smoking status: Former Smoker     Packs/day: 2.50     Years: 31.00     Pack years: 77.50     Types: Cigarettes     Start date:      Quit date:      Years since quittin.8    Smokeless tobacco: Never Used   Vaping Use    Vaping Use: Never used   Substance Use Topics    Alcohol use: No     Alcohol/week: 0.0 standard drinks    Drug use: No     Medications:  Current Outpatient Medications   Medication Sig Dispense Refill    amLODIPine (NORVASC) 5 MG tablet Take 1 tablet by mouth once daily 90 tablet 1    lisinopril (PRINIVIL;ZESTRIL) 20 MG tablet Take 1 tablet by mouth daily 90 tablet 1    metoprolol succinate (TOPROL XL) 25 MG extended release tablet Take 1 tablet by mouth once daily 90 tablet 1    omeprazole (PRILOSEC) 20 MG delayed release capsule TAKE 1 CAPSULE BY MOUTH ONCE DAILY AS NEEDED FOR  REFLUX 90 capsule 1    torsemide (DEMADEX) 20 MG tablet Take 1 tablet by mouth once daily 90 tablet 1    cetirizine (ZYRTEC) 10 MG tablet Take 10 mg by mouth daily      Acetaminophen (TYLENOL ARTHRITIS PAIN PO) Take by mouth as needed       Multiple Vitamins-Minerals (PRESERVISION AREDS 2 PO) Take by mouth daily      albuterol sulfate  (90 BASE) MCG/ACT inhaler Inhale 2 puffs into the lungs every 4 hours as needed for Wheezing or Shortness of Breath 1 Inhaler 0    vitamin D-3 (CHOLECALCIFEROL) 5000 UNITS TABS Take 5,000 Units by mouth daily. No current facility-administered medications for this visit. Scheduled Meds:  Continuous Infusions:  PRN Meds:. Prior to Admission medications    Medication Sig Start Date End Date Taking?  Authorizing Provider   amLODIPine (NORVASC) 5 MG tablet Take 1 tablet by mouth once daily 10/6/21   Bryan Winters MD   lisinopril (PRINIVIL;ZESTRIL) 20 MG tablet Take 1 tablet by mouth daily 10/6/21   Bryan Winters MD   metoprolol succinate (TOPROL XL) 25 MG extended release tablet Take 1 tablet by mouth once daily 10/6/21   Bryan Winters MD   omeprazole (PRILOSEC) 20 MG delayed release capsule TAKE 1 CAPSULE BY MOUTH ONCE DAILY AS NEEDED FOR  REFLUX 10/6/21   Javad Goetz MD   torsemide (DEMADEX) 20 MG tablet Take 1 tablet by mouth once daily 10/6/21   Sharron Amezcua MD   cetirizine (ZYRTEC) 10 MG tablet Take 10 mg by mouth daily    Historical Provider, MD   Acetaminophen (TYLENOL ARTHRITIS PAIN PO) Take by mouth as needed     Historical Provider, MD   Multiple Vitamins-Minerals (PRESERVISION AREDS 2 PO) Take by mouth daily    Historical Provider, MD   albuterol sulfate  (90 BASE) MCG/ACT inhaler Inhale 2 puffs into the lungs every 4 hours as needed for Wheezing or Shortness of Breath 11/15/16   Javad Goetz MD   vitamin D-3 (CHOLECALCIFEROL) 5000 UNITS TABS Take 5,000 Units by mouth daily. Historical Provider, MD     Vital Signs (Current) [unfilled]    Weight:   Wt Readings from Last 1 Encounters:   10/06/21 151 lb (68.5 kg)     Height:   Ht Readings from Last 1 Encounters:   10/06/21 5' 3\" (1.6 m)      BMI:  There is no height or weight on file to calculate BMI. Estimated body mass index is 26.75 kg/m² as calculated from the following:    Height as of 10/6/21: 5' 3\" (1.6 m). Weight as of 10/6/21: 151 lb (68.5 kg). body mass index is unknown because there is no height or weight on file. Medical Clearance:[facility]   Appointment for surgery Clearance scheduled for:10/06/21    Preoperative Testing:   These are the current and completed labs:  CBC:   Lab Results   Component Value Date    WBC 8.1 09/29/2021    RBC 4.11 09/29/2021    HGB 11.9 09/29/2021    HCT 38.5 09/29/2021    MCV 93.7 09/29/2021    RDW 12.9 09/29/2021     09/29/2021     CMP:   Lab Results   Component Value Date     09/29/2021    K 4.2 09/29/2021     09/29/2021    CO2 26 09/29/2021    BUN 33 09/29/2021    CREATININE 0.96 09/29/2021    GFRAA >60 09/29/2021    LABGLOM 56 09/29/2021    GLUCOSE 99 09/29/2021    PROT 7.1 09/29/2021    CALCIUM 9.2 09/29/2021    BILITOT 0.25 09/29/2021 ALKPHOS 100 09/29/2021    AST 16 09/29/2021    ALT 9 09/29/2021     POC Tests: No results for input(s): POCGLU, POCNA, POCK, POCCL, POCBUN, POCHEMO, POCHCT in the last 72 hours.   Coags  No results found for: PROTIME, INR, APTT  HCG (If Applicable) No results found for: PREGTESTUR, PREGSERUM, HCG, HCGQUANT   ABGs No results found for: PHART, PO2ART, EKP6FCU, LVA1UGF, BEART, N3DSYUMB   Type & Screen (If Applicable)  No results found for: Reggie Noguera    Additional ordered pre-operative testing:  [x]CBC    []ABG      [x] BMP   []URINALYSIS   []CMP    []HCG   []COAGS PT/INR  []T&C  []LFTs   []TYPE AND SCREEN    [x] EKG  [] Chest X-Ray  [] Other Radiology    [] Sent to Hospitalist None  [] Sent to Anesthesia for your review: 10/13/21   [] Additional Orders: None     Comments:None   Requests: None    Signed: Colt Nguyen LPN 08/51/2024 6:92 PM

## 2021-10-13 NOTE — TELEPHONE ENCOUNTER
Ortho calling stating pt was seen last week but dictation does not state if pt is cleared for surgery, please call ortho at [phone]

## 2021-10-13 NOTE — PROGRESS NOTES
PATIENT SCHEDULED FOR RIGHT JEFF  SURGERY CONSENT REVIEWED/SIGNED  MRSA CULTURE OBTAINED  SURGERY SCHEDULED FOR 10/26/21

## 2021-10-14 LAB
MRSA, DNA, NASAL: NORMAL
SPECIMEN DESCRIPTION: NORMAL

## 2021-10-20 ENCOUNTER — HOSPITAL ENCOUNTER (OUTPATIENT)
Dept: MAMMOGRAPHY | Age: 81
Discharge: HOME | End: 2021-10-22
Payer: MEDICARE

## 2021-10-20 DIAGNOSIS — Z12.31 SCREENING MAMMOGRAM FOR BREAST CANCER: ICD-10-CM

## 2021-10-20 PROCEDURE — 77063 BREAST TOMOSYNTHESIS BI: CPT

## 2021-10-25 ENCOUNTER — HOSPITAL ENCOUNTER (OUTPATIENT)
Dept: LAB | Age: 81
Discharge: HOME | End: 2021-10-25
Payer: MEDICARE

## 2021-10-25 DIAGNOSIS — Z01.818 PREOPERATIVE EXAMINATION: ICD-10-CM

## 2021-10-25 LAB
ABO/RH: NORMAL
ABSOLUTE EOS #: 0.2 K/UL (ref 0–0.44)
ABSOLUTE IMMATURE GRANULOCYTE: 0.03 K/UL (ref 0–0.3)
ABSOLUTE LYMPH #: 2.72 K/UL (ref 1.1–3.7)
ABSOLUTE MONO #: 0.74 K/UL (ref 0.1–1.2)
ANTIBODY SCREEN: NEGATIVE
ARM BAND NUMBER: NORMAL
BASOPHILS # BLD: 1 % (ref 0–2)
BASOPHILS ABSOLUTE: 0.04 K/UL (ref 0–0.2)
CULTURE: ABNORMAL
CULTURE: ABNORMAL
DIFFERENTIAL TYPE: ABNORMAL
EOSINOPHILS RELATIVE PERCENT: 3 % (ref 1–4)
EXPIRATION DATE: NORMAL
HCT VFR BLD CALC: 37.7 % (ref 36.3–47.1)
HEMOGLOBIN: 11.7 G/DL (ref 11.9–15.1)
IMMATURE GRANULOCYTES: 0 %
LYMPHOCYTES # BLD: 34 % (ref 24–43)
Lab: ABNORMAL
MCH RBC QN AUTO: 29 PG (ref 25.2–33.5)
MCHC RBC AUTO-ENTMCNC: 31 G/DL (ref 25.2–33.5)
MCV RBC AUTO: 93.3 FL (ref 82.6–102.9)
MONOCYTES # BLD: 9 % (ref 3–12)
NRBC AUTOMATED: 0 PER 100 WBC
PDW BLD-RTO: 13 % (ref 11.8–14.4)
PLATELET # BLD: 319 K/UL (ref 138–453)
PLATELET ESTIMATE: ABNORMAL
PMV BLD AUTO: 10.2 FL (ref 8.1–13.5)
RBC # BLD: 4.04 M/UL (ref 3.95–5.11)
RBC # BLD: ABNORMAL 10*6/UL
SEG NEUTROPHILS: 53 % (ref 36–65)
SEGMENTED NEUTROPHILS ABSOLUTE COUNT: 4.17 K/UL (ref 1.5–8.1)
SPECIMEN DESCRIPTION: ABNORMAL
WBC # BLD: 7.9 K/UL (ref 3.5–11.3)
WBC # BLD: ABNORMAL 10*3/UL

## 2021-10-25 PROCEDURE — 36415 COLL VENOUS BLD VENIPUNCTURE: CPT

## 2021-10-25 PROCEDURE — 86850 RBC ANTIBODY SCREEN: CPT

## 2021-10-25 PROCEDURE — 86900 BLOOD TYPING SEROLOGIC ABO: CPT

## 2021-10-25 PROCEDURE — 86901 BLOOD TYPING SEROLOGIC RH(D): CPT

## 2021-10-25 PROCEDURE — 85025 COMPLETE CBC W/AUTO DIFF WBC: CPT

## 2021-10-26 ENCOUNTER — ANESTHESIA EVENT (OUTPATIENT)
Dept: OPERATING ROOM | Age: 81
End: 2021-10-26
Payer: MEDICARE

## 2021-10-26 ENCOUNTER — TELEPHONE (OUTPATIENT)
Dept: FAMILY MEDICINE CLINIC | Age: 81
End: 2021-10-26

## 2021-10-26 ENCOUNTER — APPOINTMENT (OUTPATIENT)
Dept: GENERAL RADIOLOGY | Age: 81
End: 2021-10-26
Attending: ORTHOPAEDIC SURGERY
Payer: MEDICARE

## 2021-10-26 ENCOUNTER — ANESTHESIA (OUTPATIENT)
Dept: OPERATING ROOM | Age: 81
End: 2021-10-26
Payer: MEDICARE

## 2021-10-26 ENCOUNTER — HOSPITAL ENCOUNTER (OUTPATIENT)
Age: 81
Discharge: NURSING, REHAB OR LONG-TERM CARE | End: 2021-10-27
Attending: ORTHOPAEDIC SURGERY | Admitting: ORTHOPAEDIC SURGERY
Payer: MEDICARE

## 2021-10-26 VITALS — SYSTOLIC BLOOD PRESSURE: 81 MMHG | OXYGEN SATURATION: 100 % | TEMPERATURE: 94.7 F | DIASTOLIC BLOOD PRESSURE: 39 MMHG

## 2021-10-26 DIAGNOSIS — B35.1 ONYCHOMYCOSIS: Primary | ICD-10-CM

## 2021-10-26 DIAGNOSIS — Z96.641 STATUS POST TOTAL REPLACEMENT OF RIGHT HIP: Primary | ICD-10-CM

## 2021-10-26 DIAGNOSIS — I10 ESSENTIAL HYPERTENSION: ICD-10-CM

## 2021-10-26 DIAGNOSIS — R60.0 BILATERAL LOWER EXTREMITY EDEMA: ICD-10-CM

## 2021-10-26 PROBLEM — M16.11 OSTEOARTHRITIS OF ONE HIP, RIGHT: Status: ACTIVE | Noted: 2021-10-26

## 2021-10-26 LAB
ABSOLUTE EOS #: 0.15 K/UL (ref 0–0.44)
ABSOLUTE IMMATURE GRANULOCYTE: 0.1 K/UL (ref 0–0.3)
ABSOLUTE LYMPH #: 3.32 K/UL (ref 1.1–3.7)
ABSOLUTE MONO #: 0.69 K/UL (ref 0.1–1.2)
ALBUMIN SERPL-MCNC: 3.6 G/DL (ref 3.5–5.2)
ALBUMIN/GLOBULIN RATIO: 1.6 (ref 1–2.5)
ALP BLD-CCNC: 84 U/L (ref 35–104)
ALT SERPL-CCNC: 10 U/L (ref 5–33)
ANION GAP SERPL CALCULATED.3IONS-SCNC: 12 MMOL/L (ref 9–17)
AST SERPL-CCNC: 16 U/L
BASOPHILS # BLD: 0 % (ref 0–2)
BASOPHILS ABSOLUTE: 0.03 K/UL (ref 0–0.2)
BILIRUB SERPL-MCNC: 0.21 MG/DL (ref 0.3–1.2)
BILIRUBIN DIRECT: 0.09 MG/DL
BILIRUBIN, INDIRECT: 0.12 MG/DL (ref 0–1)
BUN BLDV-MCNC: 23 MG/DL (ref 8–23)
CALCIUM SERPL-MCNC: 8.8 MG/DL (ref 8.6–10.4)
CHLORIDE BLD-SCNC: 104 MMOL/L (ref 98–107)
CO2: 25 MMOL/L (ref 20–31)
CREAT SERPL-MCNC: 1.05 MG/DL (ref 0.5–0.9)
DIFFERENTIAL TYPE: ABNORMAL
EOSINOPHILS RELATIVE PERCENT: 1 % (ref 1–4)
GFR AFRICAN AMERICAN: >60 ML/MIN
GFR NON-AFRICAN AMERICAN: 51 ML/MIN
GFR SERPL CREATININE-BSD FRML MDRD: ABNORMAL ML/MIN/{1.73_M2}
GFR SERPL CREATININE-BSD FRML MDRD: ABNORMAL ML/MIN/{1.73_M2}
GLUCOSE BLD-MCNC: 125 MG/DL (ref 70–99)
HCT VFR BLD CALC: 35.3 % (ref 36.3–47.1)
HEMOGLOBIN: 11 G/DL (ref 11.9–15.1)
IMMATURE GRANULOCYTES: 1 %
LYMPHOCYTES # BLD: 29 % (ref 24–43)
MAGNESIUM: 1.8 MG/DL (ref 1.6–2.6)
MCH RBC QN AUTO: 28.9 PG (ref 25.2–33.5)
MCHC RBC AUTO-ENTMCNC: 31.2 G/DL (ref 25.2–33.5)
MCV RBC AUTO: 92.9 FL (ref 82.6–102.9)
MONOCYTES # BLD: 6 % (ref 3–12)
NRBC AUTOMATED: 0 PER 100 WBC
PDW BLD-RTO: 13.1 % (ref 11.8–14.4)
PLATELET # BLD: 295 K/UL (ref 138–453)
PLATELET ESTIMATE: ABNORMAL
PMV BLD AUTO: 9.9 FL (ref 8.1–13.5)
POTASSIUM SERPL-SCNC: 3.7 MMOL/L (ref 3.7–5.3)
RBC # BLD: 3.8 M/UL (ref 3.95–5.11)
RBC # BLD: ABNORMAL 10*6/UL
SEG NEUTROPHILS: 63 % (ref 36–65)
SEGMENTED NEUTROPHILS ABSOLUTE COUNT: 7.06 K/UL (ref 1.5–8.1)
SODIUM BLD-SCNC: 141 MMOL/L (ref 135–144)
TOTAL PROTEIN: 5.8 G/DL (ref 6.4–8.3)
WBC # BLD: 11.4 K/UL (ref 3.5–11.3)
WBC # BLD: ABNORMAL 10*3/UL

## 2021-10-26 PROCEDURE — 99231 SBSQ HOSP IP/OBS SF/LOW 25: CPT | Performed by: INTERNAL MEDICINE

## 2021-10-26 PROCEDURE — 6360000002 HC RX W HCPCS: Performed by: NURSE PRACTITIONER

## 2021-10-26 PROCEDURE — 6360000002 HC RX W HCPCS: Performed by: NURSE ANESTHETIST, CERTIFIED REGISTERED

## 2021-10-26 PROCEDURE — C1776 JOINT DEVICE (IMPLANTABLE): HCPCS | Performed by: ORTHOPAEDIC SURGERY

## 2021-10-26 PROCEDURE — 85025 COMPLETE CBC W/AUTO DIFF WBC: CPT

## 2021-10-26 PROCEDURE — 2580000003 HC RX 258: Performed by: ORTHOPAEDIC SURGERY

## 2021-10-26 PROCEDURE — 6370000000 HC RX 637 (ALT 250 FOR IP): Performed by: NURSE PRACTITIONER

## 2021-10-26 PROCEDURE — 7100000011 HC PHASE II RECOVERY - ADDTL 15 MIN: Performed by: ORTHOPAEDIC SURGERY

## 2021-10-26 PROCEDURE — 27130 TOTAL HIP ARTHROPLASTY: CPT | Performed by: ORTHOPAEDIC SURGERY

## 2021-10-26 PROCEDURE — 3600000014 HC SURGERY LEVEL 4 ADDTL 15MIN: Performed by: ORTHOPAEDIC SURGERY

## 2021-10-26 PROCEDURE — 6370000000 HC RX 637 (ALT 250 FOR IP): Performed by: INTERNAL MEDICINE

## 2021-10-26 PROCEDURE — 73502 X-RAY EXAM HIP UNI 2-3 VIEWS: CPT

## 2021-10-26 PROCEDURE — 2580000003 HC RX 258: Performed by: NURSE PRACTITIONER

## 2021-10-26 PROCEDURE — 82248 BILIRUBIN DIRECT: CPT

## 2021-10-26 PROCEDURE — 80053 COMPREHEN METABOLIC PANEL: CPT

## 2021-10-26 PROCEDURE — 2500000003 HC RX 250 WO HCPCS: Performed by: ORTHOPAEDIC SURGERY

## 2021-10-26 PROCEDURE — 7100000000 HC PACU RECOVERY - FIRST 15 MIN: Performed by: ORTHOPAEDIC SURGERY

## 2021-10-26 PROCEDURE — 3700000000 HC ANESTHESIA ATTENDED CARE: Performed by: ORTHOPAEDIC SURGERY

## 2021-10-26 PROCEDURE — 36415 COLL VENOUS BLD VENIPUNCTURE: CPT

## 2021-10-26 PROCEDURE — 94760 N-INVAS EAR/PLS OXIMETRY 1: CPT

## 2021-10-26 PROCEDURE — 3700000001 HC ADD 15 MINUTES (ANESTHESIA): Performed by: ORTHOPAEDIC SURGERY

## 2021-10-26 PROCEDURE — C1713 ANCHOR/SCREW BN/BN,TIS/BN: HCPCS | Performed by: ORTHOPAEDIC SURGERY

## 2021-10-26 PROCEDURE — 7100000001 HC PACU RECOVERY - ADDTL 15 MIN: Performed by: ORTHOPAEDIC SURGERY

## 2021-10-26 PROCEDURE — 27130 TOTAL HIP ARTHROPLASTY: CPT | Performed by: PHYSICIAN ASSISTANT

## 2021-10-26 PROCEDURE — 6370000000 HC RX 637 (ALT 250 FOR IP): Performed by: NURSE ANESTHETIST, CERTIFIED REGISTERED

## 2021-10-26 PROCEDURE — 7100000010 HC PHASE II RECOVERY - FIRST 15 MIN: Performed by: ORTHOPAEDIC SURGERY

## 2021-10-26 PROCEDURE — 6360000002 HC RX W HCPCS: Performed by: ORTHOPAEDIC SURGERY

## 2021-10-26 PROCEDURE — 83735 ASSAY OF MAGNESIUM: CPT

## 2021-10-26 PROCEDURE — 2500000003 HC RX 250 WO HCPCS: Performed by: NURSE ANESTHETIST, CERTIFIED REGISTERED

## 2021-10-26 PROCEDURE — 3600000004 HC SURGERY LEVEL 4 BASE: Performed by: ORTHOPAEDIC SURGERY

## 2021-10-26 PROCEDURE — 2709999900 HC NON-CHARGEABLE SUPPLY: Performed by: ORTHOPAEDIC SURGERY

## 2021-10-26 DEVICE — STEM FEM SZ 6 L111MM NK L35MM 45MM OFFSET 127DEG HIP TI: Type: IMPLANTABLE DEVICE | Site: HIP | Status: FUNCTIONAL

## 2021-10-26 DEVICE — IMPLANTABLE DEVICE: Type: IMPLANTABLE DEVICE | Site: HIP | Status: FUNCTIONAL

## 2021-10-26 DEVICE — Z DUP USE 2377969 SCREW ACET HEX LOW PROFILE 6.5X40MM TRIDENT II: Type: IMPLANTABLE DEVICE | Site: HIP | Status: FUNCTIONAL

## 2021-10-26 DEVICE — COMPONENT TOT HIP CAPPED LNR MTL CERM H1STRYKER] STRYKER CORP]: Type: IMPLANTABLE DEVICE | Site: HIP | Status: FUNCTIONAL

## 2021-10-26 DEVICE — SHELL ACET SZ G DIA60MM 5 CLUS H TRITANIUM PRESSFIT PRI: Type: IMPLANTABLE DEVICE | Site: HIP | Status: FUNCTIONAL

## 2021-10-26 RX ORDER — TERBINAFINE HYDROCHLORIDE 250 MG/1
250 TABLET ORAL DAILY
Qty: 84 TABLET | Refills: 0 | Status: SHIPPED | OUTPATIENT
Start: 2021-10-26 | End: 2022-01-18

## 2021-10-26 RX ORDER — OXYCODONE HYDROCHLORIDE 5 MG/1
10 TABLET ORAL PRN
Status: COMPLETED | OUTPATIENT
Start: 2021-10-26 | End: 2021-10-26

## 2021-10-26 RX ORDER — SODIUM CHLORIDE 9 MG/ML
25 INJECTION, SOLUTION INTRAVENOUS PRN
Status: DISCONTINUED | OUTPATIENT
Start: 2021-10-26 | End: 2021-10-26 | Stop reason: HOSPADM

## 2021-10-26 RX ORDER — SODIUM CHLORIDE FOR INHALATION 0.9 %
3 VIAL, NEBULIZER (ML) INHALATION
Status: DISCONTINUED | OUTPATIENT
Start: 2021-10-26 | End: 2021-10-27 | Stop reason: HOSPADM

## 2021-10-26 RX ORDER — MORPHINE SULFATE 2 MG/ML
2 INJECTION, SOLUTION INTRAMUSCULAR; INTRAVENOUS
Status: DISCONTINUED | OUTPATIENT
Start: 2021-10-26 | End: 2021-10-27 | Stop reason: HOSPADM

## 2021-10-26 RX ORDER — KETOROLAC TROMETHAMINE 30 MG/ML
15 INJECTION, SOLUTION INTRAMUSCULAR; INTRAVENOUS EVERY 6 HOURS
Status: DISCONTINUED | OUTPATIENT
Start: 2021-10-26 | End: 2021-10-27 | Stop reason: HOSPADM

## 2021-10-26 RX ORDER — AMLODIPINE BESYLATE 5 MG/1
5 TABLET ORAL DAILY
Status: DISCONTINUED | OUTPATIENT
Start: 2021-10-27 | End: 2021-10-27 | Stop reason: HOSPADM

## 2021-10-26 RX ORDER — CYCLOBENZAPRINE HCL 10 MG
10 TABLET ORAL 3 TIMES DAILY PRN
Status: DISCONTINUED | OUTPATIENT
Start: 2021-10-26 | End: 2021-10-27 | Stop reason: HOSPADM

## 2021-10-26 RX ORDER — HYDROCODONE BITARTRATE AND ACETAMINOPHEN 5; 325 MG/1; MG/1
1-2 TABLET ORAL
Qty: 42 TABLET | Refills: 0 | Status: SHIPPED | OUTPATIENT
Start: 2021-10-26 | End: 2021-11-02

## 2021-10-26 RX ORDER — ACETAMINOPHEN 325 MG/1
650 TABLET ORAL EVERY 4 HOURS PRN
Status: DISCONTINUED | OUTPATIENT
Start: 2021-10-26 | End: 2021-10-27 | Stop reason: HOSPADM

## 2021-10-26 RX ORDER — ACETAMINOPHEN 500 MG
TABLET ORAL PRN
Status: DISCONTINUED | OUTPATIENT
Start: 2021-10-26 | End: 2021-10-26 | Stop reason: SDUPTHER

## 2021-10-26 RX ORDER — ONDANSETRON 2 MG/ML
INJECTION INTRAMUSCULAR; INTRAVENOUS PRN
Status: DISCONTINUED | OUTPATIENT
Start: 2021-10-26 | End: 2021-10-26 | Stop reason: SDUPTHER

## 2021-10-26 RX ORDER — LIDOCAINE HYDROCHLORIDE 20 MG/ML
INJECTION, SOLUTION EPIDURAL; INFILTRATION; INTRACAUDAL; PERINEURAL PRN
Status: DISCONTINUED | OUTPATIENT
Start: 2021-10-26 | End: 2021-10-26 | Stop reason: SDUPTHER

## 2021-10-26 RX ORDER — CETIRIZINE HYDROCHLORIDE 5 MG/1
10 TABLET ORAL DAILY PRN
Status: DISCONTINUED | OUTPATIENT
Start: 2021-10-26 | End: 2021-10-27 | Stop reason: HOSPADM

## 2021-10-26 RX ORDER — DOCUSATE SODIUM 100 MG/1
100 CAPSULE, LIQUID FILLED ORAL DAILY
Status: DISCONTINUED | OUTPATIENT
Start: 2021-10-26 | End: 2021-10-27 | Stop reason: HOSPADM

## 2021-10-26 RX ORDER — LACTULOSE 10 G/15ML
20 SOLUTION ORAL 3 TIMES DAILY
Status: DISCONTINUED | OUTPATIENT
Start: 2021-10-26 | End: 2021-10-27 | Stop reason: HOSPADM

## 2021-10-26 RX ORDER — METOPROLOL SUCCINATE 25 MG/1
25 TABLET, EXTENDED RELEASE ORAL DAILY
Status: DISCONTINUED | OUTPATIENT
Start: 2021-10-27 | End: 2021-10-27 | Stop reason: HOSPADM

## 2021-10-26 RX ORDER — SODIUM CHLORIDE 0.9 % (FLUSH) 0.9 %
5-40 SYRINGE (ML) INJECTION PRN
Status: DISCONTINUED | OUTPATIENT
Start: 2021-10-26 | End: 2021-10-26 | Stop reason: HOSPADM

## 2021-10-26 RX ORDER — DEXAMETHASONE SODIUM PHOSPHATE 10 MG/ML
INJECTION INTRAMUSCULAR; INTRAVENOUS PRN
Status: DISCONTINUED | OUTPATIENT
Start: 2021-10-26 | End: 2021-10-26 | Stop reason: SDUPTHER

## 2021-10-26 RX ORDER — ASPIRIN 325 MG
325 TABLET ORAL DAILY
Status: DISCONTINUED | OUTPATIENT
Start: 2021-10-27 | End: 2021-10-27 | Stop reason: HOSPADM

## 2021-10-26 RX ORDER — GABAPENTIN 300 MG/1
CAPSULE ORAL PRN
Status: DISCONTINUED | OUTPATIENT
Start: 2021-10-26 | End: 2021-10-26 | Stop reason: SDUPTHER

## 2021-10-26 RX ORDER — MORPHINE SULFATE 4 MG/ML
4 INJECTION, SOLUTION INTRAMUSCULAR; INTRAVENOUS
Status: DISCONTINUED | OUTPATIENT
Start: 2021-10-26 | End: 2021-10-27 | Stop reason: HOSPADM

## 2021-10-26 RX ORDER — VANCOMYCIN HYDROCHLORIDE 1 G/20ML
INJECTION, POWDER, LYOPHILIZED, FOR SOLUTION INTRAVENOUS PRN
Status: DISCONTINUED | OUTPATIENT
Start: 2021-10-26 | End: 2021-10-26 | Stop reason: HOSPADM

## 2021-10-26 RX ORDER — MORPHINE SULFATE 2 MG/ML
2 INJECTION, SOLUTION INTRAMUSCULAR; INTRAVENOUS EVERY 5 MIN PRN
Status: DISCONTINUED | OUTPATIENT
Start: 2021-10-26 | End: 2021-10-26 | Stop reason: HOSPADM

## 2021-10-26 RX ORDER — PROPOFOL 10 MG/ML
INJECTION, EMULSION INTRAVENOUS PRN
Status: DISCONTINUED | OUTPATIENT
Start: 2021-10-26 | End: 2021-10-26 | Stop reason: SDUPTHER

## 2021-10-26 RX ORDER — SODIUM CHLORIDE 9 MG/ML
INJECTION, SOLUTION INTRAVENOUS CONTINUOUS
Status: DISCONTINUED | OUTPATIENT
Start: 2021-10-26 | End: 2021-10-27 | Stop reason: HOSPADM

## 2021-10-26 RX ORDER — FENTANYL CITRATE 50 UG/ML
50 INJECTION, SOLUTION INTRAMUSCULAR; INTRAVENOUS EVERY 5 MIN PRN
Status: DISCONTINUED | OUTPATIENT
Start: 2021-10-26 | End: 2021-10-26 | Stop reason: HOSPADM

## 2021-10-26 RX ORDER — POLYETHYLENE GLYCOL 3350 17 G/17G
17 POWDER, FOR SOLUTION ORAL DAILY
Status: DISCONTINUED | OUTPATIENT
Start: 2021-10-26 | End: 2021-10-27 | Stop reason: HOSPADM

## 2021-10-26 RX ORDER — DIPHENHYDRAMINE HYDROCHLORIDE 50 MG/ML
12.5 INJECTION INTRAMUSCULAR; INTRAVENOUS
Status: DISCONTINUED | OUTPATIENT
Start: 2021-10-26 | End: 2021-10-26 | Stop reason: HOSPADM

## 2021-10-26 RX ORDER — OXYCODONE HYDROCHLORIDE 5 MG/1
5 TABLET ORAL PRN
Status: COMPLETED | OUTPATIENT
Start: 2021-10-26 | End: 2021-10-26

## 2021-10-26 RX ORDER — SODIUM CHLORIDE 0.9 % (FLUSH) 0.9 %
5-40 SYRINGE (ML) INJECTION EVERY 12 HOURS SCHEDULED
Status: DISCONTINUED | OUTPATIENT
Start: 2021-10-26 | End: 2021-10-26 | Stop reason: HOSPADM

## 2021-10-26 RX ORDER — PANTOPRAZOLE SODIUM 40 MG/1
40 TABLET, DELAYED RELEASE ORAL
Status: DISCONTINUED | OUTPATIENT
Start: 2021-10-27 | End: 2021-10-27 | Stop reason: HOSPADM

## 2021-10-26 RX ORDER — SODIUM CHLORIDE, SODIUM LACTATE, POTASSIUM CHLORIDE, CALCIUM CHLORIDE 600; 310; 30; 20 MG/100ML; MG/100ML; MG/100ML; MG/100ML
INJECTION, SOLUTION INTRAVENOUS CONTINUOUS
Status: DISCONTINUED | OUTPATIENT
Start: 2021-10-26 | End: 2021-10-26

## 2021-10-26 RX ORDER — PHENYLEPHRINE HYDROCHLORIDE 10 MG/ML
INJECTION INTRAVENOUS PRN
Status: DISCONTINUED | OUTPATIENT
Start: 2021-10-26 | End: 2021-10-26 | Stop reason: SDUPTHER

## 2021-10-26 RX ORDER — ROCURONIUM BROMIDE 10 MG/ML
INJECTION, SOLUTION INTRAVENOUS PRN
Status: DISCONTINUED | OUTPATIENT
Start: 2021-10-26 | End: 2021-10-26 | Stop reason: SDUPTHER

## 2021-10-26 RX ORDER — ONDANSETRON 2 MG/ML
4 INJECTION INTRAMUSCULAR; INTRAVENOUS PRN
Status: DISCONTINUED | OUTPATIENT
Start: 2021-10-26 | End: 2021-10-26 | Stop reason: HOSPADM

## 2021-10-26 RX ORDER — LISINOPRIL 20 MG/1
20 TABLET ORAL DAILY
Status: DISCONTINUED | OUTPATIENT
Start: 2021-10-27 | End: 2021-10-27 | Stop reason: HOSPADM

## 2021-10-26 RX ORDER — TORSEMIDE 5 MG/1
20 TABLET ORAL DAILY
Status: DISCONTINUED | OUTPATIENT
Start: 2021-10-27 | End: 2021-10-27 | Stop reason: HOSPADM

## 2021-10-26 RX ORDER — HYDROCODONE BITARTRATE AND ACETAMINOPHEN 5; 325 MG/1; MG/1
1 TABLET ORAL EVERY 4 HOURS PRN
Status: DISCONTINUED | OUTPATIENT
Start: 2021-10-26 | End: 2021-10-27 | Stop reason: HOSPADM

## 2021-10-26 RX ORDER — ONDANSETRON 2 MG/ML
8 INJECTION INTRAMUSCULAR; INTRAVENOUS EVERY 4 HOURS PRN
Status: DISCONTINUED | OUTPATIENT
Start: 2021-10-26 | End: 2021-10-27 | Stop reason: HOSPADM

## 2021-10-26 RX ORDER — ASPIRIN 325 MG
325 TABLET ORAL DAILY
Qty: 30 TABLET | Refills: 0 | Status: SHIPPED | OUTPATIENT
Start: 2021-10-26 | End: 2022-07-25

## 2021-10-26 RX ORDER — ALBUTEROL SULFATE 2.5 MG/3ML
2.5 SOLUTION RESPIRATORY (INHALATION)
Status: DISCONTINUED | OUTPATIENT
Start: 2021-10-26 | End: 2021-10-27 | Stop reason: HOSPADM

## 2021-10-26 RX ORDER — HYDROCODONE BITARTRATE AND ACETAMINOPHEN 5; 325 MG/1; MG/1
2 TABLET ORAL EVERY 4 HOURS PRN
Status: DISCONTINUED | OUTPATIENT
Start: 2021-10-26 | End: 2021-10-27 | Stop reason: HOSPADM

## 2021-10-26 RX ORDER — TRANEXAMIC ACID 100 MG/ML
INJECTION, SOLUTION INTRAVENOUS PRN
Status: DISCONTINUED | OUTPATIENT
Start: 2021-10-26 | End: 2021-10-26 | Stop reason: SDUPTHER

## 2021-10-26 RX ORDER — TRANEXAMIC ACID 100 MG/ML
INJECTION, SOLUTION INTRAVENOUS PRN
Status: DISCONTINUED | OUTPATIENT
Start: 2021-10-26 | End: 2021-10-26 | Stop reason: HOSPADM

## 2021-10-26 RX ORDER — FENTANYL CITRATE 50 UG/ML
25 INJECTION, SOLUTION INTRAMUSCULAR; INTRAVENOUS EVERY 5 MIN PRN
Status: DISCONTINUED | OUTPATIENT
Start: 2021-10-26 | End: 2021-10-26 | Stop reason: HOSPADM

## 2021-10-26 RX ORDER — FENTANYL CITRATE 50 UG/ML
INJECTION, SOLUTION INTRAMUSCULAR; INTRAVENOUS PRN
Status: DISCONTINUED | OUTPATIENT
Start: 2021-10-26 | End: 2021-10-26 | Stop reason: SDUPTHER

## 2021-10-26 RX ORDER — ONDANSETRON 2 MG/ML
8 INJECTION INTRAMUSCULAR; INTRAVENOUS EVERY 4 HOURS
Status: DISCONTINUED | OUTPATIENT
Start: 2021-10-26 | End: 2021-10-26

## 2021-10-26 RX ADMIN — LACTULOSE 20 G: 20 SOLUTION ORAL at 20:48

## 2021-10-26 RX ADMIN — DOCUSATE SODIUM 100 MG: 100 CAPSULE, LIQUID FILLED ORAL at 14:37

## 2021-10-26 RX ADMIN — SUGAMMADEX 200 MG: 100 INJECTION, SOLUTION INTRAVENOUS at 11:38

## 2021-10-26 RX ADMIN — FENTANYL CITRATE 50 MCG: 50 INJECTION, SOLUTION INTRAMUSCULAR; INTRAVENOUS at 12:00

## 2021-10-26 RX ADMIN — SODIUM CHLORIDE, POTASSIUM CHLORIDE, SODIUM LACTATE AND CALCIUM CHLORIDE: 600; 310; 30; 20 INJECTION, SOLUTION INTRAVENOUS at 10:09

## 2021-10-26 RX ADMIN — DEXAMETHASONE SODIUM PHOSPHATE 10 MG: 10 INJECTION INTRAMUSCULAR; INTRAVENOUS at 10:47

## 2021-10-26 RX ADMIN — PHENYLEPHRINE HYDROCHLORIDE 200 MCG: 10 INJECTION INTRAVENOUS at 11:30

## 2021-10-26 RX ADMIN — Medication 1 MG: at 11:10

## 2021-10-26 RX ADMIN — ROCURONIUM BROMIDE 30 MG: 10 INJECTION, SOLUTION INTRAVENOUS at 10:47

## 2021-10-26 RX ADMIN — LIDOCAINE HYDROCHLORIDE 100 MG: 20 INJECTION, SOLUTION EPIDURAL; INFILTRATION; INTRACAUDAL; PERINEURAL at 10:47

## 2021-10-26 RX ADMIN — SODIUM CHLORIDE: 9 INJECTION, SOLUTION INTRAVENOUS at 14:29

## 2021-10-26 RX ADMIN — GABAPENTIN 300 MG: 300 CAPSULE ORAL at 10:39

## 2021-10-26 RX ADMIN — ONDANSETRON 4 MG: 2 INJECTION INTRAMUSCULAR; INTRAVENOUS at 11:26

## 2021-10-26 RX ADMIN — FENTANYL CITRATE 50 MCG: 50 INJECTION, SOLUTION INTRAMUSCULAR; INTRAVENOUS at 10:47

## 2021-10-26 RX ADMIN — FENTANYL CITRATE 50 MCG: 50 INJECTION, SOLUTION INTRAMUSCULAR; INTRAVENOUS at 11:07

## 2021-10-26 RX ADMIN — SODIUM CHLORIDE, POTASSIUM CHLORIDE, SODIUM LACTATE AND CALCIUM CHLORIDE: 600; 310; 30; 20 INJECTION, SOLUTION INTRAVENOUS at 11:34

## 2021-10-26 RX ADMIN — ACETAMINOPHEN 1000 MG: 500 TABLET ORAL at 10:39

## 2021-10-26 RX ADMIN — Medication 2000 MG: at 10:58

## 2021-10-26 RX ADMIN — FENTANYL CITRATE 50 MCG: 50 INJECTION, SOLUTION INTRAMUSCULAR; INTRAVENOUS at 12:13

## 2021-10-26 RX ADMIN — PROPOFOL 100 MG: 10 INJECTION, EMULSION INTRAVENOUS at 10:47

## 2021-10-26 RX ADMIN — TRANEXAMIC ACID 1000 MG: 1 INJECTION, SOLUTION INTRAVENOUS at 11:02

## 2021-10-26 RX ADMIN — KETOROLAC TROMETHAMINE 15 MG: 30 INJECTION, SOLUTION INTRAMUSCULAR at 18:23

## 2021-10-26 RX ADMIN — Medication 2000 MG: at 18:24

## 2021-10-26 RX ADMIN — OXYCODONE 5 MG: 5 TABLET ORAL at 13:21

## 2021-10-26 ASSESSMENT — PULMONARY FUNCTION TESTS
PIF_VALUE: 13
PIF_VALUE: 21
PIF_VALUE: 19
PIF_VALUE: 21
PIF_VALUE: 4
PIF_VALUE: 21
PIF_VALUE: 12
PIF_VALUE: 15
PIF_VALUE: 2
PIF_VALUE: 21
PIF_VALUE: 21
PIF_VALUE: 15
PIF_VALUE: 19
PIF_VALUE: 7
PIF_VALUE: 17
PIF_VALUE: 21
PIF_VALUE: 21
PIF_VALUE: 3
PIF_VALUE: 20
PIF_VALUE: 15
PIF_VALUE: 20
PIF_VALUE: 24
PIF_VALUE: 22
PIF_VALUE: 19
PIF_VALUE: 21
PIF_VALUE: 22
PIF_VALUE: 15
PIF_VALUE: 21
PIF_VALUE: 19
PIF_VALUE: 21
PIF_VALUE: 20
PIF_VALUE: 21
PIF_VALUE: 13
PIF_VALUE: 20
PIF_VALUE: 19
PIF_VALUE: 21
PIF_VALUE: 20
PIF_VALUE: 19
PIF_VALUE: 21
PIF_VALUE: 21
PIF_VALUE: 19
PIF_VALUE: 21
PIF_VALUE: 18
PIF_VALUE: 19
PIF_VALUE: 21
PIF_VALUE: 19
PIF_VALUE: 22
PIF_VALUE: 19
PIF_VALUE: 19
PIF_VALUE: 21
PIF_VALUE: 21
PIF_VALUE: 20
PIF_VALUE: 19
PIF_VALUE: 16
PIF_VALUE: 19
PIF_VALUE: 21
PIF_VALUE: 21
PIF_VALUE: 20
PIF_VALUE: 15

## 2021-10-26 ASSESSMENT — PAIN DESCRIPTION - LOCATION: LOCATION: HIP

## 2021-10-26 ASSESSMENT — PAIN - FUNCTIONAL ASSESSMENT: PAIN_FUNCTIONAL_ASSESSMENT: 0-10

## 2021-10-26 ASSESSMENT — PAIN SCALES - GENERAL
PAINLEVEL_OUTOF10: 2
PAINLEVEL_OUTOF10: 7
PAINLEVEL_OUTOF10: 0
PAINLEVEL_OUTOF10: 0
PAINLEVEL_OUTOF10: 6
PAINLEVEL_OUTOF10: 5

## 2021-10-26 ASSESSMENT — PAIN DESCRIPTION - PROGRESSION: CLINICAL_PROGRESSION: RAPIDLY IMPROVING

## 2021-10-26 ASSESSMENT — PAIN DESCRIPTION - PAIN TYPE: TYPE: SURGICAL PAIN

## 2021-10-26 NOTE — PROGRESS NOTES
Incentive Spirometry education and demonstration given by Respiratory Therapy. Pt achieving 1500 mL at time of instruction. Incentive Spirometer left at bedside and   Patient instructed to do a minimum of 10 breaths every hour.       Billie Dorsey RCP  3:21 PM

## 2021-10-26 NOTE — TELEPHONE ENCOUNTER
----- Message from Malena Erickson MD sent at 10/26/2021  1:29 PM EDT -----  Please advise Jc Adame that the nail culture was positive for fungus. I recommend beginning Lamisil 250 mg po daily x 12 weeks. Please pend this to her pharmacy.

## 2021-10-26 NOTE — PROGRESS NOTES
Hospitalist Progress Note    Patient:  Mallory Alonso     YOB: 1940    MRN: [id number]   Admit date: 10/26/2021     Acct: [de-identified]     PCP: Nohelia Jara MD    CC--Interval History:    POD 0---right JEFF---10.26.2021---[facility]----pain controlled---no nausea    Anemia---expected acute blood loss surgery    IFG--BG = 125    See note below    Patient njzk-wmwqlyzzkr-srnobbgy-available records reviewed, including, but not limited to,  OR reports---labs--office records---personal notes        All other ROS negative except noted in HPI    Diet:  ADULT DIET;  Regular    Medications:  Scheduled Meds:   ketorolac  15 mg IntraVENous Q6H    docusate sodium  100 mg Oral Daily    [START ON 10/27/2021] aspirin  325 mg Oral Daily    polyethylene glycol  17 g Oral Daily    ceFAZolin  2,000 mg IntraVENous Q8H    [START ON 10/27/2021] amLODIPine  5 mg Oral Daily    [START ON 10/27/2021] lisinopril  20 mg Oral Daily    [START ON 10/27/2021] metoprolol succinate  25 mg Oral Daily    [START ON 10/27/2021] pantoprazole  40 mg Oral QAM AC    [START ON 10/27/2021] torsemide  20 mg Oral Daily    [START ON 10/27/2021] Vitamin D3  5,000 Units Oral Daily    lactulose  20 g Oral TID     Continuous Infusions:   sodium chloride 100 mL/hr at 10/26/21 1429     PRN Meds:cyclobenzaprine, morphine **OR** morphine, HYDROcodone 5 mg - acetaminophen **OR** HYDROcodone 5 mg - acetaminophen, magnesium hydroxide, acetaminophen, cetirizine, sodium chloride nebulizer, albuterol    Objective:  Labs:  CBC with Differential:    Lab Results   Component Value Date    WBC 11.4 10/26/2021    RBC 3.80 10/26/2021    HGB 11.0 10/26/2021    HCT 35.3 10/26/2021     10/26/2021    MCV 92.9 10/26/2021    MCH 28.9 10/26/2021    MCHC 31.2 10/26/2021    RDW 13.1 10/26/2021    LYMPHOPCT 29 10/26/2021    MONOPCT 6 10/26/2021    BASOPCT 0 10/26/2021    MONOSABS 0.69 10/26/2021    LYMPHSABS 3.32 10/26/2021    EOSABS 0.15 10/26/2021    BASOSABS 0.03 10/26/2021    DIFFTYPE NOT REPORTED 10/26/2021     BMP:    Lab Results   Component Value Date     10/26/2021    K 3.7 10/26/2021     10/26/2021    CO2 25 10/26/2021    BUN 23 10/26/2021    LABALBU 3.6 10/26/2021    CREATININE 1.05 10/26/2021    CALCIUM 8.8 10/26/2021    GFRAA >60 10/26/2021    LABGLOM 51 10/26/2021    GLUCOSE 125 10/26/2021           Physical Exam:  Vitals: BP (!) 102/44   Pulse 62   Temp 97.3 °F (36.3 °C) (Oral)   Resp 16   Ht 5' 3\" (1.6 m)   Wt 149 lb (67.6 kg)   SpO2 95%   BMI 26.39 kg/m²   24 hour intake/output:    Intake/Output Summary (Last 24 hours) at 10/26/2021 1731  Last data filed at 10/26/2021 1706  Gross per 24 hour   Intake 1540 ml   Output 475 ml   Net 1065 ml     Last 3 weights: Wt Readings from Last 3 Encounters:   10/26/21 149 lb (67.6 kg)   10/06/21 151 lb (68.5 kg)   09/28/21 153 lb (69.4 kg)     HEENT: Normocephalic and Atraumatic  Neck: Supple, No Masses, Tenderness, Nodularity and No Lymphadenopathy  Chest/Lungs: Clear to Auscultation without Rales, Rhonchi, or Wheezes  Cardiac: Regular Rate and Rhythm  GI/Abdomen: Bowel Sounds Present and Soft, Non-tender, without Guarding or Rebound Tenderness  : Not examined  EXT/Skin: sp right JEFF---glued incision intact----CDI, No Edema, No Cyanosis and No Clubbing  Neuro: but for right JEFF--- and No Localizing Signs/Symptoms      Assessment:    Active Problems:    Status post total replacement of right hip    Osteoarthritis of one hip, right  Resolved Problems:    * No resolved hospital problems. *    Fernando Weathers  81  WF  [FATUMA [place], Orthopedics;  DC Cardiology---[facility]]  FULL CODE         Anti-infectives:    Ancef IV x 3 doses    POD ____   right total hip arthroplasty---JEFF---10.26.2021--[facility]    Hypertension             EKG--7.6.2021---NSR---64  Anemia---expected acute blood loss---surgery  Elevated fasting glucose  GERD  Overweight  Tobacco abuse---2.5 PPD--77,5 PYH---quit--1.1.1989  PMH:  osteopenia, chronic venous insufficiency, spider veins---symptomatic, hyponatremia,              tubular adenoma--colon, bullous pemphigoid,  Vitamin D deficiency, severe OA left hip    PSH:   left cataract---IOL---2018, colonoscopy---biopsy--2017,  abdominal exploration               surgery--ectopic pregnancy---BSO---1972, left JEFF----8.3.2021    Allergies: HCTZ--rash--itching      Plan:  1. Home medications reviewed  2. Respiratory regimen  3. Bowel regimen  4. IFG----goal blood glucose---140-180  5.    See orders    Electronically signed by Hanh Quick on 10/26/2021 at 5:31 PM    Hospitalist

## 2021-10-26 NOTE — TELEPHONE ENCOUNTER
Daughter calling stating she's returning nurses calling about RR calling something in for pt's toe fungus, please advise at above number.

## 2021-10-26 NOTE — RT PROTOCOL NOTE
RT Inhaler-Nebulizer Bronchodilator Protocol Note    There is a bronchodilator order in the chart from a provider indicating to follow the RT Bronchodilator Protocol and there is an Initiate RT Inhaler-Nebulizer Bronchodilator Protocol order as well (see protocol at bottom of note). CXR Findings:  No results found. The findings from the last RT Protocol Assessment were as follows:   History Pulmonary Disease: None or smoker <15 pack years  Respiratory Pattern: Regular pattern and RR 12-20 bpm  Breath Sounds: Clear breath sounds  Cough: Strong, spontaneous, non-productive  Indication for Bronchodilator Therapy: None  Bronchodilator Assessment Score: 0    Aerosolized bronchodilator medication orders have been revised according to the RT Inhaler-Nebulizer Bronchodilator Protocol below. Respiratory Therapist to perform RT Therapy Protocol Assessment initially then follow the protocol. Repeat RT Therapy Protocol Assessment PRN for score 0-3 or on second treatment, BID, and PRN for scores above 3. No Indications - adjust the frequency to every 6 hours PRN wheezing or bronchospasm, if no treatments needed after 48 hours then discontinue using Per Protocol order mode. If indication present, adjust the RT bronchodilator orders based on the Bronchodilator Assessment Score as indicated below. Use Inhaler orders unless patient has one or more of the following: on home nebulizer, not able to hold breath for 10 seconds, is not alert and oriented, cannot activate and use MDI correctly, or respiratory rate 25 breaths per minute or more, then use the equivalent nebulizer order(s) with same Frequency and PRN reasons based on the score. If a patient is on this medication at home then do not decrease Frequency below that used at home.     0-3 - enter or revise RT bronchodilator order(s) to equivalent RT Bronchodilator order with Frequency of every 4 hours PRN for wheezing or increased work of breathing using Per Protocol order mode. 4-6 - enter or revise RT Bronchodilator order(s) to two equivalent RT bronchodilator orders with one order with BID Frequency and one order with Frequency of every 4 hours PRN wheezing or increased work of breathing using Per Protocol order mode. 7-10 - enter or revise RT Bronchodilator order(s) to two equivalent RT bronchodilator orders with one order with TID Frequency and one order with Frequency of every 4 hours PRN wheezing or increased work of breathing using Per Protocol order mode. 11-13 - enter or revise RT Bronchodilator order(s) to one equivalent RT bronchodilator order with QID Frequency and an Albuterol order with Frequency of every 4 hours PRN wheezing or increased work of breathing using Per Protocol order mode. Greater than 13 - enter or revise RT Bronchodilator order(s) to one equivalent RT bronchodilator order with every 4 hours Frequency and an Albuterol order with Frequency of every 2 hours PRN wheezing or increased work of breathing using Per Protocol order mode. RT to enter RT Home Evaluation for COPD & MDI Assessment order using Per Protocol order mode.     Electronically signed by Carlo Alves RCP on 10/26/2021 at 4:10 PM

## 2021-10-26 NOTE — ANESTHESIA PRE PROCEDURE
Department of Anesthesiology  Preprocedure Note       Name:  Dora Rodriguez   Age:  80 y.o.  :  1940                                          MRN:  [id number]         Date:  10/26/2021      Surgeon: Ramonita Greenwood):  Jourdan Rice MD    Procedure: Procedure(s):  v-Right Total Hip Arthroplasty    Medications prior to admission:   Prior to Admission medications    Medication Sig Start Date End Date Taking? Authorizing Provider   amLODIPine (NORVASC) 5 MG tablet Take 1 tablet by mouth once daily 10/6/21  Yes Malachi Barker MD   lisinopril (PRINIVIL;ZESTRIL) 20 MG tablet Take 1 tablet by mouth daily 10/6/21  Yes Malachi Barker MD   metoprolol succinate (TOPROL XL) 25 MG extended release tablet Take 1 tablet by mouth once daily 10/6/21  Yes Malachi Barker MD   omeprazole (PRILOSEC) 20 MG delayed release capsule TAKE 1 CAPSULE BY MOUTH ONCE DAILY AS NEEDED FOR  REFLUX 10/6/21  Yes Malachi Barker MD   torsemide (DEMADEX) 20 MG tablet Take 1 tablet by mouth once daily 10/6/21  Yes Malachi Barker MD   Acetaminophen (TYLENOL ARTHRITIS PAIN PO) Take by mouth as needed    Yes Historical Provider, MD   Multiple Vitamins-Minerals (PRESERVISION AREDS 2 PO) Take by mouth daily   Yes Historical Provider, MD   vitamin D-3 (CHOLECALCIFEROL) 5000 UNITS TABS Take 5,000 Units by mouth daily.    Yes Historical Provider, MD   cetirizine (ZYRTEC) 10 MG tablet Take 10 mg by mouth daily    Historical Provider, MD   albuterol sulfate  (90 BASE) MCG/ACT inhaler Inhale 2 puffs into the lungs every 4 hours as needed for Wheezing or Shortness of Breath 11/15/16   Malachi Barker MD       Current medications:    Current Facility-Administered Medications   Medication Dose Route Frequency Provider Last Rate Last Admin    sodium chloride flush 0.9 % injection 5-40 mL  5-40 mL IntraVENous 2 times per day Jourdan Rice MD        sodium chloride flush 0.9 % injection 5-40 mL  5-40 mL IntraVENous PRN Jourdan Rice MD        0.9 % sodium chloride infusion  25 mL IntraVENous PRN Lauro Kirby MD        lactated ringers infusion   IntraVENous Continuous Lauro Kirby  mL/hr at 10/26/21 1009 New Bag at 10/26/21 1009    ceFAZolin (ANCEF) 2000 mg in sterile water 20 mL IV syringe  2,000 mg IntraVENous On Call to Elizabeth Diaz MD           Allergies:  No Known Allergies    Problem List:    Patient Active Problem List   Diagnosis Code    Essential hypertension I10    GERD (gastroesophageal reflux disease) K21.9    Elevated fasting glucose R73.01    Osteopenia M85.80    Overweight (BMI 25.0-29. 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       Past Medical History:        Diagnosis Date    Bullous pemphigoid     Elevated fasting glucose     Former smoker     quit in 1987    GERD (gastroesophageal reflux disease)     Hypertension     Osteopenia     took Boniva times 5 years, stopped 2012    Overweight(278.02)     wieght 174 pounds, height 65 inches, BMI 29, 3/31/2013       Past Surgical History:        Procedure Laterality Date    ABDOMINAL EXPLORATION SURGERY  1972    ectopic pregnancy, bilateral salpingectomy    CATARACT REMOVAL WITH IMPLANT Left 07/24/2018    per Dr Erma Flynn      partial    OVARY REMOVAL      unsure which one removed, has one left     MD COLONOSCOPY W/BIOPSY SINGLE/MULTIPLE N/A 7/26/2017    COLONOSCOPY WITH BIOPSY performed by Philipp Schumacher MD at [address] [place] [address], one tubular adenoma on pathology, repeat recommended in 5 years    TOTAL HIP ARTHROPLASTY Left 8/3/2021    Left Total Hip Arthroplasty performed by Lauro Kirby MD at [facility] OR       Social History:    Social History     Tobacco Use    Smoking status: Former Smoker     Packs/day: 2.50     Years: 31.00     Pack years: 77.50     Types: Cigarettes     Start date:      Quit date:      Years since quittin.8    Smokeless tobacco: Never Used   Substance Use Topics    Alcohol use: No     Alcohol/week: 0.0 standard drinks                                Counseling given: Not Answered      Vital Signs (Current):   Vitals:    10/26/21 0950   BP: (!) 144/63   Pulse: 71   Resp: 16   Temp: 36.6 °C (97.9 °F)   TempSrc: Oral   SpO2: 100%   Weight: 149 lb (67.6 kg)   Height: 5' 3\" (1.6 m)                                              BP Readings from Last 3 Encounters:   10/26/21 (!) 144/63   10/06/21 130/86   21 128/74       NPO Status: Time of last liquid consumption: 0800 (Sip of water with medication)                        Time of last solid consumption: 1830                        Date of last liquid consumption: 10/26/21                        Date of last solid food consumption: 10/25/21    BMI:   Wt Readings from Last 3 Encounters:   10/26/21 149 lb (67.6 kg)   10/06/21 151 lb (68.5 kg)   21 153 lb (69.4 kg)     Body mass index is 26.39 kg/m². CBC:   Lab Results   Component Value Date    WBC 7.9 10/25/2021    RBC 4.04 10/25/2021    HGB 11.7 10/25/2021    HCT 37.7 10/25/2021    MCV 93.3 10/25/2021    RDW 13.0 10/25/2021     10/25/2021       CMP:   Lab Results   Component Value Date     2021    K 4.2 2021     2021    CO2 26 2021    BUN 33 2021    CREATININE 0.96 2021    GFRAA >60 2021    LABGLOM 56 2021    GLUCOSE 99 2021    PROT 7.1 2021    CALCIUM 9.2 2021    BILITOT 0.25 2021    ALKPHOS 100 2021    AST 16 2021    ALT 9 2021       POC Tests: No results for input(s): POCGLU, POCNA, POCK, POCCL, POCBUN, POCHEMO, POCHCT in the last 72 hours. Coags: No results found for: PROTIME, INR, APTT    HCG (If Applicable):  No results found for: PREGTESTUR, PREGSERUM, HCG, HCGQUANT     ABGs: No results found for: PHART, PO2ART, SHI0XVM, IHZ3GKV, BEART, O2UOSASN     Type & Screen (If Applicable):  No results found for: LABABO, LABRH    Drug/Infectious Status (If Applicable):  No results found for: HIV, HEPCAB    COVID-19 Screening (If Applicable):   Lab Results   Component Value Date    COVID19 Not Detected 07/29/2021           Anesthesia Evaluation  Patient summary reviewed no history of anesthetic complications:   Airway: Mallampati: II  TM distance: >3 FB   Neck ROM: full  Mouth opening: > = 3 FB Dental:    (+) upper dentures and lower dentures      Pulmonary:Negative Pulmonary ROS and normal exam                               Cardiovascular:  Exercise tolerance: good (>4 METS),   (+) hypertension:,       ECG reviewed      Echocardiogram reviewed                  Neuro/Psych:               GI/Hepatic/Renal:   (+) GERD:,           Endo/Other: Negative Endo/Other ROS                    Abdominal:             Vascular: negative vascular ROS. Other Findings:             Anesthesia Plan      general     ASA 2       Induction: intravenous. MIPS: Postoperative opioids intended and Prophylactic antiemetics administered. Anesthetic plan and risks discussed with patient. Use of blood products discussed with patient whom consented to blood products.    Plan discussed with surgical team.                  YANELY Palencia - CRNA   10/26/2021

## 2021-10-26 NOTE — H&P
History and Physical           CHIEF COMPLAINT: Right hip primary osteoarthritis, status post left total hip arthroplasty 8/3/2021     HISTORY OF PRESENT ILLNESS:       The patient is a 68 y.o. female  who presents with history of severe bilateral hip osteoarthritis patient underwent left total hip replacement on 8/3/2021 she is doing well at this time she does not have any complaints of pain. She did not have any wound complications. She would like to proceed with undergoing a right total hip arthroplasty as she is having increasing difficulty getting up to seated position going up or down steps pain with physical therapy. Utilizes wheeled walker for mobilization.     Past Medical History:    Past Medical History        Past Medical History:   Diagnosis Date    Bullous pemphigoid      Elevated fasting glucose      Former smoker       quit in 1987    GERD (gastroesophageal reflux disease)      Hypertension      Osteopenia       took Boniva times 5 years, stopped 2012    Overweight(278.02)       wieght 174 pounds, height 65 inches, BMI 29, 3/31/2013            Past Surgical History:    Past Surgical History         Past Surgical History:   Procedure Laterality Date    ABDOMINAL EXPLORATION SURGERY   1972     ectopic pregnancy, bilateral salpingectomy    CATARACT REMOVAL WITH IMPLANT Left 07/24/2018     per Dr Marti Salas         partial    CA COLONOSCOPY W/BIOPSY SINGLE/MULTIPLE N/A 7/26/2017     COLONOSCOPY WITH BIOPSY performed by Kingston Babinski, MD at [address] [place] [address], one tubular adenoma on pathology, repeat recommended in 5 years    TOTAL HIP ARTHROPLASTY Left 8/3/2021     Left Total Hip Arthroplasty performed by Trav Nobles MD at [facility]     Medications Prior to Admission:   Home Medications           Prior to Admission medications    Medication Sig Start Date End Date Taking?  Authorizing Provider   amLODIPine (NORVASC) 5 MG tablet Take 1 tablet by mouth once daily 9/14/21   Yes Emily Ramachandran MD   torsemide (DEMADEX) 20 MG tablet Take 20 mg by mouth daily     Yes Historical Provider, MD   HYDROcodone-acetaminophen (NORCO) 5-325 MG per tablet Take 2 tablets by mouth every 4 hours as needed.     Yes Historical Provider, MD   aspirin 325 MG tablet Take 325 mg by mouth daily     Yes Historical Provider, MD   lisinopril (PRINIVIL;ZESTRIL) 20 MG tablet Take 1 tablet by mouth daily 8/18/21   Yes Emily Ramachandran MD   metoprolol succinate (TOPROL XL) 25 MG extended release tablet Take 1 tablet by mouth once daily 7/15/21   Yes Everton Richard MD   omeprazole (PRILOSEC) 20 MG delayed release capsule TAKE 1 CAPSULE BY MOUTH ONCE DAILY AS NEEDED FOR  REFLUX 7/15/21   Yes Everton Richard MD   cetirizine (ZYRTEC) 10 MG tablet Take 10 mg by mouth daily     Yes Historical Provider, MD   torsemide (DEMADEX) 20 MG tablet Take 1 tablet by mouth once daily 3/31/21   Yes Emily Ramachandran MD   Acetaminophen (TYLENOL ARTHRITIS PAIN PO) Take by mouth as needed      Yes Historical Provider, MD   hydrOXYzine (ATARAX) 25 MG tablet TAKE ONE TABLET BY MOUTH THREE TIMES DAILY AS NEEDED FOR ITCHING 8/19/20   Yes Emily Ramachandran MD   Shivam Gavia Oil (MAXIMUM RED KRILL PO) Take by mouth daily      Yes Historical Provider, MD   Multiple Vitamins-Minerals (PRESERVISION AREDS 2 PO) Take by mouth daily     Yes Historical Provider, MD   albuterol sulfate  (90 BASE) MCG/ACT inhaler Inhale 2 puffs into the lungs every 4 hours as needed for Wheezing or Shortness of Breath 11/15/16   Yes Emily Ramachandran MD   vitamin D-3 (CHOLECALCIFEROL) 5000 UNITS TABS Take 5,000 Units by mouth daily.     Yes Historical Provider, MD   Coenzyme Q10 (CO Q 10 PO) Take by mouth daily Indications: prn when remembers to take      Yes Historical Provider, MD       Scheduled Meds:  Continuous Infusions:  PRN Meds:.        Allergies:  Patient has no known allergies.     Social History:   Social History   Social History      Socioeconomic History    Marital status: Single       Spouse name: None    Number of children: None    Years of education: None    Highest education level: None   Occupational History    None   Tobacco Use    Smoking status: Former Smoker       Packs/day: 2.50       Years: 31.00       Pack years: 77.50       Types: Cigarettes       Start date:        Quit date: 48       Years since quittin.7    Smokeless tobacco: Never Used   Vaping Use    Vaping Use: Never used   Substance and Sexual Activity    Alcohol use: No       Alcohol/week: 0.0 standard drinks    Drug use: No    Sexual activity: None   Other Topics Concern    None   Social History Narrative    None      Social Determinants of Health          Financial Resource Strain: Low Risk     Difficulty of Paying Living Expenses: Not hard at all   Food Insecurity: No Food Insecurity    Worried About Running Out of Food in the Last Year: Never true    [place] of Food in the Last Year: Never true   Transportation Needs:     Lack of Transportation (Medical):      Lack of Transportation (Non-Medical):    Physical Activity:     Days of Exercise per Week:     Minutes of Exercise per Session:    Stress:     Feeling of Stress :    Social Connections:     Frequency of Communication with Friends and Family:     Frequency of Social Gatherings with Friends and Family:     Attends Gnosticist Services:     Active Member of Clubs or Organizations:     Attends Club or Organization Meetings:     Marital Status:    Intimate Partner Violence:     Fear of Current or Ex-Partner:     Emotionally Abused:     Physically Abused:     Sexually Abused:          Social History           Tobacco Use   Smoking Status Former Smoker    Packs/day: 2.50    Years: 31.00    Pack years: 77.50    Types: Cigarettes    Start date:     Quit date: 1989    Years since quittin.7   Smokeless Tobacco Never Used      Social History           Substance and Sexual Activity   Alcohol Use No    Alcohol/week: 0.0 standard drinks      Social History          Substance and Sexual Activity   Drug Use No         Family History:  Family History         Family History   Problem Relation Age of Onset    Cancer Mother           unknown type of cancer    Diabetes Mother      Diabetes Brother      Diabetes Brother      Heart Disease Father           CHF               REVIEW OF SYSTEMS:  Gen: Negative for nausea, vomiting, diarrhea, fever, chills, night sweats  Heart: Negative for HTN, palpitations, chest pain  Lungs: Negative for wheezes, asthma or SOB  GI: Negative for nausea, vomiting  Endo: Negative for diabetes  Heme: Negative for DVT         PHYSICAL EXAM:  [unfilled]  Gen: alert and oriented  Head: normorcephalic, atraumatic  Neck: supple  Heart: RRR  Lungs: No audible wheezes  Abdomen: soft  Pelvis: stable  Extremity right hip significant pain with internal or external rotation. Left hip denies any pain with any type of range of motion of this left hip.     DATA:  CBC:         Lab Results   Component Value Date     WBC 7.6 08/04/2021     HGB 9.4 08/04/2021      08/04/2021      BMP:          Lab Results   Component Value Date      08/04/2021     K 4.8 08/04/2021      08/04/2021     CO2 27 08/04/2021     BUN 37 08/04/2021     CREATININE 1.20 08/04/2021     CALCIUM 8.3 08/04/2021     GLUCOSE 131 08/04/2021      PT/INR:  No results found for: PROTIME, INR  Troponin:  No results found for: TROPONINI     Radiology: Severe osteoarthritis with collapse right hip. Left total hip replacement in satisfactory alignment     ASSESSMENT:Active Problems:    * No active hospital problems. *  Resolved Problems:    * No resolved hospital problems.  *        PLAN:  1) as discussed with Dr. Kalpana Negron would like to proceed with right total hip arthroplasty risk and benefits were discussed with patient patient would like to proceed at this time.

## 2021-10-26 NOTE — ANESTHESIA POSTPROCEDURE EVALUATION
Department of Anesthesiology  Postprocedure Note    Patient: Shanice Addison  MRN: [id number]  [place]: 1940  Date of evaluation: 10/26/2021  Time:  12:22 PM     Procedure Summary     Date: 10/26/21 Room / Location: [address] [place] [address]    Anesthesia Start: 8324 Anesthesia Stop: 1150    Procedure: Right Total Hip Arthroplasty (Right ) Diagnosis: (osteoarthritis)    Surgeons: Renu Bunn MD Responsible Provider: YANELY Wood CRNA    Anesthesia Type: general ASA Status: 2          Anesthesia Type: general    Neal Phase I: Neal Score: 8    Neal Phase II:      Last vitals: Reviewed and per EMR flowsheets.        Anesthesia Post Evaluation    Patient location during evaluation: PACU  Patient participation: complete - patient participated  Level of consciousness: awake and alert  Pain score: 7  Airway patency: patent  Nausea & Vomiting: no nausea and no vomiting  Complications: no  Cardiovascular status: hemodynamically stable  Respiratory status: room air and acceptable  Hydration status: euvolemic  Multimodal analgesia pain management approach

## 2021-10-26 NOTE — OP NOTE
PROCEDURE DETAILS     The patient was taken to the operating room, underwent a general anesthetic, placed in lateral position Right side up. Care was taken to pad all bony prominences. Timeout was taken, consent was confirmed. The patient received Ancef 2 grams within 30 minutes of incision. Started with a lateral approach to the hip with skin knife followed by electrocautery down to the iliotibial band. The iliotibial band was then split. Charnley C retractor was put in position and took down the anterior third of the gluteus medius tendon. Placed the leg in a figure-of-four, made a cut about a fingerbreadth above the lesser trochanter. We removed the remaining head and neck. Placed Hohmann's around the acetabulum, cleaned soft tissue from the acetabulum deepened the acetabulum. Reamed up sequentially to size 60. Implanted size 60 cup at 45 degrees abduction, 20 degrees of anteversion. We placed one 40 mm screw between the tables of the pelvis. Nice fixation was obtained. Standard 44 mm liner. Removed a few posterior osteophytes. We then used the  followed by the canal finder. We broached to a size 6 . We implanted the same size stem high offset. We trialed head and neck lengths, elected to go with a -4 neck length. It was stable throughout all range of motion. Limb lengths appear to be appropriate. Wounds were thoroughly irrigated. Repaired the abductor and capsule back with #5 Ethibond through bone tunnels. There was a intracapsular with injected with morphine, Toradol, marcaine with epinephrine and tranexamic acid. The iliotibial band was repaired with #2 Lena Marii. Skin was repaired with 0 Quill, Prineo, and dry dressings. The patient was then awakened and returned to the recovery room in fair condition. POSTOPERATIVE PLAN: Weightbearing as tolerated, total hip arthroplasty protocol. First postop visit will be a clinical exam, no x-rays in 8 weeks.      The physician assistant assisted throughout the procedure with positioning, draping, retraction, wound closure, and dressings.     Eulalia Razo MD      Electronically signed by Eulalia Razo MD on 10/26/2021 at 11:29 AM

## 2021-10-27 VITALS
WEIGHT: 156.2 LBS | HEART RATE: 60 BPM | TEMPERATURE: 98.2 F | HEIGHT: 63 IN | OXYGEN SATURATION: 98 % | BODY MASS INDEX: 27.68 KG/M2 | SYSTOLIC BLOOD PRESSURE: 105 MMHG | RESPIRATION RATE: 18 BRPM | DIASTOLIC BLOOD PRESSURE: 39 MMHG

## 2021-10-27 LAB
ABSOLUTE EOS #: <0.03 K/UL (ref 0–0.44)
ABSOLUTE IMMATURE GRANULOCYTE: 0.03 K/UL (ref 0–0.3)
ABSOLUTE LYMPH #: 0.86 K/UL (ref 1.1–3.7)
ABSOLUTE MONO #: 0.59 K/UL (ref 0.1–1.2)
ANION GAP SERPL CALCULATED.3IONS-SCNC: 9 MMOL/L (ref 9–17)
BASOPHILS # BLD: 0 % (ref 0–2)
BASOPHILS ABSOLUTE: <0.03 K/UL (ref 0–0.2)
BUN BLDV-MCNC: 29 MG/DL (ref 8–23)
BUN/CREAT BLD: 23 (ref 9–20)
CALCIUM SERPL-MCNC: 8.3 MG/DL (ref 8.6–10.4)
CHLORIDE BLD-SCNC: 104 MMOL/L (ref 98–107)
CO2: 25 MMOL/L (ref 20–31)
CREAT SERPL-MCNC: 1.27 MG/DL (ref 0.5–0.9)
DIFFERENTIAL TYPE: ABNORMAL
EOSINOPHILS RELATIVE PERCENT: 0 % (ref 1–4)
GFR AFRICAN AMERICAN: 49 ML/MIN
GFR NON-AFRICAN AMERICAN: 41 ML/MIN
GFR SERPL CREATININE-BSD FRML MDRD: ABNORMAL ML/MIN/{1.73_M2}
GFR SERPL CREATININE-BSD FRML MDRD: ABNORMAL ML/MIN/{1.73_M2}
GLUCOSE BLD-MCNC: 137 MG/DL (ref 70–99)
HCT VFR BLD CALC: 30.2 % (ref 36.3–47.1)
HCT VFR BLD CALC: 30.5 % (ref 36.3–47.1)
HEMOGLOBIN: 9.2 G/DL (ref 11.9–15.1)
HEMOGLOBIN: 9.4 G/DL (ref 11.9–15.1)
IMMATURE GRANULOCYTES: 0 %
LYMPHOCYTES # BLD: 9 % (ref 24–43)
MCH RBC QN AUTO: 28.7 PG (ref 25.2–33.5)
MCHC RBC AUTO-ENTMCNC: 30.8 G/DL (ref 25.2–33.5)
MCV RBC AUTO: 93.3 FL (ref 82.6–102.9)
MONOCYTES # BLD: 6 % (ref 3–12)
NRBC AUTOMATED: 0 PER 100 WBC
PDW BLD-RTO: 13 % (ref 11.8–14.4)
PLATELET # BLD: 241 K/UL (ref 138–453)
PLATELET ESTIMATE: ABNORMAL
PMV BLD AUTO: 10.6 FL (ref 8.1–13.5)
POTASSIUM SERPL-SCNC: 4.3 MMOL/L (ref 3.7–5.3)
RBC # BLD: 3.27 M/UL (ref 3.95–5.11)
RBC # BLD: ABNORMAL 10*6/UL
SEG NEUTROPHILS: 85 % (ref 36–65)
SEGMENTED NEUTROPHILS ABSOLUTE COUNT: 8.12 K/UL (ref 1.5–8.1)
SODIUM BLD-SCNC: 138 MMOL/L (ref 135–144)
WBC # BLD: 9.6 K/UL (ref 3.5–11.3)
WBC # BLD: ABNORMAL 10*3/UL

## 2021-10-27 PROCEDURE — 2580000003 HC RX 258: Performed by: NURSE PRACTITIONER

## 2021-10-27 PROCEDURE — 6360000002 HC RX W HCPCS: Performed by: INTERNAL MEDICINE

## 2021-10-27 PROCEDURE — 2580000003 HC RX 258: Performed by: INTERNAL MEDICINE

## 2021-10-27 PROCEDURE — 85018 HEMOGLOBIN: CPT

## 2021-10-27 PROCEDURE — 80048 BASIC METABOLIC PNL TOTAL CA: CPT

## 2021-10-27 PROCEDURE — 6360000002 HC RX W HCPCS: Performed by: NURSE PRACTITIONER

## 2021-10-27 PROCEDURE — 6370000000 HC RX 637 (ALT 250 FOR IP): Performed by: NURSE PRACTITIONER

## 2021-10-27 PROCEDURE — 85014 HEMATOCRIT: CPT

## 2021-10-27 PROCEDURE — 97161 PT EVAL LOW COMPLEX 20 MIN: CPT

## 2021-10-27 PROCEDURE — 97165 OT EVAL LOW COMPLEX 30 MIN: CPT | Performed by: OCCUPATIONAL THERAPIST

## 2021-10-27 PROCEDURE — 99231 SBSQ HOSP IP/OBS SF/LOW 25: CPT | Performed by: INTERNAL MEDICINE

## 2021-10-27 PROCEDURE — 6370000000 HC RX 637 (ALT 250 FOR IP): Performed by: INTERNAL MEDICINE

## 2021-10-27 PROCEDURE — 97116 GAIT TRAINING THERAPY: CPT

## 2021-10-27 PROCEDURE — 36415 COLL VENOUS BLD VENIPUNCTURE: CPT

## 2021-10-27 PROCEDURE — 85025 COMPLETE CBC W/AUTO DIFF WBC: CPT

## 2021-10-27 RX ORDER — LISINOPRIL 20 MG/1
20 TABLET ORAL DAILY
Qty: 90 TABLET | Refills: 0
Start: 2021-10-30 | End: 2022-04-06 | Stop reason: SDUPTHER

## 2021-10-27 RX ORDER — 0.9 % SODIUM CHLORIDE 0.9 %
1000 INTRAVENOUS SOLUTION INTRAVENOUS ONCE
Status: COMPLETED | OUTPATIENT
Start: 2021-10-27 | End: 2021-10-27

## 2021-10-27 RX ORDER — METOPROLOL SUCCINATE 25 MG/1
TABLET, EXTENDED RELEASE ORAL
Qty: 90 TABLET | Refills: 0
Start: 2021-10-30 | End: 2022-04-06 | Stop reason: SDUPTHER

## 2021-10-27 RX ORDER — TORSEMIDE 20 MG/1
TABLET ORAL
Qty: 90 TABLET | Refills: 1
Start: 2021-10-30 | End: 2022-04-06 | Stop reason: SDUPTHER

## 2021-10-27 RX ORDER — AMLODIPINE BESYLATE 5 MG/1
TABLET ORAL
Qty: 90 TABLET | Refills: 0
Start: 2021-10-30 | End: 2021-12-21

## 2021-10-27 RX ADMIN — PANTOPRAZOLE SODIUM 40 MG: 40 TABLET, DELAYED RELEASE ORAL at 05:34

## 2021-10-27 RX ADMIN — HYDROCODONE BITARTRATE AND ACETAMINOPHEN 1 TABLET: 5; 325 TABLET ORAL at 11:50

## 2021-10-27 RX ADMIN — ONDANSETRON 8 MG: 2 INJECTION INTRAMUSCULAR; INTRAVENOUS at 03:17

## 2021-10-27 RX ADMIN — SODIUM CHLORIDE 1000 ML: 9 INJECTION, SOLUTION INTRAVENOUS at 08:07

## 2021-10-27 RX ADMIN — ASPIRIN 325 MG: 325 TABLET, FILM COATED ORAL at 10:48

## 2021-10-27 RX ADMIN — CHOLECALCIFEROL TAB 125 MCG (5000 UNIT) 5000 UNITS: 125 TAB at 10:48

## 2021-10-27 RX ADMIN — SODIUM CHLORIDE: 9 INJECTION, SOLUTION INTRAVENOUS at 11:38

## 2021-10-27 RX ADMIN — KETOROLAC TROMETHAMINE 15 MG: 30 INJECTION, SOLUTION INTRAMUSCULAR at 05:34

## 2021-10-27 RX ADMIN — SODIUM CHLORIDE: 9 INJECTION, SOLUTION INTRAVENOUS at 00:15

## 2021-10-27 RX ADMIN — KETOROLAC TROMETHAMINE 15 MG: 30 INJECTION, SOLUTION INTRAMUSCULAR at 00:17

## 2021-10-27 RX ADMIN — Medication 2000 MG: at 03:27

## 2021-10-27 RX ADMIN — SODIUM CHLORIDE 1000 ML: 9 INJECTION, SOLUTION INTRAVENOUS at 09:58

## 2021-10-27 ASSESSMENT — PAIN SCALES - GENERAL
PAINLEVEL_OUTOF10: 0
PAINLEVEL_OUTOF10: 4
PAINLEVEL_OUTOF10: 0

## 2021-10-27 ASSESSMENT — PAIN DESCRIPTION - PAIN TYPE: TYPE: SURGICAL PAIN

## 2021-10-27 NOTE — PROGRESS NOTES
History  Social/Functional History  Lives With: Daughter  Type of Home: House  Home Layout: One level  Home Access: Stairs to enter with rails  Entrance Stairs - Number of Steps: 4  Entrance Stairs - Rails: Left  Bathroom Shower/Tub: Walk-in shower  Bathroom Toilet: Handicap height  Bathroom Equipment: Grab bars in shower, Shower chair, Grab bars around toilet  Home Equipment: 4 wheeled walker, Rolling walker, Reacher, Sock aid, Long-handled shoehorn  ADL Assistance: Independent  Homemaking Assistance: Independent  Homemaking Responsibilities: Yes  Ambulation Assistance: Independent  Transfer Assistance: Independent  Active : Yes  Mode of Transportation: Car       Objective   Vision: Within Functional Limits  Hearing: Within functional limits    Orientation  Overall Orientation Status: Within Normal Limits        ADL  Equipment Provided: Reacher;Sock aid;Long-handled shoe horn  LE Dressing: Contact guard assistance  Toileting: Stand by assistance        Bed mobility  Supine to Sit: Supervision  Sit to Supine: Stand by assistance;Contact guard assistance  Scooting: Supervision  Transfers  Sit to stand: Contact guard assistance  Stand to sit: Contact guard assistance     Cognition  Overall Cognitive Status: WFL                 LUE AROM (degrees)  LUE AROM : WFL  Left Hand AROM (degrees)  Left Hand AROM: WFL  RUE AROM (degrees)  RUE AROM : WFL  Right Hand AROM (degrees)  Right Hand AROM: WFL  LUE Strength  Gross LUE Strength: WFL  RUE Strength  Gross RUE Strength: WFL                   Plan   Plan  Times per week: 1    Goals  Short term goals  Time Frame for Short term goals: duration of hospital stay  Short term goal 1: Patient to complete toilet transfer with S/mod I using a/e as needed  Short term goal 2: Patient to complete LB dressing with S/mod I using a/e as needed       Therapy Time   Individual Concurrent Group Co-treatment   Time In 1412         Time Out 1427         Minutes 15         Timed Code Treatment Minutes: 0 Minutes       IDA NELSON OTR, OT

## 2021-10-27 NOTE — PROGRESS NOTES
Orthopaedic Progress Note      SUBJECTIVE   Ms. Polly Holcomb is post op day # 1 s/p R JEFF  hgb 9.4   Satisfactory post-op x-rays   Patient is seen in the chair she has already been up walking and has worked with therapy. Patient states her pain is controlled with medications. She plans to go to a swing bed at [facility] at discharge. Patient states she is very happy after her surgery. Allergies:     Allergies as of 10/13/2021    (No Known Allergies)     Current Inpatient Medications:  Current Facility-Administered Medications: 0.9 % sodium chloride bolus, 1,000 mL, IntraVENous, Once  0.9 % sodium chloride infusion, , IntraVENous, Continuous  ketorolac (TORADOL) injection 15 mg, 15 mg, IntraVENous, Q6H  cyclobenzaprine (FLEXERIL) tablet 10 mg, 10 mg, Oral, TID PRN  morphine (PF) injection 2 mg, 2 mg, IntraVENous, Q2H PRN **OR** morphine injection 4 mg, 4 mg, IntraVENous, Q2H PRN  HYDROcodone-acetaminophen (NORCO) 5-325 MG per tablet 1 tablet, 1 tablet, Oral, Q4H PRN **OR** HYDROcodone-acetaminophen (NORCO) 5-325 MG per tablet 2 tablet, 2 tablet, Oral, Q4H PRN  docusate sodium (COLACE) capsule 100 mg, 100 mg, Oral, Daily  magnesium hydroxide (MILK OF MAGNESIA) 400 MG/5ML suspension 30 mL, 30 mL, Oral, Daily PRN  aspirin tablet 325 mg, 325 mg, Oral, Daily  acetaminophen (TYLENOL) tablet 650 mg, 650 mg, Oral, Q4H PRN  polyethylene glycol (GLYCOLAX) packet 17 g, 17 g, Oral, Daily  amLODIPine (NORVASC) tablet 5 mg, 5 mg, Oral, Daily  [Held by provider] lisinopril (PRINIVIL;ZESTRIL) tablet 20 mg, 20 mg, Oral, Daily  [Held by provider] metoprolol succinate (TOPROL XL) extended release tablet 25 mg, 25 mg, Oral, Daily  pantoprazole (PROTONIX) tablet 40 mg, 40 mg, Oral, QAM AC  torsemide (DEMADEX) tablet 20 mg, 20 mg, Oral, Daily  Vitamin D3 TABS 5,000 Units, 5,000 Units, Oral, Daily  cetirizine (ZYRTEC) tablet 10 mg, 10 mg, Oral, Daily PRN  lactulose (CHRONULAC) 10 GM/15ML solution 20 g, 20 g, Oral, TID  sodium chloride nebulizer 0.9 % solution 3 mL, 3 mL, Nebulization, As Directed RT PRN  albuterol (PROVENTIL) nebulizer solution 2.5 mg, 2.5 mg, Nebulization, As Directed RT PRN  ondansetron (ZOFRAN) injection 8 mg, 8 mg, IntraVENous, Q4H PRN    REVIEW OF SYSTEMS:  Constitutional: Denies any fever, chills. Derm: Denies any rash or skin color change. Musculoskeletal: Denies numbness and tingling right lower extremity, Pain to right hip. Neuro: Denies any dizziness, paresthesia or weakness.     OBJECTIVE    Patient Vitals for the past 24 hrs:   BP Temp Temp src Pulse Resp SpO2 Height Weight   10/27/21 0745 (!) 82/44 98.1 °F (36.7 °C) Oral 55 16 96 % -- --   10/27/21 0529 (!) 95/44 -- -- -- -- -- -- --   10/27/21 0515 -- -- -- -- -- -- -- 156 lb 3.2 oz (70.9 kg)   10/27/21 0505 (!) 94/41 97.6 °F (36.4 °C) Oral 57 16 96 % -- --   10/27/21 0038 (!) 94/43 97.4 °F (36.3 °C) Oral 60 16 96 % -- --   10/26/21 1900 (!) 105/51 96.8 °F (36 °C) Tympanic -- -- 96 % -- --   10/26/21 1521 -- -- -- -- -- 95 % -- --   10/26/21 1405 (!) 102/44 97.3 °F (36.3 °C) Oral 62 16 96 % -- --   10/26/21 1322 (!) 113/50 -- -- 73 16 97 % -- --   10/26/21 1321 -- -- -- -- -- 96 % -- --   10/26/21 1315 (!) 101/45 96.9 °F (36.1 °C) -- 71 16 97 % -- --   10/26/21 1238 (!) 98/47 -- -- 70 -- 100 % -- --   10/26/21 1235 -- 96.9 °F (36.1 °C) -- -- -- -- -- --   10/26/21 1220 (!) 113/55 -- -- 66 -- 100 % -- --   10/26/21 1215 (!) 112/55 -- -- 68 -- 100 % -- --   10/26/21 1210 (!) 113/54 -- -- 76 18 100 % -- --   10/26/21 1205 (!) 116/57 -- -- 76 18 100 % -- --   10/26/21 1200 (!) 130/56 -- -- 79 18 100 % -- --   10/26/21 1155 (!) 119/54 96.9 °F (36.1 °C) Temporal 83 -- 98 % -- --   10/26/21 0950 (!) 144/63 97.9 °F (36.6 °C) Oral 71 16 100 % 5' 3\" (1.6 m) 149 lb (67.6 kg)     INTAKE/OUTPUT:    Intake/Output Summary (Last 24 hours) at 10/27/2021 1136  Last data filed at 10/27/2021 0530  Gross per 24 hour   Intake 2580 ml   Output 775 ml   Net 1805 ml     I/O last 3 completed shifts: In: 46 [P.O.:690; I.V.:2890]  Out: 775 [Urine:625; Blood:150]    PHYSICAL EXAM:  General appearance:  Alert and oriented x 3. No apparent distress, appears stated age and cooperative. Musculoskeletal: Right lower E:  Denies calf pain to palpation. Good range of motion without deformity. Pt can flex and extend right toes. Right hip incision dry and intact. No redness or drainage noted from incision site. Sensation intact neurovascularly intact to the right foot. Skin: Skin color normal.  No rashes or lesions. Neurologic:  Neurovascularly intact without any focal sensory/motor deficits. Sensation intact. Data  CBC:   Lab Results   Component Value Date    WBC 9.6 10/27/2021    HGB 9.4 10/27/2021     10/27/2021     BMP:    Lab Results   Component Value Date     10/27/2021    K 4.3 10/27/2021     10/27/2021    CO2 25 10/27/2021    BUN 29 10/27/2021    CREATININE 1.27 10/27/2021    CALCIUM 8.3 10/27/2021    GLUCOSE 137 10/27/2021     Uric Acid:  No components found for: URIC  PT/INR:  No results found for: PROTIME, INR  Troponin:  No results found for: TROPONINI  Urine Culture:  No components found for: CURINE    ASSESSMENT:  Active Hospital Problems    Diagnosis Date Noted    Status post total replacement of right hip [Z96.641] 10/26/2021    Osteoarthritis of one hip, right [M16.11] 10/26/2021       PLAN  1. Dry drsg changes as needed   2. Weightbearing as tolerated  3. PT/OT to eval and treat   4. Continue current medical management   5. Patient may discharge to swing bed today when medically stable and working well with therapy.       Electronically signed by YANELY Mohamud CNP on 10/27/2021 at 8:23 AM

## 2021-10-27 NOTE — PROGRESS NOTES
DISCHARGE BARRIERS       Reason for Referral:  SW completed a Psychosocial Assessment for evaluation of patient's mental health, social status, and functional capacity within the community. Sherwin Yoo is a 80year old female admitted due to Status post total replacement of right hip. SW provided supportive listening while patient discussed past medical history. Mental Status:  Alert, oriented, and engaging during assessment. Decision Making:  Makes own decisions. Family/Social/Home Environment: Lives with her daughter in [facility]. Support: Discussed a good social support network  Current Services: None  Current DMEs: None    PCP:  Jessica Lou MD and repots no issues affording medication.  status:  None   ADLs and means of transportation: Independent in ADLs and able to transport herself. Food insecurity or needed financial assistance: Patient denies any food insecurity or financial concerns at this time. ACP and Code Status: Patient is a full code status and does not have an Advance Directive. Collaborative List of SNF/ECF/HH were provided: Patient reports she will be going to [facility] swing bed upon discharge. Anticipated Needs/Discharge Plan:  Spoke with patient about discharge plan. Patient verbalizes understanding of the plan of care and denies discharge needs or further services at this time. SW provided business card. SW will continue to monitor needs and assist as appropriate.           Electronically signed by SHIRA Hoff on 10/27/2021 at 2:16 PM

## 2021-10-27 NOTE — PLAN OF CARE
Problem: Discharge Planning:  Goal: Knowledge of discharge instructions  Description: Knowledge of discharge instructions  10/27/2021 0922 by Jerson Bryant RN  Outcome: Ongoing  10/27/2021 0802 by Mariama Kline RN  Outcome: Ongoing  10/27/2021 0220 by Billy Galvan RN  Outcome: Ongoing   Patient/Family/Responsible party discussed discharge planning, provided with list of SNF/Home health agencies. [address] [place] [address] 759 bed. Information called and faxed.

## 2021-10-27 NOTE — DISCHARGE INSTR - DIET

## 2021-10-27 NOTE — PROGRESS NOTES
Orientation  Orientation  Overall Orientation Status: Within Normal Limits  Cognition      Objective   Bed mobility  Bridging: Stand by assistance  Rolling to Left: Unable to assess  Rolling to Right: Unable to assess  Supine to Sit: Stand by assistance  Sit to Supine: Minimal assistance (required assistance with B LE's)  Scooting: Stand by assistance  Transfers  Sit to Stand: Contact guard assistance  Stand to sit: Contact guard assistance  Bed to Chair: Unable to assess  Stand Pivot Transfers: Unable to assess  Squat Pivot Transfers: Unable to assess  Lateral Transfers: Unable to assess  Car Transfer: Unable to assess  Ambulation  Ambulation?: Yes  More Ambulation?: No  Ambulation 1  Surface: level tile  Device: Rolling Walker  Assistance: Contact guard assistance  Quality of Gait: Patient demonstrated slow, steady gait with decreased step length on right. Gait Deviations: Slow Maureen;Decreased step length;Decreased step height  Distance: 50'x2  Stairs/Curb  Stairs?: No  Neuromuscular Education  NDT Treatment: Gait   Balance  Posture: Good  Sitting - Static: Good  Sitting - Dynamic: Good  Standing - Static: Fair;+  Standing - Dynamic: Fair;+  Exercises  Comments: No LE exercises this date due to focusing on ambulation.   Other exercises  Other exercises?: No                        G-Code     OutComes Score                                                     AM-PAC Score             Goals  Short term goals  Time Frame for Short term goals: LOS  Short term goal 1: bed mobility mod I  Short term goal 2: Transfers mod I  Short term goal 3: Amb x 50ft x 2 with RW x 1 and SBA  Patient Goals   Patient goals : improve strength    Plan    Plan  Times per day: Twice a day  Current Treatment Recommendations: Strengthening, Transfer Training, Endurance Training, Neuromuscular Re-education, Patient/Caregiver Education & Training, Balance Training, Gait Training, Home Exercise Program, Safety Education & Training, Functional Mobility Training  Safety Devices  Type of devices: Call light within reach, Gait belt, Nurse notified, Left in bed     Therapy Time   Individual Concurrent Group Co-treatment   Time In 1315         Time Out 1330         Minutes [address] [place] [address]

## 2021-10-27 NOTE — PROGRESS NOTES
Problem: Patient Care Overview  Goal: Plan of Care Review  Outcome: Ongoing (interventions implemented as appropriate)  Flowsheets  Taken 5/12/2020 0433  Progress: no change  Outcome Summary: Pt reports feeling paranoid. Rates anxiety 8/10 depression 7/10. Denies SI HI AVH.  Taken 5/11/2020 2115  Plan of Care Reviewed With: patient  Patient Agreement with Plan of Care: agrees      Hospitalist Progress Note    Patient:  Paolo Gimenez     YOB: 1940    MRN: [id number]   Admit date: 10/26/2021     Acct: [de-identified]     PCP: Dmitri Martinez MD    CC--Interval History:   POD right JEFF---10.26.2021---[facility]    Lower BP ---> IVF------BP improved    CKD--Stage 3b    [place]    See [place] below     All other ROS negative except noted in HPI    Diet:  ADULT DIET;  Regular    Medications:  Scheduled Meds:   ketorolac  15 mg IntraVENous Q6H    docusate sodium  100 mg Oral Daily    aspirin  325 mg Oral Daily    polyethylene glycol  17 g Oral Daily    amLODIPine  5 mg Oral Daily    [Held by provider] lisinopril  20 mg Oral Daily    [Held by provider] metoprolol succinate  25 mg Oral Daily    pantoprazole  40 mg Oral QAM AC    [Held by provider] torsemide  20 mg Oral Daily    Vitamin D3  5,000 Units Oral Daily    lactulose  20 g Oral TID     Continuous Infusions:   sodium chloride 100 mL/hr at 10/27/21 1138     PRN Meds:cyclobenzaprine, morphine **OR** morphine, HYDROcodone 5 mg - acetaminophen **OR** HYDROcodone 5 mg - acetaminophen, magnesium hydroxide, acetaminophen, cetirizine, sodium chloride nebulizer, albuterol, ondansetron    Objective:  Labs:  CBC with Differential:    Lab Results   Component Value Date    WBC 9.6 10/27/2021    RBC 3.27 10/27/2021    HGB 9.2 10/27/2021    HCT 30.2 10/27/2021     10/27/2021    MCV 93.3 10/27/2021    MCH 28.7 10/27/2021    MCHC 30.8 10/27/2021    RDW 13.0 10/27/2021    LYMPHOPCT 9 10/27/2021    MONOPCT 6 10/27/2021    BASOPCT 0 10/27/2021    MONOSABS 0.59 10/27/2021    LYMPHSABS 0.86 10/27/2021    EOSABS <0.03 10/27/2021    BASOSABS <0.03 10/27/2021    DIFFTYPE NOT REPORTED 10/27/2021     BMP:    Lab Results   Component Value Date     10/27/2021    K 4.3 10/27/2021     10/27/2021    CO2 25 10/27/2021    BUN 29 10/27/2021    LABALBU 3.6 10/26/2021    CREATININE 1.27 10/27/2021    CALCIUM 8.3 10/27/2021    GFRAA 49 10/27/2021    LABGLOM 41 10/27/2021    GLUCOSE 137 10/27/2021           Physical Exam:  Vitals: BP (!) 105/39   Pulse 60   Temp 98.2 °F (36.8 °C) (Oral)   Resp 18   Ht 5' 3\" (1.6 m)   Wt 156 lb 3.2 oz (70.9 kg)   SpO2 98%   BMI 27.67 kg/m²   24 hour intake/output:    Intake/Output Summary (Last 24 hours) at 10/27/2021 1447  Last data filed at 10/27/2021 0530  Gross per 24 hour   Intake 2040 ml   Output 625 ml   Net 1415 ml     Last 3 weights: Wt Readings from Last 3 Encounters:   10/27/21 156 lb 3.2 oz (70.9 kg)   10/06/21 151 lb (68.5 kg)   09/28/21 153 lb (69.4 kg)     HEENT: Normocephalic and Atraumatic  Neck: Supple, No Masses, Tenderness, Nodularity and No Lymphadenopathy  Chest/Lungs: Clear to Auscultation without Rales, Rhonchi, or Wheezes and Distant Breath Sounds  Cardiac: Regular Rate and Rhythm  GI/Abdomen: Bowel Sounds Present and Soft, Non-tender, without Guarding or Rebound Tenderness  : Not examined  EXT/Skin: sp right JEFF---, No Edema, No Cyanosis and No Clubbing  Neuro: But for sp right JEFF---alert---- and No Localizing Signs/Symptoms      Assessment:    Active Problems:    Status post total replacement of right hip    Osteoarthritis of one hip, right  Resolved Problems:    * No resolved hospital problems. *    Darinel Cartagena  81  WF  [FATUMA [place], Orthopedics;  DC Cardiology---[facility]]  FULL CODE         Anti-infectives:    Ancef IV x 3 doses    POD ____   right total hip arthroplasty---JEFF---10.26.2021--Christy  OA right hip    Hypertension             EKG--7.6.2021---NSR---66  Anemia---expected acute blood loss---surgery--10.26.2021  Elevated fasting glucose  GERD  Overweight  Tobacco abuse---2.5 PPD--77.5  PYH---quit--1.1.1989  PMH:   osteopenia, chronic venous insufficiency, spider veins---symptomatic, hyponatremia,               tubular adenoma--colon, bullous pemphigoid,  Vitamin D deficiency, severe OA left hip  PSH:   left cataract---IOL---2018, colonoscopy---biopsy--2017,  abdominal exploration               surgery--ectopic pregnancy---BSO---1972, left JEFF----8.3.2021    Allergies: HCTZ--rash--itching      Plan:  1. To swing beds--[facility]  2. Medications reviewed----holding BP meds--diuretics  3. Follow up Dr. Gael Mathew, Orthopedics  4.    See orders    Electronically signed by Hannah Marlow on 10/27/2021 at 2:47 PM    Hospitalist

## 2021-10-27 NOTE — PLAN OF CARE
Problem: Falls - Risk of:  Goal: Will remain free from falls  Description: Will remain free from falls  10/27/2021 0802 by Octavio Doherty RN  Outcome: Ongoing  10/27/2021 0220 by Pritesh Juares RN  Outcome: Ongoing  Goal: Absence of physical injury  Description: Absence of physical injury  10/27/2021 0802 by Octavio Doherty RN  Outcome: Ongoing  10/27/2021 0220 by Pritesh Juares RN  Outcome: Ongoing     Problem: Discharge Planning:  Goal: Knowledge of discharge instructions  Description: Knowledge of discharge instructions  10/27/2021 0802 by Octavio Doherty RN  Outcome: Ongoing  10/27/2021 0220 by Pritesh Juares RN  Outcome: Ongoing     Problem: Infection - Surgical Site:  Goal: Signs of wound healing will improve  Description: Signs of wound healing will improve  10/27/2021 0802 by Octavio Doherty RN  Outcome: Ongoing  10/27/2021 0220 by Pritesh Juares RN  Outcome: Ongoing     Problem: Mobility - Impaired:  Goal: Achieve maximum mobility level  Description: Achieve maximum mobility level  10/27/2021 0802 by Octavio Doherty RN  Outcome: Ongoing  10/27/2021 0220 by Pritesh Juares RN  Outcome: Ongoing     Problem: Pain - Acute:  Goal: Pain level will decrease  Description: Pain level will decrease  10/27/2021 0802 by Octavio Doherty RN  Outcome: Ongoing  10/27/2021 0220 by Pritesh Juares RN  Outcome: Ongoing

## 2021-10-27 NOTE — FLOWSHEET NOTE
rounding on PCU    Assessment: Patient is waiting discharge to rehab facility following hip surgery. She reports doing well and looking forward to getting home. Intervention: Engaged in conversation. Patient expressed appreciation for visit and offer of continued prayer. Plan: Chaplains are available on site or on call 24/7 for spiritual and emotional support.      10/27/21 1440   Encounter Summary   Services provided to: Patient and family together   Referral/Consult From: [facility] Family members   Continue Visiting   (10/27/21)   Complexity of Encounter   (10/27/21)   Length of Encounter 15 minutes   Spiritual/Hindu   Type Spiritual support   Assessment Approachable   Intervention Active listening;Sustaining presence/ Ministry of presence   Outcome Expressed gratitude;Engaged in conversation

## 2021-10-27 NOTE — PROGRESS NOTES
Physical Therapy    Facility/Department: [facility]  PROGRESSIVE CARE  Initial Assessment    NAME: Paolo Gimenez  : 1940  MRN: [id number]    Date of Service: 10/27/2021    Discharge Recommendations:  Subacute/Skilled Nursing Facility, Continue to assess pending progress        Assessment   Body structures, Functions, Activity limitations: Decreased functional mobility ; Decreased balance;Decreased ROM; Decreased strength  Prognosis: Good  Decision Making: Low Complexity  REQUIRES PT FOLLOW UP: Yes  Activity Tolerance  Activity Tolerance: Patient Tolerated treatment well       Patient Diagnosis(es): The encounter diagnosis was Status post total replacement of right hip.     has a past medical history of Bullous pemphigoid, Elevated fasting glucose, Former smoker, GERD (gastroesophageal reflux disease), Hypertension, Osteopenia, and Overweight(278.02). has a past surgical history that includes Abdominal exploration surgery (); pr colonoscopy w/biopsy single/multiple (N/A, 2017); Cataract removal with implant (Left, 2018); Total hip arthroplasty (Left, 8/3/2021); Ovary removal; Hysterectomy; and Total hip arthroplasty (Right, 10/26/2021).     Restrictions  Restrictions/Precautions  Restrictions/Precautions: Weight Bearing  Lower Extremity Weight Bearing Restrictions  Right Lower Extremity Weight Bearing: Weight Bearing As Tolerated  Vision/Hearing  Hearing: Within functional limits     Subjective  General  Chart Reviewed: Yes  Patient assessed for rehabilitation services?: Yes  Pain Screening  Patient Currently in Pain: Denies  Pain Assessment  Pain Type: Surgical pain  Vital Signs  Patient Currently in Pain: Denies       Orientation  Orientation  Overall Orientation Status: Within Functional Limits  Social/Functional History  Social/Functional History  Lives With: Daughter  Type of Home: House  Home Layout: One level  Home Access: Stairs to enter with rails  Entrance Stairs - Number of Steps: 4  Entrance Stairs - Rails: Left  Bathroom Shower/Tub: Walk-in shower  Bathroom Toilet: Handicap height  Bathroom Equipment: Grab bars in shower, Shower chair, Grab bars around toilet  Home Equipment: 4 wheeled walker, Rolling walker  ADL Assistance: [address]: [address] [facility] Responsibilities: Yes  Ambulation Assistance: Independent  Transfer Assistance: Independent  Active : Yes  Mode of Transportation: Car  Cognition        Objective     Observation/Palpation  Posture: Fair    AROM RLE (degrees)  RLE General AROM: hip flex to 90  AROM LLE (degrees)  LLE AROM : WFL  Strength RLE  Comment: grossly 4/5  Strength LLE  Strength LLE: WFL           Transfers  Sit to Stand: Contact guard assistance  Stand to sit: Contact guard assistance  Ambulation  Ambulation?: Yes  Ambulation 1  Surface: level tile  Device: Rolling Walker  Assistance: Contact guard assistance  Gait Deviations: Slow Maureen  Distance: 15' x 1     Balance  Posture: Good  Sitting - Static: Good  Sitting - Dynamic: Good  Standing - Static: Fair;+  Standing - Dynamic: [address]  Times per day: Twice a day  Current Treatment Recommendations: Strengthening, Transfer Training, Endurance Training, Neuromuscular Re-education, Patient/Caregiver Education & Training, Balance Training, Gait Training, Home Exercise Program, Safety Education & Training, Functional Mobility Training  Safety Devices  Type of devices: Nurse notified, Call light within reach, Left in chair    G-Code       OutComes Score                                                  AM-PAC Score             Goals  Short term goals  Time Frame for Short term goals: LOS  Short term goal 1: bed mobility mod I  Short term goal 2: Transfers mod I  Short term goal 3: Amb x 50ft x 2 with RW x 1 and SBA  Patient Goals   Patient goals : improve strength       Therapy Time   Individual Concurrent Group Co-treatment   Time In 0815         Time Out 0831 Minutes [address] [place], [place]

## 2021-10-27 NOTE — PLAN OF CARE
Problem: Falls - Risk of:  Goal: Will remain free from falls  Description: Will remain free from falls  10/27/2021 0220 by Magaly Castillo RN  Outcome: Ongoing  10/26/2021 1634 by Heidi Lyons RN  Outcome: Ongoing  Goal: Absence of physical injury  Description: Absence of physical injury  10/27/2021 0220 by Magaly Castillo RN  Outcome: Ongoing  10/26/2021 1634 by Heidi Lyons RN  Outcome: Ongoing     Problem: Discharge Planning:  Goal: Knowledge of discharge instructions  Description: Knowledge of discharge instructions  10/27/2021 0220 by Magaly Castillo RN  Outcome: Ongoing  10/26/2021 1634 by Heidi Lyons RN  Outcome: Ongoing     Problem: Infection - Surgical Site:  Goal: Signs of wound healing will improve  Description: Signs of wound healing will improve  10/27/2021 0220 by Magaly Castillo RN  Outcome: Ongoing  10/26/2021 1634 by Heidi Lyons RN  Outcome: Ongoing     Problem: Mobility - Impaired:  Goal: Achieve maximum mobility level  Description: Achieve maximum mobility level  10/27/2021 0220 by Magaly Castillo RN  Outcome: Ongoing  10/26/2021 1634 by Heidi Lyons RN  Outcome: Ongoing     Problem: Pain - Acute:  Goal: Pain level will decrease  Description: Pain level will decrease  10/27/2021 0220 by aMgaly Castillo RN  Outcome: Ongoing  10/26/2021 1634 by Heidi Lyons RN  Outcome: Ongoing

## 2021-10-28 ENCOUNTER — TELEPHONE (OUTPATIENT)
Dept: FAMILY MEDICINE CLINIC | Age: 81
End: 2021-10-28

## 2021-10-28 NOTE — DISCHARGE SUMMARY
Orthopaedic Discharge Summary     Patient ID:  Grace April  [id number]  13 y.o.  1940    Admit date: 10/26/2021    Discharge date and time: 10/27/2021  2:46 PM     Admitting Physician: Priscilla Lyoola MD     Discharge Physician: Kalpana Negron    Admission Diagnoses: Osteoarthritis of one hip, right [M16.11]    Discharge Diagnoses: s/p R JEFF    Admission Condition: good    Discharged Condition: good    Indication for Admission: The patient is a 80y.o.-year-old with Right hip pain for quite some time. The patient has tried activity modification, pain medication without relief. Occasionally assistive devices such as rails are required to mobilize. NSAIDS have not been satisfactory. On physical exam there is limited ROM, pain, and crepitus. X-rays demonstrate bone on bone arthritis. Discussed the option of doing a total hip replacement and we have elected to proceed     Surgical procedure: R JEFF    Consults: IM        Disposition: swing bed -rehab     Patient Instructions:   Discharge Medication List as of 10/27/2021  2:50 PM      START taking these medications    Details   HYDROcodone-acetaminophen (NORCO) 5-325 MG per tablet Take 1-2 tablets by mouth every 4-6 hours as needed for Pain for up to 7 days. , Disp-42 tablet, R-0Print      aspirin (KIRTI ASPIRIN) 325 MG tablet Take 1 tablet by mouth daily, Disp-30 tablet, R-0Print         CONTINUE these medications which have CHANGED    Details   lisinopril (PRINIVIL;ZESTRIL) 20 MG tablet Take 1 tablet by mouth daily, Disp-90 tablet, R-0NO PRINT      metoprolol succinate (TOPROL XL) 25 MG extended release tablet Take 1 tablet by mouth once daily, Disp-90 tablet, R-0NO PRINT      torsemide (DEMADEX) 20 MG tablet Take 1 tablet by mouth once daily, Disp-90 tablet, R-1NO PRINT      amLODIPine (NORVASC) 5 MG tablet Take 1 tablet by mouth once daily, Disp-90 tablet, R-0NO PRINT         CONTINUE these medications which have NOT CHANGED    Details   terbinafine (LAMISIL) 250 MG tablet Take 1 tablet by mouth daily, Disp-84 tablet, R-0Normal      omeprazole (PRILOSEC) 20 MG delayed release capsule TAKE 1 CAPSULE BY MOUTH ONCE DAILY AS NEEDED FOR  REFLUX, Disp-90 capsule, R-1Normal      cetirizine (ZYRTEC) 10 MG tablet Take 10 mg by mouth dailyHistorical Med      Acetaminophen (TYLENOL ARTHRITIS PAIN PO) Take by mouth as needed Historical Med      Multiple Vitamins-Minerals (PRESERVISION AREDS 2 PO) Take by mouth dailyHistorical Med      albuterol sulfate  (90 BASE) MCG/ACT inhaler Inhale 2 puffs into the lungs every 4 hours as needed for Wheezing or Shortness of Breath, Disp-1 Inhaler, R-0      vitamin D-3 (CHOLECALCIFEROL) 5000 UNITS TABS Take 5,000 Units by mouth daily. Activity: activity as tolerated  Diet: regular diet  Wound Care: keep wound clean and dry    Follow-up with Christy in 8 weeks.     Signed:  YANELY Smith CNP  10/28/2021  9:09 AM

## 2021-11-02 ENCOUNTER — TELEPHONE (OUTPATIENT)
Dept: FAMILY MEDICINE CLINIC | Age: 81
End: 2021-11-02

## 2021-11-02 NOTE — TELEPHONE ENCOUNTER
----- Message from Danayskye Elida sent at 11/2/2021  1:07 PM EDT -----  Subject: Hospital Follow Up    QUESTIONS  What hospital was the Patient Discharged from? [facility]  Date of Discharge? 2021-11-02  Discharge Location? Home  Reason for hospitalization as patient stated? hip replacement at [facility],   went to [facility] for swing bed  What question does the patient have, if applicable?   ---------------------------------------------------------------------------  --------------  CALL BACK INFO  What is the best way for the office to contact you? OK to leave message on   voicemail  Preferred Call Back Phone Number? [phone]  ---------------------------------------------------------------------------  --------------  SCRIPT ANSWERS  Relationship to Patient? Third Party  Representative Name? Nurse ean  (Patient requests to see provider urgently. )? No  (Has the patient been discharged from the hospital within 2 business days   AND does not have a Telephone Encounter  Follow Up From [address] [place] [address]   documented in [address]?)? Yes  Do you have any questions for your primary care provider that need to be   answered prior to your appointment? (Use RN Triage if question pertains to   anything on the red flag list)? No  (Patient needs follow up visit after hospital discharge) Book first   available appointment within 7 days OF DISCHARGE, if no appt, proceed to   book the next available time slot within 14 days OF DISCHARGE AND Send   Message to Provider. [facility] Follow Up appointment cannot be booked   beyond 14 Days and should result in a Message to Provider. ?  Yes

## 2021-11-03 NOTE — TELEPHONE ENCOUNTER
[facility] Transitions Initial Follow Up Call    Outreach made within 2 business days of discharge: Yes    Patient: Alex Antonio Patient : 1940   MRN: [id number]  Reason for Admission: 10/26/2021  Discharge Date: 2021       Spoke with: patient    Discharge department/facility: [facility] bed 10/26/2021 right total hip replacement at [facility]    TCM Interactive Patient Contact:  Was patient able to fill all prescriptions: Yes  Was patient instructed to bring all medications to the follow-up visit: Yes  Is patient taking all medications as directed in the discharge summary?  Yes  Does patient understand their discharge instructions: Yes  Does patient have questions or concerns that need addressed prior to 7-14 day follow up office visit: no    Scheduled appointment with PCP within 7-14 days    Follow Up  Future Appointments   Date Time Provider Jairon Kinsey   11/15/2021  4:40 PM YANELY Maldonado - CNP [facility]   2021 10:00 AM Eulalia Razo MD [facility]   2022  4:20 PM Malena Erickson MD [facility]   2022  3:30 PM [facility]       Aylin Clement RN

## 2021-11-03 NOTE — TELEPHONE ENCOUNTER
Noted.  I would prefer that the transitional care visit be scheduled with me, if possible. Can she come to Urgent Care on 11/11/2021 (with the understanding that the Urgent Care schedule can be unpredictable)?

## 2021-11-05 NOTE — TELEPHONE ENCOUNTER
Patient willing to come in through urgent care on 11/11/2021 to see Dr Kamran Ohara per her request.

## 2021-11-09 ENCOUNTER — OFFICE VISIT (OUTPATIENT)
Dept: PRIMARY CARE CLINIC | Age: 81
End: 2021-11-09
Payer: MEDICARE

## 2021-11-09 ENCOUNTER — HOSPITAL ENCOUNTER (OUTPATIENT)
Age: 81
Discharge: HOME | End: 2021-11-11
Payer: MEDICARE

## 2021-11-09 VITALS
WEIGHT: 145 LBS | TEMPERATURE: 98.2 F | BODY MASS INDEX: 25.69 KG/M2 | DIASTOLIC BLOOD PRESSURE: 74 MMHG | SYSTOLIC BLOOD PRESSURE: 126 MMHG | HEART RATE: 74 BPM

## 2021-11-09 DIAGNOSIS — M79.89 RIGHT LEG SWELLING: Primary | ICD-10-CM

## 2021-11-09 PROCEDURE — 1123F ACP DISCUSS/DSCN MKR DOCD: CPT | Performed by: NURSE PRACTITIONER

## 2021-11-09 PROCEDURE — 1090F PRES/ABSN URINE INCON ASSESS: CPT | Performed by: NURSE PRACTITIONER

## 2021-11-09 PROCEDURE — 4040F PNEUMOC VAC/ADMIN/RCVD: CPT | Performed by: NURSE PRACTITIONER

## 2021-11-09 PROCEDURE — 1036F TOBACCO NON-USER: CPT | Performed by: NURSE PRACTITIONER

## 2021-11-09 PROCEDURE — G8417 CALC BMI ABV UP PARAM F/U: HCPCS | Performed by: NURSE PRACTITIONER

## 2021-11-09 PROCEDURE — 99213 OFFICE O/P EST LOW 20 MIN: CPT | Performed by: NURSE PRACTITIONER

## 2021-11-09 PROCEDURE — G8427 DOCREV CUR MEDS BY ELIG CLIN: HCPCS | Performed by: NURSE PRACTITIONER

## 2021-11-09 PROCEDURE — G8399 PT W/DXA RESULTS DOCUMENT: HCPCS | Performed by: NURSE PRACTITIONER

## 2021-11-09 PROCEDURE — G8484 FLU IMMUNIZE NO ADMIN: HCPCS | Performed by: NURSE PRACTITIONER

## 2021-11-09 RX ORDER — HYDROCODONE BITARTRATE AND ACETAMINOPHEN 5; 325 MG/1; MG/1
1 TABLET ORAL EVERY 6 HOURS PRN
COMMUNITY
End: 2022-04-06

## 2021-11-09 ASSESSMENT — PATIENT HEALTH QUESTIONNAIRE - PHQ9
1. LITTLE INTEREST OR PLEASURE IN DOING THINGS: 0
SUM OF ALL RESPONSES TO PHQ QUESTIONS 1-9: 0
2. FEELING DOWN, DEPRESSED OR HOPELESS: 0
SUM OF ALL RESPONSES TO PHQ9 QUESTIONS 1 & 2: 0

## 2021-11-09 ASSESSMENT — ENCOUNTER SYMPTOMS
CHEST TIGHTNESS: 0
SHORTNESS OF BREATH: 0
WHEEZING: 0
COUGH: 0

## 2021-11-09 NOTE — PATIENT INSTRUCTIONS
Recommend resting tonight and elevating legs. Increase your water intake. Complete doppler study tomorrow. If you develop pain, chest pain/pressure, difficulty breathing, shortness or breath, r anything concerning, please go to ER. Patient Education        Leg and Ankle Edema: Care Instructions  Your Care Instructions  Swelling in the legs, ankles, and feet is called edema. It is common after you sit or stand for a while. Long plane flights or car rides often cause swelling in the legs and feet. You may also have swelling if you have to stand for long periods of time at your job. Problems with the veins in the legs (varicose veins) and changes in hormones can also cause swelling. Sometimes the swelling in the ankles and feet is caused by a more serious problem, such as heart failure, infection, blood clots, or liver or kidney disease. Follow-up care is a key part of your treatment and safety. Be sure to make and go to all appointments, and call your doctor if you are having problems. It's also a good idea to know your test results and keep a list of the medicines you take. How can you care for yourself at home? · If your doctor gave you medicine, take it as prescribed. Call your doctor if you think you are having a problem with your medicine. · Whenever you are resting, raise your legs up. Try to keep the swollen area higher than the level of your heart. · Take breaks from standing or sitting in one position. ? Walk around to increase the blood flow in your lower legs. ? Move your feet and ankles often while you stand, or tighten and relax your leg muscles. · Wear support stockings. Put them on in the morning, before swelling gets worse. · Eat a balanced diet. Lose weight if you need to. · Limit the amount of salt (sodium) in your diet. Salt holds fluid in the body and may increase swelling. When should you call for help? Call 911 anytime you think you may need emergency care.  For example, call if:    · You have symptoms of a blood clot in your lung (called a pulmonary embolism). These may include:  ? Sudden chest pain. ? Trouble breathing. ? Coughing up blood. Call your doctor now or seek immediate medical care if:    · You have signs of a blood clot, such as:  ? Pain in your calf, back of the knee, thigh, or groin. ? Redness and swelling in your leg or groin.     · You have symptoms of infection, such as:  ? Increased pain, swelling, warmth, or redness. ? Red streaks or pus. ? A fever. Watch closely for changes in your health, and be sure to contact your doctor if:    · Your swelling is getting worse.     · You have new or worsening pain in your legs.     · You do not get better as expected. Where can you learn more? Go to https://Search [employer]. org and sign in to your [employer] account. Enter T896 in the [employer] box to learn more about \"Leg and Ankle Edema: Care Instructions. \"     If you do not have an account, please click on the \"Sign Up Now\" link. Current as of: July 1, 2021               Content Version: 13.0  © 1216-0533 [employer]. Care instructions adapted under license by [facility] ([facility]). If you have questions about a medical condition or this instruction, always ask your healthcare professional. [address] any warranty or liability for your use of this information.

## 2021-11-09 NOTE — PROGRESS NOTES
[address] [place] [address]  Dept: [phone]  Dept Fax: [phone]  Loc: [facility]       Chief Complaint   Patient presents with    Leg Swelling     right leg hip foot hip sugery on 10/28/2021       Nurses Notes reviewed and I agree except as noted in the HPI. HISTORY OF PRESENT ILLNESS   Sekou Elder is a 80 y.o. female who presents to [facility] today (11/9/2021) for evaluation of: Other  This is a new (right leg swelling) problem. The current episode started in the past 7 days (x 2 days). The problem occurs constantly. The problem has been unchanged. Pertinent negatives include no arthralgias, chest pain, chills, coughing, fever, myalgias or numbness. Nothing aggravates the symptoms. She has tried nothing for the symptoms. Hx total hip 10/26/21. Pt state was at physical therapy today and was advised by therapist to go to  to have swelling evaluated/rule out blood clot. Pt is currently on demadex for lower extremity swelling, states had been taking as usual.  Pt admits to sitting often with legs dependent. Denies pain, redness, warmth, SOB, or chest pain/pressure. REVIEW OF SYSTEMS     Review of Systems   Constitutional: Negative for chills and fever. Respiratory: Negative for cough, chest tightness, shortness of breath and wheezing. Cardiovascular: Positive for leg swelling (right leg). Negative for chest pain. Musculoskeletal: Negative for arthralgias and myalgias. Neurological: Negative for numbness.        PAST MEDICAL HISTORY         Diagnosis Date    Bullous pemphigoid     Elevated fasting glucose     Former smoker     quit in 1987    GERD (gastroesophageal reflux disease)     Hypertension     Osteopenia     took Boniva times 5 years, stopped 2012    Overweight(278.02)     wieght 174 pounds, height 65 inches, BMI 29, 3/31/2013       SURGICAL HISTORY     Patient  has a past surgical history that includes Abdominal exploration surgery (1972); pr colonoscopy w/biopsy single/multiple (N/A, 7/26/2017); Cataract removal with implant (Left, 07/24/2018); Total hip arthroplasty (Left, 8/3/2021); Ovary removal; Hysterectomy; and Total hip arthroplasty (Right, 10/26/2021). CURRENT MEDICATIONS       Outpatient Medications Prior to Visit   Medication Sig Dispense Refill    HYDROcodone-acetaminophen (NORCO) 5-325 MG per tablet Take 1 tablet by mouth every 6 hours as needed for Pain.  lisinopril (PRINIVIL;ZESTRIL) 20 MG tablet Take 1 tablet by mouth daily 90 tablet 0    metoprolol succinate (TOPROL XL) 25 MG extended release tablet Take 1 tablet by mouth once daily 90 tablet 0    torsemide (DEMADEX) 20 MG tablet Take 1 tablet by mouth once daily 90 tablet 1    amLODIPine (NORVASC) 5 MG tablet Take 1 tablet by mouth once daily 90 tablet 0    aspirin (KIRTI ASPIRIN) 325 MG tablet Take 1 tablet by mouth daily 30 tablet 0    terbinafine (LAMISIL) 250 MG tablet Take 1 tablet by mouth daily 84 tablet 0    omeprazole (PRILOSEC) 20 MG delayed release capsule TAKE 1 CAPSULE BY MOUTH ONCE DAILY AS NEEDED FOR  REFLUX 90 capsule 1    cetirizine (ZYRTEC) 10 MG tablet Take 10 mg by mouth daily      Acetaminophen (TYLENOL ARTHRITIS PAIN PO) Take by mouth as needed       Multiple Vitamins-Minerals (PRESERVISION AREDS 2 PO) Take by mouth daily      albuterol sulfate  (90 BASE) MCG/ACT inhaler Inhale 2 puffs into the lungs every 4 hours as needed for Wheezing or Shortness of Breath 1 Inhaler 0    vitamin D-3 (CHOLECALCIFEROL) 5000 UNITS TABS Take 5,000 Units by mouth daily. No facility-administered medications prior to visit. ALLERGIES     Patient is has No Known Allergies.     FAMILY HISTORY     Patient's family history includes Breast Cancer in her mother; Cancer in her mother; Diabetes in her brother, brother, and mother; Heart Disease in her father; No Known Problems in her daughter, maternal aunt, maternal aunt, maternal aunt, maternal aunt, paternal aunt, paternal aunt, paternal aunt, paternal aunt, paternal aunt, and sister. SOCIAL HISTORY     Patient  reports that she quit smoking about 32 years ago. Her smoking use included cigarettes. She started smoking about 63 years ago. She has a 77.50 pack-year smoking history. She has never used smokeless tobacco. She reports that she does not drink alcohol and does not use drugs. PHYSICAL EXAM     VITALS  BP: 126/74, Temp: 98.2 °F (36.8 °C), Pulse: 74,  ,    Physical Exam  Vitals reviewed. Constitutional:       General: She is not in acute distress. Appearance: She is not ill-appearing. Cardiovascular:      Rate and Rhythm: Normal rate and regular rhythm. Heart sounds: Normal heart sounds, S1 normal and S2 normal. No murmur heard. No S3 or S4 sounds. Pulmonary:      Effort: Pulmonary effort is normal. No respiratory distress. Breath sounds: Normal breath sounds. Musculoskeletal:      Cervical back: Normal range of motion and neck supple. Right hip: No tenderness. Right lower le+ Pitting Edema (Edema noted up to knee) present. Left lower le+ Edema present. Legs:       Comments: Healing surgical incision noted to right lateral hip/thigh. Clear closure dressing in place, clean, dry, and intact. No redness, warmth, tenderness, or active drainage/bleeding noted. No redness, warmth, or tenderness appreciated on entire right leg. Negative Adele's. Lymphadenopathy:      Cervical: No cervical adenopathy. Skin:     General: Skin is warm and dry. Capillary Refill: Capillary refill takes less than 2 seconds. Neurological:      General: No focal deficit present. Mental Status: She is alert. DIAGNOSTIC RESULTS   Labs:No results found for this visit on 21.     IMAGING:        CLINICAL COURSE: Vitals:    11/09/21 1755   BP: 126/74   Site: Right Upper Arm   Position: Sitting   Cuff Size: Large Adult   Pulse: 74   Temp: 98.2 °F (36.8 °C)   TempSrc: Tympanic   Weight: 145 lb (65.8 kg)           PROCEDURES:  None  FINAL IMPRESSION      1. Right leg swelling         DISPOSITION/PLAN     Will order doppler to ensure there is no DVT. Pt declines to have this done tonight, states will return tomorrow to complete. Recommend pt rest and elevate legs. Instructed pt to go to ER should she develop any SOB, difficulty breathing, chest pain/pressure, or any acutely concerning symptoms. The use, risks, benefits, and potential side effects of prescribed and/or recommended medications were discussed. All questions were answered and the patient/caregiver voiced understanding. Patient Instructions     Recommend resting tonight and elevating legs. Increase your water intake. Complete doppler study tomorrow. If you develop pain, chest pain/pressure, difficulty breathing, shortness or breath, r anything concerning, please go to ER. Patient Education        Leg and Ankle Edema: Care Instructions  Your Care Instructions  Swelling in the legs, ankles, and feet is called edema. It is common after you sit or stand for a while. Long plane flights or car rides often cause swelling in the legs and feet. You may also have swelling if you have to stand for long periods of time at your job. Problems with the veins in the legs (varicose veins) and changes in hormones can also cause swelling. Sometimes the swelling in the ankles and feet is caused by a more serious problem, such as heart failure, infection, blood clots, or liver or kidney disease. Follow-up care is a key part of your treatment and safety. Be sure to make and go to all appointments, and call your doctor if you are having problems. It's also a good idea to know your test results and keep a list of the medicines you take.   How can you care for yourself at home?  · If your doctor gave you medicine, take it as prescribed. Call your doctor if you think you are having a problem with your medicine. · Whenever you are resting, raise your legs up. Try to keep the swollen area higher than the level of your heart. · Take breaks from standing or sitting in one position. ? Walk around to increase the blood flow in your lower legs. ? Move your feet and ankles often while you stand, or tighten and relax your leg muscles. · Wear support stockings. Put them on in the morning, before swelling gets worse. · Eat a balanced diet. Lose weight if you need to. · Limit the amount of salt (sodium) in your diet. Salt holds fluid in the body and may increase swelling. When should you call for help? Call 911 anytime you think you may need emergency care. For example, call if:    · You have symptoms of a blood clot in your lung (called a pulmonary embolism). These may include:  ? Sudden chest pain. ? Trouble breathing. ? Coughing up blood. Call your doctor now or seek immediate medical care if:    · You have signs of a blood clot, such as:  ? Pain in your calf, back of the knee, thigh, or groin. ? Redness and swelling in your leg or groin.     · You have symptoms of infection, such as:  ? Increased pain, swelling, warmth, or redness. ? Red streaks or pus. ? A fever. Watch closely for changes in your health, and be sure to contact your doctor if:    · Your swelling is getting worse.     · You have new or worsening pain in your legs.     · You do not get better as expected. Where can you learn more? Go to [email or web address]://HelpHivejoseIfOnly.[employer]. org and sign in to your [employer] account. Enter G453 in the [facility] box to learn more about \"Leg and Ankle Edema: Care Instructions. \"     If you do not have an account, please click on the \"Sign Up Now\" link. Current as of: July 1, 2021               Content Version: 13.0  © 8221-0178 Healthwise, [facility].    Care instructions adapted under license by [facility] ([facility]). If you have questions about a medical condition or this instruction, always ask your healthcare professional. [place] [address] any warranty or liability for your use of this information. Orders Placed This Encounter   Procedures    US DUP LOWER EXTREMITY RIGHT SHERRI     Standing Status:   Future     Standing Expiration Date:   12/9/2021     Order Specific Question:   Reason for exam:     Answer:   Right leg swelling x 2 days. Hx right hip surgery 10/26/21     Outpatient Encounter Medications as of 11/9/2021   Medication Sig Dispense Refill    HYDROcodone-acetaminophen (NORCO) 5-325 MG per tablet Take 1 tablet by mouth every 6 hours as needed for Pain.  lisinopril (PRINIVIL;ZESTRIL) 20 MG tablet Take 1 tablet by mouth daily 90 tablet 0    metoprolol succinate (TOPROL XL) 25 MG extended release tablet Take 1 tablet by mouth once daily 90 tablet 0    torsemide (DEMADEX) 20 MG tablet Take 1 tablet by mouth once daily 90 tablet 1    amLODIPine (NORVASC) 5 MG tablet Take 1 tablet by mouth once daily 90 tablet 0    aspirin (KIRTI ASPIRIN) 325 MG tablet Take 1 tablet by mouth daily 30 tablet 0    terbinafine (LAMISIL) 250 MG tablet Take 1 tablet by mouth daily 84 tablet 0    omeprazole (PRILOSEC) 20 MG delayed release capsule TAKE 1 CAPSULE BY MOUTH ONCE DAILY AS NEEDED FOR  REFLUX 90 capsule 1    cetirizine (ZYRTEC) 10 MG tablet Take 10 mg by mouth daily      Acetaminophen (TYLENOL ARTHRITIS PAIN PO) Take by mouth as needed       Multiple Vitamins-Minerals (PRESERVISION AREDS 2 PO) Take by mouth daily      albuterol sulfate  (90 BASE) MCG/ACT inhaler Inhale 2 puffs into the lungs every 4 hours as needed for Wheezing or Shortness of Breath 1 Inhaler 0    vitamin D-3 (CHOLECALCIFEROL) 5000 UNITS TABS Take 5,000 Units by mouth daily. No facility-administered encounter medications on file as of 11/9/2021. No follow-ups on file.                 Electronically signed by YANELY Brantley CNP on 11/9/2021 at 9:03 PM

## 2021-11-10 ENCOUNTER — HOSPITAL ENCOUNTER (OUTPATIENT)
Dept: INTERVENTIONAL RADIOLOGY/VASCULAR | Age: 81
Discharge: HOME | End: 2021-11-12
Payer: MEDICARE

## 2021-11-10 DIAGNOSIS — M79.89 RIGHT LEG SWELLING: ICD-10-CM

## 2021-11-10 PROCEDURE — 93971 EXTREMITY STUDY: CPT

## 2021-11-11 ENCOUNTER — TELEPHONE (OUTPATIENT)
Dept: FAMILY MEDICINE CLINIC | Age: 81
End: 2021-11-11

## 2021-11-11 ENCOUNTER — OFFICE VISIT (OUTPATIENT)
Dept: PRIMARY CARE CLINIC | Age: 81
End: 2021-11-11
Payer: MEDICARE

## 2021-11-11 VITALS
TEMPERATURE: 96.8 F | OXYGEN SATURATION: 95 % | HEART RATE: 71 BPM | SYSTOLIC BLOOD PRESSURE: 134 MMHG | WEIGHT: 152.6 LBS | BODY MASS INDEX: 27.04 KG/M2 | DIASTOLIC BLOOD PRESSURE: 70 MMHG | HEIGHT: 63 IN

## 2021-11-11 DIAGNOSIS — R79.89 ELEVATED SERUM CREATININE: ICD-10-CM

## 2021-11-11 DIAGNOSIS — Z96.641 STATUS POST TOTAL REPLACEMENT OF RIGHT HIP: Primary | ICD-10-CM

## 2021-11-11 DIAGNOSIS — D64.9 ANEMIA, UNSPECIFIED TYPE: ICD-10-CM

## 2021-11-11 PROCEDURE — 99495 TRANSJ CARE MGMT MOD F2F 14D: CPT | Performed by: FAMILY MEDICINE

## 2021-11-11 PROCEDURE — 99212 OFFICE O/P EST SF 10 MIN: CPT | Performed by: FAMILY MEDICINE

## 2021-11-11 ASSESSMENT — PATIENT HEALTH QUESTIONNAIRE - PHQ9
SUM OF ALL RESPONSES TO PHQ QUESTIONS 1-9: 0
SUM OF ALL RESPONSES TO PHQ QUESTIONS 1-9: 0
SUM OF ALL RESPONSES TO PHQ9 QUESTIONS 1 & 2: 0
SUM OF ALL RESPONSES TO PHQ QUESTIONS 1-9: 0
2. FEELING DOWN, DEPRESSED OR HOPELESS: 0
1. LITTLE INTEREST OR PLEASURE IN DOING THINGS: 0

## 2021-11-11 NOTE — PROGRESS NOTES
Post-Discharge Transitional Care Management Services or Hospital Follow Up      Jono Rice   YOB: 1940    Assessment and Plan:  1. Status post total replacement of right hip  She is progressing well. Her pain is controlled. She plans to resume physical therapy next week. She has a follow up appointment scheduled with orthopedic surgery. 2. Anemia, unspecified type  Post-operative anemia vs. other  Hemoglobin was decreased (9.4 g/dL) on 10/27/2021.    - CBC Auto Differential; Future was ordered to be done next week. 3. Elevated serum creatinine  Creatinine was elevated (1.27 mg/dL) on 10/27/2021.  - Basic Metabolic Panel; Future was ordered to be done next week. Date of Office Visit:  11/11/2021  Date of Hospital Admission: 10/26/21  Date of Hospital Discharge: 10/27/21   Date of discharge from [facility] bed:  11/2/2021  Risk of hospital readmission (high >=14%. Medium >=10%) :Readmission Risk Score: 12      Care management risk score Rising risk (score 2-5) and Complex Care (Scores >=6): 0     Non face to face  following discharge, date last encounter closed (first attempt may have been earlier): 11/3/2021  6:02 PM    Call initiated 2 business days of discharge: Yes    Patient Active Problem List   Diagnosis    Essential hypertension    GERD (gastroesophageal reflux disease)    Elevated fasting glucose    Osteopenia    Overweight (BMI 25.0-29. 9)    Venous insufficiency (chronic) (peripheral)    Spider vein, symptomatic    Varicose vein    Hyponatremia, mild    Tubular adenoma of colon    Bullous pemphigoid    Vitamin D deficiency    Osteoarthritis of left hip    Status post total replacement of left hip    Status post total replacement of right hip    Osteoarthritis of one hip, right       No Known Allergies    Medications listed as ordered at the time of discharge from hospital  Same as medications marked as \"taking. \"       Medications marked \"taking\" at this time  Outpatient Medications Marked as Taking for the 11/11/21 encounter (Office Visit) with Иван Pimentel MD   Medication Sig Dispense Refill    FUROSEMIDE PO Take by mouth      HYDROcodone-acetaminophen (NORCO) 5-325 MG per tablet Take 1 tablet by mouth every 6 hours as needed for Pain.  lisinopril (PRINIVIL;ZESTRIL) 20 MG tablet Take 1 tablet by mouth daily 90 tablet 0    metoprolol succinate (TOPROL XL) 25 MG extended release tablet Take 1 tablet by mouth once daily 90 tablet 0    torsemide (DEMADEX) 20 MG tablet Take 1 tablet by mouth once daily 90 tablet 1    amLODIPine (NORVASC) 5 MG tablet Take 1 tablet by mouth once daily 90 tablet 0    aspirin (KIRTI ASPIRIN) 325 MG tablet Take 1 tablet by mouth daily 30 tablet 0    terbinafine (LAMISIL) 250 MG tablet Take 1 tablet by mouth daily 84 tablet 0    omeprazole (PRILOSEC) 20 MG delayed release capsule TAKE 1 CAPSULE BY MOUTH ONCE DAILY AS NEEDED FOR  REFLUX 90 capsule 1    cetirizine (ZYRTEC) 10 MG tablet Take 10 mg by mouth daily      Acetaminophen (TYLENOL ARTHRITIS PAIN PO) Take by mouth as needed       Multiple Vitamins-Minerals (PRESERVISION AREDS 2 PO) Take by mouth daily      vitamin D-3 (CHOLECALCIFEROL) 5000 UNITS TABS Take 5,000 Units by mouth daily. Medications patient taking as of now reconciled against medications ordered at time of hospital discharge: Yes    Chief Complaint   Patient presents with    Other     F/u Surgery R hip replacement       History of Present illness - Follow up of Hospital diagnosis(es): right total hip arthroplasty    Inpatient course: Discharge summary reviewed- see chart. Interval history/Current status:  She was discharged from the swing bed at [facility] on 11/2/2021. She does not have home health services currently. When she went to physical therapy on 11/9/2021, the therapist was concerned about swelling in the right leg.   She was seen at Urgent Care on 11/9/2021, and a Doppler of the right leg was ordered. This was done on 11/10/2021, which was negative for DVT. She took an additional dose of diuretic today. She states that she is not having significant pain. She is taking a daily Aspirin, 325 mg. She denies shortness of breath. She is scheduled to return to therapy on 11/15/2021. Vitals:    11/11/21 1756   BP: 134/70   Site: Right Upper Arm   Position: Sitting   Cuff Size: Small Adult   Pulse: 71   Temp: 96.8 °F (36 °C)   TempSrc: Tympanic   SpO2: 95%   Weight: 152 lb 9.6 oz (69.2 kg)   Height: 5' 3\" (1.6 m)     Body mass index is 27.03 kg/m². Wt Readings from Last 3 Encounters:   11/11/21 152 lb 9.6 oz (69.2 kg)   11/09/21 145 lb (65.8 kg)   10/27/21 156 lb 3.2 oz (70.9 kg)     BP Readings from Last 3 Encounters:   11/11/21 134/70   11/09/21 126/74   10/27/21 (!) 105/39        Physical Exam:  Overweight elderly female healthy-appearing, alert, no distress, cooperative. Neck is supple, with no lymphadenopathy. Chest is clear to auscultation, no wheezes, rales, or rhonchi. Heart sounds are regular rate and rhythm, no murmurs. There is 1-2+ edema of the right lower extremity. There is 1+ edema of the left lower extremity. Labs done 10/26/2021 and 10/27/2021 were reviewed with the patient:  No visits with results within 1 Week(s) from this visit.    Latest known visit with results is:   Admission on 10/26/2021, Discharged on 10/27/2021   Component Date Value Ref Range Status    WBC 10/26/2021 11.4* 3.5 - 11.3 k/uL Final    RBC 10/26/2021 3.80* 3.95 - 5.11 m/uL Final    Hemoglobin 10/26/2021 11.0* 11.9 - 15.1 g/dL Final    Hematocrit 10/26/2021 35.3* 36.3 - 47.1 % Final    MCV 10/26/2021 92.9  82.6 - 102.9 fL Final    MCH 10/26/2021 28.9  25.2 - 33.5 pg Final    MCHC 10/26/2021 31.2  25.2 - 33.5 g/dL Final    RDW 10/26/2021 13.1  11.8 - 14.4 % Final    Platelets 47/60/8550 295  138 - 453 k/uL Final    MPV 10/26/2021 9.9  8.1 - 13.5 fL Final    NRBC Final    GFR Comment 10/26/2021        Final    Comment: Average GFR for 79or more years old:   76 mL/min/1.73sq m  Chronic Kidney Disease:   <60 mL/min/1.73sq m  Kidney failure:   <15 mL/min/1.73sq m              eGFR calculated using average adult body mass. Additional eGFR calculator available at:        E2amer[employer].com.br            GFR Staging 10/26/2021 NOT REPORTED   Final    Magnesium 10/26/2021 1.8  1.6 - 2.6 mg/dL Final    Glucose 10/27/2021 137* 70 - 99 mg/dL Final    BUN 10/27/2021 29* 8 - 23 mg/dL Final    CREATININE 10/27/2021 1.27* 0.50 - 0.90 mg/dL Final    Bun/Cre Ratio 10/27/2021 23* 9 - 20 Final    Calcium 10/27/2021 8.3* 8.6 - 10.4 mg/dL Final    Sodium 10/27/2021 138  135 - 144 mmol/L Final    Potassium 10/27/2021 4.3  3.7 - 5.3 mmol/L Final    Chloride 10/27/2021 104  98 - 107 mmol/L Final    CO2 10/27/2021 25  20 - 31 mmol/L Final    Anion Gap 10/27/2021 9  9 - 17 mmol/L Final    GFR Non- 10/27/2021 41* >60 mL/min Final    GFR  10/27/2021 49* >60 mL/min Final    GFR Comment 10/27/2021        Final    Comment: Average GFR for 79or more years old:   76 mL/min/1.73sq m  Chronic Kidney Disease:   <60 mL/min/1.73sq m  Kidney failure:   <15 mL/min/1.73sq m              eGFR calculated using average adult body mass.  Additional eGFR calculator available at:        E2amer[employer].com.br            GFR Staging 10/27/2021 NOT REPORTED   Final    WBC 10/27/2021 9.6  3.5 - 11.3 k/uL Final    RBC 10/27/2021 3.27* 3.95 - 5.11 m/uL Final    Hemoglobin 10/27/2021 9.4* 11.9 - 15.1 g/dL Final    Hematocrit 10/27/2021 30.5* 36.3 - 47.1 % Final    MCV 10/27/2021 93.3  82.6 - 102.9 fL Final    MCH 10/27/2021 28.7  25.2 - 33.5 pg Final    MCHC 10/27/2021 30.8  25.2 - 33.5 g/dL Final    RDW 10/27/2021 13.0  11.8 - 14.4 % Final    Platelets 19/25/1194 241  138 - 453 k/uL Final    MPV 10/27/2021 10.6  8.1 - 13.5 fL Final    NRBC Automated 10/27/2021 0.0  0.0 per 100 WBC Final    Differential Type 10/27/2021 NOT REPORTED   Final    WBC Morphology 10/27/2021 NOT REPORTED   Final    RBC Morphology 10/27/2021 NOT REPORTED   Final    Platelet Estimate 28/05/0109 NOT REPORTED   Final    Seg Neutrophils 10/27/2021 85* 36 - 65 % Final    Lymphocytes 10/27/2021 9* 24 - 43 % Final    Monocytes 10/27/2021 6  3 - 12 % Final    Eosinophils % 10/27/2021 0* 1 - 4 % Final    Basophils 10/27/2021 0  0 - 2 % Final    Immature Granulocytes 10/27/2021 0  0 % Final    Segs Absolute 10/27/2021 8.12* 1.50 - 8.10 k/uL Final    Absolute Lymph # 10/27/2021 0.86* 1.10 - 3.70 k/uL Final    Absolute Mono # 10/27/2021 0.59  0.10 - 1.20 k/uL Final    Absolute Eos # 10/27/2021 <0.03  0.00 - 0.44 k/uL Final    Basophils Absolute 10/27/2021 <0.03  0.00 - 0.20 k/uL Final    Absolute Immature Granulocyte 10/27/2021 0.03  0.00 - 0.30 k/uL Final    Hemoglobin 10/27/2021 9.2* 11.9 - 15.1 g/dL Final    Hematocrit 10/27/2021 30.2* 36.3 - 47.1 % Final       Results of the Doppler done 11/10/2021 were reviewed with the patient:  VL DUP LOWER EXTREMITY VENOUS RIGHT    Result Date: 11/10/2021  EXAMINATION: DUPLEX VENOUS ULTRASOUND OF THE RIGHT LOWER EXTREMITY 11/10/2021 10:01 am TECHNIQUE: Duplex ultrasound using B-mode/gray scaled imaging and Doppler spectral analysis and color flow was obtained of the deep venous structures of the right extremity. COMPARISON: None. HISTORY: ORDERING SYSTEM PROVIDED HISTORY: Right leg swelling TECHNOLOGIST PROVIDED HISTORY: Right leg swelling x 2 days. Hx right hip surgery 10/26/21 FINDINGS: The visualized veins of the right lower extremity are patent and free of echogenic thrombus. The veins demonstrate good compressibility with normal color flow study and spectral analysis. No evidence of DVT in the right lower extremity.              This document was produced, in part, using voice-recognition software. While efforts are made to avoid release of the document with an error, the software occasionally misinterprets dictation, which leads to errors that can alter the intended meaning. If such an error is found, please contact my office at [phone].        Medical Decision Making: moderate complexity

## 2021-11-12 NOTE — TELEPHONE ENCOUNTER
Please call the patient to schedule a telephone Medicare Wellness Visit. Her last Medicare Wellness Visit was 8/19/2020. Thank you.

## 2021-11-19 ENCOUNTER — HOSPITAL ENCOUNTER (OUTPATIENT)
Dept: LAB | Age: 81
Discharge: HOME | End: 2021-11-19
Payer: MEDICARE

## 2021-11-19 DIAGNOSIS — R79.89 ELEVATED SERUM CREATININE: ICD-10-CM

## 2021-11-19 DIAGNOSIS — D64.9 ANEMIA, UNSPECIFIED TYPE: ICD-10-CM

## 2021-11-19 LAB
ABSOLUTE EOS #: 0.57 K/UL (ref 0–0.44)
ABSOLUTE IMMATURE GRANULOCYTE: <0.03 K/UL (ref 0–0.3)
ABSOLUTE LYMPH #: 2.32 K/UL (ref 1.1–3.7)
ABSOLUTE MONO #: 0.67 K/UL (ref 0.1–1.2)
ANION GAP SERPL CALCULATED.3IONS-SCNC: 9 MMOL/L (ref 9–17)
BASOPHILS # BLD: 1 % (ref 0–2)
BASOPHILS ABSOLUTE: 0.05 K/UL (ref 0–0.2)
BUN BLDV-MCNC: 18 MG/DL (ref 8–23)
BUN/CREAT BLD: 17 (ref 9–20)
CALCIUM SERPL-MCNC: 9.1 MG/DL (ref 8.6–10.4)
CHLORIDE BLD-SCNC: 100 MMOL/L (ref 98–107)
CO2: 32 MMOL/L (ref 20–31)
CREAT SERPL-MCNC: 1.03 MG/DL (ref 0.5–0.9)
DIFFERENTIAL TYPE: ABNORMAL
EOSINOPHILS RELATIVE PERCENT: 8 % (ref 1–4)
GFR AFRICAN AMERICAN: >60 ML/MIN
GFR NON-AFRICAN AMERICAN: 52 ML/MIN
GFR SERPL CREATININE-BSD FRML MDRD: ABNORMAL ML/MIN/{1.73_M2}
GFR SERPL CREATININE-BSD FRML MDRD: ABNORMAL ML/MIN/{1.73_M2}
GLUCOSE BLD-MCNC: 89 MG/DL (ref 70–99)
HCT VFR BLD CALC: 36 % (ref 36.3–47.1)
HEMOGLOBIN: 11.1 G/DL (ref 11.9–15.1)
IMMATURE GRANULOCYTES: 0 %
LYMPHOCYTES # BLD: 34 % (ref 24–43)
MCH RBC QN AUTO: 28.4 PG (ref 25.2–33.5)
MCHC RBC AUTO-ENTMCNC: 30.8 G/DL (ref 25.2–33.5)
MCV RBC AUTO: 92.1 FL (ref 82.6–102.9)
MONOCYTES # BLD: 10 % (ref 3–12)
NRBC AUTOMATED: 0 PER 100 WBC
PDW BLD-RTO: 14.1 % (ref 11.8–14.4)
PLATELET # BLD: 412 K/UL (ref 138–453)
PLATELET ESTIMATE: ABNORMAL
PMV BLD AUTO: 9.7 FL (ref 8.1–13.5)
POTASSIUM SERPL-SCNC: 3.8 MMOL/L (ref 3.7–5.3)
RBC # BLD: 3.91 M/UL (ref 3.95–5.11)
RBC # BLD: ABNORMAL 10*6/UL
SEG NEUTROPHILS: 47 % (ref 36–65)
SEGMENTED NEUTROPHILS ABSOLUTE COUNT: 3.27 K/UL (ref 1.5–8.1)
SODIUM BLD-SCNC: 141 MMOL/L (ref 135–144)
WBC # BLD: 6.9 K/UL (ref 3.5–11.3)
WBC # BLD: ABNORMAL 10*3/UL

## 2021-11-19 PROCEDURE — 80048 BASIC METABOLIC PNL TOTAL CA: CPT

## 2021-11-19 PROCEDURE — 36415 COLL VENOUS BLD VENIPUNCTURE: CPT

## 2021-11-19 PROCEDURE — 85025 COMPLETE CBC W/AUTO DIFF WBC: CPT

## 2021-11-23 ENCOUNTER — NURSE ONLY (OUTPATIENT)
Dept: ORTHOPEDIC SURGERY | Age: 81
End: 2021-11-23
Payer: MEDICARE

## 2021-11-23 DIAGNOSIS — Z09 STATUS POST ORTHOPEDIC SURGERY, FOLLOW-UP EXAM: Primary | ICD-10-CM

## 2021-11-23 PROCEDURE — 99024 POSTOP FOLLOW-UP VISIT: CPT | Performed by: NURSE PRACTITIONER

## 2021-11-23 PROCEDURE — 99215 OFFICE O/P EST HI 40 MIN: CPT | Performed by: NURSE PRACTITIONER

## 2021-11-23 RX ORDER — SULFAMETHOXAZOLE AND TRIMETHOPRIM 800; 160 MG/1; MG/1
1 TABLET ORAL 2 TIMES DAILY
Qty: 20 TABLET | Refills: 0 | Status: SHIPPED | OUTPATIENT
Start: 2021-11-23 | End: 2021-12-03

## 2021-11-23 NOTE — PROGRESS NOTES
PATIENT PRESENTED FOR WOUND CHECK OF RIGHT HIP  MILD REDNESS NOTED, NO DRAINAGE  LOU ROBERTSON CNP LOOKED AT SURGERY SITE AND RECOMMENDED A PRESCRIPTION FOR BACTRIM DS FOR 10 DAYS  SCRIPT SENT TO [facility] IN [place]  PATIENT TO FOLLOW UP AS SCHEDULED

## 2021-12-21 ENCOUNTER — OFFICE VISIT (OUTPATIENT)
Dept: ORTHOPEDIC SURGERY | Age: 81
End: 2021-12-21
Payer: MEDICARE

## 2021-12-21 VITALS
DIASTOLIC BLOOD PRESSURE: 56 MMHG | HEIGHT: 63 IN | WEIGHT: 152 LBS | SYSTOLIC BLOOD PRESSURE: 138 MMHG | BODY MASS INDEX: 26.93 KG/M2 | HEART RATE: 74 BPM

## 2021-12-21 DIAGNOSIS — Z96.641 HISTORY OF RIGHT HIP REPLACEMENT: Primary | ICD-10-CM

## 2021-12-21 PROCEDURE — 99024 POSTOP FOLLOW-UP VISIT: CPT | Performed by: PHYSICIAN ASSISTANT

## 2021-12-21 PROCEDURE — 99213 OFFICE O/P EST LOW 20 MIN: CPT | Performed by: PHYSICIAN ASSISTANT

## 2021-12-21 NOTE — PROGRESS NOTES
Orthopaedic Progress Note      CHIEF COMPLAINT: History of right total hip replacement    HISTORY OF PRESENT ILLNESS:       Ms. Alka Xavier  is a 80 y.o. female  who presents with a history of right total hip replacement done on 10/26/2021. She comes in today doing reasonably well. She states that there is a stitch coming out on the side of her hip. And that she has nearly no pain. She is almost done with physical therapy. She would like to weight-bear as tolerated without the use of a walker.       Past Medical History:    Past Medical History:   Diagnosis Date    Bullous pemphigoid     Elevated fasting glucose     Former smoker     quit in 1987    GERD (gastroesophageal reflux disease)     Hypertension     Osteopenia     took Boniva times 5 years, stopped 2012    Overweight(278.02)     wieght 174 pounds, height 65 inches, BMI 29, 3/31/2013       Past Surgical History:    Past Surgical History:   Procedure Laterality Date    ABDOMINAL EXPLORATION SURGERY  1972    ectopic pregnancy, bilateral salpingectomy    CATARACT REMOVAL WITH IMPLANT Left 07/24/2018    per Dr Christie [facility]      partial   [address]      unsure which one removed, has one left     [place] COLONOSCOPY W/BIOPSY SINGLE/MULTIPLE N/A 7/26/2017    COLONOSCOPY WITH BIOPSY performed by Mortimer Dawson, MD at [address] [place] [address], one tubular adenoma on pathology, repeat recommended in 5 years    TOTAL HIP ARTHROPLASTY Left 8/3/2021    Left Total Hip Arthroplasty performed by Diana Ortiz MD at [address] Right 10/26/2021    Right Total Hip Arthroplasty performed by Diana Ortiz MD at [facility] OR         Current  Medications:  Current Outpatient Medications   Medication Sig Dispense Refill    amLODIPine (NORVASC) 5 MG tablet Take 1 tablet by mouth once daily 90 tablet 1    FUROSEMIDE PO Take by mouth      HYDROcodone-acetaminophen (NORCO) 5-325 MG per tablet Take 1 tablet by mouth every 6 hours as needed for Pain.  lisinopril (PRINIVIL;ZESTRIL) 20 MG tablet Take 1 tablet by mouth daily 90 tablet 0    metoprolol succinate (TOPROL XL) 25 MG extended release tablet Take 1 tablet by mouth once daily 90 tablet 0    torsemide (DEMADEX) 20 MG tablet Take 1 tablet by mouth once daily 90 tablet 1    aspirin (KIRTI ASPIRIN) 325 MG tablet Take 1 tablet by mouth daily 30 tablet 0    terbinafine (LAMISIL) 250 MG tablet Take 1 tablet by mouth daily 84 tablet 0    omeprazole (PRILOSEC) 20 MG delayed release capsule TAKE 1 CAPSULE BY MOUTH ONCE DAILY AS NEEDED FOR  REFLUX 90 capsule 1    cetirizine (ZYRTEC) 10 MG tablet Take 10 mg by mouth daily      Acetaminophen (TYLENOL ARTHRITIS PAIN PO) Take by mouth as needed       Multiple Vitamins-Minerals (PRESERVISION AREDS 2 PO) Take by mouth daily      vitamin D-3 (CHOLECALCIFEROL) 5000 UNITS TABS Take 5,000 Units by mouth daily. No current facility-administered medications for this visit. Allergies:  Patient has no known allergies.     Social History:   Social History     Tobacco Use   Smoking Status Former Smoker    Packs/day: 2.50    Years: 31.00    Pack years: 77.50    Types: Cigarettes    Start date:     Quit date:     Years since quittin.9   Smokeless Tobacco Never Used     Social History     Substance and Sexual Activity   Alcohol Use No    Alcohol/week: 0.0 standard drinks     Social History     Substance and Sexual Activity   Drug Use No       Family History:  Family History   Problem Relation Age of Onset    Cancer Mother         unknown type of cancer    Diabetes Mother     Breast Cancer Mother     Diabetes Brother     Diabetes Brother     Heart Disease Father         CHF    No Known Problems Sister     No Known Problems Daughter     No Known Problems Paternal Aunt     No Known Problems Paternal Aunt     No Known Problems Paternal Aunt     No Known Problems Paternal Aunt     No Known Problems Paternal Aunt     No Known Problems Maternal Aunt     No Known Problems Maternal Aunt     No Known Problems Maternal Aunt     No Known Problems Maternal Aunt        REVIEW OF SYSTEMS:  Constitutional: Denies any fever, chills. Musculoskeletal: Positive for bilateral total hip replacements. PHYSICAL EXAM:  [unfilled]  General appearance:  Alert and oriented x 3. No apparent distress, appears stated age, calm and cooperative. Musculoskeletal: Right hip was inspected lateral base incision is almost completely healed. In the central part of the incision is a post suture that was removed by myself here today. She tolerated that well. Dry sterile bandage was applied. She has no calf pain. She neurovascular intact the foot. Rhiannon Jackson DATA:  CBC:   Lab Results   Component Value Date    WBC 6.9 11/19/2021    HGB 11.1 11/19/2021     11/19/2021     BMP:    Lab Results   Component Value Date     11/19/2021    K 3.8 11/19/2021     11/19/2021    CO2 32 11/19/2021    BUN 18 11/19/2021    CREATININE 1.03 11/19/2021    CALCIUM 9.1 11/19/2021    GLUCOSE 89 11/19/2021     PT/INR:  No results found for: PROTIME, INR  Troponin:  No results found for: TROPONINI  No results for input(s): LIPASE, AMYLASE in the last 72 hours. No results for input(s): AST, ALT, BILIDIR, BILITOT, ALKPHOS in the last 72 hours. Uric Acid:  No components found for: URIC  Urine Culture:  No components found for: CURINE    Radiology:   No results found. X-rays personally reviewed by me of none obtained today       Diagnosis Orders   1. History of right hip replacement           PLAN:  Activity as tolerated, weightbearing as tolerated see her back here in as-needed basis    No orders of the defined types were placed in this encounter.        Procedure:        Electronically signed by Joanne Grey PA-C on 12/21/2021 at 10:11 AM

## 2022-01-11 ENCOUNTER — TELEPHONE (OUTPATIENT)
Dept: ORTHOPEDIC SURGERY | Age: 82
End: 2022-01-11

## 2022-01-11 NOTE — TELEPHONE ENCOUNTER
[facility] called and asked if it is alright to order a heal lift for the patient. Therapy stated patient told them it was discussed at her appointment with Ortho.

## 2022-03-30 ENCOUNTER — HOSPITAL ENCOUNTER (OUTPATIENT)
Dept: LAB | Age: 82
Discharge: HOME | End: 2022-03-30
Payer: MEDICARE

## 2022-03-30 DIAGNOSIS — I10 ESSENTIAL HYPERTENSION: ICD-10-CM

## 2022-03-30 DIAGNOSIS — R73.01 ELEVATED FASTING GLUCOSE: ICD-10-CM

## 2022-03-30 LAB
ALBUMIN SERPL-MCNC: 4.4 G/DL (ref 3.5–5.2)
ALBUMIN/GLOBULIN RATIO: 1.4 (ref 1–2.5)
ALP BLD-CCNC: 131 U/L (ref 35–104)
ALT SERPL-CCNC: 9 U/L (ref 5–33)
ANION GAP SERPL CALCULATED.3IONS-SCNC: 11 MMOL/L (ref 9–17)
AST SERPL-CCNC: 17 U/L
BILIRUB SERPL-MCNC: 0.33 MG/DL (ref 0.3–1.2)
BUN BLDV-MCNC: 25 MG/DL (ref 8–23)
BUN/CREAT BLD: 21 (ref 9–20)
CALCIUM SERPL-MCNC: 9.5 MG/DL (ref 8.6–10.4)
CHLORIDE BLD-SCNC: 101 MMOL/L (ref 98–107)
CO2: 29 MMOL/L (ref 20–31)
CREAT SERPL-MCNC: 1.18 MG/DL (ref 0.5–0.9)
ESTIMATED AVERAGE GLUCOSE: 117 MG/DL
GFR AFRICAN AMERICAN: 54 ML/MIN
GFR NON-AFRICAN AMERICAN: 44 ML/MIN
GFR SERPL CREATININE-BSD FRML MDRD: ABNORMAL ML/MIN/{1.73_M2}
GLUCOSE BLD-MCNC: 95 MG/DL (ref 70–99)
HBA1C MFR BLD: 5.7 % (ref 4–6)
POTASSIUM SERPL-SCNC: 4.5 MMOL/L (ref 3.7–5.3)
SODIUM BLD-SCNC: 141 MMOL/L (ref 135–144)
TOTAL PROTEIN: 7.5 G/DL (ref 6.4–8.3)

## 2022-03-30 PROCEDURE — 36415 COLL VENOUS BLD VENIPUNCTURE: CPT

## 2022-03-30 PROCEDURE — 83036 HEMOGLOBIN GLYCOSYLATED A1C: CPT

## 2022-03-30 PROCEDURE — 80061 LIPID PANEL: CPT

## 2022-03-30 PROCEDURE — 80053 COMPREHEN METABOLIC PANEL: CPT

## 2022-03-31 LAB
CHOLESTEROL, FASTING: 183 MG/DL
CHOLESTEROL/HDL RATIO: 3
HDLC SERPL-MCNC: 62 MG/DL
LDL CHOLESTEROL: 103 MG/DL (ref 0–130)
TRIGLYCERIDE, FASTING: 88 MG/DL

## 2022-04-06 ENCOUNTER — OFFICE VISIT (OUTPATIENT)
Dept: FAMILY MEDICINE CLINIC | Age: 82
End: 2022-04-06
Payer: MEDICARE

## 2022-04-06 VITALS
HEIGHT: 63 IN | SYSTOLIC BLOOD PRESSURE: 122 MMHG | WEIGHT: 153.2 LBS | DIASTOLIC BLOOD PRESSURE: 60 MMHG | OXYGEN SATURATION: 99 % | HEART RATE: 62 BPM | BODY MASS INDEX: 27.14 KG/M2 | TEMPERATURE: 97.6 F

## 2022-04-06 DIAGNOSIS — K21.9 GASTROESOPHAGEAL REFLUX DISEASE, UNSPECIFIED WHETHER ESOPHAGITIS PRESENT: ICD-10-CM

## 2022-04-06 DIAGNOSIS — E55.9 VITAMIN D DEFICIENCY: ICD-10-CM

## 2022-04-06 DIAGNOSIS — I10 ESSENTIAL HYPERTENSION: Primary | ICD-10-CM

## 2022-04-06 DIAGNOSIS — M85.89 OTHER SPECIFIED DISORDERS OF BONE DENSITY AND STRUCTURE, MULTIPLE SITES: ICD-10-CM

## 2022-04-06 DIAGNOSIS — R60.0 BILATERAL LOWER EXTREMITY EDEMA: ICD-10-CM

## 2022-04-06 DIAGNOSIS — Z23 NEED FOR TDAP VACCINATION: ICD-10-CM

## 2022-04-06 DIAGNOSIS — M85.80 OSTEOPENIA, UNSPECIFIED LOCATION: ICD-10-CM

## 2022-04-06 DIAGNOSIS — Z12.31 BREAST CANCER SCREENING BY MAMMOGRAM: ICD-10-CM

## 2022-04-06 DIAGNOSIS — R73.01 ELEVATED FASTING GLUCOSE: ICD-10-CM

## 2022-04-06 PROCEDURE — G8417 CALC BMI ABV UP PARAM F/U: HCPCS | Performed by: FAMILY MEDICINE

## 2022-04-06 PROCEDURE — 1090F PRES/ABSN URINE INCON ASSESS: CPT | Performed by: FAMILY MEDICINE

## 2022-04-06 PROCEDURE — 99214 OFFICE O/P EST MOD 30 MIN: CPT | Performed by: FAMILY MEDICINE

## 2022-04-06 PROCEDURE — G8427 DOCREV CUR MEDS BY ELIG CLIN: HCPCS | Performed by: FAMILY MEDICINE

## 2022-04-06 PROCEDURE — 99212 OFFICE O/P EST SF 10 MIN: CPT

## 2022-04-06 PROCEDURE — 1123F ACP DISCUSS/DSCN MKR DOCD: CPT | Performed by: FAMILY MEDICINE

## 2022-04-06 PROCEDURE — G8399 PT W/DXA RESULTS DOCUMENT: HCPCS | Performed by: FAMILY MEDICINE

## 2022-04-06 PROCEDURE — 1036F TOBACCO NON-USER: CPT | Performed by: FAMILY MEDICINE

## 2022-04-06 PROCEDURE — 4040F PNEUMOC VAC/ADMIN/RCVD: CPT | Performed by: FAMILY MEDICINE

## 2022-04-06 RX ORDER — BENZOYL PEROXIDE
KIT TOPICAL PRN
COMMUNITY
Start: 2022-01-27

## 2022-04-06 RX ORDER — LISINOPRIL 20 MG/1
20 TABLET ORAL DAILY
Qty: 90 TABLET | Refills: 1 | Status: SHIPPED | OUTPATIENT
Start: 2022-04-06 | End: 2022-10-12 | Stop reason: SDUPTHER

## 2022-04-06 RX ORDER — OMEPRAZOLE 20 MG/1
CAPSULE, DELAYED RELEASE ORAL
Qty: 90 CAPSULE | Refills: 1 | Status: SHIPPED | OUTPATIENT
Start: 2022-04-06 | End: 2022-10-12 | Stop reason: SDUPTHER

## 2022-04-06 RX ORDER — TORSEMIDE 20 MG/1
TABLET ORAL
Qty: 90 TABLET | Refills: 1 | Status: SHIPPED | OUTPATIENT
Start: 2022-04-06 | End: 2022-09-29

## 2022-04-06 RX ORDER — AMLODIPINE BESYLATE 5 MG/1
TABLET ORAL
Qty: 90 TABLET | Refills: 1 | Status: SHIPPED | OUTPATIENT
Start: 2022-04-06 | End: 2022-10-12 | Stop reason: SDUPTHER

## 2022-04-06 RX ORDER — METOPROLOL SUCCINATE 25 MG/1
TABLET, EXTENDED RELEASE ORAL
Qty: 90 TABLET | Refills: 1 | Status: SHIPPED | OUTPATIENT
Start: 2022-04-06 | End: 2022-10-12 | Stop reason: SDUPTHER

## 2022-04-06 SDOH — ECONOMIC STABILITY: FOOD INSECURITY: WITHIN THE PAST 12 MONTHS, YOU WORRIED THAT YOUR FOOD WOULD RUN OUT BEFORE YOU GOT MONEY TO BUY MORE.: NEVER TRUE

## 2022-04-06 SDOH — ECONOMIC STABILITY: FOOD INSECURITY: WITHIN THE PAST 12 MONTHS, THE FOOD YOU BOUGHT JUST DIDN'T LAST AND YOU DIDN'T HAVE MONEY TO GET MORE.: NEVER TRUE

## 2022-04-06 ASSESSMENT — SOCIAL DETERMINANTS OF HEALTH (SDOH): HOW HARD IS IT FOR YOU TO PAY FOR THE VERY BASICS LIKE FOOD, HOUSING, MEDICAL CARE, AND HEATING?: NOT HARD AT ALL

## 2022-04-06 NOTE — PROGRESS NOTES
[place] [address], [address]                        Telephone [phone]             Fax [phone]       Jessica Door  :  1940  Age:  80 y.o. MRN:  [id number]  Date of visit:  2022       Assessment and Plan:    1. Essential hypertension  Her blood pressure is well-controlled today. (BP: 122/60)   She was advised to continue current medications. Amlodipine, Toprol XL, and Lisinopril were refilled:   - amLODIPine (NORVASC) 5 MG tablet; Take 1 tablet by mouth once daily  Dispense: 90 tablet; Refill: 1  - metoprolol succinate (TOPROL XL) 25 MG extended release tablet; Take 1 tablet by mouth once daily  Dispense: 90 tablet; Refill: 1  - lisinopril (PRINIVIL;ZESTRIL) 20 MG tablet; Take 1 tablet by mouth daily  Dispense: 90 tablet; Refill: 1    Labs were ordered to be done prior to her return visit in 6 months:   - Comprehensive Metabolic Panel; Future  - Lipid Panel; Future    2. Elevated fasting glucose  Her fasting glucose and HgbA1c are both within normal limits on her recent lab work. - Hemoglobin A1C; Future was ordered to be done prior to her return visit in 6 months. 3. Bilateral lower extremity edema  She is doing well with Demadex; this was refilled:  - torsemide (DEMADEX) 20 MG tablet; Take 1 tablet by mouth once daily  Dispense: 90 tablet; Refill: 1    4. Vitamin D deficiency  Her 25-hydroxy Vitamin D level was within normal limits (44.6 ng/mL) on 2021. She was advised to continue with her current dose of Vitamin D.       - Vitamin D 25 Hydroxy; Future was ordered to be done prior to her return visit in 6 months.      5. Gastroesophageal reflux disease, unspecified whether esophagitis present  She is doing well with her current dose of Omeprazole; this was refilled:  - omeprazole (PRILOSEC) 20 MG delayed release capsule; TAKE 1 CAPSULE BY MOUTH ONCE DAILY AS NEEDED FOR  REFLUX Dispense: 90 capsule; Refill: 1    6. Osteopenia, unspecified location  7. Other specified disorders of bone density and structure, multiple sites   She is due for a DEXA scan; this was ordered:  - DEXA AXIAL SKELETON W VERTEBRAL FX ASST; Future    8. Routine health maintenance  Health maintenance was reviewed with the patient. She has received three doses of the Pfizer Covid-19 vaccine. She was advised that a fourth dose of the Covid-19 vaccine has been authorized for higher risk individuals, including those over age 48, if it has been more than four months since the previous dose. Screening mammogram was recommended and ordered. Hepatitis C screening was recommended and declined. Tdap was recommended and a printed prescription was given to the patient. Shingrix was recommended and declined. All other health maintenance, including Pneumovax and Prevnar-13, is up to date at this time. Follow up instructions were given to the patient:  Return in about 6 months (around 10/6/2022) for hypertension, hyperlipidemia, elevated glucose. Subjective:    Benitez Otero is a 80 y.o. female who presents to [place] [id number] today (4/6/2022) for follow up/evaluation of:  6 Month Follow-Up, Hypertension, and Other (Elevated fasting glucose)      She states that she has been feeling well. She is tolerating her medications well. She states that she takes Tylenol Arthritis occasionally for low back pain. She does not use NSAIDs. She has no new concerns or complaints today. She has received three doses of the Pfizer Covid-19 vaccine. The third dose was 3/6/2022.          Immunization history was reviewed:  Immunization History   Administered Date(s) Administered    COVID-19, SaveUp top, DO NOT Dilute, Samy-Sucrose, 12+ yrs, PF, 30 mcg/0.3 mL dose 03/06/2022    COVID-19, Pfizer Purple top, DILUTE for use, 12+ yrs, 30mcg/0.3mL dose 08/12/2021, 09/02/2021    Influenza Vaccine, unspecified formulation 10/25/2013, 10/31/2014, 10/29/2015    Influenza Virus Vaccine 12/04/2009, 11/16/2011, 10/31/2014    Influenza Whole 12/04/2009, 11/16/2011    Influenza, High Dose (Fluzone 65 yrs and older) 10/25/2013, 10/29/2015, 11/15/2016, 10/26/2017, 10/19/2018    Influenza, Quadv, adjuvanted, 65 yrs +, IM, PF (Fluad) 09/30/2020, 10/06/2021    Influenza, Triv, inactivated, subunit, adjuvanted, IM (Fluad 65 yrs and older) 11/07/2019    Pneumococcal Conjugate 13-valent ([id number]) 06/04/2015    Pneumococcal Polysaccharide ([id number]) 10/30/2012    Tdap (Boostrix, Adacel) 01/30/2009          She has the following problem list:  Patient Active Problem List   Diagnosis    Essential hypertension    GERD (gastroesophageal reflux disease)    Elevated fasting glucose    Osteopenia    Overweight (BMI 25.0-29. 9)    Venous insufficiency (chronic) (peripheral)    Spider vein, symptomatic    Varicose vein    Hyponatremia, mild    Tubular adenoma of colon    Bullous pemphigoid    Vitamin D deficiency    Osteoarthritis of left hip    Status post total replacement of left hip    Status post total replacement of right hip    Osteoarthritis of one hip, right        Current medications are:  Outpatient Medications Marked as Taking for the 4/6/22 encounter (Office Visit) with Disha Tran MD   Medication Sig Dispense Refill    EQ ALLERGY RELIEF 10 MG tablet TAKE 1 TABLET BY MOUTH ONCE DAILY      amLODIPine (NORVASC) 5 MG tablet Take 1 tablet by mouth once daily 90 tablet 1    lisinopril (PRINIVIL;ZESTRIL) 20 MG tablet Take 1 tablet by mouth daily 90 tablet 0    metoprolol succinate (TOPROL XL) 25 MG extended release tablet Take 1 tablet by mouth once daily 90 tablet 0    torsemide (DEMADEX) 20 MG tablet Take 1 tablet by mouth once daily 90 tablet 1    omeprazole (PRILOSEC) 20 MG delayed release capsule TAKE 1 CAPSULE BY MOUTH ONCE DAILY AS NEEDED FOR  REFLUX 90 capsule 1    Acetaminophen (TYLENOL ARTHRITIS PAIN PO) Take by mouth as needed       Multiple Vitamins-Minerals (PRESERVISION AREDS 2 PO) Take by mouth daily      vitamin D-3 (CHOLECALCIFEROL) 5000 UNITS TABS Take 5,000 Units by mouth daily. She has No Known Allergies. She is not currently a smoker. She  reports that she quit smoking about 33 years ago. Her smoking use included cigarettes. She started smoking about 64 years ago. She has a 77.50 pack-year smoking history. She has never used smokeless tobacco.      Objective:    Vitals:    04/06/22 1617   BP: 122/60   Site: Right Upper Arm   Position: Sitting   Cuff Size: Medium Adult   Pulse: 62   Temp: 97.6 °F (36.4 °C)   TempSrc: Temporal   SpO2: 99%   Weight: 153 lb 3.2 oz (69.5 kg)   Height: 5' 3\" (1.6 m)     Body mass index is 27.14 kg/m². Overweight elderly female, healthy-appearing, alert, cooperative and in no acute distress. Neck supple. No adenopathy. Thyroid symmetric, normal size. Chest:  Normal expansion. Clear to auscultation. No rales, rhonchi, or wheezing. Heart sounds are normal.  Regular rate and rhythm without murmur, gallop or rub. Lower extremities have no edema. Results of labs done 3/30/2022 were reviewed with the patient:   Hospital Outpatient Visit on 03/30/2022   Component Date Value Ref Range Status    Hemoglobin A1C 03/30/2022 5.7  4.0 - 6.0 % Final    Estimated Avg Glucose 03/30/2022 117  mg/dL Final    Comment: The ADA and AACC recommend providing the estimated average glucose result to permit better   patient understanding of their HBA1c result.       Glucose 03/30/2022 95  70 - 99 mg/dL Final    BUN 03/30/2022 25* 8 - 23 mg/dL Final    CREATININE 03/30/2022 1.18* 0.50 - 0.90 mg/dL Final    Bun/Cre Ratio 03/30/2022 21* 9 - 20 Final    Calcium 03/30/2022 9.5  8.6 - 10.4 mg/dL Final    Sodium 03/30/2022 141  135 - 144 mmol/L Final    Potassium 03/30/2022 4.5  3.7 - 5.3 mmol/L Final    Chloride 03/30/2022 101  98 - 107 mmol/L Final    CO2 03/30/2022 29  20 - 31 mmol/L Final    Anion Gap 03/30/2022 11  9 - 17 mmol/L Final    Alkaline Phosphatase 03/30/2022 131* 35 - 104 U/L Final    ALT 03/30/2022 9  5 - 33 U/L Final    AST 03/30/2022 17  <32 U/L Final    Total Bilirubin 03/30/2022 0.33  0.3 - 1.2 mg/dL Final    Total Protein 03/30/2022 7.5  6.4 - 8.3 g/dL Final    Albumin 03/30/2022 4.4  3.5 - 5.2 g/dL Final    Albumin/Globulin Ratio 03/30/2022 1.4  1.0 - 2.5 Final    GFR Non- 03/30/2022 44* >60 mL/min Final    GFR  03/30/2022 54* >60 mL/min Final    GFR Comment 03/30/2022        Final    Comment: Average GFR for 79or more years old:   76 mL/min/1.73sq m  Chronic Kidney Disease:   <60 mL/min/1.73sq m  Kidney failure:   <15 mL/min/1.73sq m              eGFR calculated using average adult body mass. Additional eGFR calculator available at:        [email or web address].br            Cholesterol, Fasting 03/30/2022 183  <200 mg/dL Final    Comment:    Cholesterol Guidelines:      <200  Desirable   200-240  Borderline      >240  Undesirable         HDL 03/30/2022 62  >40 mg/dL Final    Comment:    HDL Guidelines:    <40     Undesirable   40-59    Borderline    >59     Desirable         LDL Cholesterol 03/30/2022 103  0 - 130 mg/dL Final    Comment:    LDL Guidelines:     <100    Desirable   100-129   Near to/above Desirable   130-159   Borderline      >159   Undesirable     Direct (measured) LDL and calculated LDL are not interchangeable tests.  Chol/HDL Ratio 03/30/2022 3.0  <5 Final            Triglyceride, Fasting 03/30/2022 88  <150 mg/dL Final    Comment:    Triglyceride Guidelines:     <150   Desirable   150-199  Borderline   200-499  High     >499   Very high   Based on AHA Guidelines for fasting triglyceride, October 2012.                   (Please note that portions of this note were completed with a voice-recognition program. Efforts were made to edit the dictation but occasionally words are mis-transcribed.)

## 2022-04-06 NOTE — PROGRESS NOTES
Patient questioning if she should remain on a baby aspirin a day or if she should stop taking. Patient plans to get 4th covid booster in July. Patient agreeable to Tdap, mammogram  Patient declines Hep C screen, Shingles, colonoscopy    Patient would like to discuss Dexa Scan.

## 2022-04-19 ENCOUNTER — HOSPITAL ENCOUNTER (OUTPATIENT)
Dept: BONE DENSITY | Age: 82
Discharge: HOME | End: 2022-04-21
Payer: MEDICARE

## 2022-04-19 DIAGNOSIS — M85.89 OTHER SPECIFIED DISORDERS OF BONE DENSITY AND STRUCTURE, MULTIPLE SITES: ICD-10-CM

## 2022-04-19 DIAGNOSIS — M85.80 OSTEOPENIA, UNSPECIFIED LOCATION: ICD-10-CM

## 2022-04-19 PROCEDURE — 77080 DXA BONE DENSITY AXIAL: CPT

## 2022-05-10 ENCOUNTER — TELEPHONE (OUTPATIENT)
Dept: FAMILY MEDICINE CLINIC | Age: 82
End: 2022-05-10

## 2022-05-17 DIAGNOSIS — I10 ESSENTIAL HYPERTENSION: ICD-10-CM

## 2022-05-17 RX ORDER — LISINOPRIL 20 MG/1
TABLET ORAL
Qty: 90 TABLET | Refills: 0 | OUTPATIENT
Start: 2022-05-17

## 2022-06-09 ENCOUNTER — TELEPHONE (OUTPATIENT)
Dept: FAMILY MEDICINE CLINIC | Age: 82
End: 2022-06-09

## 2022-06-18 DIAGNOSIS — I10 ESSENTIAL HYPERTENSION: ICD-10-CM

## 2022-06-20 RX ORDER — AMLODIPINE BESYLATE 5 MG/1
TABLET ORAL
Qty: 90 TABLET | Refills: 0 | OUTPATIENT
Start: 2022-06-20

## 2022-07-25 ENCOUNTER — OFFICE VISIT (OUTPATIENT)
Dept: CARDIOLOGY | Age: 82
End: 2022-07-25
Payer: MEDICARE

## 2022-07-25 VITALS
RESPIRATION RATE: 16 BRPM | HEIGHT: 63 IN | SYSTOLIC BLOOD PRESSURE: 124 MMHG | WEIGHT: 144.8 LBS | BODY MASS INDEX: 25.66 KG/M2 | HEART RATE: 70 BPM | DIASTOLIC BLOOD PRESSURE: 58 MMHG

## 2022-07-25 DIAGNOSIS — I10 ESSENTIAL HYPERTENSION: Primary | ICD-10-CM

## 2022-07-25 DIAGNOSIS — I07.1 MODERATE TRICUSPID REGURGITATION: ICD-10-CM

## 2022-07-25 DIAGNOSIS — R94.31 ABNORMAL ECG: ICD-10-CM

## 2022-07-25 DIAGNOSIS — I34.0 MILD MITRAL REGURGITATION: ICD-10-CM

## 2022-07-25 PROCEDURE — 1090F PRES/ABSN URINE INCON ASSESS: CPT | Performed by: INTERNAL MEDICINE

## 2022-07-25 PROCEDURE — G8427 DOCREV CUR MEDS BY ELIG CLIN: HCPCS | Performed by: INTERNAL MEDICINE

## 2022-07-25 PROCEDURE — G8417 CALC BMI ABV UP PARAM F/U: HCPCS | Performed by: INTERNAL MEDICINE

## 2022-07-25 PROCEDURE — G8399 PT W/DXA RESULTS DOCUMENT: HCPCS | Performed by: INTERNAL MEDICINE

## 2022-07-25 PROCEDURE — 93005 ELECTROCARDIOGRAM TRACING: CPT | Performed by: INTERNAL MEDICINE

## 2022-07-25 PROCEDURE — 99214 OFFICE O/P EST MOD 30 MIN: CPT | Performed by: INTERNAL MEDICINE

## 2022-07-25 PROCEDURE — 93010 ELECTROCARDIOGRAM REPORT: CPT | Performed by: INTERNAL MEDICINE

## 2022-07-25 PROCEDURE — 1036F TOBACCO NON-USER: CPT | Performed by: INTERNAL MEDICINE

## 2022-07-25 PROCEDURE — 1123F ACP DISCUSS/DSCN MKR DOCD: CPT | Performed by: INTERNAL MEDICINE

## 2022-07-25 NOTE — PROGRESS NOTES
Cardiology Consultation/Follow Up. [address] [facility]  1940  [id number]    Today: 7/25/22    CC: Patient is here for follow up for HTN.     HPI:   Mallory Alonso is here for follow up for HTN. Is now s/p R and L Total hip replacements. Feels much better. No cp, sob, orthopnea, pnd, le edema, palpitations. Uses cane when outside the house, doesn't need cane when inside the house.      Past Medical:  Past Medical History:   Diagnosis Date    Bullous pemphigoid     Elevated fasting glucose     Former smoker     quit in 1987    GERD (gastroesophageal reflux disease)     Hypertension     Osteopenia     took Boniva times 5 years, stopped 2012    Overweight(278.02)     wieght 174 pounds, height 65 inches, BMI 29, 3/31/2013       Past Surgical:  Past Surgical History:   Procedure Laterality Date    ABDOMINAL EXPLORATION SURGERY  1972    ectopic pregnancy, bilateral salpingectomy    CATARACT REMOVAL WITH IMPLANT Left 07/24/2018    per Dr Britton Maxwell ([address] [place] [address])      partial    OVARY REMOVAL      unsure which one removed, has one left     [place] COLONOSCOPY W/BIOPSY SINGLE/MULTIPLE N/A 7/26/2017    COLONOSCOPY WITH BIOPSY performed by Ammy Kyle MD at [address] [place] [address], one tubular adenoma on pathology, repeat recommended in 5 years    TOTAL HIP ARTHROPLASTY Left 8/3/2021    Left Total Hip Arthroplasty performed by Yolanda Valiente MD at [address] [place] [address] Right 10/26/2021    Right Total Hip Arthroplasty performed by Yolanda Valiente MD at [facility] OR       Family History:  Family History   Problem Relation Age of Onset    Cancer Mother         unknown type of cancer    Diabetes Mother     Breast Cancer Mother     Diabetes Brother     Diabetes Brother     Heart Disease Father         CHF    No Known Problems Sister     No Known Problems Daughter     No Known Problems Paternal Aunt     No Known Problems Paternal Aunt     No Known Problems Paternal Aunt No Known Problems Paternal Aunt     No Known Problems Paternal Aunt     No Known Problems Maternal Aunt     No Known Problems Maternal Aunt     No Known Problems Maternal Aunt     No Known Problems Maternal Aunt      Social History:  Social History     Socioeconomic History    Marital status: Single     Spouse name: None    Number of children: None    Years of education: None    Highest education level: None   Tobacco Use    Smoking status: Former     Packs/day: 2.50     Years: 31.00     Pack years: 77.50     Types: Cigarettes     Start date:      Quit date:      Years since quittin.5    Smokeless tobacco: Never   Vaping Use    Vaping Use: Never used   Substance and Sexual Activity    Alcohol use: No     Alcohol/week: 0.0 standard drinks    Drug use: No     Social Determinants of Health     Financial Resource Strain: Low Risk     Difficulty of Paying Living Expenses: Not hard at all   Food Insecurity: No Food Insecurity    Worried About Running Out of Food in the Last Year: Never true    Ran Out of Food in the Last Year: Never true     REVIEW OF SYSTEMS:    Constitutional: there has been no unanticipated weight loss. There's been No change in energy level, No change in activity level. Eyes: No visual changes or diplopia. No scleral icterus. ENT: No Headaches, hearing loss or vertigo. No mouth sores or sore throat. Cardiovascular: AS HPI  Respiratory: AS HPI  Gastrointestinal: No abdominal pain, appetite loss, blood in stools. No change in bowel or bladder habits. Genitourinary: No dysuria, trouble voiding, or hematuria. Musculoskeletal:  No gait disturbance, No weakness or joint complaints. Integumentary: No rash or pruritis. Neurological: No headache, diplopia, change in muscle strength, numbness or tingling. No change in gait, balance, coordination, mood, affect, memory, mentation, behavior. Psychiatric: No new anxiety or depression. Endocrine: No temperature intolerance.  No excessive thirst, fluid intake, or urination. No tremor. Hematologic/Lymphatic: No abnormal bruising or bleeding, blood clots or swollen lymph nodes. Allergic/Immunologic: No nasal congestion or hives. Medications:  Outpatient Medications Marked as Taking for the 7/25/22 encounter (Office Visit) with Chacho Ritter DO   Medication Sig Dispense Refill    EQ ALLERGY RELIEF 10 MG tablet TAKE 1 TABLET BY MOUTH ONCE DAILY      amLODIPine (NORVASC) 5 MG tablet Take 1 tablet by mouth once daily 90 tablet 1    omeprazole (PRILOSEC) 20 MG delayed release capsule TAKE 1 CAPSULE BY MOUTH ONCE DAILY AS NEEDED FOR  REFLUX 90 capsule 1    torsemide (DEMADEX) 20 MG tablet Take 1 tablet by mouth once daily 90 tablet 1    metoprolol succinate (TOPROL XL) 25 MG extended release tablet Take 1 tablet by mouth once daily 90 tablet 1    lisinopril (PRINIVIL;ZESTRIL) 20 MG tablet Take 1 tablet by mouth daily 90 tablet 1    Acetaminophen (TYLENOL ARTHRITIS PAIN PO) Take by mouth as needed       Multiple Vitamins-Minerals (PRESERVISION AREDS 2 PO) Take by mouth daily      vitamin D-3 (CHOLECALCIFEROL) 5000 UNITS TABS Take 5,000 Units by mouth daily. Physical Exam:   Vitals: BP (!) 124/58 (Site: Right Upper Arm, Position: Sitting, Cuff Size: Large Adult)   Pulse 70   Resp 16   Ht 5' 3\" (1.6 m)   Wt 144 lb 12.8 oz (65.7 kg)   BMI 25.65 kg/m²   General appearance: alert and cooperative with exam  HEENT: Head: Normocephalic, no lesions, without obvious abnormality. Neck: no carotid bruit, no JVD  Lungs: clear to auscultation bilaterally  Heart: regular rate and rhythm, S1, S2 normal, no Murmur  Abdomen: soft, non-tender; bowel sounds normal; no masses,  no organomegaly  Extremities: no site injection hematoma, extremities normal, atraumatic, no cyanosis.  no edema  Neurologic: Mental status: Alert, oriented, thought content appropriate    Labs:  Lab Results   Component Value Date    CHOL 185 01/30/2020    TRIG 81 01/30/2020    HDL 62 03/30/2022    LDLCHOLESTEROL 103 03/30/2022    VLDL NOT REPORTED 09/29/2021    CHOLHDLRATIO 3.0 03/30/2022       Lab Results   Component Value Date     03/30/2022    K 4.5 03/30/2022     03/30/2022    CO2 29 03/30/2022    BUN 25 (H) 03/30/2022    CREATININE 1.18 (H) 03/30/2022    GLUCOSE 95 03/30/2022    CALCIUM 9.5 03/30/2022    PROT 7.5 03/30/2022    LABALBU 4.4 03/30/2022    BILITOT 0.33 03/30/2022    ALKPHOS 131 (H) 03/30/2022    AST 17 03/30/2022    ALT 9 03/30/2022    LABGLOM 44 (L) 03/30/2022    GFRAA 54 (L) 03/30/2022       EKG:  Normal sinus rhythm  Poor R wave progression. ECHO:   Results for orders placed or performed during the hospital encounter of 04/27/21   Echocardiogram complete  Normal left ventricular diameter. Hyperdynamic left ventricular function. Left ventricular ejection fraction 70 %. Grade II (moderate) left ventricular diastolic dysfunction. Left atrium is mildly dilated. Mitral annular calcification. Mild mitral regurgitation. Normal tricuspid valve leaflets. Moderate tricuspid regurgitation. Estimated right ventricular systolic pressure is 38 mmHg. Normal function of other valves. No pericardial effusion. Past Medical and Surgical History, Problem List, Allergies, Medications, Labs, Imaging, all reviewed extensively in EMR and with the patient. Assessment:  - HTN- controlled  - Abnormal ECG- poor R wave progression  - Diastolic Dysfunction  - Echo 4/21- EF 70%, GIIDD, Mild MR, Mod TR    Plan:  - continue norvasc, lisinopril, toprol    The patient is to continue heart healthy diet, weight loss and exercise as tolerated. Patient's medications and side effects were discussed. Medication refills were provided if needed. Follow up appointment timing was discussed. All questions and concerns were addressed to patient's satisfaction. The patient is to follow up in 12 months or sooner if necessary.      Thank you for allowing me to participate in the care of this patient, please do not hesitate to call if you have any questions. Prince Stephy DO, [facility], [address], [place] [address] [facility]  [facility]. [employer]  [phone]

## 2022-09-29 DIAGNOSIS — R60.0 BILATERAL LOWER EXTREMITY EDEMA: ICD-10-CM

## 2022-09-29 RX ORDER — TORSEMIDE 20 MG/1
TABLET ORAL
Qty: 30 TABLET | Refills: 0 | Status: SHIPPED | OUTPATIENT
Start: 2022-09-29 | End: 2022-10-12 | Stop reason: SDUPTHER

## 2022-09-29 NOTE — TELEPHONE ENCOUNTER
Jose Guadalupe Dias called requesting a refill of the below medication which has been pended for you:     Requested Prescriptions     Pending Prescriptions Disp Refills    torsemide (DEMADEX) 20 MG tablet [Pharmacy Med Name: Torsemide 20 MG Oral Tablet] 30 tablet 0     Sig: Take 1 tablet by mouth once daily       Last Appointment Date: 4/6/2022  Next Appointment Date: 10/12/2022    No Known Allergies

## 2022-10-05 ENCOUNTER — HOSPITAL ENCOUNTER (OUTPATIENT)
Dept: LAB | Age: 82
Discharge: HOME | End: 2022-10-05
Payer: MEDICARE

## 2022-10-05 DIAGNOSIS — R73.01 ELEVATED FASTING GLUCOSE: ICD-10-CM

## 2022-10-05 DIAGNOSIS — I10 ESSENTIAL HYPERTENSION: ICD-10-CM

## 2022-10-05 DIAGNOSIS — E55.9 VITAMIN D DEFICIENCY: ICD-10-CM

## 2022-10-05 LAB
ALBUMIN SERPL-MCNC: 3.6 G/DL (ref 3.5–5.2)
ALBUMIN/GLOBULIN RATIO: 1.2 (ref 1–2.5)
ALP BLD-CCNC: 95 U/L (ref 35–104)
ALT SERPL-CCNC: 8 U/L (ref 5–33)
ANION GAP SERPL CALCULATED.3IONS-SCNC: 12 MMOL/L (ref 9–17)
AST SERPL-CCNC: 17 U/L
BILIRUB SERPL-MCNC: 0.4 MG/DL (ref 0.3–1.2)
BUN BLDV-MCNC: 22 MG/DL (ref 8–23)
BUN/CREAT BLD: 18 (ref 9–20)
CALCIUM SERPL-MCNC: 9.2 MG/DL (ref 8.6–10.4)
CHLORIDE BLD-SCNC: 98 MMOL/L (ref 98–107)
CHOLESTEROL/HDL RATIO: 3.3
CHOLESTEROL: 178 MG/DL
CO2: 28 MMOL/L (ref 20–31)
CREAT SERPL-MCNC: 1.24 MG/DL (ref 0.5–0.9)
GFR SERPL CREATININE-BSD FRML MDRD: 44 ML/MIN/1.73M2
GLUCOSE BLD-MCNC: 89 MG/DL (ref 70–99)
HDLC SERPL-MCNC: 54 MG/DL
LDL CHOLESTEROL: 109 MG/DL (ref 0–130)
POTASSIUM SERPL-SCNC: 4.4 MMOL/L (ref 3.7–5.3)
SODIUM BLD-SCNC: 138 MMOL/L (ref 135–144)
TOTAL PROTEIN: 6.7 G/DL (ref 6.4–8.3)
TRIGL SERPL-MCNC: 74 MG/DL
VITAMIN D 25-HYDROXY: 40.3 NG/ML

## 2022-10-05 PROCEDURE — 80053 COMPREHEN METABOLIC PANEL: CPT

## 2022-10-05 PROCEDURE — 36415 COLL VENOUS BLD VENIPUNCTURE: CPT

## 2022-10-05 PROCEDURE — 83036 HEMOGLOBIN GLYCOSYLATED A1C: CPT

## 2022-10-05 PROCEDURE — 82306 VITAMIN D 25 HYDROXY: CPT

## 2022-10-05 PROCEDURE — 80061 LIPID PANEL: CPT

## 2022-10-06 LAB
ESTIMATED AVERAGE GLUCOSE: 117 MG/DL
HBA1C MFR BLD: 5.7 % (ref 4–6)

## 2022-10-12 ENCOUNTER — IMMUNIZATION (OUTPATIENT)
Dept: LAB | Age: 82
End: 2022-10-12
Payer: MEDICARE

## 2022-10-12 ENCOUNTER — OFFICE VISIT (OUTPATIENT)
Dept: FAMILY MEDICINE CLINIC | Age: 82
End: 2022-10-12
Payer: MEDICARE

## 2022-10-12 VITALS
SYSTOLIC BLOOD PRESSURE: 128 MMHG | WEIGHT: 142.8 LBS | HEIGHT: 63 IN | TEMPERATURE: 97.4 F | DIASTOLIC BLOOD PRESSURE: 64 MMHG | OXYGEN SATURATION: 99 % | BODY MASS INDEX: 25.3 KG/M2 | HEART RATE: 67 BPM

## 2022-10-12 DIAGNOSIS — K21.9 GASTROESOPHAGEAL REFLUX DISEASE, UNSPECIFIED WHETHER ESOPHAGITIS PRESENT: ICD-10-CM

## 2022-10-12 DIAGNOSIS — R73.01 ELEVATED FASTING GLUCOSE: ICD-10-CM

## 2022-10-12 DIAGNOSIS — I10 ESSENTIAL HYPERTENSION: Primary | ICD-10-CM

## 2022-10-12 DIAGNOSIS — E55.9 VITAMIN D DEFICIENCY: ICD-10-CM

## 2022-10-12 DIAGNOSIS — Z23 NEED FOR INFLUENZA VACCINATION: ICD-10-CM

## 2022-10-12 DIAGNOSIS — R60.0 BILATERAL LOWER EXTREMITY EDEMA: ICD-10-CM

## 2022-10-12 PROCEDURE — 99214 OFFICE O/P EST MOD 30 MIN: CPT | Performed by: FAMILY MEDICINE

## 2022-10-12 PROCEDURE — PBSHW COVID-19, MODERNA BIVALENT BOOSTER, (AGE 18Y+), IM, 50 MCG/0.5 ML: Performed by: FAMILY MEDICINE

## 2022-10-12 PROCEDURE — 1123F ACP DISCUSS/DSCN MKR DOCD: CPT | Performed by: FAMILY MEDICINE

## 2022-10-12 PROCEDURE — G8427 DOCREV CUR MEDS BY ELIG CLIN: HCPCS | Performed by: FAMILY MEDICINE

## 2022-10-12 PROCEDURE — 1090F PRES/ABSN URINE INCON ASSESS: CPT | Performed by: FAMILY MEDICINE

## 2022-10-12 PROCEDURE — 1036F TOBACCO NON-USER: CPT | Performed by: FAMILY MEDICINE

## 2022-10-12 PROCEDURE — 91313 COVID-19, MODERNA BIVALENT BOOSTER, (AGE 18Y+), IM, 50 MCG/0.5 ML: CPT | Performed by: FAMILY MEDICINE

## 2022-10-12 PROCEDURE — G8484 FLU IMMUNIZE NO ADMIN: HCPCS | Performed by: FAMILY MEDICINE

## 2022-10-12 PROCEDURE — 99214 OFFICE O/P EST MOD 30 MIN: CPT

## 2022-10-12 PROCEDURE — G8399 PT W/DXA RESULTS DOCUMENT: HCPCS | Performed by: FAMILY MEDICINE

## 2022-10-12 PROCEDURE — PBSHW INFLUENZA, FLUAD, (AGE 65 Y+), IM, PF, 0.5 ML: Performed by: FAMILY MEDICINE

## 2022-10-12 PROCEDURE — G8417 CALC BMI ABV UP PARAM F/U: HCPCS | Performed by: FAMILY MEDICINE

## 2022-10-12 PROCEDURE — G0008 ADMIN INFLUENZA VIRUS VAC: HCPCS | Performed by: FAMILY MEDICINE

## 2022-10-12 RX ORDER — LISINOPRIL 20 MG/1
20 TABLET ORAL DAILY
Qty: 90 TABLET | Refills: 1 | Status: SHIPPED | OUTPATIENT
Start: 2022-10-12

## 2022-10-12 RX ORDER — OMEPRAZOLE 20 MG/1
CAPSULE, DELAYED RELEASE ORAL
Qty: 90 CAPSULE | Refills: 1 | Status: SHIPPED | OUTPATIENT
Start: 2022-10-12

## 2022-10-12 RX ORDER — AMLODIPINE BESYLATE 5 MG/1
TABLET ORAL
Qty: 90 TABLET | Refills: 1 | Status: SHIPPED | OUTPATIENT
Start: 2022-10-12

## 2022-10-12 RX ORDER — TORSEMIDE 20 MG/1
TABLET ORAL
Qty: 90 TABLET | Refills: 1 | Status: SHIPPED | OUTPATIENT
Start: 2022-10-12

## 2022-10-12 RX ORDER — METOPROLOL SUCCINATE 25 MG/1
TABLET, EXTENDED RELEASE ORAL
Qty: 90 TABLET | Refills: 1 | Status: SHIPPED | OUTPATIENT
Start: 2022-10-12

## 2022-10-12 ASSESSMENT — PATIENT HEALTH QUESTIONNAIRE - PHQ9
3. TROUBLE FALLING OR STAYING ASLEEP: 0
SUM OF ALL RESPONSES TO PHQ QUESTIONS 1-9: 0
8. MOVING OR SPEAKING SO SLOWLY THAT OTHER PEOPLE COULD HAVE NOTICED. OR THE OPPOSITE, BEING SO FIGETY OR RESTLESS THAT YOU HAVE BEEN MOVING AROUND A LOT MORE THAN USUAL: 0
1. LITTLE INTEREST OR PLEASURE IN DOING THINGS: 0
9. THOUGHTS THAT YOU WOULD BE BETTER OFF DEAD, OR OF HURTING YOURSELF: 0
SUM OF ALL RESPONSES TO PHQ QUESTIONS 1-9: 0
6. FEELING BAD ABOUT YOURSELF - OR THAT YOU ARE A FAILURE OR HAVE LET YOURSELF OR YOUR FAMILY DOWN: 0
10. IF YOU CHECKED OFF ANY PROBLEMS, HOW DIFFICULT HAVE THESE PROBLEMS MADE IT FOR YOU TO DO YOUR WORK, TAKE CARE OF THINGS AT HOME, OR GET ALONG WITH OTHER PEOPLE: 0
2. FEELING DOWN, DEPRESSED OR HOPELESS: 0
SUM OF ALL RESPONSES TO PHQ9 QUESTIONS 1 & 2: 0
SUM OF ALL RESPONSES TO PHQ QUESTIONS 1-9: 0
SUM OF ALL RESPONSES TO PHQ QUESTIONS 1-9: 0
4. FEELING TIRED OR HAVING LITTLE ENERGY: 0
7. TROUBLE CONCENTRATING ON THINGS, SUCH AS READING THE NEWSPAPER OR WATCHING TELEVISION: 0
5. POOR APPETITE OR OVEREATING: 0

## 2022-10-12 NOTE — PROGRESS NOTES
Patient declines shingles, Tdap, colonoscopy  Agreeable to Flu and covid booster. MAW visit scheduled.

## 2022-10-12 NOTE — PROGRESS NOTES
[facility] [address], [address] [place] [address] Drive                        Telephone [phone]             Fax [phone]       Reshma Donaldson  :  1940  Age:  80 y.o. MRN:  [id number]  Date of visit:  10/12/2022       Assessment and Plan:    1. Essential hypertension  Her blood pressure is well-controlled today. (BP: 128/64)   She was advised to continue current medications. Lisinopril, Metoprolol, and Amlodipine were refilled:   - lisinopril (PRINIVIL;ZESTRIL) 20 MG tablet; Take 1 tablet by mouth daily  Dispense: 90 tablet; Refill: 1  - metoprolol succinate (TOPROL XL) 25 MG extended release tablet; Take 1 tablet by mouth once daily  Dispense: 90 tablet; Refill: 1  - amLODIPine (NORVASC) 5 MG tablet; Take 1 tablet by mouth once daily  Dispense: 90 tablet; Refill: 1    Labs were ordered to be done prior to her return visit in 6 months:   - Comprehensive Metabolic Panel; Future  - Lipid Panel; Future    2. Elevated fasting glucose  Her fasting glucose and HgbA1c are both within normal limits on her recent lab work. {  Lab Results   Component Value Date/Time    GLUCOSE 89 10/05/2022 07:45 AM     Lab Results   Component Value Date    LABA1C 5.7 10/05/2022    ([de-identified]   - Hemoglobin A1C; Future was ordered to be done prior to her return visit in 6 months. 3. Vitamin D deficiency  Her 25-hydroxy Vitamin D level was within normal limits (40.3 ng/mL) on  her recent lab work. She was advised to continue with her current dose of Vitamin D.       - Vitamin D 25 Hydroxy; Future was ordered to be done prior to her return visit in 6 months. 4. Gastroesophageal reflux disease, unspecified whether esophagitis present  She is doing well with Omeprazole; this was refilled:  - omeprazole (PRILOSEC) 20 MG delayed release capsule; TAKE 1 CAPSULE BY MOUTH ONCE DAILY AS NEEDED FOR  REFLUX  Dispense: 90 capsule;  Refill: 11/16/2011    Influenza, FLUAD, (age 72 y+), Adjuvanted, 0.5mL 09/30/2020, 10/06/2021    Influenza, High Dose (Fluzone 65 yrs and older) 10/25/2013, 10/29/2015, 11/15/2016, 10/26/2017, 10/19/2018    Influenza, Triv, inactivated, subunit, adjuvanted, IM (Fluad 65 yrs and older) 11/07/2019    Pneumococcal Conjugate 13-valent ([id number]) 06/04/2015    Pneumococcal Polysaccharide ([id number]) 10/30/2012    Tdap (Boostrix, Adacel) 01/30/2009          She has the following problem list:  Patient Active Problem List   Diagnosis    Essential hypertension    GERD (gastroesophageal reflux disease)    Elevated fasting glucose    Osteopenia    Overweight (BMI 25.0-29. 9)    Venous insufficiency (chronic) (peripheral)    Spider vein, symptomatic    Varicose vein    Hyponatremia, mild    Tubular adenoma of colon    Bullous pemphigoid    Vitamin D deficiency    Osteoarthritis of left hip    Status post total replacement of left hip    Status post total replacement of right hip    Osteoarthritis of one hip, right        Current medications are:  Outpatient Medications Marked as Taking for the 10/12/22 encounter (Office Visit) with Steph Abarca MD   Medication Sig Dispense Refill    Calcium Carbonate (CALCIUM 600 PO) Take by mouth      torsemide (DEMADEX) 20 MG tablet Take 1 tablet by mouth once daily 30 tablet 0    EQ ALLERGY RELIEF 10 MG tablet as needed      amLODIPine (NORVASC) 5 MG tablet Take 1 tablet by mouth once daily 90 tablet 1    omeprazole (PRILOSEC) 20 MG delayed release capsule TAKE 1 CAPSULE BY MOUTH ONCE DAILY AS NEEDED FOR  REFLUX 90 capsule 1    metoprolol succinate (TOPROL XL) 25 MG extended release tablet Take 1 tablet by mouth once daily 90 tablet 1    lisinopril (PRINIVIL;ZESTRIL) 20 MG tablet Take 1 tablet by mouth daily 90 tablet 1    Acetaminophen (TYLENOL ARTHRITIS PAIN PO) Take by mouth as needed       Multiple Vitamins-Minerals (PRESERVISION AREDS 2 PO) Take by mouth daily      vitamin D-3 (CHOLECALCIFEROL) 5000 UNITS TABS Take 5,000 Units by mouth daily. She has No Known Allergies. She is not currently a smoker. She  reports that she quit smoking about 33 years ago. Her smoking use included cigarettes. She started smoking about 64 years ago. She has a 77.50 pack-year smoking history. She has never used smokeless tobacco.      Objective:    Vitals:    10/12/22 1446   BP: 128/64   Site: Right Upper Arm   Position: Sitting   Cuff Size: Large Adult   Pulse: 67   Temp: 97.4 °F (36.3 °C)   TempSrc: Temporal   SpO2: 99%   Weight: 142 lb 12.8 oz (64.8 kg)   Height: 5' 3\" (1.6 m)     Body mass index is 25.3 kg/m². Well-nourished, well-developed elderly female, healthy-appearing, alert, cooperative, and in no acute distress. Neck supple. No adenopathy. Thyroid symmetric, normal size. Chest:  Normal expansion. Clear to auscultation. No rales, rhonchi, or wheezing. Heart sounds are normal.  Regular rate and rhythm without murmur, gallop or rub. Lower extremities have no edema.     Results of labs done 10/5/2022 were reviewed with the patient:   Hospital Outpatient Visit on 10/05/2022   Component Date Value Ref Range Status    Vit D, 25-Hydroxy 10/05/2022 40.3  >29.9 ng/mL Final    Comment:    Reference Range:  Vitamin D status         Range   Deficiency              <20 ng/mL   Mild Deficiency       20-30 ng/mL   Sufficiency           ng/mL   Toxicity               >100 ng/mL      Cholesterol 10/05/2022 178  <200 mg/dL Final    Comment:    Cholesterol Guidelines:      <200  Desirable   200-240  Borderline      >240  Undesirable         HDL 10/05/2022 54  >40 mg/dL Final    Comment:    HDL Guidelines:    <40     Undesirable   40-59    Borderline    >59     Desirable         LDL Cholesterol 10/05/2022 109  0 - 130 mg/dL Final    Comment:    LDL Guidelines:     <100    Desirable   100-129   Near to/above Desirable   130-159   Borderline      >159   Undesirable     Direct (measured) LDL and calculated LDL are not interchangeable tests. Chol/HDL Ratio 10/05/2022 3.3  <5 Final            Triglycerides 10/05/2022 74  <150 mg/dL Final    Comment:    Triglyceride Guidelines:     <150   Desirable   150-199  Borderline   200-499  High     >499   Very high   Based on AHA Guidelines for fasting triglyceride, October 2012. Hemoglobin A1C 10/05/2022 5.7  4.0 - 6.0 % Final    Estimated Avg Glucose 10/05/2022 117  mg/dL Final    Comment: The ADA and AACC recommend providing the estimated average glucose result to permit better   patient understanding of their HBA1c result. Glucose 10/05/2022 89  70 - 99 mg/dL Final    BUN 10/05/2022 22  8 - 23 mg/dL Final    Creatinine 10/05/2022 1.24 (A)  0.50 - 0.90 mg/dL Final    Est, Glom Filt Rate 10/05/2022 44 (A)  >60 mL/min/1.73m2 Final    Comment:       Effective Oct 3, 2022        These results are not intended for use in patients <25years of age. eGFR results are calculated without a race factor using the 2021 CKD-EPI equation. Careful clinical correlation is recommended, particularly when comparing to results   calculated using previous equations. The CKD-EPI equation is less accurate in patients with extremes of muscle mass, extra-renal   metabolism of creatine, excessive creatine ingestion, or following therapy that affects   renal tubular secretion.       Bun/Cre Ratio 10/05/2022 18  9 - 20 Final    Calcium 10/05/2022 9.2  8.6 - 10.4 mg/dL Final    Sodium 10/05/2022 138  135 - 144 mmol/L Final    Potassium 10/05/2022 4.4  3.7 - 5.3 mmol/L Final    Chloride 10/05/2022 98  98 - 107 mmol/L Final    CO2 10/05/2022 28  20 - 31 mmol/L Final    Anion Gap 10/05/2022 12  9 - 17 mmol/L Final    Alkaline Phosphatase 10/05/2022 95  35 - 104 U/L Final    ALT 10/05/2022 8  5 - 33 U/L Final    AST 10/05/2022 17  <32 U/L Final    Total Bilirubin 10/05/2022 0.4  0.3 - 1.2 mg/dL Final    Total Protein 10/05/2022 6.7  6.4 - 8.3 g/dL Final    Albumin 10/05/2022 3.6  3.5 - 5.2 g/dL Final    Albumin/Globulin Ratio 10/05/2022 1.2  1.0 - 2.5 Final                (Please note that portions of this note were completed with a voice-recognition program. Efforts were made to edit the dictation but occasionally words are mis-transcribed.)

## 2022-10-13 ENCOUNTER — TELEMEDICINE (OUTPATIENT)
Dept: FAMILY MEDICINE CLINIC | Age: 82
End: 2022-10-13
Payer: MEDICARE

## 2022-10-13 DIAGNOSIS — Z00.00 MEDICARE ANNUAL WELLNESS VISIT, SUBSEQUENT: Primary | ICD-10-CM

## 2022-10-13 PROCEDURE — 1123F ACP DISCUSS/DSCN MKR DOCD: CPT | Performed by: FAMILY MEDICINE

## 2022-10-13 PROCEDURE — G0439 PPPS, SUBSEQ VISIT: HCPCS | Performed by: FAMILY MEDICINE

## 2022-10-13 PROCEDURE — G8484 FLU IMMUNIZE NO ADMIN: HCPCS | Performed by: FAMILY MEDICINE

## 2022-10-13 ASSESSMENT — LIFESTYLE VARIABLES
HOW MANY STANDARD DRINKS CONTAINING ALCOHOL DO YOU HAVE ON A TYPICAL DAY: PATIENT DOES NOT DRINK
HOW OFTEN DO YOU HAVE A DRINK CONTAINING ALCOHOL: NEVER

## 2022-10-13 ASSESSMENT — PATIENT HEALTH QUESTIONNAIRE - PHQ9
SUM OF ALL RESPONSES TO PHQ QUESTIONS 1-9: 0
SUM OF ALL RESPONSES TO PHQ QUESTIONS 1-9: 0
SUM OF ALL RESPONSES TO PHQ9 QUESTIONS 1 & 2: 0
1. LITTLE INTEREST OR PLEASURE IN DOING THINGS: 0
SUM OF ALL RESPONSES TO PHQ QUESTIONS 1-9: 0
SUM OF ALL RESPONSES TO PHQ QUESTIONS 1-9: 0
2. FEELING DOWN, DEPRESSED OR HOPELESS: 0

## 2022-10-13 NOTE — PROGRESS NOTES
Patient declines colonscopy at this time. Patient also declines shingles vaccine and tdap vaccine. Patient declines hepatitis c screen.

## 2022-10-13 NOTE — PROGRESS NOTES
Medicare Annual Wellness Visit    Link Hua is here for Medicare AWV (AWV-LPN)    Assessment & Plan    Recommendations for Preventive Services Due: see orders and patient instructions/AVS.  Recommended screening schedule for the next 5-10 years is provided to the patient in written form: see Patient Instructions/AVS.     No follow-ups on file. Subjective       Patient's complete Health Risk Assessment and screening values have been reviewed and are found in Flowsheets. The following problems were reviewed today and where indicated follow up appointments were made and/or referrals ordered.     Positive Risk Factor Screenings with Interventions:             General Health and ACP:  General  In general, how would you say your health is?: Very Good  In the past 7 days, have you experienced any of the following: New or Increased Pain, New or Increased Fatigue, Loneliness, Social Isolation, Stress or Anger?: No  Do you get the social and emotional support that you need?: Yes  Do you have a Living Will?: (!) No    Advance Directives       Power of  Living Will ACP-Advance Directive ACP-Power of     Not on File Not on File Not on File Not on File        General Health Risk Interventions:  No Living Will: Patient declines ACP discussion/assistance    Health Habits/Nutrition:  Physical Activity: Inactive    Days of Exercise per Week: 0 days    Minutes of Exercise per Session: 0 min     Have you lost any weight without trying in the past 3 months?: No     Have you seen the dentist within the past year?: (!) No  Health Habits/Nutrition Interventions:  Inadequate physical activity:  patient is not ready to increase his/her physical activity level at this time  Dental exam overdue:  patient encouraged to make appointment with his/her dentist    Hearing/Vision:  Do you or your family notice any trouble with your hearing that hasn't been managed with hearing aids?: No  Do you have difficulty driving, watching TV, or doing any of your daily activities because of your eyesight?: No  Have you had an eye exam within the past year?: (!) No  No results found. Hearing/Vision Interventions:  Hearing concerns:  patient declines any further evaluation/treatment for hearing issues  Vision concerns:  patient encouraged to make appointment with his/her eye specialist            Objective      Patient-Reported Vitals  Patient-Reported Systolic (Top): 857 mmHg  Patient-Reported Diastolic (Bottom): 64 mmHg  BP Observations: No, remote/electronic monitoring device was not used or able to be verified  Patient-Reported Pulse: 67  Patient-Reported Weight: 142lbs  Patient-Reported Height: 5 3           No Known Allergies  Prior to Visit Medications    Medication Sig Taking? Authorizing Provider   Calcium Carbonate (CALCIUM 600 PO) Take by mouth Yes Historical Provider, MD   lisinopril (PRINIVIL;ZESTRIL) 20 MG tablet Take 1 tablet by mouth daily Yes Alicia Silverio MD   metoprolol succinate (TOPROL XL) 25 MG extended release tablet Take 1 tablet by mouth once daily Yes Alicia Silverio MD   omeprazole (PRILOSEC) 20 MG delayed release capsule TAKE 1 CAPSULE BY MOUTH ONCE DAILY AS NEEDED FOR  REFLUX Yes Alicia Silverio MD   amLODIPine (NORVASC) 5 MG tablet Take 1 tablet by mouth once daily Yes Alicia Silverio MD   torsemide (DEMADEX) 20 MG tablet Take 1 tablet by mouth once daily Yes Alicia Silverio MD   EQ ALLERGY RELIEF 10 MG tablet as needed Yes Historical Provider, MD   Acetaminophen (TYLENOL ARTHRITIS PAIN PO) Take by mouth as needed  Yes Historical Provider, MD   Multiple Vitamins-Minerals (PRESERVISION AREDS 2 PO) Take by mouth daily Yes Historical Provider, MD   vitamin D-3 (CHOLECALCIFEROL) 5000 UNITS TABS Take 5,000 Units by mouth daily.  Yes Historical Provider, MD Banks (Including outside providers/suppliers regularly involved in providing care):   Patient Care Team:  Alicia Silverio MD as PCP - General (Family Medicine)  Olene Lanes Dina Holden MD as PCP - [facility]     Reviewed and updated this visit:  Tobacco  Allergies  Meds  Med Hx  Surg Hx  Soc Hx  Fam Hx          Ernie Jean, was evaluated through a synchronous (real-time) audio encounter. The patient (or guardian if applicable) is aware that this is a billable service, which includes applicable co-pays. This Virtual Visit was conducted with patient's (and/or legal guardian's) consent. The visit was conducted pursuant to the emergency declaration under the [facility] [place]note 2[facility], [address] [place] [address] [facility] authority and the [employer] Act. Patient identification was verified, and a caregiver was present when appropriate. The patient was located at Home: [address] [place] [address]. Provider was located at [facility] ([address] [place] [address]): [address] [place] [address]. Georgie Sweeney LPN, 86/24/8413, performed the documented evaluation under the direct supervision of the attending physician. This encounter was performed under my, Cathy Molina, direct supervision, 10/13/2022.    Electronically signed by Sunny De Leon MD on 10/13/2022 at 1:48 PM

## 2022-10-13 NOTE — PATIENT INSTRUCTIONS
Personalized Preventive Plan for Shade Cassette - 10/13/2022  Medicare offers a range of preventive health benefits. Some of the tests and screenings are paid in full while other may be subject to a deductible, co-insurance, and/or copay. Some of these benefits include a comprehensive review of your medical history including lifestyle, illnesses that may run in your family, and various assessments and screenings as appropriate. After reviewing your medical record and screening and assessments performed today your provider may have ordered immunizations, labs, imaging, and/or referrals for you. A list of these orders (if applicable) as well as your Preventive Care list are included within your After Visit Summary for your review. Other Preventive Recommendations:    A preventive eye exam performed by an eye specialist is recommended every 1-2 years to screen for glaucoma; cataracts, macular degeneration, and other eye disorders. A preventive dental visit is recommended every 6 months. Try to get at least 150 minutes of exercise per week or 10,000 steps per day on a pedometer . Order or download the FREE \"Exercise & Physical Activity: Your Everyday Guide\" from [employer]. Call 3-292.798.6622 or search [employer] online. You need 8925-0437 mg of calcium and 0739-3622 IU of vitamin D per day. It is possible to meet your calcium requirement with diet alone, but a vitamin D supplement is usually necessary to meet this goal.  When exposed to the sun, use a sunscreen that protects against both UVA and UVB radiation with an SPF of 30 or greater. Reapply every 2 to 3 hours or after sweating, drying off with a towel, or swimming. Always wear a seat belt when traveling in a car. Always wear a helmet when riding a bicycle or motorcycle.

## 2022-10-25 ENCOUNTER — HOSPITAL ENCOUNTER (OUTPATIENT)
Dept: MAMMOGRAPHY | Age: 82
Discharge: HOME | End: 2022-10-27
Payer: MEDICARE

## 2022-10-25 DIAGNOSIS — Z12.31 BREAST CANCER SCREENING BY MAMMOGRAM: ICD-10-CM

## 2022-10-25 PROCEDURE — 77067 SCR MAMMO BI INCL CAD: CPT

## 2022-10-26 DIAGNOSIS — I10 ESSENTIAL HYPERTENSION: ICD-10-CM

## 2022-10-26 RX ORDER — METOPROLOL SUCCINATE 25 MG/1
TABLET, EXTENDED RELEASE ORAL
Qty: 90 TABLET | Refills: 1 | OUTPATIENT
Start: 2022-10-26

## 2022-12-19 DIAGNOSIS — I10 ESSENTIAL HYPERTENSION: ICD-10-CM

## 2022-12-19 RX ORDER — AMLODIPINE BESYLATE 5 MG/1
TABLET ORAL
Qty: 90 TABLET | Refills: 0 | OUTPATIENT
Start: 2022-12-19

## 2023-04-19 ENCOUNTER — HOSPITAL ENCOUNTER (OUTPATIENT)
Dept: GENERAL RADIOLOGY | Age: 83
Discharge: HOME | End: 2023-04-21
Payer: MEDICARE

## 2023-04-19 ENCOUNTER — OFFICE VISIT (OUTPATIENT)
Dept: FAMILY MEDICINE CLINIC | Age: 83
End: 2023-04-19
Payer: MEDICARE

## 2023-04-19 VITALS
HEIGHT: 63 IN | OXYGEN SATURATION: 98 % | TEMPERATURE: 97.8 F | HEART RATE: 68 BPM | SYSTOLIC BLOOD PRESSURE: 122 MMHG | WEIGHT: 145 LBS | DIASTOLIC BLOOD PRESSURE: 74 MMHG | BODY MASS INDEX: 25.69 KG/M2

## 2023-04-19 DIAGNOSIS — G89.29 CHRONIC PAIN OF LEFT KNEE: ICD-10-CM

## 2023-04-19 DIAGNOSIS — M25.562 CHRONIC PAIN OF LEFT KNEE: ICD-10-CM

## 2023-04-19 DIAGNOSIS — E66.3 OVERWEIGHT: ICD-10-CM

## 2023-04-19 DIAGNOSIS — E55.9 VITAMIN D DEFICIENCY: ICD-10-CM

## 2023-04-19 DIAGNOSIS — R73.01 ELEVATED FASTING GLUCOSE: ICD-10-CM

## 2023-04-19 DIAGNOSIS — I10 ESSENTIAL HYPERTENSION: Primary | ICD-10-CM

## 2023-04-19 DIAGNOSIS — R60.0 BILATERAL LOWER EXTREMITY EDEMA: ICD-10-CM

## 2023-04-19 DIAGNOSIS — K21.9 GASTROESOPHAGEAL REFLUX DISEASE, UNSPECIFIED WHETHER ESOPHAGITIS PRESENT: ICD-10-CM

## 2023-04-19 PROCEDURE — 73562 X-RAY EXAM OF KNEE 3: CPT

## 2023-04-19 PROCEDURE — 1036F TOBACCO NON-USER: CPT | Performed by: FAMILY MEDICINE

## 2023-04-19 PROCEDURE — G8427 DOCREV CUR MEDS BY ELIG CLIN: HCPCS | Performed by: FAMILY MEDICINE

## 2023-04-19 PROCEDURE — 1090F PRES/ABSN URINE INCON ASSESS: CPT | Performed by: FAMILY MEDICINE

## 2023-04-19 PROCEDURE — 99214 OFFICE O/P EST MOD 30 MIN: CPT | Performed by: FAMILY MEDICINE

## 2023-04-19 PROCEDURE — 3074F SYST BP LT 130 MM HG: CPT | Performed by: FAMILY MEDICINE

## 2023-04-19 PROCEDURE — G8417 CALC BMI ABV UP PARAM F/U: HCPCS | Performed by: FAMILY MEDICINE

## 2023-04-19 PROCEDURE — G8399 PT W/DXA RESULTS DOCUMENT: HCPCS | Performed by: FAMILY MEDICINE

## 2023-04-19 PROCEDURE — 1123F ACP DISCUSS/DSCN MKR DOCD: CPT | Performed by: FAMILY MEDICINE

## 2023-04-19 PROCEDURE — 3078F DIAST BP <80 MM HG: CPT | Performed by: FAMILY MEDICINE

## 2023-04-19 RX ORDER — TORSEMIDE 10 MG/1
10 TABLET ORAL DAILY
Qty: 30 TABLET | Refills: 3 | Status: SHIPPED | OUTPATIENT
Start: 2023-04-19 | End: 2023-05-19

## 2023-04-19 RX ORDER — AMLODIPINE BESYLATE 5 MG/1
TABLET ORAL
Qty: 90 TABLET | Refills: 1 | Status: SHIPPED | OUTPATIENT
Start: 2023-04-19

## 2023-04-19 RX ORDER — LISINOPRIL 20 MG/1
20 TABLET ORAL DAILY
Qty: 90 TABLET | Refills: 1 | Status: SHIPPED | OUTPATIENT
Start: 2023-04-19

## 2023-04-19 RX ORDER — OMEPRAZOLE 20 MG/1
CAPSULE, DELAYED RELEASE ORAL
Qty: 90 CAPSULE | Refills: 1 | Status: SHIPPED | OUTPATIENT
Start: 2023-04-19

## 2023-04-19 RX ORDER — METOPROLOL SUCCINATE 25 MG/1
TABLET, EXTENDED RELEASE ORAL
Qty: 90 TABLET | Refills: 1 | Status: SHIPPED | OUTPATIENT
Start: 2023-04-19

## 2023-04-19 SDOH — ECONOMIC STABILITY: INCOME INSECURITY: HOW HARD IS IT FOR YOU TO PAY FOR THE VERY BASICS LIKE FOOD, HOUSING, MEDICAL CARE, AND HEATING?: PATIENT DECLINED

## 2023-04-19 SDOH — ECONOMIC STABILITY: HOUSING INSECURITY
IN THE LAST 12 MONTHS, WAS THERE A TIME WHEN YOU DID NOT HAVE A STEADY PLACE TO SLEEP OR SLEPT IN A SHELTER (INCLUDING NOW)?: PATIENT REFUSED

## 2023-04-19 SDOH — ECONOMIC STABILITY: FOOD INSECURITY: WITHIN THE PAST 12 MONTHS, YOU WORRIED THAT YOUR FOOD WOULD RUN OUT BEFORE YOU GOT MONEY TO BUY MORE.: PATIENT DECLINED

## 2023-04-19 SDOH — ECONOMIC STABILITY: FOOD INSECURITY: WITHIN THE PAST 12 MONTHS, THE FOOD YOU BOUGHT JUST DIDN'T LAST AND YOU DIDN'T HAVE MONEY TO GET MORE.: PATIENT DECLINED

## 2023-04-19 ASSESSMENT — PATIENT HEALTH QUESTIONNAIRE - PHQ9
1. LITTLE INTEREST OR PLEASURE IN DOING THINGS: 0
SUM OF ALL RESPONSES TO PHQ QUESTIONS 1-9: 0
2. FEELING DOWN, DEPRESSED OR HOPELESS: 0
SUM OF ALL RESPONSES TO PHQ9 QUESTIONS 1 & 2: 0
SUM OF ALL RESPONSES TO PHQ QUESTIONS 1-9: 0

## 2023-04-19 NOTE — PROGRESS NOTES
ng/mL   Toxicity               >100 ng/mL      Cholesterol 04/12/2023 198  <200 mg/dL Final    Comment:    Cholesterol Guidelines:      <200  Desirable   200-240  Borderline      >240  Undesirable         HDL 04/12/2023 64  >40 mg/dL Final    Comment:    HDL Guidelines:    <40     Undesirable   40-59    Borderline    >59     Desirable         LDL Cholesterol 04/12/2023 120  0 - 130 mg/dL Final    Comment:    LDL Guidelines:     <100    Desirable   100-129   Near to/above Desirable   130-159   Borderline      >159   Undesirable     Direct (measured) LDL and calculated LDL are not interchangeable tests. Chol/HDL Ratio 04/12/2023 3.1  <5 Final            Triglycerides 04/12/2023 70  <150 mg/dL Final    Comment:    Triglyceride Guidelines:     <150   Desirable   150-199  Borderline   200-499  High     >499   Very high   Based on AHA Guidelines for fasting triglyceride, October 2012. Hemoglobin A1C 04/12/2023 5.7  4.0 - 6.0 % Final    Estimated Avg Glucose 04/12/2023 117  mg/dL Final    Comment: The ADA and AACC recommend providing the estimated average glucose result to permit better   patient understanding of their HBA1c result. Glucose 04/12/2023 98  70 - 99 mg/dL Final    BUN 04/12/2023 28 (H)  8 - 23 mg/dL Final    Creatinine 04/12/2023 1.06 (H)  0.50 - 0.90 mg/dL Final    Est, Glom Filt Rate 04/12/2023 53 (L)  >60 mL/min/1.73m2 Final    Comment:       These results are not intended for use in patients <25years of age. eGFR results are calculated without a race factor using the 2021 CKD-EPI equation. Careful clinical correlation is recommended, particularly when comparing to results   calculated using previous equations. The CKD-EPI equation is less accurate in patients with extremes of muscle mass, extra-renal   metabolism of creatine, excessive creatine ingestion, or following therapy that affects   renal tubular secretion.       Bun/Cre Ratio 04/12/2023 26 (H)  9 - 20 Final    Calcium

## 2023-05-02 ENCOUNTER — OFFICE VISIT (OUTPATIENT)
Dept: ORTHOPEDIC SURGERY | Age: 83
End: 2023-05-02
Payer: MEDICARE

## 2023-05-02 VITALS — DIASTOLIC BLOOD PRESSURE: 55 MMHG | SYSTOLIC BLOOD PRESSURE: 141 MMHG | HEART RATE: 61 BPM

## 2023-05-02 DIAGNOSIS — M17.12 LOCALIZED OSTEOARTHRITIS OF LEFT KNEE: ICD-10-CM

## 2023-05-02 DIAGNOSIS — M25.562 LEFT KNEE PAIN, UNSPECIFIED CHRONICITY: Primary | ICD-10-CM

## 2023-05-02 PROCEDURE — 99214 OFFICE O/P EST MOD 30 MIN: CPT | Performed by: NURSE PRACTITIONER

## 2023-05-03 RX ORDER — TRIAMCINOLONE ACETONIDE 40 MG/ML
80 INJECTION, SUSPENSION INTRA-ARTICULAR; INTRAMUSCULAR ONCE
Status: COMPLETED | OUTPATIENT
Start: 2023-05-03 | End: 2023-05-04

## 2023-05-03 RX ORDER — BUPIVACAINE HYDROCHLORIDE 5 MG/ML
5 INJECTION, SOLUTION PERINEURAL ONCE
Status: COMPLETED | OUTPATIENT
Start: 2023-05-03 | End: 2023-05-04

## 2023-05-03 NOTE — PROGRESS NOTES
Orthopaedic Progress Note      CHIEF COMPLAINT: Left knee pain    HISTORY OF PRESENT ILLNESS:       Ms. Matthew Perla  is a [de-identified] y.o. female  who presents with left knee pain. Patient is here with complaints of left knee pain. We have seen her previously for a hip replacement done back in 2021. Patient states she has been in [place] for several months over the winter. She had noticed pain in for she was walking and doing increased activities. She has been noticing increased swelling to the knee. She has returned home and states the pain and swelling has not improved. She is here today for for initial orthopedic evaluation for this problem.       Past Medical History:    Past Medical History:   Diagnosis Date    Bullous pemphigoid     Elevated fasting glucose     Former smoker     quit in 1987    GERD (gastroesophageal reflux disease)     Hypertension     Osteopenia     took Boniva times 5 years, stopped 2012    Overweight(278.02)     wieght 174 pounds, height 65 inches, BMI 29, 3/31/2013       Past Surgical History:    Past Surgical History:   Procedure Laterality Date    ABDOMINAL EXPLORATION SURGERY  1972    ectopic pregnancy, bilateral salpingectomy    CATARACT REMOVAL WITH IMPLANT Left 07/24/2018    per Dr Barbara Wilsno ([address] [place] [address])      partial    OVARY REMOVAL      unsure which one removed, has one left     [place] COLONOSCOPY W/BIOPSY SINGLE/MULTIPLE N/A 7/26/2017    COLONOSCOPY WITH BIOPSY performed by Coreen Elliott MD at [address] [place] [address], one tubular adenoma on pathology, repeat recommended in 5 years    TOTAL HIP ARTHROPLASTY Left 8/3/2021    Left Total Hip Arthroplasty performed by Timmy Nunez MD at [address] [place] [address] Right 10/26/2021    Right Total Hip Arthroplasty performed by Timmy Nunez MD at [facility] OR         Current  Medications:  Current Outpatient Medications   Medication Sig Dispense Refill    Omega-3 Fatty Acids (FISH OIL PO) Take by mouth      Probiotic

## 2023-05-04 RX ADMIN — BUPIVACAINE HYDROCHLORIDE 25 MG: 5 INJECTION, SOLUTION PERINEURAL at 09:19

## 2023-05-04 RX ADMIN — TRIAMCINOLONE ACETONIDE 80 MG: 40 INJECTION, SUSPENSION INTRA-ARTICULAR; INTRAMUSCULAR at 09:20

## 2023-06-05 DIAGNOSIS — K21.9 GASTROESOPHAGEAL REFLUX DISEASE, UNSPECIFIED WHETHER ESOPHAGITIS PRESENT: ICD-10-CM

## 2023-06-05 RX ORDER — OMEPRAZOLE 20 MG/1
CAPSULE, DELAYED RELEASE ORAL
Qty: 90 CAPSULE | Refills: 1 | OUTPATIENT
Start: 2023-06-05

## 2023-06-07 DIAGNOSIS — R60.0 BILATERAL LOWER EXTREMITY EDEMA: ICD-10-CM

## 2023-06-07 RX ORDER — TORSEMIDE 10 MG/1
10 TABLET ORAL DAILY
Qty: 90 TABLET | Refills: 1 | Status: SHIPPED | OUTPATIENT
Start: 2023-06-07 | End: 2023-12-04

## 2023-06-07 NOTE — TELEPHONE ENCOUNTER
Daryle Koller called requesting a refill of the below medication which has been pended for you:     Requested Prescriptions     Pending Prescriptions Disp Refills    torsemide (DEMADEX) 10 MG tablet 90 tablet 0     Sig: Take 1 tablet by mouth daily       Last Appointment Date: 4/19/2023  Next Appointment Date: 10/24/2023    No Known Allergies

## 2023-07-24 ENCOUNTER — OFFICE VISIT (OUTPATIENT)
Dept: CARDIOLOGY | Age: 83
End: 2023-07-24
Payer: MEDICARE

## 2023-07-24 VITALS
OXYGEN SATURATION: 98 % | HEIGHT: 63 IN | SYSTOLIC BLOOD PRESSURE: 130 MMHG | WEIGHT: 145 LBS | DIASTOLIC BLOOD PRESSURE: 60 MMHG | HEART RATE: 72 BPM | BODY MASS INDEX: 25.69 KG/M2

## 2023-07-24 DIAGNOSIS — I36.1 NONRHEUMATIC TRICUSPID VALVE REGURGITATION: ICD-10-CM

## 2023-07-24 DIAGNOSIS — I07.1 MODERATE TRICUSPID REGURGITATION: ICD-10-CM

## 2023-07-24 DIAGNOSIS — R94.31 ABNORMAL ECG: ICD-10-CM

## 2023-07-24 DIAGNOSIS — I34.0 NONRHEUMATIC MITRAL VALVE REGURGITATION: ICD-10-CM

## 2023-07-24 DIAGNOSIS — I10 ESSENTIAL HYPERTENSION: Primary | ICD-10-CM

## 2023-07-24 DIAGNOSIS — I34.0 MILD MITRAL REGURGITATION: ICD-10-CM

## 2023-07-24 PROCEDURE — 93005 ELECTROCARDIOGRAM TRACING: CPT | Performed by: INTERNAL MEDICINE

## 2023-07-24 PROCEDURE — 1036F TOBACCO NON-USER: CPT | Performed by: INTERNAL MEDICINE

## 2023-07-24 PROCEDURE — G8399 PT W/DXA RESULTS DOCUMENT: HCPCS | Performed by: INTERNAL MEDICINE

## 2023-07-24 PROCEDURE — G8427 DOCREV CUR MEDS BY ELIG CLIN: HCPCS | Performed by: INTERNAL MEDICINE

## 2023-07-24 PROCEDURE — 3078F DIAST BP <80 MM HG: CPT | Performed by: INTERNAL MEDICINE

## 2023-07-24 PROCEDURE — G8417 CALC BMI ABV UP PARAM F/U: HCPCS | Performed by: INTERNAL MEDICINE

## 2023-07-24 PROCEDURE — 99212 OFFICE O/P EST SF 10 MIN: CPT | Performed by: INTERNAL MEDICINE

## 2023-07-24 PROCEDURE — 3075F SYST BP GE 130 - 139MM HG: CPT | Performed by: INTERNAL MEDICINE

## 2023-07-24 PROCEDURE — 1090F PRES/ABSN URINE INCON ASSESS: CPT | Performed by: INTERNAL MEDICINE

## 2023-07-24 PROCEDURE — 1123F ACP DISCUSS/DSCN MKR DOCD: CPT | Performed by: INTERNAL MEDICINE

## 2023-07-24 PROCEDURE — 99214 OFFICE O/P EST MOD 30 MIN: CPT | Performed by: INTERNAL MEDICINE

## 2023-07-24 PROCEDURE — 93010 ELECTROCARDIOGRAM REPORT: CPT | Performed by: INTERNAL MEDICINE

## 2023-07-24 NOTE — PATIENT INSTRUCTIONS
Central Scheduling will call you to book your ECHO  If you do not receive a call within 48 hours, please call their number at [phone] and push option 1. If you have any questions or concerns, call Cardiology at [phone]. The Cardiopulmonary Testing Facility is located in the basement of [facility]. Please check in for your testing appointment at the [facility] Physical Therapy desk.

## 2023-07-27 ENCOUNTER — HOSPITAL ENCOUNTER (OUTPATIENT)
Age: 83
Discharge: HOME | End: 2023-07-29
Attending: INTERNAL MEDICINE
Payer: MEDICARE

## 2023-07-27 VITALS
SYSTOLIC BLOOD PRESSURE: 124 MMHG | HEART RATE: 63 BPM | BODY MASS INDEX: 25.69 KG/M2 | DIASTOLIC BLOOD PRESSURE: 60 MMHG | HEIGHT: 63 IN | WEIGHT: 145 LBS

## 2023-07-27 DIAGNOSIS — I34.0 MILD MITRAL REGURGITATION: ICD-10-CM

## 2023-07-27 DIAGNOSIS — I07.1 MODERATE TRICUSPID REGURGITATION: ICD-10-CM

## 2023-07-27 DIAGNOSIS — R94.31 ABNORMAL ECG: ICD-10-CM

## 2023-07-27 DIAGNOSIS — I36.1 NONRHEUMATIC TRICUSPID VALVE REGURGITATION: ICD-10-CM

## 2023-07-27 DIAGNOSIS — I10 ESSENTIAL HYPERTENSION: ICD-10-CM

## 2023-07-27 DIAGNOSIS — I34.0 NONRHEUMATIC MITRAL VALVE REGURGITATION: ICD-10-CM

## 2023-07-27 LAB
ECHO AO ROOT DIAM: 3 CM
ECHO AO ROOT INDEX: 1.78 CM/M2
ECHO AV PEAK GRADIENT: 11 MMHG
ECHO AV PEAK VELOCITY: 1.6 M/S
ECHO AV VELOCITY RATIO: 0.75
ECHO BSA: 1.71 M2
ECHO EST RA PRESSURE: 8 MMHG
ECHO LA AREA 4C: 15.2 CM2
ECHO LA DIAMETER INDEX: 2.13 CM/M2
ECHO LA DIAMETER: 3.6 CM
ECHO LA MAJOR AXIS: 5 CM
ECHO LA TO AORTIC ROOT RATIO: 1.2
ECHO LA VOL 4C: 35 ML (ref 22–52)
ECHO LA VOLUME INDEX A4C: 21 ML/M2 (ref 16–34)
ECHO LV E' LATERAL VELOCITY: 8 CM/S
ECHO LV E' SEPTAL VELOCITY: 6 CM/S
ECHO LV EF PHYSICIAN: 80 %
ECHO LV EJECTION FRACTION BIPLANE: 80 % (ref 55–100)
ECHO LV FRACTIONAL SHORTENING: 47 % (ref 28–44)
ECHO LV INTERNAL DIMENSION DIASTOLE INDEX: 2.66 CM/M2
ECHO LV INTERNAL DIMENSION DIASTOLIC: 4.5 CM (ref 3.9–5.3)
ECHO LV INTERNAL DIMENSION SYSTOLIC INDEX: 1.42 CM/M2
ECHO LV INTERNAL DIMENSION SYSTOLIC: 2.4 CM
ECHO LV IVSD: 0.8 CM (ref 0.6–0.9)
ECHO LV MASS 2D: 113.6 G (ref 67–162)
ECHO LV MASS INDEX 2D: 67.2 G/M2 (ref 43–95)
ECHO LV POSTERIOR WALL DIASTOLIC: 0.8 CM (ref 0.6–0.9)
ECHO LV RELATIVE WALL THICKNESS RATIO: 0.36
ECHO LVOT PEAK GRADIENT: 6 MMHG
ECHO LVOT PEAK VELOCITY: 1.2 M/S
ECHO MV A VELOCITY: 1.02 M/S
ECHO MV E DECELERATION TIME (DT): 282 MS
ECHO MV E VELOCITY: 0.85 M/S
ECHO MV E/A RATIO: 0.83
ECHO MV E/E' LATERAL: 10.63
ECHO MV E/E' RATIO (AVERAGED): 12.4
ECHO MV E/E' SEPTAL: 14.17
ECHO MV MAX VELOCITY: 1.1 M/S
ECHO MV PEAK GRADIENT: 5 MMHG
ECHO PULMONARY ARTERY SYSTOLIC PRESSURE (PASP): 54 MMHG
ECHO PV MAX VELOCITY: 1.3 M/S
ECHO PV PEAK GRADIENT: 6 MMHG
ECHO RIGHT VENTRICULAR SYSTOLIC PRESSURE (RVSP): 46 MMHG
ECHO TV PEAK GRADIENT: 2 MMHG
ECHO TV REGURGITANT MAX VELOCITY: 3.07 M/S
ECHO TV REGURGITANT PEAK GRADIENT: 38 MMHG

## 2023-07-27 PROCEDURE — 93306 TTE W/DOPPLER COMPLETE: CPT | Performed by: INTERNAL MEDICINE

## 2023-07-27 PROCEDURE — 93306 TTE W/DOPPLER COMPLETE: CPT

## 2023-07-31 ENCOUNTER — TELEPHONE (OUTPATIENT)
Dept: CARDIOLOGY | Age: 83
End: 2023-07-31

## 2023-08-02 NOTE — TELEPHONE ENCOUNTER
Pt notified of results of echo per Dr Logan Lofton. Pt verbalized understanding and had no further questions at the time.

## 2023-08-17 DIAGNOSIS — K21.9 GASTROESOPHAGEAL REFLUX DISEASE, UNSPECIFIED WHETHER ESOPHAGITIS PRESENT: ICD-10-CM

## 2023-08-17 DIAGNOSIS — I10 ESSENTIAL HYPERTENSION: ICD-10-CM

## 2023-08-17 RX ORDER — LISINOPRIL 20 MG/1
TABLET ORAL
Qty: 90 TABLET | Refills: 0 | Status: SHIPPED | OUTPATIENT
Start: 2023-08-17

## 2023-08-17 RX ORDER — OMEPRAZOLE 20 MG/1
CAPSULE, DELAYED RELEASE ORAL
Qty: 90 CAPSULE | Refills: 0 | Status: SHIPPED | OUTPATIENT
Start: 2023-08-17

## 2023-08-17 RX ORDER — METOPROLOL SUCCINATE 25 MG/1
TABLET, EXTENDED RELEASE ORAL
Qty: 90 TABLET | Refills: 0 | Status: SHIPPED | OUTPATIENT
Start: 2023-08-17

## 2023-08-17 NOTE — TELEPHONE ENCOUNTER
Oziel White called requesting a refill of the below medication which has been pended for you:     Requested Prescriptions     Pending Prescriptions Disp Refills    metoprolol succinate (TOPROL XL) 25 MG extended release tablet [Pharmacy Med Name: METOPROLOL SUCCINATE ER 25 MG Tablet Extended Release 24 Hour] 90 tablet 0     Sig: TAKE 1 TABLET EVERY DAY    omeprazole (PRILOSEC) 20 MG delayed release capsule [Pharmacy Med Name: OMEPRAZOLE 20 MG Capsule Delayed Release] 90 capsule 0     Sig: TAKE 1 CAPSULE ONE TIME DAILY AS NEEDED FOR  REFLUX    lisinopril (PRINIVIL;ZESTRIL) 20 MG tablet [Pharmacy Med Name: LISINOPRIL 20 MG Tablet] 90 tablet 0     Sig: TAKE 1 TABLET EVERY DAY       Last Appointment Date: 4/19/2023  Next Appointment Date: 10/17/23 (establishing with Dr. Sandra Claire.  Patient requesting medication to go to [facility] mail order pharmacy)    No Known Allergies

## 2023-10-14 SDOH — HEALTH STABILITY: PHYSICAL HEALTH: ON AVERAGE, HOW MANY DAYS PER WEEK DO YOU ENGAGE IN MODERATE TO STRENUOUS EXERCISE (LIKE A BRISK WALK)?: 0 DAYS

## 2023-10-14 SDOH — HEALTH STABILITY: PHYSICAL HEALTH: ON AVERAGE, HOW MANY MINUTES DO YOU ENGAGE IN EXERCISE AT THIS LEVEL?: 20 MIN

## 2023-10-14 ASSESSMENT — SOCIAL DETERMINANTS OF HEALTH (SDOH)
WITHIN THE LAST YEAR, HAVE TO BEEN RAPED OR FORCED TO HAVE ANY KIND OF SEXUAL ACTIVITY BY YOUR PARTNER OR EX-PARTNER?: NO
WITHIN THE LAST YEAR, HAVE YOU BEEN KICKED, HIT, SLAPPED, OR OTHERWISE PHYSICALLY HURT BY YOUR PARTNER OR EX-PARTNER?: NO
WITHIN THE LAST YEAR, HAVE YOU BEEN AFRAID OF YOUR PARTNER OR EX-PARTNER?: NO
WITHIN THE LAST YEAR, HAVE YOU BEEN HUMILIATED OR EMOTIONALLY ABUSED IN OTHER WAYS BY YOUR PARTNER OR EX-PARTNER?: NO

## 2023-10-17 ENCOUNTER — HOSPITAL ENCOUNTER (OUTPATIENT)
Age: 83
Discharge: HOME | End: 2023-10-17
Payer: MEDICARE

## 2023-10-17 ENCOUNTER — OFFICE VISIT (OUTPATIENT)
Dept: FAMILY MEDICINE CLINIC | Age: 83
End: 2023-10-17
Payer: MEDICARE

## 2023-10-17 VITALS
OXYGEN SATURATION: 100 % | WEIGHT: 146.9 LBS | HEART RATE: 63 BPM | RESPIRATION RATE: 14 BRPM | TEMPERATURE: 97.4 F | SYSTOLIC BLOOD PRESSURE: 138 MMHG | DIASTOLIC BLOOD PRESSURE: 60 MMHG | HEIGHT: 63 IN | BODY MASS INDEX: 26.03 KG/M2

## 2023-10-17 DIAGNOSIS — R73.01 ELEVATED FASTING GLUCOSE: ICD-10-CM

## 2023-10-17 DIAGNOSIS — K21.9 GASTROESOPHAGEAL REFLUX DISEASE, UNSPECIFIED WHETHER ESOPHAGITIS PRESENT: ICD-10-CM

## 2023-10-17 DIAGNOSIS — Z23 NEED FOR INFLUENZA VACCINATION: ICD-10-CM

## 2023-10-17 DIAGNOSIS — Z12.31 SCREENING MAMMOGRAM FOR BREAST CANCER: ICD-10-CM

## 2023-10-17 DIAGNOSIS — Z23 NEED FOR TDAP VACCINATION: ICD-10-CM

## 2023-10-17 DIAGNOSIS — I10 ESSENTIAL HYPERTENSION: Primary | ICD-10-CM

## 2023-10-17 LAB
ALBUMIN SERPL-MCNC: 4.1 G/DL (ref 3.5–5.2)
ALBUMIN/GLOB SERPL: 1.4 {RATIO} (ref 1–2.5)
ALP SERPL-CCNC: 91 U/L (ref 35–104)
ALT SERPL-CCNC: 12 U/L (ref 5–33)
ANION GAP SERPL CALCULATED.3IONS-SCNC: 10 MMOL/L (ref 9–17)
AST SERPL-CCNC: 17 U/L
BILIRUB SERPL-MCNC: 0.2 MG/DL (ref 0.3–1.2)
BUN SERPL-MCNC: 31 MG/DL (ref 8–23)
BUN/CREAT SERPL: 28 (ref 9–20)
CALCIUM SERPL-MCNC: 9.3 MG/DL (ref 8.6–10.4)
CHLORIDE SERPL-SCNC: 102 MMOL/L (ref 98–107)
CO2 SERPL-SCNC: 29 MMOL/L (ref 20–31)
CREAT SERPL-MCNC: 1.1 MG/DL (ref 0.5–0.9)
GFR SERPL CREATININE-BSD FRML MDRD: 51 ML/MIN/1.73M2
GLUCOSE SERPL-MCNC: 104 MG/DL (ref 70–99)
POTASSIUM SERPL-SCNC: 4.1 MMOL/L (ref 3.7–5.3)
PROT SERPL-MCNC: 7.1 G/DL (ref 6.4–8.3)
SODIUM SERPL-SCNC: 141 MMOL/L (ref 135–144)

## 2023-10-17 PROCEDURE — 99214 OFFICE O/P EST MOD 30 MIN: CPT | Performed by: FAMILY MEDICINE

## 2023-10-17 PROCEDURE — 83036 HEMOGLOBIN GLYCOSYLATED A1C: CPT

## 2023-10-17 PROCEDURE — 3078F DIAST BP <80 MM HG: CPT | Performed by: FAMILY MEDICINE

## 2023-10-17 PROCEDURE — PBSHW INFLUENZA, FLUAD, (AGE 65 Y+), IM, PF, 0.5 ML: Performed by: FAMILY MEDICINE

## 2023-10-17 PROCEDURE — 3074F SYST BP LT 130 MM HG: CPT | Performed by: FAMILY MEDICINE

## 2023-10-17 PROCEDURE — G8427 DOCREV CUR MEDS BY ELIG CLIN: HCPCS | Performed by: FAMILY MEDICINE

## 2023-10-17 PROCEDURE — 80053 COMPREHEN METABOLIC PANEL: CPT

## 2023-10-17 PROCEDURE — G8399 PT W/DXA RESULTS DOCUMENT: HCPCS | Performed by: FAMILY MEDICINE

## 2023-10-17 PROCEDURE — 90694 VACC AIIV4 NO PRSRV 0.5ML IM: CPT | Performed by: FAMILY MEDICINE

## 2023-10-17 PROCEDURE — G8417 CALC BMI ABV UP PARAM F/U: HCPCS | Performed by: FAMILY MEDICINE

## 2023-10-17 PROCEDURE — 1090F PRES/ABSN URINE INCON ASSESS: CPT | Performed by: FAMILY MEDICINE

## 2023-10-17 PROCEDURE — 1123F ACP DISCUSS/DSCN MKR DOCD: CPT | Performed by: FAMILY MEDICINE

## 2023-10-17 PROCEDURE — G8484 FLU IMMUNIZE NO ADMIN: HCPCS | Performed by: FAMILY MEDICINE

## 2023-10-17 PROCEDURE — 1036F TOBACCO NON-USER: CPT | Performed by: FAMILY MEDICINE

## 2023-10-17 PROCEDURE — 36415 COLL VENOUS BLD VENIPUNCTURE: CPT

## 2023-10-17 RX ORDER — AMLODIPINE BESYLATE 5 MG/1
TABLET ORAL
Qty: 90 TABLET | Refills: 3 | Status: SHIPPED | OUTPATIENT
Start: 2023-10-17

## 2023-10-17 NOTE — PROGRESS NOTES
[facility]  [address] [place] [address]  [phone]      Jocelyne Quiroz is a 82 y.o. female who presents today for her medical conditions/complaints as noted below. Jocelyne Quiroz is c/o of New Patient      HPI:     Pt here today to establish - was most recently seeing Dr. May Terry. Taking Lisinopril 20 mg daily, Norvasc 5 mg daily, and Toprol-XL 25 mg daily for HTN - stable on these meds for years. BP well-controlled today - 138/60. Does not check BP at home very often. Seeing Dr. Kacy Oswald (Cardiology) vahe 6-12 months for HTN and tricuspid regurgitation - next visit 2/5/24. Just had updated Echo in 7/2023, which showed mild-mod tricuspid regurgitation. Pt has been feeing more hungry than usual x past 2-3 weeks; states this is new for her. Denies increased thirst.      Taking Demadex 10 mg daily for lower extremity edema - stable; takes this every day. Swelling has been well-controlled. Taking Omeprazole 40 mg daily for GERD - stable; if she doesn't take it every day, she will have nausea/indigestion. Also taking Metamucil daily for bowels. Taking OTC anti-histamine as needed for allergy sx's - has not taken it recently. Taking daily Preservision eye vitamins, Vit D3 5000 IU daily, Calcium 600 mg BID, cinnamon daily, apple cider vinegar daily for her stomach, fish oil daily, Vit C daily, and Zinc daily. Was diagnosed with bullous pemphigoid 3-5 years ago - was having breakouts and itching at that time, so was referred to to [facility]. Has not had issues with this recently; had old Rx still for Hydroxyzine to use PRN for itching. Has not had to use this recently. Seeing Ortho as needed for L knee pain - had a steroid injection in 5/2023. Lives at home with daughter. Not currently working. Used to smoke cigarettes - quit in 1989 after smoking 2-3 ppd x 30 years. No other tobacco use. Denies alcohol or recreational drug use.   Has not

## 2023-10-18 LAB
EST. AVERAGE GLUCOSE BLD GHB EST-MCNC: 111 MG/DL
HBA1C MFR BLD: 5.5 % (ref 4–6)

## 2023-10-24 ENCOUNTER — TELEMEDICINE (OUTPATIENT)
Dept: FAMILY MEDICINE CLINIC | Age: 83
End: 2023-10-24
Payer: MEDICARE

## 2023-10-24 DIAGNOSIS — Z00.00 MEDICARE ANNUAL WELLNESS VISIT, SUBSEQUENT: Primary | ICD-10-CM

## 2023-10-24 PROCEDURE — G0439 PPPS, SUBSEQ VISIT: HCPCS | Performed by: FAMILY MEDICINE

## 2023-10-24 PROCEDURE — G8484 FLU IMMUNIZE NO ADMIN: HCPCS | Performed by: FAMILY MEDICINE

## 2023-10-24 PROCEDURE — 1123F ACP DISCUSS/DSCN MKR DOCD: CPT | Performed by: FAMILY MEDICINE

## 2023-10-24 SDOH — HEALTH STABILITY: PHYSICAL HEALTH: ON AVERAGE, HOW MANY MINUTES DO YOU ENGAGE IN EXERCISE AT THIS LEVEL?: 20 MIN

## 2023-10-24 SDOH — HEALTH STABILITY: PHYSICAL HEALTH: ON AVERAGE, HOW MANY DAYS PER WEEK DO YOU ENGAGE IN MODERATE TO STRENUOUS EXERCISE (LIKE A BRISK WALK)?: 2 DAYS

## 2023-10-24 ASSESSMENT — LIFESTYLE VARIABLES
HOW MANY STANDARD DRINKS CONTAINING ALCOHOL DO YOU HAVE ON A TYPICAL DAY: PATIENT DOES NOT DRINK
HOW MANY STANDARD DRINKS CONTAINING ALCOHOL DO YOU HAVE ON A TYPICAL DAY: 0
HOW OFTEN DO YOU HAVE SIX OR MORE DRINKS ON ONE OCCASION: 1
HOW OFTEN DO YOU HAVE A DRINK CONTAINING ALCOHOL: 1
HOW OFTEN DO YOU HAVE A DRINK CONTAINING ALCOHOL: NEVER

## 2023-10-24 ASSESSMENT — PATIENT HEALTH QUESTIONNAIRE - PHQ9
1. LITTLE INTEREST OR PLEASURE IN DOING THINGS: 0
SUM OF ALL RESPONSES TO PHQ QUESTIONS 1-9: 0
2. FEELING DOWN, DEPRESSED OR HOPELESS: 0
SUM OF ALL RESPONSES TO PHQ QUESTIONS 1-9: 0
SUM OF ALL RESPONSES TO PHQ QUESTIONS 1-9: 0
SUM OF ALL RESPONSES TO PHQ9 QUESTIONS 1 & 2: 0
SUM OF ALL RESPONSES TO PHQ QUESTIONS 1-9: 0

## 2023-10-24 NOTE — PROGRESS NOTES
Medicare Annual Wellness Visit    Giorgio Chandler is here for Medicare AWV    Assessment & Plan   Medicare annual wellness visit, subsequent    Recommendations for Preventive Services Due: see orders and patient instructions/AVS.  Recommended screening schedule for the next 5-10 years is provided to the patient in written form: see Patient Instructions/AVS.     Return in 1 year (on 10/24/2024). Subjective     Patient's complete Health Risk Assessment and screening values have been reviewed and are found in Flowsheets. The following problems were reviewed today and where indicated follow up appointments were made and/or referrals ordered. Positive Risk Factor Screenings with Interventions:                  Dentist Screen:  Have you seen the dentist within the past year?: (!) No    Intervention:  Advised to schedule with their dentist     Vision Screen:  Do you have difficulty driving, watching TV, or doing any of your daily activities because of your eyesight?: No  Have you had an eye exam within the past year?: (!) No  No results found. Interventions:   Patient encouraged to make appointment with their eye specialist      Advanced Directives:  Do you have a Living Will?: (!) No    Intervention:  has NO advanced directive - information provided              Objective      Patient-Reported Vitals  No data recorded         No Known Allergies  Prior to Visit Medications    Medication Sig Taking?  Authorizing Provider   torsemide (DEMADEX) 10 MG tablet Take 1 tablet by mouth daily  Sary Dao, DO   Psyllium (METAMUCIL PO) Take by mouth  Provider, MD Domonique   amLODIPine (NORVASC) 5 MG tablet Take 1 tablet by mouth once daily  Sary Dao, DO   metoprolol succinate (TOPROL XL) 25 MG extended release tablet TAKE 1 TABLET EVERY DAY  Arabella Goetz MD   omeprazole (PRILOSEC) 20 MG delayed release capsule TAKE 1 CAPSULE ONE TIME DAILY AS NEEDED FOR  REFLUX  Arabella Goetz MD   lisinopril

## 2023-10-29 DIAGNOSIS — R60.0 BILATERAL LOWER EXTREMITY EDEMA: ICD-10-CM

## 2023-10-30 RX ORDER — TORSEMIDE 10 MG/1
10 TABLET ORAL DAILY
Qty: 90 TABLET | Refills: 0 | Status: SHIPPED | OUTPATIENT
Start: 2023-10-30

## 2023-10-30 NOTE — TELEPHONE ENCOUNTER
Vasyl Castellanos called requesting a refill of the below medication which has been pended for you:     Requested Prescriptions     Pending Prescriptions Disp Refills    torsemide (DEMADEX) 10 MG tablet [Pharmacy Med Name: TORSEMIDE 10 MG Tablet] 90 tablet 10     Sig: TAKE 1 TABLET EVERY DAY       Last Appointment Date: 4/19/2023  Next Appointment Date: Visit date not found    No Known Allergies

## 2023-11-03 ENCOUNTER — HOSPITAL ENCOUNTER (OUTPATIENT)
Dept: MAMMOGRAPHY | Age: 83
End: 2023-11-03
Attending: FAMILY MEDICINE
Payer: MEDICARE

## 2023-11-03 DIAGNOSIS — Z12.31 SCREENING MAMMOGRAM, ENCOUNTER FOR: Primary | ICD-10-CM

## 2023-11-03 DIAGNOSIS — Z12.31 SCREENING MAMMOGRAM FOR BREAST CANCER: ICD-10-CM

## 2023-11-03 PROCEDURE — 77063 BREAST TOMOSYNTHESIS BI: CPT

## 2023-12-06 ENCOUNTER — OFFICE VISIT (OUTPATIENT)
Dept: PRIMARY CARE CLINIC | Age: 83
End: 2023-12-06
Payer: MEDICARE

## 2023-12-06 VITALS
BODY MASS INDEX: 26.42 KG/M2 | HEART RATE: 68 BPM | WEIGHT: 149.13 LBS | OXYGEN SATURATION: 98 % | SYSTOLIC BLOOD PRESSURE: 128 MMHG | TEMPERATURE: 96.8 F | RESPIRATION RATE: 15 BRPM | DIASTOLIC BLOOD PRESSURE: 70 MMHG | HEIGHT: 63 IN

## 2023-12-06 DIAGNOSIS — L02.91 CUTANEOUS ABSCESS, UNSPECIFIED SITE: Primary | ICD-10-CM

## 2023-12-06 PROCEDURE — 1123F ACP DISCUSS/DSCN MKR DOCD: CPT | Performed by: FAMILY MEDICINE

## 2023-12-06 PROCEDURE — G8399 PT W/DXA RESULTS DOCUMENT: HCPCS | Performed by: FAMILY MEDICINE

## 2023-12-06 PROCEDURE — 99213 OFFICE O/P EST LOW 20 MIN: CPT | Performed by: FAMILY MEDICINE

## 2023-12-06 PROCEDURE — 99212 OFFICE O/P EST SF 10 MIN: CPT | Performed by: FAMILY MEDICINE

## 2023-12-06 PROCEDURE — 1090F PRES/ABSN URINE INCON ASSESS: CPT | Performed by: FAMILY MEDICINE

## 2023-12-06 PROCEDURE — G8417 CALC BMI ABV UP PARAM F/U: HCPCS | Performed by: FAMILY MEDICINE

## 2023-12-06 PROCEDURE — 1036F TOBACCO NON-USER: CPT | Performed by: FAMILY MEDICINE

## 2023-12-06 PROCEDURE — G8427 DOCREV CUR MEDS BY ELIG CLIN: HCPCS | Performed by: FAMILY MEDICINE

## 2023-12-06 PROCEDURE — 3074F SYST BP LT 130 MM HG: CPT | Performed by: FAMILY MEDICINE

## 2023-12-06 PROCEDURE — 3078F DIAST BP <80 MM HG: CPT | Performed by: FAMILY MEDICINE

## 2023-12-06 PROCEDURE — G8484 FLU IMMUNIZE NO ADMIN: HCPCS | Performed by: FAMILY MEDICINE

## 2023-12-06 RX ORDER — DOXYCYCLINE HYCLATE 100 MG
100 TABLET ORAL 2 TIMES DAILY
Qty: 20 TABLET | Refills: 0 | Status: SHIPPED | OUTPATIENT
Start: 2023-12-06

## 2023-12-06 ASSESSMENT — ENCOUNTER SYMPTOMS: COLOR CHANGE: 1

## 2023-12-06 NOTE — PROGRESS NOTES
2023     Fabi Knight (:  1940) is a 82 y.o. female, here for evaluation of the following medical concerns:    Cyst  This is a chronic (has had a small cyst to the skin surface of the middle aspect of the breast for quite some time but in the last few days it has become more erythematous and swollen and now has broken open and started drainage purulent drainage) problem. The problem occurs constantly. The problem has been unchanged. Pertinent negatives include no chills, fatigue, fever or rash. She has tried nothing for the symptoms. Is up to date on mammograms. Had last mammogram 11/3/23 without acute findings. Did review patient's med list, allergies, social history,pmhx and pshx today as noted in the record. Review of Systems   Constitutional:  Negative for chills, fatigue and fever. Skin:  Positive for color change and wound. Negative for pallor and rash. Prior to Visit Medications    Medication Sig Taking?  Authorizing Provider   doxycycline hyclate (VIBRA-TABS) 100 MG tablet Take 1 tablet by mouth 2 times daily Yes Falguni Moss W, DO   torsemide (DEMADEX) 10 MG tablet Take 1 tablet by mouth daily Yes Sary Dao, DO   Psyllium (METAMUCIL PO) Take by mouth Yes Domonique Olmedo MD   amLODIPine (NORVASC) 5 MG tablet Take 1 tablet by mouth once daily Yes Sary Dao, DO   metoprolol succinate (TOPROL XL) 25 MG extended release tablet TAKE 1 TABLET EVERY DAY Yes Vinita Goetz MD   omeprazole (PRILOSEC) 20 MG delayed release capsule TAKE 1 CAPSULE ONE TIME DAILY AS NEEDED FOR  REFLUX Yes Vinita Goetz MD   lisinopril (PRINIVIL;ZESTRIL) 20 MG tablet TAKE 1 TABLET EVERY DAY Yes Vinita Goetz MD   Omega-3 Fatty Acids (FISH OIL PO) Take by mouth Yes Domonique Olmedo MD   Ascorbic Acid (VITAMIN C PO) Take by mouth Yes Domonique Olmedo MD   ZINC PO Take 50 mg by mouth Yes Domonique Olmedo MD   CINNAMON PO Take by mouth Yes Domonique Olmedo MD

## 2024-02-05 ENCOUNTER — OFFICE VISIT (OUTPATIENT)
Dept: CARDIOLOGY | Age: 84
End: 2024-02-05
Payer: MEDICARE

## 2024-02-05 VITALS
BODY MASS INDEX: 26.01 KG/M2 | SYSTOLIC BLOOD PRESSURE: 134 MMHG | DIASTOLIC BLOOD PRESSURE: 62 MMHG | HEIGHT: 63 IN | WEIGHT: 146.8 LBS | HEART RATE: 64 BPM

## 2024-02-05 DIAGNOSIS — I36.1 NONRHEUMATIC TRICUSPID VALVE REGURGITATION: ICD-10-CM

## 2024-02-05 DIAGNOSIS — I07.1 MODERATE TRICUSPID REGURGITATION: ICD-10-CM

## 2024-02-05 DIAGNOSIS — R94.31 ABNORMAL ECG: ICD-10-CM

## 2024-02-05 DIAGNOSIS — I34.0 NONRHEUMATIC MITRAL VALVE REGURGITATION: ICD-10-CM

## 2024-02-05 DIAGNOSIS — I10 ESSENTIAL HYPERTENSION: Primary | ICD-10-CM

## 2024-02-05 DIAGNOSIS — I34.0 MILD MITRAL REGURGITATION: ICD-10-CM

## 2024-02-05 PROCEDURE — 93005 ELECTROCARDIOGRAM TRACING: CPT | Performed by: INTERNAL MEDICINE

## 2024-02-05 PROCEDURE — 3078F DIAST BP <80 MM HG: CPT | Performed by: INTERNAL MEDICINE

## 2024-02-05 PROCEDURE — 1123F ACP DISCUSS/DSCN MKR DOCD: CPT | Performed by: INTERNAL MEDICINE

## 2024-02-05 PROCEDURE — 99214 OFFICE O/P EST MOD 30 MIN: CPT | Performed by: INTERNAL MEDICINE

## 2024-02-05 PROCEDURE — 93010 ELECTROCARDIOGRAM REPORT: CPT | Performed by: INTERNAL MEDICINE

## 2024-02-05 PROCEDURE — 99212 OFFICE O/P EST SF 10 MIN: CPT | Performed by: INTERNAL MEDICINE

## 2024-02-05 PROCEDURE — 3075F SYST BP GE 130 - 139MM HG: CPT | Performed by: INTERNAL MEDICINE

## 2024-02-05 NOTE — PROGRESS NOTES
Cardiology Consultation/Follow Up.  [facility]    Adela Mfcadden  1940  [id number]    Today: 2/5/24    CC: Patient is here for follow up for HTN.     HPI:   Adela Mcfadden is here for follow up for HTN.   She feels well.   Denies any cp.   Denies any sob.   No palpitations.   No orthopnea, pnd.   Using cane for ambulation.     Past Medical:  Past Medical History:   Diagnosis Date    Bullous pemphigoid     Elevated fasting glucose     Former smoker     quit in 1987    GERD (gastroesophageal reflux disease)     Hypertension     Osteoarthritis     Osteopenia     took Boniva times 5 years, stopped 2012    Overweight(278.02)     wieght 174 pounds, height 65 inches, BMI 29, 3/31/2013       Past Surgical:  Past Surgical History:   Procedure Laterality Date    ABDOMINAL EXPLORATION SURGERY  1972    ectopic pregnancy, bilateral salpingectomy    CATARACT REMOVAL WITH IMPLANT Left 07/24/2018    per Dr Maldonado    HYSTERECTOMY (CERVIX STATUS UNKNOWN)      partial    OVARY REMOVAL      unsure which one removed, has one left     MI COLONOSCOPY W/BIOPSY SINGLE/MULTIPLE N/A 7/26/2017    COLONOSCOPY WITH BIOPSY performed by Quita Christiansen MD at [facility] OR, one tubular adenoma on pathology, repeat recommended in 5 years    TOTAL HIP ARTHROPLASTY Left 8/3/2021    Left Total Hip Arthroplasty performed by Junior Harrison MD at [facility] OR    TOTAL HIP ARTHROPLASTY Right 10/26/2021    Right Total Hip Arthroplasty performed by Junior Harrison MD at [facility] OR       Family History:  Family History   Problem Relation Age of Onset    Cancer Mother         unknown type of cancer    Diabetes Mother     Breast Cancer Mother     Heart Disease Father         CHF    No Known Problems Sister     Diabetes Brother     Diabetes Brother     No Known Problems Daughter     No Known Problems Maternal Aunt     No Known Problems Maternal Aunt     No Known Problems Maternal Aunt     No Known Problems Paternal Aunt     No Known Problems

## 2024-03-01 DIAGNOSIS — K21.9 GASTROESOPHAGEAL REFLUX DISEASE, UNSPECIFIED WHETHER ESOPHAGITIS PRESENT: ICD-10-CM

## 2024-03-01 DIAGNOSIS — I10 ESSENTIAL HYPERTENSION: ICD-10-CM

## 2024-03-01 RX ORDER — METOPROLOL SUCCINATE 25 MG/1
25 TABLET, EXTENDED RELEASE ORAL DAILY
Qty: 90 TABLET | Refills: 1 | Status: SHIPPED | OUTPATIENT
Start: 2024-03-01

## 2024-03-01 RX ORDER — OMEPRAZOLE 20 MG/1
20 CAPSULE, DELAYED RELEASE ORAL DAILY PRN
Qty: 90 CAPSULE | Refills: 1 | Status: SHIPPED | OUTPATIENT
Start: 2024-03-01

## 2024-03-01 RX ORDER — LISINOPRIL 20 MG/1
20 TABLET ORAL DAILY
Qty: 90 TABLET | Refills: 1 | Status: SHIPPED | OUTPATIENT
Start: 2024-03-01

## 2024-03-01 NOTE — TELEPHONE ENCOUNTER
Adela called requesting a refill of the below medication which has been pended for you:     Requested Prescriptions     Pending Prescriptions Disp Refills    metoprolol succinate (TOPROL XL) 25 MG extended release tablet 90 tablet 0     Sig: TAKE 1 TABLET EVERY DAY    lisinopril (PRINIVIL;ZESTRIL) 20 MG tablet 90 tablet 0     Sig: Take 1 tablet by mouth daily       Last Appointment Date: 10/24/2023  Next Appointment Date: 3/1/2024    No Known Allergies

## 2024-03-07 ENCOUNTER — HOSPITAL ENCOUNTER (OUTPATIENT)
Dept: GENERAL RADIOLOGY | Age: 84
Discharge: HOME | End: 2024-03-09
Payer: MEDICARE

## 2024-03-07 ENCOUNTER — OFFICE VISIT (OUTPATIENT)
Dept: PRIMARY CARE CLINIC | Age: 84
End: 2024-03-07
Payer: MEDICARE

## 2024-03-07 VITALS
RESPIRATION RATE: 18 BRPM | OXYGEN SATURATION: 97 % | DIASTOLIC BLOOD PRESSURE: 84 MMHG | BODY MASS INDEX: 24.8 KG/M2 | HEART RATE: 86 BPM | SYSTOLIC BLOOD PRESSURE: 138 MMHG | TEMPERATURE: 99 F | WEIGHT: 140 LBS

## 2024-03-07 DIAGNOSIS — J20.9 ACUTE BRONCHITIS, UNSPECIFIED ORGANISM: Primary | ICD-10-CM

## 2024-03-07 DIAGNOSIS — R05.1 ACUTE COUGH: ICD-10-CM

## 2024-03-07 LAB
INFLUENZA A ANTIGEN, POC: NEGATIVE
INFLUENZA B ANTIGEN, POC: NEGATIVE
LOT EXPIRE DATE: NORMAL
LOT KIT NUMBER: NORMAL
SARS-COV-2, POC: NORMAL
VALID INTERNAL CONTROL: NORMAL
VENDOR AND KIT NAME POC: NORMAL

## 2024-03-07 PROCEDURE — 1123F ACP DISCUSS/DSCN MKR DOCD: CPT | Performed by: FAMILY MEDICINE

## 2024-03-07 PROCEDURE — 87428 SARSCOV & INF VIR A&B AG IA: CPT | Performed by: FAMILY MEDICINE

## 2024-03-07 PROCEDURE — 71046 X-RAY EXAM CHEST 2 VIEWS: CPT

## 2024-03-07 PROCEDURE — 3079F DIAST BP 80-89 MM HG: CPT | Performed by: FAMILY MEDICINE

## 2024-03-07 PROCEDURE — 3075F SYST BP GE 130 - 139MM HG: CPT | Performed by: FAMILY MEDICINE

## 2024-03-07 PROCEDURE — 99212 OFFICE O/P EST SF 10 MIN: CPT | Performed by: FAMILY MEDICINE

## 2024-03-07 PROCEDURE — 99214 OFFICE O/P EST MOD 30 MIN: CPT | Performed by: FAMILY MEDICINE

## 2024-03-07 PROCEDURE — PBSHW POCT COVID-19 & INFLUENZA A/B: Performed by: FAMILY MEDICINE

## 2024-03-07 RX ORDER — GUAIFENESIN 600 MG/1
600 TABLET, EXTENDED RELEASE ORAL 2 TIMES DAILY
Qty: 30 TABLET | Refills: 0 | Status: SHIPPED | OUTPATIENT
Start: 2024-03-07 | End: 2024-03-22

## 2024-03-07 RX ORDER — PREDNISONE 20 MG/1
20 TABLET ORAL 2 TIMES DAILY
Qty: 12 TABLET | Refills: 0 | Status: SHIPPED | OUTPATIENT
Start: 2024-03-07 | End: 2024-03-13

## 2024-03-07 ASSESSMENT — PATIENT HEALTH QUESTIONNAIRE - PHQ9
SUM OF ALL RESPONSES TO PHQ9 QUESTIONS 1 & 2: 0
SUM OF ALL RESPONSES TO PHQ QUESTIONS 1-9: 0
1. LITTLE INTEREST OR PLEASURE IN DOING THINGS: 0
SUM OF ALL RESPONSES TO PHQ QUESTIONS 1-9: 0
SUM OF ALL RESPONSES TO PHQ QUESTIONS 1-9: 0
2. FEELING DOWN, DEPRESSED OR HOPELESS: 0
SUM OF ALL RESPONSES TO PHQ QUESTIONS 1-9: 0

## 2024-03-07 ASSESSMENT — ENCOUNTER SYMPTOMS
WHEEZING: 1
TROUBLE SWALLOWING: 0
SORE THROAT: 0
CONSTIPATION: 0
CHOKING: 0
DIARRHEA: 0
COUGH: 1
SINUS PRESSURE: 1
SHORTNESS OF BREATH: 1
CHEST TIGHTNESS: 0
NAUSEA: 0

## 2024-03-07 NOTE — PROGRESS NOTES
[facility], [facility] [place]  [address]  [facility] [address]  Dept: [phone]  Dept Fax: [phone]    Adela Mcfadden is a 83 y.o. female who presents today for her medical conditions/complaints as noted below.  Adela Mcfadden is c/o of   Chief Complaint   Patient presents with    Cough     Congestion started Monday some sob       HPI:     Here today for a cough    Cough  This is a new problem. The current episode started in the past 7 days (4 days). The problem has been gradually worsening. The problem occurs every few minutes. The cough is Productive of sputum. Associated symptoms include headaches, myalgias, nasal congestion, postnasal drip, shortness of breath and wheezing. Pertinent negatives include no chest pain, chills, ear congestion, ear pain, fever, rash or sore throat. The symptoms are aggravated by lying down and exercise. Treatments tried: mucinex. The treatment provided mild relief. There is no history of asthma, COPD, emphysema or environmental allergies.     Daughter has bronchitis      Past Medical History:   Diagnosis Date    Bullous pemphigoid     Elevated fasting glucose     Former smoker     quit in     GERD (gastroesophageal reflux disease)     Hypertension     Osteoarthritis     Osteopenia     took Boniva times 5 years, stopped     Overweight(278.02)     wieght 174 pounds, height 65 inches, BMI 29, 3/31/2013          Social History     Tobacco Use    Smoking status: Former     Current packs/day: 0.00     Average packs/day: 2.5 packs/day for 31.0 years (77.5 ttl pk-yrs)     Types: Cigarettes     Start date: 1958     Quit date: 1989     Years since quittin.2    Smokeless tobacco: Never   Substance Use Topics    Alcohol use: No     Current Outpatient Medications   Medication Sig Dispense Refill    predniSONE (DELTASONE) 20 MG tablet Take 1 tablet by

## 2024-03-11 DIAGNOSIS — R60.0 BILATERAL LOWER EXTREMITY EDEMA: ICD-10-CM

## 2024-03-11 NOTE — TELEPHONE ENCOUNTER
Adela called requesting a refill of the below medication which has been pended for you:     Requested Prescriptions     Pending Prescriptions Disp Refills    torsemide (DEMADEX) 10 MG tablet 90 tablet 0     Sig: Take 1 tablet by mouth daily       Last Appointment Date: 10/24/2023  Next Appointment Date: 4/19/2024    No Known Allergies

## 2024-03-12 ENCOUNTER — OFFICE VISIT (OUTPATIENT)
Dept: PRIMARY CARE CLINIC | Age: 84
End: 2024-03-12
Payer: MEDICARE

## 2024-03-12 VITALS
WEIGHT: 140 LBS | RESPIRATION RATE: 18 BRPM | DIASTOLIC BLOOD PRESSURE: 80 MMHG | HEIGHT: 63 IN | HEART RATE: 83 BPM | SYSTOLIC BLOOD PRESSURE: 140 MMHG | TEMPERATURE: 97.1 F | OXYGEN SATURATION: 93 % | BODY MASS INDEX: 24.8 KG/M2

## 2024-03-12 DIAGNOSIS — J20.8 ACUTE BRONCHITIS DUE TO OTHER SPECIFIED ORGANISMS: Primary | ICD-10-CM

## 2024-03-12 DIAGNOSIS — R06.00 DYSPNEA, UNSPECIFIED TYPE: ICD-10-CM

## 2024-03-12 PROCEDURE — 1123F ACP DISCUSS/DSCN MKR DOCD: CPT | Performed by: NURSE PRACTITIONER

## 2024-03-12 PROCEDURE — 3077F SYST BP >= 140 MM HG: CPT | Performed by: NURSE PRACTITIONER

## 2024-03-12 PROCEDURE — 2709999900 HC NON-CHARGEABLE SUPPLY: Performed by: NURSE PRACTITIONER

## 2024-03-12 PROCEDURE — PBSHW PBB SHADOW CHARGE: Performed by: NURSE PRACTITIONER

## 2024-03-12 PROCEDURE — 99213 OFFICE O/P EST LOW 20 MIN: CPT | Performed by: NURSE PRACTITIONER

## 2024-03-12 PROCEDURE — 3079F DIAST BP 80-89 MM HG: CPT | Performed by: NURSE PRACTITIONER

## 2024-03-12 RX ORDER — TORSEMIDE 10 MG/1
10 TABLET ORAL DAILY
Qty: 90 TABLET | Refills: 1 | Status: ON HOLD | OUTPATIENT
Start: 2024-03-12

## 2024-03-12 RX ORDER — ALBUTEROL SULFATE 2.5 MG/3ML
2.5 SOLUTION RESPIRATORY (INHALATION) ONCE
Status: COMPLETED | OUTPATIENT
Start: 2024-03-12 | End: 2024-03-12

## 2024-03-12 RX ORDER — NEBULIZER ACCESSORIES
KIT MISCELLANEOUS
Qty: 1 KIT | Refills: 0 | Status: SHIPPED | OUTPATIENT
Start: 2024-03-12 | End: 2024-03-12

## 2024-03-12 RX ORDER — PREDNISONE 20 MG/1
20 TABLET ORAL 2 TIMES DAILY
Qty: 4 TABLET | Refills: 0 | Status: SHIPPED | OUTPATIENT
Start: 2024-03-12 | End: 2024-03-14

## 2024-03-12 RX ORDER — ALBUTEROL SULFATE 2.5 MG/3ML
2.5 SOLUTION RESPIRATORY (INHALATION) EVERY 6 HOURS
Qty: 120 EACH | Refills: 0 | Status: ON HOLD | OUTPATIENT
Start: 2024-03-12

## 2024-03-12 RX ORDER — NEBULIZER ACCESSORIES
KIT MISCELLANEOUS
Qty: 1 KIT | Refills: 0 | Status: ON HOLD | OUTPATIENT
Start: 2024-03-12

## 2024-03-12 RX ADMIN — ALBUTEROL SULFATE 2.5 MG: 2.5 SOLUTION RESPIRATORY (INHALATION) at 11:26

## 2024-03-12 ASSESSMENT — ENCOUNTER SYMPTOMS
WHEEZING: 1
CHEST TIGHTNESS: 0
SHORTNESS OF BREATH: 1
NAUSEA: 0
DIARRHEA: 0
COLOR CHANGE: 0
SORE THROAT: 0
ABDOMINAL DISTENTION: 0
COUGH: 1
VOMITING: 0
RHINORRHEA: 0
SINUS PRESSURE: 0
CONSTIPATION: 0
SINUS PAIN: 0

## 2024-03-12 NOTE — PROGRESS NOTES
[facility]  [address]  [facility] [address]  Dept: [phone]  Dept Fax: [phone]    Adela Mcfadden is a 83 y.o. female who presents today for her medical conditions/complaintsas noted below.  Adela Mcfadden is c/o of   Chief Complaint   Patient presents with    Cough     She is here for a cough she was seen on 03/07/24 with Acute Bronchitis, she was only given prednisone, granddaughter reports here being worse now. She is congested with an on going cough      HPI:      Patient presents to the walk in clinic with granddaughter. Normally a patient of Dr. Dao. Presents today due to symptoms not improving. Was seen at the walk in clinic on 3/7 diagnosed with bronchitis. Given prednisone and mucinex. COVID, influenza, and chest xray were all negative. Symptoms originally started about 4 days before the visit. Feels symptoms are worsening. C/o cough, SOB, wheezing, congestion. Denies any other symptoms. Nothing is making better or worse. No additional treatment.         Hemoglobin A1C (%)   Date Value   10/17/2023 5.5   04/12/2023 5.7   10/05/2022 5.7             ( goal A1Cis < 7)   No components found for: \"LABMICR\"  LDL Cholesterol (mg/dL)   Date Value   04/12/2023 120   10/05/2022 109   03/30/2022 103       (goal LDL is <100)   AST (U/L)   Date Value   10/17/2023 17     ALT (U/L)   Date Value   10/17/2023 12     BUN (mg/dL)   Date Value   10/17/2023 31 (H)     BP Readings from Last 3 Encounters:   03/12/24 (!) 140/80   03/07/24 138/84   02/05/24 134/62          (goal 120/80)    Past Medical History:   Diagnosis Date    Bullous pemphigoid     Elevated fasting glucose     Former smoker     quit in 1987    GERD (gastroesophageal reflux disease)     Hypertension     Osteoarthritis     Osteopenia     took Boniva times 5 years, stopped 2012    Overweight(278.02)     wieght 174

## 2024-03-17 ENCOUNTER — HOSPITAL ENCOUNTER (INPATIENT)
Age: 84
LOS: 2 days | Discharge: HOME | DRG: 194 | End: 2024-03-19
Attending: EMERGENCY MEDICINE | Admitting: INTERNAL MEDICINE
Payer: MEDICARE

## 2024-03-17 ENCOUNTER — APPOINTMENT (OUTPATIENT)
Dept: GENERAL RADIOLOGY | Age: 84
DRG: 194 | End: 2024-03-17
Payer: MEDICARE

## 2024-03-17 ENCOUNTER — APPOINTMENT (OUTPATIENT)
Dept: CT IMAGING | Age: 84
DRG: 194 | End: 2024-03-17
Payer: MEDICARE

## 2024-03-17 DIAGNOSIS — J18.9 MULTIFOCAL PNEUMONIA: Primary | ICD-10-CM

## 2024-03-17 DIAGNOSIS — J44.1 COPD EXACERBATION (HCC): ICD-10-CM

## 2024-03-17 PROBLEM — H02.831 DERMATOCHALASIS OF BOTH UPPER EYELIDS: Status: ACTIVE | Noted: 2018-07-02

## 2024-03-17 PROBLEM — Z87.891 FORMER SMOKER: Status: ACTIVE | Noted: 2024-03-17

## 2024-03-17 PROBLEM — N18.31 STAGE 3A CHRONIC KIDNEY DISEASE (CKD) (HCC): Status: ACTIVE | Noted: 2024-03-17

## 2024-03-17 PROBLEM — H25.812 COMBINED FORMS OF AGE-RELATED CATARACT OF LEFT EYE: Status: ACTIVE | Noted: 2018-07-02

## 2024-03-17 PROBLEM — J44.9 COPD (CHRONIC OBSTRUCTIVE PULMONARY DISEASE) (HCC): Status: ACTIVE | Noted: 2024-03-17

## 2024-03-17 PROBLEM — I07.1 MODERATE TRICUSPID REGURGITATION: Status: ACTIVE | Noted: 2024-03-17

## 2024-03-17 PROBLEM — H02.834 DERMATOCHALASIS OF BOTH UPPER EYELIDS: Status: ACTIVE | Noted: 2018-07-02

## 2024-03-17 PROBLEM — H35.3121: Status: ACTIVE | Noted: 2018-07-02

## 2024-03-17 PROBLEM — H35.371 MACULAR PUCKER, RIGHT: Status: ACTIVE | Noted: 2018-07-02

## 2024-03-17 LAB
ALBUMIN SERPL-MCNC: 3.6 G/DL (ref 3.5–5.2)
ALBUMIN/GLOB SERPL: 1.3 {RATIO} (ref 1–2.5)
ALP SERPL-CCNC: 75 U/L (ref 35–104)
ALT SERPL-CCNC: 11 U/L (ref 5–33)
ANION GAP SERPL CALCULATED.3IONS-SCNC: 12 MMOL/L (ref 9–17)
AST SERPL-CCNC: 11 U/L
BACTERIA URNS QL MICRO: ABNORMAL
BASOPHILS # BLD: 0 K/UL (ref 0–0.2)
BASOPHILS NFR BLD: 0 % (ref 0–1)
BILIRUB SERPL-MCNC: 0.8 MG/DL (ref 0.3–1.2)
BILIRUB UR QL STRIP: NEGATIVE
BNP SERPL-MCNC: 497 PG/ML
BUN SERPL-MCNC: 29 MG/DL (ref 8–23)
BUN/CREAT SERPL: 26 (ref 9–20)
CALCIUM SERPL-MCNC: 8.3 MG/DL (ref 8.6–10.4)
CHARACTER UR: ABNORMAL
CHLORIDE SERPL-SCNC: 99 MMOL/L (ref 98–107)
CLARITY UR: CLEAR
CO2 SERPL-SCNC: 30 MMOL/L (ref 20–31)
COLOR UR: YELLOW
CREAT SERPL-MCNC: 1.1 MG/DL (ref 0.5–0.9)
EOSINOPHIL # BLD: 0 K/UL (ref 0–0.4)
EOSINOPHILS RELATIVE PERCENT: 0 % (ref 1–7)
EPI CELLS #/AREA URNS HPF: ABNORMAL /HPF (ref 0–5)
ERYTHROCYTE [DISTWIDTH] IN BLOOD BY AUTOMATED COUNT: 13.4 % (ref 11.8–14.4)
FLUAV AG SPEC QL: NEGATIVE
FLUBV AG SPEC QL: NEGATIVE
GFR SERPL CREATININE-BSD FRML MDRD: 50 ML/MIN/1.73M2
GLUCOSE SERPL-MCNC: 150 MG/DL (ref 70–99)
GLUCOSE UR STRIP-MCNC: NEGATIVE MG/DL
HCT VFR BLD AUTO: 48.7 % (ref 36.3–47.1)
HGB BLD-MCNC: 15.6 G/DL (ref 11.9–15.1)
HGB UR QL STRIP.AUTO: ABNORMAL
IMM GRANULOCYTES # BLD AUTO: 0 K/UL (ref 0–0.3)
IMM GRANULOCYTES NFR BLD: 0 %
INR PPP: 1
KETONES UR STRIP-MCNC: NEGATIVE MG/DL
LACTATE BLDV-SCNC: 1.7 MMOL/L (ref 0.5–1.9)
LEUKOCYTE ESTERASE UR QL STRIP: NEGATIVE
LIPASE SERPL-CCNC: 17 U/L (ref 13–60)
LYMPHOCYTES NFR BLD: 0.51 K/UL (ref 1–4.8)
LYMPHOCYTES RELATIVE PERCENT: 3 % (ref 16–46)
MCH RBC QN AUTO: 28.9 PG (ref 25.2–33.5)
MCHC RBC AUTO-ENTMCNC: 32 G/DL (ref 25.2–33.5)
MCV RBC AUTO: 90.2 FL (ref 82.6–102.9)
MONOCYTES NFR BLD: 0.68 K/UL (ref 0.1–1.2)
MONOCYTES NFR BLD: 4 % (ref 4–11)
MORPHOLOGY: ABNORMAL
MORPHOLOGY: ABNORMAL
NEUTROPHILS NFR BLD: 93 % (ref 43–77)
NEUTS SEG NFR BLD: 15.91 K/UL (ref 1.5–8.1)
NITRITE UR QL STRIP: NEGATIVE
NRBC BLD-RTO: 0 PER 100 WBC
PH UR STRIP: 6 [PH] (ref 5–6)
PLATELET # BLD AUTO: 408 K/UL (ref 138–453)
PMV BLD AUTO: 9.7 FL (ref 8.1–13.5)
POTASSIUM SERPL-SCNC: 4.4 MMOL/L (ref 3.7–5.3)
PROT SERPL-MCNC: 6.3 G/DL (ref 6.4–8.3)
PROT UR STRIP-MCNC: NEGATIVE MG/DL
PROTHROMBIN TIME: 13.2 SEC (ref 11.5–14.2)
RBC # BLD AUTO: 5.4 M/UL (ref 3.95–5.11)
RBC #/AREA URNS HPF: ABNORMAL /HPF (ref 0–4)
SARS-COV-2 RDRP RESP QL NAA+PROBE: NOT DETECTED
SODIUM SERPL-SCNC: 141 MMOL/L (ref 135–144)
SP GR UR STRIP: 1.01 (ref 1.01–1.02)
SPECIMEN DESCRIPTION: NORMAL
TROPONIN I SERPL HS-MCNC: 27 NG/L (ref 0–14)
TROPONIN I SERPL HS-MCNC: 28 NG/L (ref 0–14)
UROBILINOGEN UR STRIP-ACNC: NORMAL EU/DL (ref 0–1)
WBC #/AREA URNS HPF: ABNORMAL /HPF (ref 0–4)
WBC OTHER # BLD: 17.1 K/UL (ref 3.5–11.3)

## 2024-03-17 PROCEDURE — 93005 ELECTROCARDIOGRAM TRACING: CPT | Performed by: EMERGENCY MEDICINE

## 2024-03-17 PROCEDURE — 96375 TX/PRO/DX INJ NEW DRUG ADDON: CPT

## 2024-03-17 PROCEDURE — 87070 CULTURE OTHR SPECIMN AEROBIC: CPT

## 2024-03-17 PROCEDURE — 2580000003 HC RX 258: Performed by: SPECIALIST

## 2024-03-17 PROCEDURE — 2580000003 HC RX 258: Performed by: NURSE PRACTITIONER

## 2024-03-17 PROCEDURE — 83605 ASSAY OF LACTIC ACID: CPT

## 2024-03-17 PROCEDURE — 71260 CT THORAX DX C+: CPT

## 2024-03-17 PROCEDURE — 87635 SARS-COV-2 COVID-19 AMP PRB: CPT

## 2024-03-17 PROCEDURE — 80053 COMPREHEN METABOLIC PANEL: CPT

## 2024-03-17 PROCEDURE — 6360000002 HC RX W HCPCS: Performed by: SPECIALIST

## 2024-03-17 PROCEDURE — 2060000000 HC ICU INTERMEDIATE R&B

## 2024-03-17 PROCEDURE — 6370000000 HC RX 637 (ALT 250 FOR IP): Performed by: NURSE PRACTITIONER

## 2024-03-17 PROCEDURE — 94640 AIRWAY INHALATION TREATMENT: CPT

## 2024-03-17 PROCEDURE — 71045 X-RAY EXAM CHEST 1 VIEW: CPT

## 2024-03-17 PROCEDURE — 6370000000 HC RX 637 (ALT 250 FOR IP): Performed by: EMERGENCY MEDICINE

## 2024-03-17 PROCEDURE — 99285 EMERGENCY DEPT VISIT HI MDM: CPT

## 2024-03-17 PROCEDURE — 2580000003 HC RX 258: Performed by: EMERGENCY MEDICINE

## 2024-03-17 PROCEDURE — 96367 TX/PROPH/DG ADDL SEQ IV INF: CPT

## 2024-03-17 PROCEDURE — 81001 URINALYSIS AUTO W/SCOPE: CPT

## 2024-03-17 PROCEDURE — 83880 ASSAY OF NATRIURETIC PEPTIDE: CPT

## 2024-03-17 PROCEDURE — 36415 COLL VENOUS BLD VENIPUNCTURE: CPT

## 2024-03-17 PROCEDURE — 84484 ASSAY OF TROPONIN QUANT: CPT

## 2024-03-17 PROCEDURE — 6360000002 HC RX W HCPCS: Performed by: EMERGENCY MEDICINE

## 2024-03-17 PROCEDURE — 96365 THER/PROPH/DIAG IV INF INIT: CPT

## 2024-03-17 PROCEDURE — 6360000004 HC RX CONTRAST MEDICATION: Performed by: EMERGENCY MEDICINE

## 2024-03-17 PROCEDURE — 96361 HYDRATE IV INFUSION ADD-ON: CPT

## 2024-03-17 PROCEDURE — 87205 SMEAR GRAM STAIN: CPT

## 2024-03-17 PROCEDURE — 99222 1ST HOSP IP/OBS MODERATE 55: CPT | Performed by: NURSE PRACTITIONER

## 2024-03-17 PROCEDURE — 87804 INFLUENZA ASSAY W/OPTIC: CPT

## 2024-03-17 PROCEDURE — 85025 COMPLETE CBC W/AUTO DIFF WBC: CPT

## 2024-03-17 PROCEDURE — 83690 ASSAY OF LIPASE: CPT

## 2024-03-17 PROCEDURE — 87040 BLOOD CULTURE FOR BACTERIA: CPT

## 2024-03-17 PROCEDURE — 85610 PROTHROMBIN TIME: CPT

## 2024-03-17 RX ORDER — ACETAMINOPHEN 325 MG/1
650 TABLET ORAL EVERY 6 HOURS PRN
Status: DISCONTINUED | OUTPATIENT
Start: 2024-03-17 | End: 2024-03-19 | Stop reason: HOSPADM

## 2024-03-17 RX ORDER — METOPROLOL SUCCINATE 25 MG/1
25 TABLET, EXTENDED RELEASE ORAL DAILY
Status: DISCONTINUED | OUTPATIENT
Start: 2024-03-18 | End: 2024-03-19 | Stop reason: HOSPADM

## 2024-03-17 RX ORDER — SODIUM CHLORIDE 0.9 % (FLUSH) 0.9 %
5-40 SYRINGE (ML) INJECTION EVERY 12 HOURS SCHEDULED
Status: DISCONTINUED | OUTPATIENT
Start: 2024-03-17 | End: 2024-03-19 | Stop reason: HOSPADM

## 2024-03-17 RX ORDER — TORSEMIDE 5 MG/1
10 TABLET ORAL DAILY
Status: DISCONTINUED | OUTPATIENT
Start: 2024-03-18 | End: 2024-03-19 | Stop reason: HOSPADM

## 2024-03-17 RX ORDER — ENOXAPARIN SODIUM 100 MG/ML
40 INJECTION SUBCUTANEOUS DAILY
Status: DISCONTINUED | OUTPATIENT
Start: 2024-03-18 | End: 2024-03-19 | Stop reason: HOSPADM

## 2024-03-17 RX ORDER — POLYETHYLENE GLYCOL 3350 17 G/17G
17 POWDER, FOR SOLUTION ORAL DAILY PRN
Status: DISCONTINUED | OUTPATIENT
Start: 2024-03-17 | End: 2024-03-19 | Stop reason: HOSPADM

## 2024-03-17 RX ORDER — ONDANSETRON 2 MG/ML
4 INJECTION INTRAMUSCULAR; INTRAVENOUS EVERY 6 HOURS PRN
Status: DISCONTINUED | OUTPATIENT
Start: 2024-03-17 | End: 2024-03-19 | Stop reason: HOSPADM

## 2024-03-17 RX ORDER — SODIUM CHLORIDE FOR INHALATION 0.9 %
3 VIAL, NEBULIZER (ML) INHALATION
Status: DISCONTINUED | OUTPATIENT
Start: 2024-03-17 | End: 2024-03-19 | Stop reason: HOSPADM

## 2024-03-17 RX ORDER — ONDANSETRON 4 MG/1
4 TABLET, ORALLY DISINTEGRATING ORAL EVERY 8 HOURS PRN
Status: DISCONTINUED | OUTPATIENT
Start: 2024-03-17 | End: 2024-03-19 | Stop reason: HOSPADM

## 2024-03-17 RX ORDER — ONDANSETRON 2 MG/ML
4 INJECTION INTRAMUSCULAR; INTRAVENOUS ONCE
Status: COMPLETED | OUTPATIENT
Start: 2024-03-17 | End: 2024-03-17

## 2024-03-17 RX ORDER — SODIUM CHLORIDE 9 MG/ML
INJECTION, SOLUTION INTRAVENOUS PRN
Status: DISCONTINUED | OUTPATIENT
Start: 2024-03-17 | End: 2024-03-19 | Stop reason: HOSPADM

## 2024-03-17 RX ORDER — VITAMIN B COMPLEX
5000 TABLET ORAL DAILY
Status: DISCONTINUED | OUTPATIENT
Start: 2024-03-18 | End: 2024-03-19 | Stop reason: HOSPADM

## 2024-03-17 RX ORDER — ALBUTEROL SULFATE 2.5 MG/3ML
2.5 SOLUTION RESPIRATORY (INHALATION)
Status: DISCONTINUED | OUTPATIENT
Start: 2024-03-17 | End: 2024-03-19 | Stop reason: HOSPADM

## 2024-03-17 RX ORDER — PANTOPRAZOLE SODIUM 40 MG/1
40 TABLET, DELAYED RELEASE ORAL
Status: DISCONTINUED | OUTPATIENT
Start: 2024-03-18 | End: 2024-03-19 | Stop reason: HOSPADM

## 2024-03-17 RX ORDER — GUAIFENESIN 600 MG/1
600 TABLET, EXTENDED RELEASE ORAL 2 TIMES DAILY
Status: DISCONTINUED | OUTPATIENT
Start: 2024-03-17 | End: 2024-03-19 | Stop reason: HOSPADM

## 2024-03-17 RX ORDER — AMLODIPINE BESYLATE 5 MG/1
5 TABLET ORAL DAILY
Status: DISCONTINUED | OUTPATIENT
Start: 2024-03-18 | End: 2024-03-19 | Stop reason: HOSPADM

## 2024-03-17 RX ORDER — ACETAMINOPHEN 650 MG/1
650 SUPPOSITORY RECTAL EVERY 6 HOURS PRN
Status: DISCONTINUED | OUTPATIENT
Start: 2024-03-17 | End: 2024-03-19 | Stop reason: HOSPADM

## 2024-03-17 RX ORDER — SODIUM CHLORIDE 0.9 % (FLUSH) 0.9 %
5-40 SYRINGE (ML) INJECTION PRN
Status: DISCONTINUED | OUTPATIENT
Start: 2024-03-17 | End: 2024-03-19 | Stop reason: HOSPADM

## 2024-03-17 RX ORDER — IPRATROPIUM BROMIDE AND ALBUTEROL SULFATE 2.5; .5 MG/3ML; MG/3ML
1 SOLUTION RESPIRATORY (INHALATION)
Status: DISCONTINUED | OUTPATIENT
Start: 2024-03-17 | End: 2024-03-19 | Stop reason: HOSPADM

## 2024-03-17 RX ORDER — 0.9 % SODIUM CHLORIDE 0.9 %
1000 INTRAVENOUS SOLUTION INTRAVENOUS ONCE
Status: COMPLETED | OUTPATIENT
Start: 2024-03-17 | End: 2024-03-17

## 2024-03-17 RX ORDER — CETIRIZINE HYDROCHLORIDE 5 MG/1
10 TABLET ORAL DAILY
Status: DISCONTINUED | OUTPATIENT
Start: 2024-03-18 | End: 2024-03-19 | Stop reason: HOSPADM

## 2024-03-17 RX ORDER — LISINOPRIL 20 MG/1
20 TABLET ORAL DAILY
Status: DISCONTINUED | OUTPATIENT
Start: 2024-03-18 | End: 2024-03-19 | Stop reason: HOSPADM

## 2024-03-17 RX ADMIN — AZITHROMYCIN MONOHYDRATE 500 MG: 500 INJECTION, POWDER, LYOPHILIZED, FOR SOLUTION INTRAVENOUS at 21:28

## 2024-03-17 RX ADMIN — SODIUM CHLORIDE 1000 ML: 9 INJECTION, SOLUTION INTRAVENOUS at 16:40

## 2024-03-17 RX ADMIN — IOPAMIDOL 80 ML: 755 INJECTION, SOLUTION INTRAVENOUS at 19:09

## 2024-03-17 RX ADMIN — CEFTRIAXONE 1000 MG: 1 INJECTION, POWDER, FOR SOLUTION INTRAMUSCULAR; INTRAVENOUS at 21:30

## 2024-03-17 RX ADMIN — IPRATROPIUM BROMIDE AND ALBUTEROL SULFATE 1 DOSE: 2.5; .5 SOLUTION RESPIRATORY (INHALATION) at 22:27

## 2024-03-17 RX ADMIN — SODIUM CHLORIDE, PRESERVATIVE FREE 10 ML: 5 INJECTION INTRAVENOUS at 23:15

## 2024-03-17 RX ADMIN — IPRATROPIUM BROMIDE AND ALBUTEROL SULFATE 1 DOSE: 2.5; .5 SOLUTION RESPIRATORY (INHALATION) at 16:11

## 2024-03-17 RX ADMIN — GUAIFENESIN 600 MG: 600 TABLET, EXTENDED RELEASE ORAL at 23:17

## 2024-03-17 RX ADMIN — ONDANSETRON 4 MG: 2 INJECTION INTRAMUSCULAR; INTRAVENOUS at 16:40

## 2024-03-17 ASSESSMENT — LIFESTYLE VARIABLES: HOW OFTEN DO YOU HAVE A DRINK CONTAINING ALCOHOL: NEVER

## 2024-03-18 LAB
ANION GAP SERPL CALCULATED.3IONS-SCNC: 13 MMOL/L (ref 9–17)
BASOPHILS # BLD: 0 K/UL (ref 0–0.2)
BASOPHILS NFR BLD: 0 % (ref 0–1)
BUN SERPL-MCNC: 26 MG/DL (ref 8–23)
BUN/CREAT SERPL: 29 (ref 9–20)
CALCIUM SERPL-MCNC: 7.6 MG/DL (ref 8.6–10.4)
CHLORIDE SERPL-SCNC: 104 MMOL/L (ref 98–107)
CO2 SERPL-SCNC: 26 MMOL/L (ref 20–31)
CREAT SERPL-MCNC: 0.9 MG/DL (ref 0.5–0.9)
EKG ATRIAL RATE: 67 BPM
EKG ATRIAL RATE: 91 BPM
EKG P AXIS: 75 DEGREES
EKG P AXIS: 78 DEGREES
EKG P-R INTERVAL: 112 MS
EKG P-R INTERVAL: 114 MS
EKG Q-T INTERVAL: 334 MS
EKG Q-T INTERVAL: 370 MS
EKG QRS DURATION: 68 MS
EKG QRS DURATION: 70 MS
EKG QTC CALCULATION (BAZETT): 390 MS
EKG QTC CALCULATION (BAZETT): 410 MS
EKG R AXIS: 18 DEGREES
EKG R AXIS: 37 DEGREES
EKG T AXIS: 82 DEGREES
EKG T AXIS: 92 DEGREES
EKG VENTRICULAR RATE: 67 BPM
EKG VENTRICULAR RATE: 91 BPM
EOSINOPHIL # BLD: 0 K/UL (ref 0–0.4)
EOSINOPHILS RELATIVE PERCENT: 0 % (ref 1–4)
ERYTHROCYTE [DISTWIDTH] IN BLOOD BY AUTOMATED COUNT: 13.6 % (ref 11.8–14.4)
GFR SERPL CREATININE-BSD FRML MDRD: >60 ML/MIN/1.73M2
GLUCOSE SERPL-MCNC: 108 MG/DL (ref 70–99)
HCT VFR BLD AUTO: 41.6 % (ref 36.3–47.1)
HGB BLD-MCNC: 13.1 G/DL (ref 11.9–15.1)
IMM GRANULOCYTES # BLD AUTO: 0 K/UL (ref 0–0.3)
IMM GRANULOCYTES NFR BLD: 0 %
LYMPHOCYTES NFR BLD: 0.92 K/UL (ref 1–4.8)
LYMPHOCYTES RELATIVE PERCENT: 7 % (ref 16–46)
MCH RBC QN AUTO: 28.5 PG (ref 25.2–33.5)
MCHC RBC AUTO-ENTMCNC: 31.5 G/DL (ref 25.2–33.5)
MCV RBC AUTO: 90.6 FL (ref 82.6–102.9)
METAMYELOCYTES ABSOLUTE COUNT: 0.13 K/UL
METAMYELOCYTES: 1 %
MONOCYTES NFR BLD: 0.92 K/UL (ref 0.1–1.2)
MONOCYTES NFR BLD: 7 % (ref 4–11)
MORPHOLOGY: ABNORMAL
MORPHOLOGY: ABNORMAL
NEUTROPHILS NFR BLD: 85 % (ref 43–77)
NEUTS SEG NFR BLD: 11.13 K/UL (ref 1.5–8.1)
NRBC BLD-RTO: 0 PER 100 WBC
PLATELET # BLD AUTO: 334 K/UL (ref 138–453)
PMV BLD AUTO: 9.7 FL (ref 8.1–13.5)
POTASSIUM SERPL-SCNC: 3.9 MMOL/L (ref 3.7–5.3)
RBC # BLD AUTO: 4.59 M/UL (ref 3.95–5.11)
SODIUM SERPL-SCNC: 143 MMOL/L (ref 135–144)
WBC OTHER # BLD: 13.1 K/UL (ref 3.5–11.3)

## 2024-03-18 PROCEDURE — 36415 COLL VENOUS BLD VENIPUNCTURE: CPT

## 2024-03-18 PROCEDURE — 6370000000 HC RX 637 (ALT 250 FOR IP): Performed by: NURSE PRACTITIONER

## 2024-03-18 PROCEDURE — 94669 MECHANICAL CHEST WALL OSCILL: CPT

## 2024-03-18 PROCEDURE — 93005 ELECTROCARDIOGRAM TRACING: CPT | Performed by: NURSE PRACTITIONER

## 2024-03-18 PROCEDURE — 2580000003 HC RX 258: Performed by: NURSE PRACTITIONER

## 2024-03-18 PROCEDURE — 94640 AIRWAY INHALATION TREATMENT: CPT

## 2024-03-18 PROCEDURE — 99222 1ST HOSP IP/OBS MODERATE 55: CPT | Performed by: INTERNAL MEDICINE

## 2024-03-18 PROCEDURE — 2060000000 HC ICU INTERMEDIATE R&B

## 2024-03-18 PROCEDURE — 80048 BASIC METABOLIC PNL TOTAL CA: CPT

## 2024-03-18 PROCEDURE — 94760 N-INVAS EAR/PLS OXIMETRY 1: CPT

## 2024-03-18 PROCEDURE — 85025 COMPLETE CBC W/AUTO DIFF WBC: CPT

## 2024-03-18 PROCEDURE — 6360000002 HC RX W HCPCS: Performed by: NURSE PRACTITIONER

## 2024-03-18 RX ORDER — CALCIUM CARBONATE 500(1250)
500 TABLET ORAL DAILY
Status: DISCONTINUED | OUTPATIENT
Start: 2024-03-19 | End: 2024-03-19 | Stop reason: HOSPADM

## 2024-03-18 RX ADMIN — SODIUM CHLORIDE, PRESERVATIVE FREE 10 ML: 5 INJECTION INTRAVENOUS at 20:58

## 2024-03-18 RX ADMIN — IPRATROPIUM BROMIDE AND ALBUTEROL SULFATE 1 DOSE: 2.5; .5 SOLUTION RESPIRATORY (INHALATION) at 16:27

## 2024-03-18 RX ADMIN — CHOLECALCIFEROL TAB 25 MCG (1000 UNIT) 5000 UNITS: 25 TAB at 09:39

## 2024-03-18 RX ADMIN — GUAIFENESIN 600 MG: 600 TABLET, EXTENDED RELEASE ORAL at 09:39

## 2024-03-18 RX ADMIN — IPRATROPIUM BROMIDE AND ALBUTEROL SULFATE 1 DOSE: 2.5; .5 SOLUTION RESPIRATORY (INHALATION) at 08:11

## 2024-03-18 RX ADMIN — AZITHROMYCIN MONOHYDRATE 500 MG: 500 INJECTION, POWDER, LYOPHILIZED, FOR SOLUTION INTRAVENOUS at 22:31

## 2024-03-18 RX ADMIN — TORSEMIDE 10 MG: 5 TABLET ORAL at 09:38

## 2024-03-18 RX ADMIN — CEFTRIAXONE 1000 MG: 1 INJECTION, POWDER, FOR SOLUTION INTRAMUSCULAR; INTRAVENOUS at 21:01

## 2024-03-18 RX ADMIN — SODIUM CHLORIDE, PRESERVATIVE FREE 10 ML: 5 INJECTION INTRAVENOUS at 09:41

## 2024-03-18 RX ADMIN — GUAIFENESIN 600 MG: 600 TABLET, EXTENDED RELEASE ORAL at 20:58

## 2024-03-18 RX ADMIN — METOPROLOL SUCCINATE 25 MG: 25 TABLET, EXTENDED RELEASE ORAL at 09:38

## 2024-03-18 RX ADMIN — AMLODIPINE BESYLATE 5 MG: 5 TABLET ORAL at 09:39

## 2024-03-18 RX ADMIN — LISINOPRIL 20 MG: 20 TABLET ORAL at 09:39

## 2024-03-18 RX ADMIN — IPRATROPIUM BROMIDE AND ALBUTEROL SULFATE 1 DOSE: 2.5; .5 SOLUTION RESPIRATORY (INHALATION) at 12:59

## 2024-03-18 RX ADMIN — PANTOPRAZOLE SODIUM 40 MG: 40 TABLET, DELAYED RELEASE ORAL at 05:30

## 2024-03-18 RX ADMIN — ENOXAPARIN SODIUM 40 MG: 100 INJECTION SUBCUTANEOUS at 09:39

## 2024-03-18 RX ADMIN — SODIUM CHLORIDE 15 ML: 9 INJECTION, SOLUTION INTRAVENOUS at 20:59

## 2024-03-18 RX ADMIN — IPRATROPIUM BROMIDE AND ALBUTEROL SULFATE 1 DOSE: 2.5; .5 SOLUTION RESPIRATORY (INHALATION) at 19:41

## 2024-03-18 RX ADMIN — SODIUM CHLORIDE 15 ML: 9 INJECTION, SOLUTION INTRAVENOUS at 22:29

## 2024-03-18 RX ADMIN — CETIRIZINE HYDROCHLORIDE 10 MG: 5 TABLET, FILM COATED ORAL at 09:38

## 2024-03-18 NOTE — ED PROVIDER NOTES
[facility]  eMERGENCY dEPARTMENT eNCOUnter      Pt Name: Adela Mcfadden  MRN: [id number]  Birthdate 1940  Date of evaluation: 3/17/2024      CHIEF COMPLAINT     Cough nausea vomiting        HISTORY OF PRESENT ILLNESS    Adela Mcfadden is a 83 y.o. female who presents with cough and nausea vomiting she says the cough been going on for some time she has been seen in urgent care and had a negative chest x-ray she has been treated with cough suppressants and steroids and given a diagnosis of acute bronchitis cough intermittently productive today and late last night she started having nausea vomiting no diarrhea no chest pain she is an ex-smoker does use aerosols but has not used 1 today      REVIEW OF SYSTEMS         Review of Systems   Constitutional:  Positive for fatigue and fever. Negative for chills.   HENT:  Negative for congestion and ear pain.    Eyes:  Negative for pain and visual disturbance.   Respiratory:  Positive for cough, shortness of breath and wheezing.    Cardiovascular:  Negative for chest pain, palpitations and leg swelling.   Gastrointestinal:  Positive for nausea and vomiting. Negative for abdominal pain, blood in stool, constipation and diarrhea.   Endocrine: Negative for polydipsia and polyuria.   Genitourinary:  Negative for difficulty urinating, dysuria, frequency, vaginal bleeding and vaginal discharge.   Musculoskeletal:  Negative for back pain, joint swelling, myalgias, neck pain and neck stiffness.   Skin:  Negative for rash.   Neurological:  Negative for dizziness, weakness and headaches.   Hematological:  Negative for adenopathy. Does not bruise/bleed easily.   Psychiatric/Behavioral:  Negative for confusion, self-injury and suicidal ideas.          PAST MEDICAL HISTORY    has a past medical history of Bullous pemphigoid, Elevated fasting glucose, Former smoker, GERD (gastroesophageal reflux disease), Hypertension, Osteoarthritis, Osteopenia, and 
RBC morphology normal.     Morphology Platelet scan shows Increased Platelets    Lactate, Sepsis   Result Value Ref Range    Lactic Acid, Sepsis 1.7 0.5 - 1.9 mmol/L   Protime-INR   Result Value Ref Range    Protime 13.2 11.5 - 14.2 sec    INR 1.0    Troponin   Result Value Ref Range    Troponin, High Sensitivity 28 (H) 0 - 14 ng/L   Brain Natriuretic Peptide   Result Value Ref Range    Pro- (H) <300 pg/mL   Urinalysis with Reflex to Culture    Specimen: Urine   Result Value Ref Range    Color, UA Yellow Yellow    Turbidity UA Clear Clear    Glucose, Ur NEGATIVE NEGATIVE mg/dL    Bilirubin Urine NEGATIVE NEGATIVE    Ketones, Urine NEGATIVE NEGATIVE mg/dL    Specific Gravity, UA 1.010 1.010 - 1.025    Urine Hgb TRACE (A) NEGATIVE    pH, UA 6.0 5.0 - 6.0    Protein, UA NEGATIVE NEGATIVE mg/dL    Urobilinogen, Urine Normal 0.0 - 1.0 EU/dL    Nitrite, Urine NEGATIVE NEGATIVE    Leukocyte Esterase, Urine NEGATIVE NEGATIVE   Lipase   Result Value Ref Range    Lipase 17 13 - 60 U/L   Troponin   Result Value Ref Range    Troponin, High Sensitivity 27 (H) 0 - 14 ng/L   Microscopic Urinalysis   Result Value Ref Range    WBC, UA 0 TO 2 0 - 4 /HPF    RBC, UA 0 TO 2 0 - 4 /HPF    Epithelial Cells UA 5 TO 10 0 - 5 /HPF    Bacteria, UA FEW (A) None    Other Observations UA (A) NOT REQ.     Utilizing a urinalysis as the only screening method to exclude a potential uropathogen can be unreliable in many patient populations.  Rapid screening tests are less sensitive than culture and if UTI is a clinical possibility, culture should be considered despite a negative urinalysis.     EKG 12 Lead   Result Value Ref Range    Ventricular Rate 91 BPM    Atrial Rate 91 BPM    P-R Interval 112 ms    QRS Duration 68 ms    Q-T Interval 334 ms    QTc Calculation (Bazett) 410 ms    P Axis 75 degrees    R Axis 37 degrees    T Axis 82 degrees       RECENT VITALS:  BP: 105/65, Temp: 98.6 °F (37 °C), Pulse: 78, Respirations: 24     ED Course

## 2024-03-18 NOTE — CARE COORDINATION
Case Management Assessment  Initial Evaluation    Date/Time of Evaluation: 3/18/2024 2:58 PM  Assessment Completed by: Lisa Costello RN    If patient is discharged prior to next notation, then this note serves as note for discharge by case management.    Patient Name: Adela Mcfadden                   YOB: 1940  Diagnosis: COPD exacerbation (HCC) [J44.1]  Multifocal pneumonia [J18.9]                   Date / Time: 3/17/2024  3:45 PM    Patient Admission Status: Inpatient   Readmission Risk (Low < 19, Mod (19-27), High > 27): Readmission Risk Score: 11.4    Current PCP: Sary Dao, DO  PCP verified by CM? Yes    Chart Reviewed: Yes      History Provided by: Patient  Patient Orientation: Alert and Oriented    Patient Cognition: Alert    Hospitalization in the last 30 days (Readmission):  No    If yes, Readmission Assessment in CM Navigator will be completed.    Advance Directives:      Code Status: Full Code   Patient's Primary Decision Maker is: Legal Next of Kin      Discharge Planning:    Patient lives with: Children Type of Home: Trailer/Mobile Home  Primary Care Giver: Self  Patient Support Systems include: Children   Current Financial resources: Medicare  Current community resources: None  Current services prior to admission: None            Current DME:              Type of Home Care services:  None    ADLS  Prior functional level: Independent in ADLs/IADLs  Current functional level: Independent in ADLs/IADLs    PT AM-PAC:   /24  OT AM-PAC:   /24    Family can provide assistance at DC: Yes  Would you like Case Management to discuss the discharge plan with any other family members/significant others, and if so, who? No  Plans to Return to Present Housing: Yes  Other Identified Issues/Barriers to RETURNING to current housing: yes  Potential Assistance needed at discharge: N/A            Potential DME:    Patient expects to discharge to: Trailer/mobile home  Plan for transportation at

## 2024-03-18 NOTE — H&P
notes.    Attending Supervising Physician's Attestation Statement  I performed a history and physical examination on the patient and discussed the management with the nurse practitioner. I reviewed and agree with the findings and plan as documented in her note .    Electronically signed by Andreas Diane MD on 3/18/24 at 8:52 PM EDT     PLAN:    1. Multifocal pneumonia -- Antibiotics, mednebs, supplemental oxygen prn, RT protocols, IS, acapella, mucinex, supportive care, check sputum culture  2. Nausea and vomiting -- antiemetics prn, diet as tolerated  3. IFG -- diet controlled -- carb controlled diet, monitor glucose  4. CKD stage 3a -- stable -- Monitor I&O, renal function, and fluid balance. Avoid nephrotoxins when possible.  5. Home medications reviewed  6. DVT prophylaxis -- lovenox  7. Outpatient follow up lung nodule  8. See orders     Note that over 55 minutes was spent in reviewing and obtaining history, physical examination and evaluation of the patient, placing orders, providing education, coordinating care, reviewing test results, documenting in the medical record, and discussion/communicating with patient/caregiver/family.    Electronically signed by YANELY Mata - CNP, NP-C, FNP-BC on 3/17/2024 at 10:10 PM  Hospitalist/Nocturnist

## 2024-03-18 NOTE — CARE COORDINATION
DISCHARGE BARRIERS       Reason for Referral:  SW completed a Psychosocial Assessment for evaluation of patient's mental health, social status, and functional capacity within the community. Adela Mcfadden is a 83 y.o. female admitted due to Multifocal pneumonia. Patient alone. SW provided supportive listening while patient discussed past medical history and events leading up to hospitalization.       Mental Status:  Alert, oriented, and engaging during assessment.     Decision Making:  Makes own decisions.     Family/Social/Home Environment: lives with their daughter    Employment status: Retired    Support: Discussed a good social support network     Current Services:  None      Current DMEs: [employer]    PCP: Sary Dao,  and repots no issues affording medication.      status:   None     ADLs and means of transportation: Independent in ADLs prior to hospitalization and unable to transport self. Patients daughter assists with transportation at this time.    Food insecurity or needed financial assistance: Denies any food insecurity or financial concerns at this time.    Income Source: social security    ACP and Code Status:  SW discussed an Advance Directive which included the patient's choices for care and treatment in the case of a health event that adversely affects decision-making abilities. SW provided education and resources. Adela Mcfadden has no questions at this time and has agreed to keep me up-to-date should anything change.    Adela Mcfadden is a Full Code status and has NO advanced directive - not interested in additional information.      Collaborative List of SNF/ECF/HH were provided: offered, declined No discharge order at this time.    Anticipated Needs/Discharge Plan:  Spoke with patient/family/representative about discharge plan. Patient/Family/Representative verbalizes understanding of the plan of care and denies discharge needs or further services at this time. SW provided

## 2024-03-18 NOTE — PLAN OF CARE
Problem: Discharge Planning  Goal: Discharge to home or other facility with appropriate resources  3/18/2024 1901 by Ritu Pat RN  Outcome: Progressing  3/18/2024 0503 by Alma Garcia RN  Outcome: Progressing  Flowsheets (Taken 3/17/2024 2302)  Discharge to home or other facility with appropriate resources:   Identify barriers to discharge with patient and caregiver   Arrange for needed discharge resources and transportation as appropriate   Identify discharge learning needs (meds, wound care, etc)   Refer to discharge planning if patient needs post-hospital services based on physician order or complex needs related to functional status, cognitive ability or social support system     Problem: Safety - Adult  Goal: Free from fall injury  3/18/2024 1901 by Ritu Pat RN  Outcome: Progressing  3/18/2024 0503 by Alma Garcia RN  Outcome: Progressing     Problem: Respiratory - Adult  Goal: Achieves optimal ventilation and oxygenation  3/18/2024 1901 by Ritu Pat RN  Outcome: Progressing  3/18/2024 0503 by Alma Garcia RN  Outcome: Progressing  Flowsheets (Taken 3/17/2024 2302)  Achieves optimal ventilation and oxygenation:   Assess for changes in respiratory status   Assess for changes in mentation and behavior   Position to facilitate oxygenation and minimize respiratory effort   Respiratory therapy support as indicated   Assess and instruct to report shortness of breath or any respiratory difficulty   Encourage broncho-pulmonary hygiene including cough, deep breathe, incentive spirometry     Problem: Cardiovascular - Adult  Goal: Maintains optimal cardiac output and hemodynamic stability  3/18/2024 1901 by Ritu Pat RN  Outcome: Progressing  3/18/2024 0503 by Alma Garcia RN  Outcome: Progressing  Flowsheets (Taken 3/17/2024 2302)  Maintains optimal cardiac output and hemodynamic stability:   Monitor blood pressure and heart rate   Monitor urine output and notify Licensed Independent

## 2024-03-19 ENCOUNTER — TELEPHONE (OUTPATIENT)
Dept: FAMILY MEDICINE CLINIC | Age: 84
End: 2024-03-19

## 2024-03-19 VITALS
OXYGEN SATURATION: 94 % | HEIGHT: 63 IN | HEART RATE: 73 BPM | BODY MASS INDEX: 24.66 KG/M2 | DIASTOLIC BLOOD PRESSURE: 54 MMHG | RESPIRATION RATE: 16 BRPM | WEIGHT: 139.2 LBS | TEMPERATURE: 98.4 F | SYSTOLIC BLOOD PRESSURE: 129 MMHG

## 2024-03-19 LAB
ANION GAP SERPL CALCULATED.3IONS-SCNC: 9 MMOL/L (ref 9–17)
BASOPHILS # BLD: 0.06 K/UL (ref 0–0.2)
BASOPHILS NFR BLD: 1 % (ref 0–2)
BUN SERPL-MCNC: 24 MG/DL (ref 8–23)
BUN/CREAT SERPL: 24 (ref 9–20)
CALCIUM SERPL-MCNC: 7.9 MG/DL (ref 8.6–10.4)
CHLORIDE SERPL-SCNC: 106 MMOL/L (ref 98–107)
CO2 SERPL-SCNC: 27 MMOL/L (ref 20–31)
CREAT SERPL-MCNC: 1 MG/DL (ref 0.5–0.9)
EOSINOPHIL # BLD: 0.16 K/UL (ref 0–0.44)
EOSINOPHILS RELATIVE PERCENT: 1 % (ref 1–4)
ERYTHROCYTE [DISTWIDTH] IN BLOOD BY AUTOMATED COUNT: 13.7 % (ref 11.8–14.4)
GFR SERPL CREATININE-BSD FRML MDRD: 57 ML/MIN/1.73M2
GLUCOSE SERPL-MCNC: 101 MG/DL (ref 70–99)
HCT VFR BLD AUTO: 39.6 % (ref 36.3–47.1)
HGB BLD-MCNC: 12.5 G/DL (ref 11.9–15.1)
IMM GRANULOCYTES # BLD AUTO: 0.42 K/UL (ref 0–0.3)
IMM GRANULOCYTES NFR BLD: 3 %
LYMPHOCYTES NFR BLD: 1.64 K/UL (ref 1.1–3.7)
LYMPHOCYTES RELATIVE PERCENT: 13 % (ref 24–43)
MCH RBC QN AUTO: 28.9 PG (ref 25.2–33.5)
MCHC RBC AUTO-ENTMCNC: 31.6 G/DL (ref 25.2–33.5)
MCV RBC AUTO: 91.5 FL (ref 82.6–102.9)
MONOCYTES NFR BLD: 1.14 K/UL (ref 0.1–1.2)
MONOCYTES NFR BLD: 9 % (ref 3–12)
NEUTROPHILS NFR BLD: 73 % (ref 36–65)
NEUTS SEG NFR BLD: 9.18 K/UL (ref 1.5–8.1)
NRBC BLD-RTO: 0 PER 100 WBC
PLATELET # BLD AUTO: 320 K/UL (ref 138–453)
PMV BLD AUTO: 9.8 FL (ref 8.1–13.5)
POTASSIUM SERPL-SCNC: 3.7 MMOL/L (ref 3.7–5.3)
RBC # BLD AUTO: 4.33 M/UL (ref 3.95–5.11)
SODIUM SERPL-SCNC: 142 MMOL/L (ref 135–144)
WBC OTHER # BLD: 12.6 K/UL (ref 3.5–11.3)

## 2024-03-19 PROCEDURE — 85025 COMPLETE CBC W/AUTO DIFF WBC: CPT

## 2024-03-19 PROCEDURE — 6370000000 HC RX 637 (ALT 250 FOR IP): Performed by: INTERNAL MEDICINE

## 2024-03-19 PROCEDURE — 2580000003 HC RX 258: Performed by: NURSE PRACTITIONER

## 2024-03-19 PROCEDURE — 2500000003 HC RX 250 WO HCPCS

## 2024-03-19 PROCEDURE — 99238 HOSP IP/OBS DSCHRG MGMT 30/<: CPT | Performed by: INTERNAL MEDICINE

## 2024-03-19 PROCEDURE — 94760 N-INVAS EAR/PLS OXIMETRY 1: CPT

## 2024-03-19 PROCEDURE — 6370000000 HC RX 637 (ALT 250 FOR IP): Performed by: NURSE PRACTITIONER

## 2024-03-19 PROCEDURE — 80048 BASIC METABOLIC PNL TOTAL CA: CPT

## 2024-03-19 PROCEDURE — 2580000003 HC RX 258

## 2024-03-19 PROCEDURE — 94640 AIRWAY INHALATION TREATMENT: CPT

## 2024-03-19 PROCEDURE — 36415 COLL VENOUS BLD VENIPUNCTURE: CPT

## 2024-03-19 PROCEDURE — 6360000002 HC RX W HCPCS: Performed by: NURSE PRACTITIONER

## 2024-03-19 RX ORDER — AZITHROMYCIN 250 MG/1
500 TABLET, FILM COATED ORAL NIGHTLY
Status: DISCONTINUED | OUTPATIENT
Start: 2024-03-19 | End: 2024-03-19 | Stop reason: HOSPADM

## 2024-03-19 RX ORDER — AZITHROMYCIN 500 MG/1
500 TABLET, FILM COATED ORAL NIGHTLY
Qty: 5 TABLET | Refills: 0 | Status: SHIPPED | OUTPATIENT
Start: 2024-03-19 | End: 2024-03-24

## 2024-03-19 RX ORDER — ACETAMINOPHEN 500 MG
1 TABLET ORAL 3 TIMES DAILY
Qty: 30 CAPSULE | Refills: 0 | COMMUNITY
Start: 2024-03-19 | End: 2024-03-29

## 2024-03-19 RX ORDER — CEFDINIR 300 MG/1
300 CAPSULE ORAL 2 TIMES DAILY
Qty: 10 CAPSULE | Refills: 0 | Status: SHIPPED | OUTPATIENT
Start: 2024-03-19 | End: 2024-03-24

## 2024-03-19 RX ADMIN — SODIUM CHLORIDE, PRESERVATIVE FREE 10 ML: 5 INJECTION INTRAVENOUS at 09:57

## 2024-03-19 RX ADMIN — AMLODIPINE BESYLATE 5 MG: 5 TABLET ORAL at 09:56

## 2024-03-19 RX ADMIN — CETIRIZINE HYDROCHLORIDE 10 MG: 5 TABLET, FILM COATED ORAL at 09:56

## 2024-03-19 RX ADMIN — IPRATROPIUM BROMIDE AND ALBUTEROL SULFATE 1 DOSE: 2.5; .5 SOLUTION RESPIRATORY (INHALATION) at 09:11

## 2024-03-19 RX ADMIN — ENOXAPARIN SODIUM 40 MG: 100 INJECTION SUBCUTANEOUS at 09:56

## 2024-03-19 RX ADMIN — IPRATROPIUM BROMIDE AND ALBUTEROL SULFATE 1 DOSE: 2.5; .5 SOLUTION RESPIRATORY (INHALATION) at 00:03

## 2024-03-19 RX ADMIN — TORSEMIDE 10 MG: 5 TABLET ORAL at 09:56

## 2024-03-19 RX ADMIN — METOPROLOL SUCCINATE 25 MG: 25 TABLET, EXTENDED RELEASE ORAL at 09:56

## 2024-03-19 RX ADMIN — PANTOPRAZOLE SODIUM 40 MG: 40 TABLET, DELAYED RELEASE ORAL at 05:09

## 2024-03-19 RX ADMIN — GUAIFENESIN 600 MG: 600 TABLET, EXTENDED RELEASE ORAL at 09:56

## 2024-03-19 RX ADMIN — CALCIUM CHLORIDE 1000 MG: 100 INJECTION, SOLUTION INTRAVENOUS at 09:48

## 2024-03-19 RX ADMIN — CALCIUM 500 MG: 500 TABLET ORAL at 09:56

## 2024-03-19 RX ADMIN — LISINOPRIL 20 MG: 20 TABLET ORAL at 09:56

## 2024-03-19 NOTE — PROGRESS NOTES
Physician Progress Note      PATIENT:               RAJNI DIEGO  CSN #:                  [id number]  :                       1940  ADMIT DATE:       3/17/2024 3:45 PM  DISCH DATE:        3/19/2024 2:10 PM  RESPONDING  PROVIDER #:        Andreas Diane MD          QUERY TEXT:    Patient admitted with multifocal pneumonia.  ED provider documented pneumonia and COPD exacerbation    If possible, please document in the progress notes and discharge summary if   COPD exacerbation was:    The medical record reflects the following:  Risk Factors: COPD,  former smoker, 77.5 pack year smoking history,    multifocal pneumonia  Clinical Indicators: presented to ED with c/o productive cough, wheezing, SOB;    H and P: POMPA, rhonchi present, expiratory wheezes  Treatment: DuoNebs, Zithromax, Rocephin    Aixa Koch, MSN, RN, CCDS, CRCR  Clinical   .  Options provided:  -- COPD exacerbation  confirmed after study  -- COPD exacerbation ruled out after study  -- Other - I will add my own diagnosis  -- Disagree - Not applicable / Not valid  -- Disagree - Clinically unable to determine / Unknown  -- Refer to Clinical Documentation Reviewer    PROVIDER RESPONSE TEXT:    COPD exacerbation confirmed after study.    Query created by: Aixa Koch on 3/19/2024 11:50 AM      Electronically signed by:  Andreas Diane MD 3/19/2024 5:28 PM          
 blanca in PCU.    Assessment: Patient reported feeling \"much better,\" had a positive attitude, and was looking forward to going home in a couple of days.  Patient's daughter lives with her, and she has the support of her grandchildren, great-grandchildren, and Jew family.     03/18/24 1604   Encounter Summary   Encounter Overview/Reason  Initial Encounter;Spiritual/Emotional Needs   Service Provided For: Patient   Referral/Consult From: Rounding   Support System Children;Family members;[employer]/leela community   Last Encounter  03/18/24   Complexity of Encounter Low   Begin Time 1325   End Time  1336   Total Time Calculated 11 min   Spiritual/Emotional needs   Type Spiritual Support   Assessment/Intervention/Outcome   Assessment Calm;Coping;Hopeful   Intervention Active listening;Prayer (assurance of)/[place];Sustaining Presence/Ministry of presence   Outcome Acceptance;Encouraged;Engaged in conversation;Expressed Gratitude;Receptive;Optimistic         Intervention: Engaged in conversation and active listening. Prayed with Patient.     Outcome: Patient expressed appreciation for visit and offer of continued prayer.    Plan: Chaplains are available on site or on call 24/7 for spiritual and emotional support.    Electronically signed by Carole Beebe on 3/18/2024 at 4:06 PM    
pneumonia---PNA---3.17.2024         CTA chest--pulmonary--3.17.2024--no PE--bilateral lower lobe and RML tree-in-bud pattern                            microconsolidation--indeterminate 10 mm LLL nodule          EKG---3.17.2024--SR--PACs---LAE--old septal infarction--NSTCs laterally          Troponin--3.17.2024--28---27            URI---3.4.2024    Hypertension         2D ECHO--7.27.2023--hyperdynamic LV--mild MR--mild-to-moderate TR---Grade I mild DD---LVEF ~ 75-80%  II/VI HOLLY  COPD  CKD--Stage 2--3a  IFG--impaired fasting glucose  Venous insufficiency---BLE  GERD  Overweight   Tobacco use---onset 1.1.1858--77 [facility]--quit--1.1.1989    PMH:   bullous pemphigoid, macular degeneration--OS, dermatochalasis               upper eyelids--OU, moderate TR, macular pucker--OD, OA--hips,               osteopenia, symptomatic spider veins, hyponatremia, tubular adenoma---              colon   PSH:    abdominal exploration--ectopic pregnancy---USO--1972, cataract--IOL--OS--2018,               partial hysterectomy--cervix unknown, colonoscopy--2017, BLE JEFF--2021    Allergies:    NKDA      Plan:     Home---3.19.2024     Medications reviewed     PNA---IV --> PO Omnicef--Zithromax     Med nebs and rescue Albuterol MDI     Probiotics     Follow up Dr. Dao, FP     See orders     Electronically signed by Andreas Diane MD on 3/19/2024 at 1:32 PM    Hospitalist        
2.36 2.55 2.75 2.95 3.16 70+ 1.97 2.19 2.41 2.64 2.87 3.12 3.37 3.62             Predicted Peak Expiratory Flow Rate                                       Height (in)  Female       Height (in) Male           Age 56 58 60 62 64 66 68 70 Age            20 344 357 372 387 402 417 432 446  60 62 64 66 68 70 72 74 76   25 337 352 366 381 396 411 426 441 25 447 476 505 533 562 [phone] 677   30 329 344 359 374 389 404 419 434 30 437 466 494 523 552 580 609 638 667   35 322 337 351 366 381 396 411 426 35 426 455 484 512 [phone] 657   40 314 329 344 359 374 389 404 419 40 416 445 473 502 531 559 588 617 647   45 307 322 336 351 366 381 396 411 45 405 434 463 491 520 549 577 606 636   50 299 314 329 344 359 374 389 404 50 395 424 452 481 510 538 567 596 625   55 292 307 321 336 351 366 381 396 55 384 413 442 470 499 528 556 585 615   60 284 299 314 329 344 359 374 389 60 374 403 431 460 489 517 546 575 605   65 277 292 306 321 336 351 366 381 65 363 392 421 449 478 507 535 564 594   70 269 284 299 314 329 344 359 374 70 353 382 410 439 468 [phone] 583   75 261 274 289 305 319 334 348 364 75 344 372 400 429 458 487 515 544 573   80 253 266 282 296 312 327 342 356 80 335 362 390 419 448 476 [phone]

## 2024-03-19 NOTE — TELEPHONE ENCOUNTER
Care Transitions Initial Follow Up Call    Outreach made within 2 business days of discharge: Yes    Patient: Adela Mcfadden Patient : 1940   MRN: [id number]  Reason for Admission: There are no discharge diagnoses documented for the most recent discharge.  Discharge Date: 3/19/24       Spoke with: ADELA    Discharge department/facility: [facility] PCU    TCM Interactive Patient Contact:  Was patient able to fill all prescriptions: No: GOING TO PHARMACY TO  [facility] THIS AFTERNOON, WILL CALL IF SHE HAS PROBLEMS   Was patient instructed to bring all medications to the follow-up visit: Yes  Is patient taking all medications as directed in the discharge summary? Yes  Does patient understand their discharge instructions: Yes  Does patient have questions or concerns that need addressed prior to 7-14 day follow up office visit: no    Pt declines OV at this time. Has OV on . Will call if needing anything sooner.     Follow Up  Future Appointments   Date Time Provider Department Center   2024  2:00 PM Sary Dao DO [facility]   10/25/2024  2:00 PM MDCX PF, KALEIGHV NURSE SCHEDULE [facility]   2/3/2025 11:00 AM [facility] Mobley, [facility] - [facility]       Maile Lyons RN

## 2024-03-19 NOTE — PLAN OF CARE
Problem: Discharge Planning  Goal: Discharge to home or other facility with appropriate resources  Outcome: Adequate for Discharge     Problem: Safety - Adult  Goal: Free from fall injury  Outcome: Adequate for Discharge     Problem: Respiratory - Adult  Goal: Achieves optimal ventilation and oxygenation  Outcome: Adequate for Discharge     Problem: Cardiovascular - Adult  Goal: Maintains optimal cardiac output and hemodynamic stability  Outcome: Adequate for Discharge  Goal: Absence of cardiac dysrhythmias or at baseline  Outcome: Adequate for Discharge     Problem: Skin/Tissue Integrity - Adult  Goal: Skin integrity remains intact  Outcome: Adequate for Discharge     Problem: Infection - Adult  Goal: Absence of infection at discharge  Outcome: Adequate for Discharge     Problem: Metabolic/Fluid and Electrolytes - Adult  Goal: Electrolytes maintained within normal limits  Outcome: Adequate for Discharge  Goal: Hemodynamic stability and optimal renal function maintained  Outcome: Adequate for Discharge     Problem: Chronic Conditions and Co-morbidities  Goal: Patient's chronic conditions and co-morbidity symptoms are monitored and maintained or improved  Outcome: Adequate for Discharge     Problem: Skin/Tissue Integrity  Goal: Absence of new skin breakdown  Description: 1.  Monitor for areas of redness and/or skin breakdown  2.  Assess vascular access sites hourly  3.  Every 4-6 hours minimum:  Change oxygen saturation probe site  4.  Every 4-6 hours:  If on nasal continuous positive airway pressure, respiratory therapy assess nares and determine need for appliance change or resting period.  Outcome: Adequate for Discharge

## 2024-03-19 NOTE — DISCHARGE INSTR - DIET

## 2024-03-20 ENCOUNTER — FOLLOWUP TELEPHONE ENCOUNTER (OUTPATIENT)
Dept: INPATIENT UNIT | Age: 84
End: 2024-03-20

## 2024-03-20 ENCOUNTER — CARE COORDINATION (OUTPATIENT)
Dept: CASE MANAGEMENT | Age: 84
End: 2024-03-20

## 2024-03-20 DIAGNOSIS — R60.0 BILATERAL LOWER EXTREMITY EDEMA: ICD-10-CM

## 2024-03-20 NOTE — CARE COORDINATION
Care Transitions Outreach Attempt    Call within 2 business days of discharge: Yes   Attempted to reach patient for transitions of care follow up. Unable to reach patient.    Patient: Adela Mcfadden Patient : 1940 MRN: <[id number]>    Last Discharge Facility       Date Complaint Diagnosis Description Type Department Provider    3/17/24 Emesis; Cough Multifocal pneumonia ... ED to Hosp-Admission (Discharged) (ADMITTED) Andreas Stinson MD; Chacho, ...          # 1 attempt-Attempted initial 24 hour hospital follow up call. Left a Hipaa compliant message with name and call back information. Requested return call to [phone].     Was this an external facility discharge? No Discharge Facility Name: [facility]    Noted following upcoming appointments from discharge chart review:   [facility] follow up appointment(s):   Future Appointments   Date Time Provider Department Center   2024  2:00 PM [facility]   10/25/2024  2:00 PM MDCX PF[facility] AW[facility]   2/3/2025 11:00 AM Martha Mobley, APRN - CNP PATIENCE [facility]     Non-BS  follow up appointment(s):

## 2024-03-20 NOTE — DISCHARGE SUMMARY
p.o. 50 mg OTC, patient's volition.  Followup with the patient's personal physician Sary Dao D.O., [facility], [facility].    Any aspect of the patient's care not discussed in the chart and/or dictation will be addressed and treated as an outpatient.    The patient's medications have been reviewed including, but not limited to, prehospital, hospital, and discharge medications.  The patient and/or the patient's personal representatives were specifically advised the only medications to be taken are those set forth in the discharge orders and no other medications should be taken.  Any prior medications not on the discharge orders are specifically discontinued.          ALLISON ENRIQUE MD      D:  03/19/2024 12:57:42     T:  03/20/2024 05:23:08     AUGUST/ISIAH  Job #:  [id number]     Doc#:  [id number]    CC:   [facility]

## 2024-03-20 NOTE — TELEPHONE ENCOUNTER
Pt states she needs med to go to [facility] instead. She did already  rx from [facility] as she was out but they only gave her a 30 day supply.    Adela called requesting a refill of the below medication which has been pended for you:     Requested Prescriptions     Pending Prescriptions Disp Refills    torsemide (DEMADEX) 10 MG tablet 90 tablet 1     Sig: Take 1 tablet by mouth daily       Last Appointment Date: 10/24/2023  Next Appointment Date: 4/19/2024    No Known Allergies

## 2024-03-21 ENCOUNTER — CARE COORDINATION (OUTPATIENT)
Dept: CASE MANAGEMENT | Age: 84
End: 2024-03-21

## 2024-03-21 LAB
MICROORGANISM SPEC CULT: NORMAL
MICROORGANISM/AGENT SPEC: NORMAL
SPECIMEN DESCRIPTION: NORMAL

## 2024-03-21 NOTE — CARE COORDINATION
Care Transitions Outreach Attempt    Call within 2 business days of discharge: Yes   Attempted to reach patient for transitions of care follow up. Unable to reach patient.    Patient: Adela Mcfadden Patient : 1940 MRN: <[id number]>    Last Discharge Facility       Date Complaint Diagnosis Description Type Department Provider    3/17/24 Emesis; Cough Multifocal pneumonia ... ED to Hosp-Admission (Discharged) (ADMITTED) Andreas Stinson MD; [place], ...          # 2 attempt-Attempted initial 24 hour hospital follow up call. Left a Hipaa compliant message with name and call back information. Requested return call to [phone].   2 unsuccessful attempts to reach patient, care transition episode resolved//JU  Was this an external facility discharge? No Discharge Facility Name: [facility]    Noted following upcoming appointments from discharge chart review:   [facility] follow up appointment(s):   Future Appointments   Date Time Provider Department Center   2024  2:00 PM [facility]   10/25/2024  2:00 PM MDCX PF, AWV [facility]   2/3/2025 11:00 AM Martha Mobley, [facility] - [facility]     Non-[facility]  follow up appointment(s):

## 2024-03-22 LAB
MICROORGANISM SPEC CULT: NORMAL
MICROORGANISM SPEC CULT: NORMAL
SERVICE CMNT-IMP: NORMAL
SERVICE CMNT-IMP: NORMAL
SPECIMEN DESCRIPTION: NORMAL
SPECIMEN DESCRIPTION: NORMAL

## 2024-03-23 RX ORDER — TORSEMIDE 10 MG/1
10 TABLET ORAL DAILY
Qty: 90 TABLET | Refills: 1 | Status: SHIPPED | OUTPATIENT
Start: 2024-03-23

## 2024-04-05 ENCOUNTER — HOSPITAL ENCOUNTER (INPATIENT)
Age: 84
LOS: 2 days | Discharge: HOME | DRG: 603 | End: 2024-04-07
Attending: EMERGENCY MEDICINE | Admitting: INTERNAL MEDICINE
Payer: MEDICARE

## 2024-04-05 ENCOUNTER — APPOINTMENT (OUTPATIENT)
Dept: GENERAL RADIOLOGY | Age: 84
DRG: 603 | End: 2024-04-05
Payer: MEDICARE

## 2024-04-05 ENCOUNTER — APPOINTMENT (OUTPATIENT)
Dept: INTERVENTIONAL RADIOLOGY/VASCULAR | Age: 84
DRG: 603 | End: 2024-04-05
Payer: MEDICARE

## 2024-04-05 DIAGNOSIS — L03.116 CELLULITIS OF LEFT LOWER EXTREMITY: Primary | ICD-10-CM

## 2024-04-05 PROBLEM — L03.90 CELLULITIS: Status: ACTIVE | Noted: 2024-04-05

## 2024-04-05 LAB
ALBUMIN SERPL-MCNC: 3.9 G/DL (ref 3.5–5.2)
ALBUMIN/GLOB SERPL: 1.2 {RATIO} (ref 1–2.5)
ALP SERPL-CCNC: 85 U/L (ref 35–104)
ALT SERPL-CCNC: 13 U/L (ref 5–33)
ANION GAP SERPL CALCULATED.3IONS-SCNC: 11 MMOL/L (ref 9–17)
AST SERPL-CCNC: 20 U/L
BASOPHILS # BLD: 0.03 K/UL (ref 0–0.2)
BASOPHILS NFR BLD: 0 % (ref 0–2)
BILIRUB SERPL-MCNC: 0.3 MG/DL (ref 0.3–1.2)
BUN SERPL-MCNC: 19 MG/DL (ref 8–23)
BUN/CREAT SERPL: 19 (ref 9–20)
CALCIUM SERPL-MCNC: 9.1 MG/DL (ref 8.6–10.4)
CHLORIDE SERPL-SCNC: 98 MMOL/L (ref 98–107)
CO2 SERPL-SCNC: 29 MMOL/L (ref 20–31)
CREAT SERPL-MCNC: 1 MG/DL (ref 0.5–0.9)
CRP SERPL HS-MCNC: 12.6 MG/L (ref 0–5)
EOSINOPHIL # BLD: 0.45 K/UL (ref 0–0.44)
EOSINOPHILS RELATIVE PERCENT: 7 % (ref 1–4)
ERYTHROCYTE [DISTWIDTH] IN BLOOD BY AUTOMATED COUNT: 14 % (ref 11.8–14.4)
GFR SERPL CREATININE-BSD FRML MDRD: 57 ML/MIN/1.73M2
GLUCOSE SERPL-MCNC: 81 MG/DL (ref 70–99)
HCT VFR BLD AUTO: 37.1 % (ref 36.3–47.1)
HGB BLD-MCNC: 12.2 G/DL (ref 11.9–15.1)
IMM GRANULOCYTES # BLD AUTO: 0.04 K/UL (ref 0–0.3)
IMM GRANULOCYTES NFR BLD: 1 %
LACTATE BLDV-SCNC: 1 MMOL/L (ref 0.5–1.9)
LYMPHOCYTES NFR BLD: 1.97 K/UL (ref 1.1–3.7)
LYMPHOCYTES RELATIVE PERCENT: 29 % (ref 24–43)
MCH RBC QN AUTO: 29.8 PG (ref 25.2–33.5)
MCHC RBC AUTO-ENTMCNC: 32.9 G/DL (ref 25.2–33.5)
MCV RBC AUTO: 90.7 FL (ref 82.6–102.9)
MONOCYTES NFR BLD: 0.58 K/UL (ref 0.1–1.2)
MONOCYTES NFR BLD: 9 % (ref 3–12)
NEUTROPHILS NFR BLD: 54 % (ref 36–65)
NEUTS SEG NFR BLD: 3.75 K/UL (ref 1.5–8.1)
NRBC BLD-RTO: 0 PER 100 WBC
PLATELET # BLD AUTO: 281 K/UL (ref 138–453)
PMV BLD AUTO: 10.1 FL (ref 8.1–13.5)
POTASSIUM SERPL-SCNC: 3.7 MMOL/L (ref 3.7–5.3)
PROT SERPL-MCNC: 7.2 G/DL (ref 6.4–8.3)
RBC # BLD AUTO: 4.09 M/UL (ref 3.95–5.11)
SODIUM SERPL-SCNC: 138 MMOL/L (ref 135–144)
URATE SERPL-MCNC: 7.7 MG/DL (ref 2.4–5.7)
WBC OTHER # BLD: 6.8 K/UL (ref 3.5–11.3)

## 2024-04-05 PROCEDURE — 87040 BLOOD CULTURE FOR BACTERIA: CPT

## 2024-04-05 PROCEDURE — 6360000002 HC RX W HCPCS: Performed by: EMERGENCY MEDICINE

## 2024-04-05 PROCEDURE — 93005 ELECTROCARDIOGRAM TRACING: CPT | Performed by: INTERNAL MEDICINE

## 2024-04-05 PROCEDURE — 84550 ASSAY OF BLOOD/URIC ACID: CPT

## 2024-04-05 PROCEDURE — 6370000000 HC RX 637 (ALT 250 FOR IP): Performed by: INTERNAL MEDICINE

## 2024-04-05 PROCEDURE — 85025 COMPLETE CBC W/AUTO DIFF WBC: CPT

## 2024-04-05 PROCEDURE — 83605 ASSAY OF LACTIC ACID: CPT

## 2024-04-05 PROCEDURE — 2580000003 HC RX 258: Performed by: INTERNAL MEDICINE

## 2024-04-05 PROCEDURE — 6360000002 HC RX W HCPCS: Performed by: INTERNAL MEDICINE

## 2024-04-05 PROCEDURE — 99285 EMERGENCY DEPT VISIT HI MDM: CPT

## 2024-04-05 PROCEDURE — 94760 N-INVAS EAR/PLS OXIMETRY 1: CPT

## 2024-04-05 PROCEDURE — 99222 1ST HOSP IP/OBS MODERATE 55: CPT | Performed by: INTERNAL MEDICINE

## 2024-04-05 PROCEDURE — 80053 COMPREHEN METABOLIC PANEL: CPT

## 2024-04-05 PROCEDURE — 71045 X-RAY EXAM CHEST 1 VIEW: CPT

## 2024-04-05 PROCEDURE — 2580000003 HC RX 258: Performed by: EMERGENCY MEDICINE

## 2024-04-05 PROCEDURE — 93971 EXTREMITY STUDY: CPT

## 2024-04-05 PROCEDURE — 86140 C-REACTIVE PROTEIN: CPT

## 2024-04-05 PROCEDURE — 2060000000 HC ICU INTERMEDIATE R&B

## 2024-04-05 RX ORDER — PANTOPRAZOLE SODIUM 40 MG/1
40 TABLET, DELAYED RELEASE ORAL
Status: DISCONTINUED | OUTPATIENT
Start: 2024-04-06 | End: 2024-04-07 | Stop reason: HOSPADM

## 2024-04-05 RX ORDER — SODIUM CHLORIDE 0.9 % (FLUSH) 0.9 %
10 SYRINGE (ML) INJECTION PRN
Status: DISCONTINUED | OUTPATIENT
Start: 2024-04-05 | End: 2024-04-07 | Stop reason: HOSPADM

## 2024-04-05 RX ORDER — ENOXAPARIN SODIUM 100 MG/ML
40 INJECTION SUBCUTANEOUS DAILY
Status: DISCONTINUED | OUTPATIENT
Start: 2024-04-05 | End: 2024-04-07 | Stop reason: HOSPADM

## 2024-04-05 RX ORDER — ACETAMINOPHEN 325 MG/1
650 TABLET ORAL EVERY 4 HOURS PRN
Status: DISCONTINUED | OUTPATIENT
Start: 2024-04-05 | End: 2024-04-07 | Stop reason: HOSPADM

## 2024-04-05 RX ORDER — ALBUTEROL SULFATE 2.5 MG/3ML
2.5 SOLUTION RESPIRATORY (INHALATION)
Status: DISCONTINUED | OUTPATIENT
Start: 2024-04-05 | End: 2024-04-07 | Stop reason: HOSPADM

## 2024-04-05 RX ORDER — LISINOPRIL 20 MG/1
20 TABLET ORAL DAILY
Status: DISCONTINUED | OUTPATIENT
Start: 2024-04-06 | End: 2024-04-07 | Stop reason: HOSPADM

## 2024-04-05 RX ORDER — AMLODIPINE BESYLATE 5 MG/1
5 TABLET ORAL DAILY
Status: DISCONTINUED | OUTPATIENT
Start: 2024-04-06 | End: 2024-04-07 | Stop reason: HOSPADM

## 2024-04-05 RX ORDER — CLOTRIMAZOLE 1 %
CREAM (GRAM) TOPICAL 2 TIMES DAILY
Status: DISCONTINUED | OUTPATIENT
Start: 2024-04-05 | End: 2024-04-07 | Stop reason: HOSPADM

## 2024-04-05 RX ORDER — SODIUM CHLORIDE FOR INHALATION 0.9 %
3 VIAL, NEBULIZER (ML) INHALATION
Status: DISCONTINUED | OUTPATIENT
Start: 2024-04-05 | End: 2024-04-05

## 2024-04-05 RX ORDER — ZINC SULFATE 50(220)MG
50 CAPSULE ORAL DAILY
Status: DISCONTINUED | OUTPATIENT
Start: 2024-04-06 | End: 2024-04-07 | Stop reason: HOSPADM

## 2024-04-05 RX ORDER — ONDANSETRON 2 MG/ML
4 INJECTION INTRAMUSCULAR; INTRAVENOUS EVERY 6 HOURS PRN
Status: DISCONTINUED | OUTPATIENT
Start: 2024-04-05 | End: 2024-04-07 | Stop reason: HOSPADM

## 2024-04-05 RX ORDER — TORSEMIDE 5 MG/1
10 TABLET ORAL DAILY
Status: DISCONTINUED | OUTPATIENT
Start: 2024-04-06 | End: 2024-04-07 | Stop reason: HOSPADM

## 2024-04-05 RX ORDER — METOPROLOL SUCCINATE 25 MG/1
25 TABLET, EXTENDED RELEASE ORAL DAILY
Status: DISCONTINUED | OUTPATIENT
Start: 2024-04-06 | End: 2024-04-07 | Stop reason: HOSPADM

## 2024-04-05 RX ORDER — SODIUM CHLORIDE 9 MG/ML
INJECTION, SOLUTION INTRAVENOUS PRN
Status: DISCONTINUED | OUTPATIENT
Start: 2024-04-05 | End: 2024-04-07 | Stop reason: HOSPADM

## 2024-04-05 RX ORDER — SODIUM CHLORIDE 0.9 % (FLUSH) 0.9 %
10 SYRINGE (ML) INJECTION EVERY 12 HOURS SCHEDULED
Status: DISCONTINUED | OUTPATIENT
Start: 2024-04-05 | End: 2024-04-07 | Stop reason: HOSPADM

## 2024-04-05 RX ORDER — METHYLPREDNISOLONE SODIUM SUCCINATE 125 MG/2ML
125 INJECTION, POWDER, LYOPHILIZED, FOR SOLUTION INTRAMUSCULAR; INTRAVENOUS ONCE
Status: COMPLETED | OUTPATIENT
Start: 2024-04-05 | End: 2024-04-05

## 2024-04-05 RX ADMIN — CLOTRIMAZOLE: 0.01 CREAM TOPICAL at 20:54

## 2024-04-05 RX ADMIN — SODIUM CHLORIDE, PRESERVATIVE FREE 10 ML: 5 INJECTION INTRAVENOUS at 20:54

## 2024-04-05 RX ADMIN — VANCOMYCIN HYDROCHLORIDE 1250 MG: 1.25 INJECTION, POWDER, LYOPHILIZED, FOR SOLUTION INTRAVENOUS at 14:45

## 2024-04-05 RX ADMIN — ENOXAPARIN SODIUM 40 MG: 100 INJECTION SUBCUTANEOUS at 16:43

## 2024-04-05 RX ADMIN — METHYLPREDNISOLONE SODIUM SUCCINATE 125 MG: 125 INJECTION INTRAMUSCULAR; INTRAVENOUS at 16:44

## 2024-04-05 ASSESSMENT — PAIN DESCRIPTION - ORIENTATION: ORIENTATION: LOWER;LEFT

## 2024-04-05 ASSESSMENT — PAIN - FUNCTIONAL ASSESSMENT
PAIN_FUNCTIONAL_ASSESSMENT: 0-10
PAIN_FUNCTIONAL_ASSESSMENT: NONE - DENIES PAIN
PAIN_FUNCTIONAL_ASSESSMENT: NONE - DENIES PAIN

## 2024-04-05 ASSESSMENT — PAIN DESCRIPTION - LOCATION: LOCATION: LEG

## 2024-04-05 ASSESSMENT — PAIN SCALES - GENERAL: PAINLEVEL_OUTOF10: 7

## 2024-04-05 NOTE — ED PROVIDER NOTES
anterior and posterior and is little bit of lymphangitis as well good range of motion of the knee there is no crepitus sensations intact as well as   Skin:     General: Skin is warm and dry.   Neurological:      Mental Status: She is alert and oriented to person, place, and time.   Psychiatric:         Behavior: Behavior normal.         DIFFERENTIAL DIAGNOSIS/ MDM:     Cellulitis versus DVT versus gout or combination special with her recent hospitalization    DIAGNOSTIC RESULTS     EKG: All EKG's are interpreted by the Emergency Department Physician who either signs or Co-signs this chart in the absence of a cardiologist.        RADIOLOGY:   Vascular duplex lower extremity venous left   Final Result   No evidence of DVT in the left lower extremity.         XR CHEST PORTABLE    (Results Pending)      I directly visualized the following  images and reviewed the radiologist interpretations:          ED BEDSIDE ULTRASOUND:       LABS:      EMERGENCY DEPARTMENT COURSE:   Vitals:    Vitals:    04/05/24 1148 04/05/24 1446 04/05/24 1448 04/05/24 1457   BP: (!) 156/64 138/70     Pulse: 69   67   Resp: 18   18   Temp: 97.7 °F (36.5 °C)      TempSrc: Tympanic      SpO2: 99%  99% 99%   Weight: 63.5 kg (140 lb)        -------------------------  BP: 138/70, Temp: 97.7 °F (36.5 °C), Pulse: 67, Respirations: 18        Re-evaluation Notes    Resting comfortably erythema seems to be slightly increased    CRITICAL CARE:   IP CONSULT TO HOSPITALIST  PHARMACY TO DOSE VANCOMYCIN        CONSULTS: Dr. Diane hospitalist      PROCEDURES:  None    FINAL IMPRESSION      1. Cellulitis of left lower extremity          DISPOSITION/PLAN   DISPOSITION Admitted    Condition on Disposition    Stable    PATIENT REFERRED TO:  No follow-up provider specified.    DISCHARGE MEDICATIONS:  New Prescriptions    No medications on file       (Please note that portions of this note were completed with a voice recognition program.  Efforts were made to edit the

## 2024-04-05 NOTE — PROGRESS NOTES
[facility]   Pharmacy Pharmacokinetic Monitoring Service - Vancomycin     Adela Mcfadden is a 83 y.o. female starting on vancomycin therapy for SSTI. Pharmacy consulted by Kt Flor for monitoring and adjustment.    Target Concentration: Goal AUC/-600 mg*hr/L    Additional Antimicrobials: none    Pertinent Laboratory Values:   Wt Readings from Last 1 Encounters:   04/05/24 63.5 kg (140 lb)     Temp Readings from Last 1 Encounters:   04/05/24 97.7 °F (36.5 °C) (Tympanic)     Estimated Creatinine Clearance: 40 mL/min (A) (based on SCr of 1 mg/dL (H)).  Recent Labs     04/05/24  1217   CREATININE 1.0*   BUN 19   WBC 6.8     Procalcitonin:      Pertinent Cultures:  Culture Date Source Results   04/05/24 Blood Pending   MRSA Nasal Swab: N/A. Non-respiratory infection.    Plan:  Dosing recommendations based on Bayesian software  Start vancomycin 1250 mg q24h  Anticipated AUC of 589 and trough concentration of 16 at steady state  Renal labs as indicated   Vancomycin concentration ordered for 04/06/24 @ 0500   Pharmacy will continue to monitor patient and adjust therapy as indicated    Thank you for the consult,  Ashu Vivas [facility]  4/5/2024 2:37 PM

## 2024-04-05 NOTE — H&P
HOSPITALIST ADMISSION H&P      REASON FOR ADMISSION:  LLE cellulitis  ESTIMATED LENGTH OF STAY:   > 2 midnights---2-4 days    ATTENDING/ADMITTING PHYSICIAN: Andreas Diane MD MD  PCP: Sary Dao DO    HISTORY OF PRESENT ILLNESS:      The patient is a 83 y.o. female patient of Sary Dao DO who presents with pain and tenderness left foot and ankle--progressively moving up the LLE----DUS--LLE---4.5.2024--no DVT--underlying BLE venous insufficiency.  Incidental elevation of uric acid level, but presentation not consistent with typical gout.  Started on Vancomycin.    Recent hospitalization for pneumonia--3.17.2024    HTN    Hyperlipidemia    COPD    IFG       See below for additional PMH.    Patient fhnq-coejobfuao-nvronnjk-available records reviewed, including, but not limited to, ER reports--labs--imaging--office records---personal notes.       Past Medical History:   Diagnosis Date    Bullous pemphigoid     Elevated fasting glucose     Former smoker     quit in 1987    GERD (gastroesophageal reflux disease)     Hypertension     Osteoarthritis     Osteopenia     took Boniva times 5 years, stopped 2012    Overweight(278.02)     wieght 174 pounds, height 65 inches, BMI 29, 3/31/2013           Past Surgical History:   Procedure Laterality Date    ABDOMINAL EXPLORATION SURGERY  1972    ectopic pregnancy, bilateral salpingectomy    CATARACT REMOVAL WITH IMPLANT Left 07/24/2018    per Dr Maldonado    HYSTERECTOMY (CERVIX STATUS UNKNOWN)      partial    OVARY REMOVAL      unsure which one removed, has one left     CT COLONOSCOPY W/BIOPSY SINGLE/MULTIPLE N/A 7/26/2017    COLONOSCOPY WITH BIOPSY performed by Quita Christiansen MD at [facility] OR, one tubular adenoma on pathology, repeat recommended in 5 years    TOTAL HIP ARTHROPLASTY Left 8/3/2021    Left Total Hip Arthroplasty performed by Junior Harrison MD at [facility] OR    TOTAL HIP ARTHROPLASTY Right 10/26/2021    Right Total Hip Arthroplasty performed by Junior CASEY

## 2024-04-05 NOTE — PROGRESS NOTES
Pt notified writer that family commented face was getting red.  Writer noted redness around pt hairline. Pt denies itching, no swelling or itching of face, tongue. No difficulty breathing. Dr Diane notified and came to bedside  discussed \"jered syndrome\" from Vancomycin and instructed writer to run vanco slower the next time and this should help the redness from coming back.

## 2024-04-05 NOTE — PROGRESS NOTES
[facility] Hagan[facility]. Cellulitis [L03.90]  Cellulitis of left lower extremity [L03.116] .   Vitals:    04/05/24 1515   BP: (!) 148/75   Pulse: 64   Resp: 18   Temp: 98.2 °F (36.8 °C)   SpO2: 99%   . Patients home meds are   Prior to Admission medications    Medication Sig Start Date End Date Taking? Authorizing Provider   torsemide (DEMADEX) 10 MG tablet Take 1 tablet by mouth daily 3/23/24   Sary Dao DO   albuterol (PROVENTIL) (2.5 MG/3ML) 0.083% nebulizer solution Take 3 mLs by nebulization every 6 hours 3/12/24   Maile Adan APRN - CNP   Respiratory Therapy Supplies (NEBULIZER/TUBING/MOUTHPIECE) KIT Tubing and mask to use with breathing treatments 3/12/24   Maile Adan APRN - CNP   Nebulizers (COMPRESSOR/NEBULIZER) MISC 1 nebulizer machine to use with albuterol breathing treatments 3/12/24   Maile Adan APRN - CNP   metoprolol succinate (TOPROL XL) 25 MG extended release tablet Take 1 tablet by mouth daily 3/1/24   Sary Dao DO   lisinopril (PRINIVIL;ZESTRIL) 20 MG tablet Take 1 tablet by mouth daily 3/1/24   Sary Dao DO   omeprazole (PRILOSEC) 20 MG delayed release capsule Take 1 capsule by mouth daily as needed (reflux) 3/1/24   Sary Dao DO   Psyllium (METAMUCIL PO) Take by mouth    Domonique Olmedo MD   amLODIPine (NORVASC) 5 MG tablet Take 1 tablet by mouth once daily 10/17/23   Sary Dao DO   Omega-3 Fatty Acids (FISH OIL PO) Take by mouth    Domonique Olmedo MD   Ascorbic Acid (VITAMIN C PO) Take by mouth    Domonique Olmedo MD   ZINC PO Take 50 mg by mouth    Domonique Olmedo MD   CINNAMON PO Take by mouth    Domonique Olmedo MD   APPLE CIDER VINEGAR PO Take by mouth    Domonique Olmedo MD   Calcium Carbonate (CALCIUM 600 PO) Take by mouth    ProviderDomonique MD   EQ ALLERGY RELIEF 10 MG tablet as needed 1/27/22   Domonique Olmedo MD   Multiple Vitamins-Minerals (PRESERVISION AREDS 2

## 2024-04-06 ENCOUNTER — APPOINTMENT (OUTPATIENT)
Dept: GENERAL RADIOLOGY | Age: 84
DRG: 603 | End: 2024-04-06
Payer: MEDICARE

## 2024-04-06 LAB
ANION GAP SERPL CALCULATED.3IONS-SCNC: 8 MMOL/L (ref 9–17)
BASOPHILS # BLD: <0.03 K/UL (ref 0–0.2)
BASOPHILS NFR BLD: 0 % (ref 0–2)
BUN SERPL-MCNC: 19 MG/DL (ref 8–23)
BUN/CREAT SERPL: 24 (ref 9–20)
CALCIUM SERPL-MCNC: 8.5 MG/DL (ref 8.6–10.4)
CHLORIDE SERPL-SCNC: 103 MMOL/L (ref 98–107)
CO2 SERPL-SCNC: 28 MMOL/L (ref 20–31)
CREAT SERPL-MCNC: 0.8 MG/DL (ref 0.5–0.9)
EOSINOPHIL # BLD: <0.03 K/UL (ref 0–0.44)
EOSINOPHILS RELATIVE PERCENT: 0 % (ref 1–4)
ERYTHROCYTE [DISTWIDTH] IN BLOOD BY AUTOMATED COUNT: 13.4 % (ref 11.8–14.4)
GFR SERPL CREATININE-BSD FRML MDRD: 74 ML/MIN/1.73M2
GLUCOSE SERPL-MCNC: 153 MG/DL (ref 70–99)
HCT VFR BLD AUTO: 35.5 % (ref 36.3–47.1)
HGB BLD-MCNC: 11.6 G/DL (ref 11.9–15.1)
IMM GRANULOCYTES # BLD AUTO: <0.03 K/UL (ref 0–0.3)
IMM GRANULOCYTES NFR BLD: 0 %
LYMPHOCYTES NFR BLD: 0.72 K/UL (ref 1.1–3.7)
LYMPHOCYTES RELATIVE PERCENT: 11 % (ref 24–43)
MCH RBC QN AUTO: 29.3 PG (ref 25.2–33.5)
MCHC RBC AUTO-ENTMCNC: 32.7 G/DL (ref 25.2–33.5)
MCV RBC AUTO: 89.6 FL (ref 82.6–102.9)
MONOCYTES NFR BLD: 0.08 K/UL (ref 0.1–1.2)
MONOCYTES NFR BLD: 1 % (ref 3–12)
NEUTROPHILS NFR BLD: 88 % (ref 36–65)
NEUTS SEG NFR BLD: 5.87 K/UL (ref 1.5–8.1)
NRBC BLD-RTO: 0 PER 100 WBC
PLATELET # BLD AUTO: 250 K/UL (ref 138–453)
PMV BLD AUTO: 9.8 FL (ref 8.1–13.5)
POTASSIUM SERPL-SCNC: 3.8 MMOL/L (ref 3.7–5.3)
RBC # BLD AUTO: 3.96 M/UL (ref 3.95–5.11)
SODIUM SERPL-SCNC: 139 MMOL/L (ref 135–144)
VANCOMYCIN SERPL-MCNC: 9.8 UG/ML
WBC OTHER # BLD: 6.7 K/UL (ref 3.5–11.3)

## 2024-04-06 PROCEDURE — 73620 X-RAY EXAM OF FOOT: CPT

## 2024-04-06 PROCEDURE — 6360000002 HC RX W HCPCS: Performed by: INTERNAL MEDICINE

## 2024-04-06 PROCEDURE — 2060000000 HC ICU INTERMEDIATE R&B

## 2024-04-06 PROCEDURE — 85025 COMPLETE CBC W/AUTO DIFF WBC: CPT

## 2024-04-06 PROCEDURE — 6360000002 HC RX W HCPCS: Performed by: EMERGENCY MEDICINE

## 2024-04-06 PROCEDURE — 36415 COLL VENOUS BLD VENIPUNCTURE: CPT

## 2024-04-06 PROCEDURE — 6370000000 HC RX 637 (ALT 250 FOR IP): Performed by: FAMILY MEDICINE

## 2024-04-06 PROCEDURE — 2580000003 HC RX 258: Performed by: EMERGENCY MEDICINE

## 2024-04-06 PROCEDURE — 80048 BASIC METABOLIC PNL TOTAL CA: CPT

## 2024-04-06 PROCEDURE — 99232 SBSQ HOSP IP/OBS MODERATE 35: CPT | Performed by: FAMILY MEDICINE

## 2024-04-06 PROCEDURE — 80202 ASSAY OF VANCOMYCIN: CPT

## 2024-04-06 PROCEDURE — 6370000000 HC RX 637 (ALT 250 FOR IP): Performed by: INTERNAL MEDICINE

## 2024-04-06 PROCEDURE — 2580000003 HC RX 258: Performed by: INTERNAL MEDICINE

## 2024-04-06 RX ORDER — ALLOPURINOL 100 MG/1
100 TABLET ORAL DAILY
Status: DISCONTINUED | OUTPATIENT
Start: 2024-04-06 | End: 2024-04-07 | Stop reason: HOSPADM

## 2024-04-06 RX ADMIN — SODIUM CHLORIDE 1250 MG: 9 INJECTION, SOLUTION INTRAVENOUS at 13:36

## 2024-04-06 RX ADMIN — CHOLECALCIFEROL TAB 125 MCG (5000 UNIT) 5000 UNITS: 125 TAB at 09:41

## 2024-04-06 RX ADMIN — AMLODIPINE BESYLATE 5 MG: 5 TABLET ORAL at 09:43

## 2024-04-06 RX ADMIN — METOPROLOL SUCCINATE 25 MG: 25 TABLET, EXTENDED RELEASE ORAL at 09:43

## 2024-04-06 RX ADMIN — PANTOPRAZOLE SODIUM 40 MG: 40 TABLET, DELAYED RELEASE ORAL at 06:23

## 2024-04-06 RX ADMIN — CLOTRIMAZOLE: 0.01 CREAM TOPICAL at 09:41

## 2024-04-06 RX ADMIN — CLOTRIMAZOLE: 0.01 CREAM TOPICAL at 21:21

## 2024-04-06 RX ADMIN — ZINC SULFATE 220 MG (50 MG) CAPSULE 50 MG: CAPSULE at 09:41

## 2024-04-06 RX ADMIN — SODIUM CHLORIDE, PRESERVATIVE FREE 10 ML: 5 INJECTION INTRAVENOUS at 09:44

## 2024-04-06 RX ADMIN — ENOXAPARIN SODIUM 40 MG: 100 INJECTION SUBCUTANEOUS at 09:41

## 2024-04-06 RX ADMIN — SODIUM CHLORIDE, PRESERVATIVE FREE 10 ML: 5 INJECTION INTRAVENOUS at 21:21

## 2024-04-06 RX ADMIN — TORSEMIDE 10 MG: 5 TABLET ORAL at 09:42

## 2024-04-06 RX ADMIN — LISINOPRIL 20 MG: 20 TABLET ORAL at 09:41

## 2024-04-06 RX ADMIN — ALLOPURINOL 100 MG: 100 TABLET ORAL at 13:34

## 2024-04-06 NOTE — PROGRESS NOTES
[facility]   Pharmacy Pharmacokinetic Monitoring Service - Vancomycin    Adela Mcfadden is a 83 y.o. female starting on vancomycin therapy for SSTI. Pharmacy consulted by Kt Flor for monitoring and adjustment.     Target Concentration: Goal AUC/-600 mg*hr/L  Day of Therapy: 2  Additional Antimicrobials: sofya    Pertinent Laboratory Values:   Wt Readings from Last 1 Encounters:   04/06/24 65.1 kg (143 lb 8 oz)     Temp Readings from Last 1 Encounters:   04/06/24 97.7 °F (36.5 °C) (Temporal)     Estimated Creatinine Clearance: 50 mL/min (based on SCr of 0.8 mg/dL).  Recent Labs     04/05/24  1217 04/06/24  0503   CREATININE 1.0* 0.8   BUN 19 19   WBC 6.8 6.7     Procalcitonin:      Pertinent Cultures:  Culture Date Source Results   04/05/24 blood NGTD   MRSA Nasal Swab: N/A. Non-respiratory infection.    Recent vancomycin administrations                     vancomycin (VANCOCIN) 1,250 mg in sodium chloride 0.9 % 250 mL IVPB (mg) 1,250 mg New Bag 04/05/24 1445                    Assessment:  Date/Time Current Dose Concentration Timing of Concentration (h) AUC   04/06/2024 1250 mg IVPB q 24h 9.7 14h 18 m 531   Note: Serum concentrations collected for AUC dosing may appear elevated if collected in close proximity to the dose administered, this is not necessarily an indication of toxicity    Plan:  Current dosing regimen is therapeutic  Continue current dose  Pharmacy will continue to monitor patient and adjust therapy as indicated    Thank you for the consult,  Ines Travis [facility]  4/6/2024 8:34 AM

## 2024-04-06 NOTE — PROGRESS NOTES
Pt receiving IV Vancomycin and complains of mild/moderate flushing of cheeks, similar to reaction that happened yesterday. No complaints of tongue or throat swelling, shortness of breath or anything else of concern to pt. Pt aware to monitor symptoms and alert nurse immediatly for any concerns.

## 2024-04-06 NOTE — PLAN OF CARE
Problem: Discharge Planning  Goal: Discharge to home or other facility with appropriate resources  Outcome: Progressing  Flowsheets  Taken 4/6/2024 0944 by Barbara Beltran RN  Discharge to home or other facility with appropriate resources: Identify barriers to discharge with patient and caregiver  Taken 4/5/2024 2058 by Estelle Khan RN  Discharge to home or other facility with appropriate resources:   Identify barriers to discharge with patient and caregiver   Arrange for needed discharge resources and transportation as appropriate   Refer to discharge planning if patient needs post-hospital services based on physician order or complex needs related to functional status, cognitive ability or social support system   Identify discharge learning needs (meds, wound care, etc)     Problem: Safety - Adult  Goal: Free from fall injury  Outcome: Progressing

## 2024-04-06 NOTE — PROGRESS NOTES
Hospitalist Progress Note  4/6/2024 1:23 PM  Subjective:   Admit Date: 4/5/2024  PCP: Sary Dao, DO    Interval History: Patient with pain nad redness left foot and leg is improved with IV vancomycin has selling 1st MTP joint area.    Diet: ADULT DIET; Regular; Low Fat/Low Chol/High Fiber/SUJATHA  Medications:   Scheduled Meds:   allopurinol  100 mg Oral Daily    vancomycin  1,250 mg IntraVENous Q24H    vancomycin (VANCOCIN) intermittent dosing (placeholder)   Other RX Placeholder    sodium chloride flush  10 mL IntraVENous 2 times per day    enoxaparin  40 mg SubCUTAneous Daily    amLODIPine  5 mg Oral Daily    lisinopril  20 mg Oral Daily    metoprolol succinate  25 mg Oral Daily    pantoprazole  40 mg Oral QAM AC    torsemide  10 mg Oral Daily    vitamin D3  5,000 Units Oral Daily    zinc sulfate  50 mg Oral Daily    clotrimazole   Topical BID     Continuous Infusions:   sodium chloride       CBC:   Recent Labs     04/05/24  1217 04/06/24  0503   WBC 6.8 6.7   HGB 12.2 11.6*    250     BMP:    Recent Labs     04/05/24  1217 04/06/24  0503    139   K 3.7 3.8   CL 98 103   CO2 29 28   BUN 19 19   CREATININE 1.0* 0.8   GLUCOSE 81 153*     Hepatic:   Recent Labs     04/05/24  1217   AST 20   ALT 13   BILITOT 0.3   ALKPHOS 85     Troponin: No results for input(s): \"TROPONINI\" in the last 72 hours.  BNP: No results for input(s): \"BNP\" in the last 72 hours.  Lipids: No results for input(s): \"CHOL\", \"HDL\" in the last 72 hours.    Invalid input(s): \"LDLCALCU\"  INR: No results for input(s): \"INR\" in the last 72 hours.    Objective:   Vitals: BP (!) 150/59   Pulse 84   Temp 98.2 °F (36.8 °C) (Temporal)   Resp 18   Ht 1.6 m (5' 3\")   Wt 65.1 kg (143 lb 8 oz)   SpO2 95%   BMI 25.42 kg/m²   General appearance: alert and cooperative with exam  HEENT: Eye: Normal external eye, conjunctiva, lids cornea, KRISTINE.  Neck: no adenopathy, no carotid bruit, no JVD, supple, symmetrical, trachea midline, and thyroid not

## 2024-04-06 NOTE — PROGRESS NOTES
Physical Therapy    Patient declined PT services, stating they are at their baseline level of function. Patient notes they are independent with functional mobility and transfers.  Patient discharged per their request. Patient was advised to inform staff if there is a decline in function or if they have any questions, and therapy can return at that time.  Patient verbalized understanding.     Astrid Goldman, PT

## 2024-04-06 NOTE — PROGRESS NOTES
Checked on pt again after having flushing of cheeks post Vanc; Pt states she has no symptoms or complaints at this time and is resting comfortably in bed.

## 2024-04-07 VITALS
OXYGEN SATURATION: 96 % | TEMPERATURE: 97.8 F | HEART RATE: 65 BPM | DIASTOLIC BLOOD PRESSURE: 50 MMHG | RESPIRATION RATE: 16 BRPM | HEIGHT: 63 IN | SYSTOLIC BLOOD PRESSURE: 121 MMHG | BODY MASS INDEX: 25.66 KG/M2 | WEIGHT: 144.84 LBS

## 2024-04-07 LAB
ANION GAP SERPL CALCULATED.3IONS-SCNC: 8 MMOL/L (ref 9–17)
BASOPHILS # BLD: <0.03 K/UL (ref 0–0.2)
BASOPHILS NFR BLD: 0 % (ref 0–2)
BUN SERPL-MCNC: 20 MG/DL (ref 8–23)
BUN/CREAT SERPL: 22 (ref 9–20)
CALCIUM SERPL-MCNC: 8.5 MG/DL (ref 8.6–10.4)
CHLORIDE SERPL-SCNC: 106 MMOL/L (ref 98–107)
CO2 SERPL-SCNC: 29 MMOL/L (ref 20–31)
CREAT SERPL-MCNC: 0.9 MG/DL (ref 0.5–0.9)
EKG ATRIAL RATE: 68 BPM
EKG P AXIS: 72 DEGREES
EKG P-R INTERVAL: 142 MS
EKG Q-T INTERVAL: 388 MS
EKG QRS DURATION: 74 MS
EKG QTC CALCULATION (BAZETT): 412 MS
EKG R AXIS: 44 DEGREES
EKG T AXIS: 72 DEGREES
EKG VENTRICULAR RATE: 68 BPM
EOSINOPHIL # BLD: <0.03 K/UL (ref 0–0.44)
EOSINOPHILS RELATIVE PERCENT: 0 % (ref 1–4)
ERYTHROCYTE [DISTWIDTH] IN BLOOD BY AUTOMATED COUNT: 14 % (ref 11.8–14.4)
GFR SERPL CREATININE-BSD FRML MDRD: 64 ML/MIN/1.73M2
GLUCOSE SERPL-MCNC: 115 MG/DL (ref 70–99)
HCT VFR BLD AUTO: 36 % (ref 36.3–47.1)
HGB BLD-MCNC: 11.4 G/DL (ref 11.9–15.1)
IMM GRANULOCYTES # BLD AUTO: 0.06 K/UL (ref 0–0.3)
IMM GRANULOCYTES NFR BLD: 1 %
LYMPHOCYTES NFR BLD: 1.82 K/UL (ref 1.1–3.7)
LYMPHOCYTES RELATIVE PERCENT: 24 % (ref 24–43)
MCH RBC QN AUTO: 28.7 PG (ref 25.2–33.5)
MCHC RBC AUTO-ENTMCNC: 31.7 G/DL (ref 25.2–33.5)
MCV RBC AUTO: 90.7 FL (ref 82.6–102.9)
MONOCYTES NFR BLD: 0.6 K/UL (ref 0.1–1.2)
MONOCYTES NFR BLD: 8 % (ref 3–12)
NEUTROPHILS NFR BLD: 67 % (ref 36–65)
NEUTS SEG NFR BLD: 5.21 K/UL (ref 1.5–8.1)
NRBC BLD-RTO: 0 PER 100 WBC
PLATELET # BLD AUTO: 258 K/UL (ref 138–453)
PMV BLD AUTO: 9.8 FL (ref 8.1–13.5)
POTASSIUM SERPL-SCNC: 3.7 MMOL/L (ref 3.7–5.3)
RBC # BLD AUTO: 3.97 M/UL (ref 3.95–5.11)
SODIUM SERPL-SCNC: 143 MMOL/L (ref 135–144)
WBC OTHER # BLD: 7.7 K/UL (ref 3.5–11.3)

## 2024-04-07 PROCEDURE — 85025 COMPLETE CBC W/AUTO DIFF WBC: CPT

## 2024-04-07 PROCEDURE — 2580000003 HC RX 258: Performed by: INTERNAL MEDICINE

## 2024-04-07 PROCEDURE — 6370000000 HC RX 637 (ALT 250 FOR IP): Performed by: INTERNAL MEDICINE

## 2024-04-07 PROCEDURE — 36415 COLL VENOUS BLD VENIPUNCTURE: CPT

## 2024-04-07 PROCEDURE — 6360000002 HC RX W HCPCS: Performed by: INTERNAL MEDICINE

## 2024-04-07 PROCEDURE — 6370000000 HC RX 637 (ALT 250 FOR IP): Performed by: FAMILY MEDICINE

## 2024-04-07 PROCEDURE — 80048 BASIC METABOLIC PNL TOTAL CA: CPT

## 2024-04-07 PROCEDURE — 99238 HOSP IP/OBS DSCHRG MGMT 30/<: CPT | Performed by: FAMILY MEDICINE

## 2024-04-07 RX ORDER — ALLOPURINOL 100 MG/1
100 TABLET ORAL DAILY
Qty: 30 TABLET | Refills: 0 | Status: SHIPPED | OUTPATIENT
Start: 2024-04-08

## 2024-04-07 RX ORDER — DOXYCYCLINE HYCLATE 100 MG
100 TABLET ORAL 2 TIMES DAILY
Qty: 10 TABLET | Refills: 0 | Status: SHIPPED | OUTPATIENT
Start: 2024-04-07 | End: 2024-04-12

## 2024-04-07 RX ADMIN — METOPROLOL SUCCINATE 25 MG: 25 TABLET, EXTENDED RELEASE ORAL at 09:16

## 2024-04-07 RX ADMIN — AMLODIPINE BESYLATE 5 MG: 5 TABLET ORAL at 09:16

## 2024-04-07 RX ADMIN — LISINOPRIL 20 MG: 20 TABLET ORAL at 09:16

## 2024-04-07 RX ADMIN — ALLOPURINOL 100 MG: 100 TABLET ORAL at 09:16

## 2024-04-07 RX ADMIN — PANTOPRAZOLE SODIUM 40 MG: 40 TABLET, DELAYED RELEASE ORAL at 06:29

## 2024-04-07 RX ADMIN — CLOTRIMAZOLE: 0.01 CREAM TOPICAL at 09:17

## 2024-04-07 RX ADMIN — CHOLECALCIFEROL TAB 125 MCG (5000 UNIT) 5000 UNITS: 125 TAB at 09:16

## 2024-04-07 RX ADMIN — ZINC SULFATE 220 MG (50 MG) CAPSULE 50 MG: CAPSULE at 09:16

## 2024-04-07 RX ADMIN — ENOXAPARIN SODIUM 40 MG: 100 INJECTION SUBCUTANEOUS at 09:15

## 2024-04-07 RX ADMIN — SODIUM CHLORIDE, PRESERVATIVE FREE 10 ML: 5 INJECTION INTRAVENOUS at 09:16

## 2024-04-07 RX ADMIN — TORSEMIDE 10 MG: 5 TABLET ORAL at 09:15

## 2024-04-07 NOTE — PLAN OF CARE
Problem: Discharge Planning  Goal: Discharge to home or other facility with appropriate resources  Outcome: Progressing  Flowsheets (Taken 4/6/2024 1950)  Discharge to home or other facility with appropriate resources:   Identify barriers to discharge with patient and caregiver   Arrange for needed discharge resources and transportation as appropriate   Identify discharge learning needs (meds, wound care, etc)   Refer to discharge planning if patient needs post-hospital services based on physician order or complex needs related to functional status, cognitive ability or social support system     Problem: Safety - Adult  Goal: Free from fall injury  Outcome: Progressing     Problem: Neurosensory - Adult  Goal: Achieves maximal functionality and self care  Outcome: Progressing     Problem: Respiratory - Adult  Goal: Achieves optimal ventilation and oxygenation  Outcome: Progressing     Problem: Cardiovascular - Adult  Goal: Absence of cardiac dysrhythmias or at baseline  Outcome: Progressing  Flowsheets (Taken 4/6/2024 1950)  Absence of cardiac dysrhythmias or at baseline:   Monitor cardiac rate and rhythm   Assess for signs of decreased cardiac output   Administer antiarrhythmia medication and electrolyte replacement as ordered     Problem: Skin/Tissue Integrity - Adult  Goal: Skin integrity remains intact  Outcome: Progressing  Flowsheets (Taken 4/6/2024 1950)  Skin Integrity Remains Intact:   Monitor for areas of redness and/or skin breakdown   Assess vascular access sites hourly     Problem: Musculoskeletal - Adult  Goal: Return ADL status to a safe level of function  Outcome: Progressing     Problem: Gastrointestinal - Adult  Goal: Maintains adequate nutritional intake  Outcome: Progressing     Problem: Genitourinary - Adult  Goal: Absence of urinary retention  Outcome: Progressing     Problem: Infection - Adult  Goal: Absence of infection at discharge  Outcome: Progressing  Flowsheets (Taken 4/6/2024

## 2024-04-07 NOTE — PLAN OF CARE
Problem: Discharge Planning  Goal: Discharge to home or other facility with appropriate resources  4/7/2024 1042 by Barbara Beltran, RN  Outcome: Progressing  4/7/2024 0423 by Estelle Khan RN  Outcome: Progressing  Flowsheets (Taken 4/6/2024 1950)  Discharge to home or other facility with appropriate resources:   Identify barriers to discharge with patient and caregiver   Arrange for needed discharge resources and transportation as appropriate   Identify discharge learning needs (meds, wound care, etc)   Refer to discharge planning if patient needs post-hospital services based on physician order or complex needs related to functional status, cognitive ability or social support system     Problem: Safety - Adult  Goal: Free from fall injury  4/7/2024 1042 by Barbara Beltran, RN  Outcome: Progressing  4/7/2024 0423 by Estelle Khan RN  Outcome: Progressing     Problem: Neurosensory - Adult  Goal: Achieves maximal functionality and self care  4/7/2024 1042 by Barbara Beltran, RN  Outcome: Progressing  4/7/2024 0423 by Estelle Khan RN  Outcome: Progressing

## 2024-04-07 NOTE — DISCHARGE SUMMARY
Discharge Summary    Adela Mcfadden Patient Status:  Inpatient    1940 MRN [id number]   Location [facility]  PROGRESSIVE CARE Attending Andreas Diane MD   Hosp Day # 2 PCP Sary Dao DO     Date of Admission: 2024    Date of Discharge: 2024    Admitting Diagnosis: Cellulitis [L03.90]  Cellulitis of left lower extremity [L03.116]    Discharge Diagnosis: Principal Problem:    Cellulitis  Active Problems:    Cellulitis of left lower extremity  Resolved Problems:    * No resolved hospital problems. *  Hyperuricemia with likely gout left foot    Reason for Admission/HPI:Pain and tenderness in left foot and leg    Hospital Course: Patient was on IV vancomycin redness in leg and foot has improved,Had swelling and tendrness of left 1 st MTP joint ,Xray foot with severe DJD changes and soft tissue edema .Her uric acid was up so was started on Allopurinol for Likely gout    Consultations: None    Procedures: None    Complications: None    Disposition: Home    Discharge Condition: Good    Discharge Medications:      Medication List        START taking these medications      allopurinol 100 MG tablet  Commonly known as: ZYLOPRIM  Take 1 tablet by mouth daily  Start taking on: 2024     doxycycline hyclate 100 MG tablet  Commonly known as: VIBRA-TABS  Take 1 tablet by mouth 2 times daily for 5 days            CONTINUE taking these medications      albuterol (2.5 MG/3ML) 0.083% nebulizer solution  Commonly known as: PROVENTIL  Take 3 mLs by nebulization every 6 hours     amLODIPine 5 MG tablet  Commonly known as: NORVASC  Take 1 tablet by mouth once daily     APPLE CIDER VINEGAR PO     CALCIUM 600 PO     CINNAMON PO     Compressor/Nebulizer Misc  1 nebulizer machine to use with albuterol breathing treatments     EQ Allergy Relief 10 MG tablet  Generic drug: loratadine     FISH OIL PO     lisinopril 20 MG tablet  Commonly known as: PRINIVIL;ZESTRIL  Take 1 tablet by mouth daily     METAMUCIL PO

## 2024-04-08 ENCOUNTER — FOLLOWUP TELEPHONE ENCOUNTER (OUTPATIENT)
Dept: INPATIENT UNIT | Age: 84
End: 2024-04-08

## 2024-04-08 ENCOUNTER — CARE COORDINATION (OUTPATIENT)
Dept: CASE MANAGEMENT | Age: 84
End: 2024-04-08

## 2024-04-08 NOTE — CARE COORDINATION
Care Transitions Outreach Attempt #1  Initial 24 hour call.     Call within 2 business days of discharge: Yes   Attempted to reach patient for transitions of care follow up. Unable to reach patient. HIPAA compliant message left on  requesting a return call.    Patient: Adela Mcfadden Patient : 1940 MRN: [id number]    Last Discharge Facility       Date Complaint Diagnosis Description Type Department Provider    24 Leg Swelling Cellulitis of left lower extremity ED to Hosp-Admission (Discharged) (ADMITTED) Andreas Stinson MD; [place], ...              Was this an external facility discharge? No Discharge Facility: [facility]    Noted following upcoming appointments from discharge chart review:   [facility] follow up appointment(s):   Future Appointments   Date Time Provider Department Center   2024  2:00 PM [facility]   10/25/2024  2:00 PM PEDROX GRIFFIN BROWN [facility]SE SCHED[facility]   2/3/2025 11:00 AM Martha Mobley, APRN - CNP [facility]        Davida Eastman LPN  Care Transition Nurse

## 2024-04-09 ENCOUNTER — CARE COORDINATION (OUTPATIENT)
Dept: CASE MANAGEMENT | Age: 84
End: 2024-04-09

## 2024-04-09 NOTE — CARE COORDINATION
Care Transitions Outreach Attempt #2  Initial 24 hour call.     Call within 2 business days of discharge: Yes   Attempted to reach patient for transitions of care follow up. Unable to reach patient. HIPAA compliant message left on  requesting a return call. Noted that patient has PCP follow up on 2024. Will end care transitions if no return call received.     Patient: Adela Mcfadden Patient : 1940 MRN: [id number]    Last Discharge Facility       Date Complaint Diagnosis Description Type Department Provider    24 Leg Swelling Cellulitis of left lower extremity ED to Hosp-Admission (Discharged) (ADMITTED) Andreas Stinson MD; [place], ...              Was this an external facility discharge? No Discharge Facility: [facility]    Noted following upcoming appointments from discharge chart review:   [facility] follow up appointment(s):   Future Appointments   Date Time Provider Department Center   2024  2:00 PM [facility]   10/25/2024  2:00 PM MDCX PF, AWV [facility]   2/3/2025 11:00 AM Martha Mobley, APRN - CNP [facility]        Davida Eastman LPN  Care Transition Nurse

## 2024-04-12 DIAGNOSIS — Z13.220 SCREENING FOR LIPOID DISORDERS: ICD-10-CM

## 2024-04-12 DIAGNOSIS — E83.51 HYPOCALCEMIA: Primary | ICD-10-CM

## 2024-04-12 DIAGNOSIS — R73.01 ELEVATED FASTING GLUCOSE: ICD-10-CM

## 2024-04-15 ENCOUNTER — HOSPITAL ENCOUNTER (OUTPATIENT)
Age: 84
Discharge: HOME | End: 2024-04-15
Payer: MEDICARE

## 2024-04-15 DIAGNOSIS — E83.51 HYPOCALCEMIA: ICD-10-CM

## 2024-04-15 DIAGNOSIS — R73.01 ELEVATED FASTING GLUCOSE: ICD-10-CM

## 2024-04-15 DIAGNOSIS — Z13.220 SCREENING FOR LIPOID DISORDERS: ICD-10-CM

## 2024-04-15 LAB
ANION GAP SERPL CALCULATED.3IONS-SCNC: 10 MMOL/L (ref 9–17)
BUN SERPL-MCNC: 19 MG/DL (ref 8–23)
BUN/CREAT SERPL: 19 (ref 9–20)
CALCIUM SERPL-MCNC: 9.1 MG/DL (ref 8.6–10.4)
CHLORIDE SERPL-SCNC: 103 MMOL/L (ref 98–107)
CHOLEST SERPL-MCNC: 199 MG/DL (ref 0–199)
CHOLESTEROL/HDL RATIO: 3
CO2 SERPL-SCNC: 28 MMOL/L (ref 20–31)
CREAT SERPL-MCNC: 1 MG/DL (ref 0.5–0.9)
EST. AVERAGE GLUCOSE BLD GHB EST-MCNC: 120 MG/DL
GFR SERPL CREATININE-BSD FRML MDRD: 57 ML/MIN/1.73M2
GLUCOSE SERPL-MCNC: 93 MG/DL (ref 70–99)
HBA1C MFR BLD: 5.8 % (ref 4–6)
HDLC SERPL-MCNC: 62 MG/DL
LDLC SERPL CALC-MCNC: 121 MG/DL (ref 0–100)
POTASSIUM SERPL-SCNC: 4.5 MMOL/L (ref 3.7–5.3)
SODIUM SERPL-SCNC: 141 MMOL/L (ref 135–144)
TRIGL SERPL-MCNC: 77 MG/DL
VLDLC SERPL CALC-MCNC: 15 MG/DL

## 2024-04-15 PROCEDURE — 80061 LIPID PANEL: CPT

## 2024-04-15 PROCEDURE — 83036 HEMOGLOBIN GLYCOSYLATED A1C: CPT

## 2024-04-15 PROCEDURE — 36415 COLL VENOUS BLD VENIPUNCTURE: CPT

## 2024-04-15 PROCEDURE — 80048 BASIC METABOLIC PNL TOTAL CA: CPT

## 2024-04-19 ENCOUNTER — OFFICE VISIT (OUTPATIENT)
Dept: FAMILY MEDICINE CLINIC | Age: 84
End: 2024-04-19

## 2024-04-19 VITALS
SYSTOLIC BLOOD PRESSURE: 120 MMHG | DIASTOLIC BLOOD PRESSURE: 52 MMHG | TEMPERATURE: 97.5 F | HEART RATE: 68 BPM | RESPIRATION RATE: 16 BRPM | BODY MASS INDEX: 25.3 KG/M2 | WEIGHT: 142.8 LBS | OXYGEN SATURATION: 99 % | HEIGHT: 63 IN

## 2024-04-19 DIAGNOSIS — I10 ESSENTIAL HYPERTENSION: ICD-10-CM

## 2024-04-19 DIAGNOSIS — M10.9 ACUTE GOUT INVOLVING TOE OF LEFT FOOT, UNSPECIFIED CAUSE: ICD-10-CM

## 2024-04-19 DIAGNOSIS — L03.116 CELLULITIS OF LEFT LOWER EXTREMITY: Primary | ICD-10-CM

## 2024-04-19 DIAGNOSIS — N18.31 STAGE 3A CHRONIC KIDNEY DISEASE (CKD) (HCC): ICD-10-CM

## 2024-04-19 DIAGNOSIS — Z09 HOSPITAL DISCHARGE FOLLOW-UP: ICD-10-CM

## 2024-04-19 DIAGNOSIS — Z12.31 SCREENING MAMMOGRAM FOR BREAST CANCER: ICD-10-CM

## 2024-04-19 DIAGNOSIS — Z23 NEED FOR PROPHYLACTIC VACCINATION AGAINST DIPHTHERIA-TETANUS-PERTUSSIS (DTP): ICD-10-CM

## 2024-04-19 DIAGNOSIS — R73.01 ELEVATED FASTING GLUCOSE: ICD-10-CM

## 2024-04-19 RX ORDER — PREDNISONE 20 MG/1
TABLET ORAL
Qty: 13 TABLET | Refills: 0 | Status: SHIPPED | OUTPATIENT
Start: 2024-04-19

## 2024-04-19 SDOH — ECONOMIC STABILITY: HOUSING INSECURITY
IN THE LAST 12 MONTHS, WAS THERE A TIME WHEN YOU DID NOT HAVE A STEADY PLACE TO SLEEP OR SLEPT IN A SHELTER (INCLUDING NOW)?: NO

## 2024-04-19 SDOH — ECONOMIC STABILITY: FOOD INSECURITY: WITHIN THE PAST 12 MONTHS, THE FOOD YOU BOUGHT JUST DIDN'T LAST AND YOU DIDN'T HAVE MONEY TO GET MORE.: NEVER TRUE

## 2024-04-19 SDOH — ECONOMIC STABILITY: FOOD INSECURITY: WITHIN THE PAST 12 MONTHS, YOU WORRIED THAT YOUR FOOD WOULD RUN OUT BEFORE YOU GOT MONEY TO BUY MORE.: NEVER TRUE

## 2024-04-19 SDOH — ECONOMIC STABILITY: INCOME INSECURITY: HOW HARD IS IT FOR YOU TO PAY FOR THE VERY BASICS LIKE FOOD, HOUSING, MEDICAL CARE, AND HEATING?: NOT HARD AT ALL

## 2024-04-19 NOTE — PROGRESS NOTES
the muscle once for 1 dose 0.5 mL 0    predniSONE (DELTASONE) 20 MG tablet Take 2 tabs by mouth daily x 5 days, then 1 tab daily x 3 days. 13 tablet 0    allopurinol (ZYLOPRIM) 100 MG tablet Take 1 tablet by mouth daily 30 tablet 0    torsemide (DEMADEX) 10 MG tablet Take 1 tablet by mouth daily 90 tablet 1    albuterol (PROVENTIL) (2.5 MG/3ML) 0.083% nebulizer solution Take 3 mLs by nebulization every 6 hours 120 each 0    Respiratory Therapy Supplies (NEBULIZER/TUBING/MOUTHPIECE) KIT Tubing and mask to use with breathing treatments 1 kit 0    Nebulizers (COMPRESSOR/NEBULIZER) MISC 1 nebulizer machine to use with albuterol breathing treatments 1 each 0    metoprolol succinate (TOPROL XL) 25 MG extended release tablet Take 1 tablet by mouth daily 90 tablet 1    lisinopril (PRINIVIL;ZESTRIL) 20 MG tablet Take 1 tablet by mouth daily 90 tablet 1    omeprazole (PRILOSEC) 20 MG delayed release capsule Take 1 capsule by mouth daily as needed (reflux) 90 capsule 1    amLODIPine (NORVASC) 5 MG tablet Take 1 tablet by mouth once daily 90 tablet 3    Omega-3 Fatty Acids (FISH OIL PO) Take by mouth      Ascorbic Acid (VITAMIN C PO) Take by mouth      ZINC PO Take 50 mg by mouth      CINNAMON PO Take by mouth      Calcium Carbonate (CALCIUM 600 PO) Take by mouth      EQ ALLERGY RELIEF 10 MG tablet as needed      Multiple Vitamins-Minerals (PRESERVISION AREDS 2 PO) Take by mouth daily      vitamin D-3 (CHOLECALCIFEROL) 5000 UNITS TABS Take 1 tablet by mouth daily          Medications patient taking as of now reconciled against medications ordered at time of hospital discharge: Yes    Review of Systems   Constitutional:  Negative for unexpected weight change.   Cardiovascular:  Positive for leg swelling (L foot). Negative for chest pain.   Gastrointestinal:  Negative for blood in stool and constipation.   Skin:  Positive for color change (redness - L MTP joint).         Objective:    BP (!) 120/52 (Site: Right Upper Arm,

## 2024-04-25 DIAGNOSIS — I10 ESSENTIAL HYPERTENSION: ICD-10-CM

## 2024-04-25 DIAGNOSIS — K21.9 GASTROESOPHAGEAL REFLUX DISEASE, UNSPECIFIED WHETHER ESOPHAGITIS PRESENT: ICD-10-CM

## 2024-04-26 RX ORDER — AMLODIPINE BESYLATE 5 MG/1
TABLET ORAL
Qty: 100 TABLET | Refills: 2 | OUTPATIENT
Start: 2024-04-26

## 2024-04-26 RX ORDER — LISINOPRIL 20 MG/1
20 TABLET ORAL DAILY
Qty: 100 TABLET | Refills: 2 | OUTPATIENT
Start: 2024-04-26

## 2024-04-26 RX ORDER — OMEPRAZOLE 20 MG/1
CAPSULE, DELAYED RELEASE ORAL
Qty: 100 CAPSULE | Refills: 2 | OUTPATIENT
Start: 2024-04-26

## 2024-04-26 RX ORDER — METOPROLOL SUCCINATE 25 MG/1
25 TABLET, EXTENDED RELEASE ORAL DAILY
Qty: 100 TABLET | Refills: 2 | OUTPATIENT
Start: 2024-04-26

## 2024-04-26 NOTE — TELEPHONE ENCOUNTER
Lisinopril sent on 3/1/24 to [facility] for 90 days with 1 refill.  Amlodipine, metoprolol, omeprazole sent on 3/1/24 to [facility] for 100 days.

## 2024-05-01 DIAGNOSIS — M10.9 ACUTE GOUT INVOLVING TOE OF LEFT FOOT, UNSPECIFIED CAUSE: Primary | ICD-10-CM

## 2024-05-01 NOTE — TELEPHONE ENCOUNTER
Do you want pt to  continue this med?    Adela called requesting a refill of the below medication which has been pended for you:     Requested Prescriptions     Pending Prescriptions Disp Refills    allopurinol (ZYLOPRIM) 100 MG tablet [Pharmacy Med Name: Allopurinol 100 MG Oral Tablet] 30 tablet 0     Sig: Take 1 tablet by mouth once daily       Last Appointment Date: Visit date not found  Next Appointment Date: Visit date not found    No Known Allergies

## 2024-05-03 NOTE — TELEPHONE ENCOUNTER
Pt held med while taking steroids. Restarted with steroids were completed, still having pain in her big toe. Has been taking medication for about 4 days.

## 2024-05-03 NOTE — TELEPHONE ENCOUNTER
Pt was instructed to hold this med and then come in for re-check of uric acid level when she is completely asymptomatic of any gout sx's.  If uric acid still high, then we were going to re-start the Allopurinol; otherwise she doesn't need.    Has she been taking since our OV?

## 2024-05-07 RX ORDER — ALLOPURINOL 100 MG/1
100 TABLET ORAL DAILY
Qty: 30 TABLET | Refills: 0 | OUTPATIENT
Start: 2024-05-07

## 2024-05-07 RX ORDER — COLCHICINE 0.6 MG/1
0.6 TABLET ORAL 2 TIMES DAILY
Qty: 6 TABLET | Refills: 0 | Status: SHIPPED | OUTPATIENT
Start: 2024-05-07 | End: 2024-05-10

## 2024-05-07 NOTE — TELEPHONE ENCOUNTER
Is the pain in her big toe any better than it has been the entire time?  She told me on 4/19 it had improved but then recurred; did the Prednisone I gave her help at all?      If still having fairly noticeable gout sx's, can try one more treatment with Colchicine to see if that finally resolves it.      Would still like her to stop her Allopurinol to be able to see if she truly needs it when she's finally able to re-check uric acid level when asymptomatic from gout flare.

## 2024-05-07 NOTE — TELEPHONE ENCOUNTER
Rx for Colchicine 0.6 mg BID x 3 days sent to pharmacy.  Pt to inform the office on 5/13 if still not improved with sx's or call as soon as sx's are completely resolved.  No Allopurinol until she has uric acid level re-checked, which we will instruct her on when she calls in.

## 2024-05-07 NOTE — TELEPHONE ENCOUNTER
States she has pain some days but other days it doesn't bother her and then other days it hurts a lot. Does think the steroid helped.   Agreeable to stopping allopurinol now and trying another treatment of colchicine, pended now.

## 2024-05-13 ENCOUNTER — TELEPHONE (OUTPATIENT)
Dept: FAMILY MEDICINE CLINIC | Age: 84
End: 2024-05-13

## 2024-05-13 DIAGNOSIS — E79.0 HYPERURICEMIA: Primary | ICD-10-CM

## 2024-05-13 NOTE — TELEPHONE ENCOUNTER
Spoke with pt, voiced understanding. Will come in for uric acid level re check this week and await further instruction.

## 2024-05-13 NOTE — TELEPHONE ENCOUNTER
If the original area of pain from her gout (L great toe) is resolved, I would recommend she come in this week for re-check of uric acid.  If the R toe pain is mild without any swelling, redness, or warmth, it may be related to arthritis, pressure with walking/shoes, or other cause besides gout.  If the R toe pain worsens to a gouty presentation, pt should let us know.  We will be in touch when her uric acid level is resulted.  In the meantime, she can use ice and/or Tylenol as needed for toe pain.

## 2024-05-13 NOTE — TELEPHONE ENCOUNTER
Pt calling in regards to her gout in her L toe, pt states she finished the medication and the swelling is gone and almost no pain but pt has had a little pain in her R big toe for the past few days, please call with recommendations.

## 2024-05-14 ENCOUNTER — HOSPITAL ENCOUNTER (OUTPATIENT)
Age: 84
Discharge: HOME | End: 2024-05-14
Payer: MEDICARE

## 2024-05-14 DIAGNOSIS — E79.0 HYPERURICEMIA: ICD-10-CM

## 2024-05-14 LAB — URATE SERPL-MCNC: 8.4 MG/DL (ref 2.4–5.7)

## 2024-05-14 PROCEDURE — 36415 COLL VENOUS BLD VENIPUNCTURE: CPT

## 2024-05-14 PROCEDURE — 84550 ASSAY OF BLOOD/URIC ACID: CPT

## 2024-05-15 DIAGNOSIS — E79.0 HYPERURICEMIA: Primary | ICD-10-CM

## 2024-05-17 DIAGNOSIS — E79.0 HYPERURICEMIA: Primary | ICD-10-CM

## 2024-05-17 RX ORDER — ALLOPURINOL 100 MG/1
100 TABLET ORAL DAILY
Qty: 90 TABLET | Refills: 0 | Status: SHIPPED | OUTPATIENT
Start: 2024-05-17

## 2024-06-13 ENCOUNTER — HOSPITAL ENCOUNTER (OUTPATIENT)
Age: 84
Discharge: HOME | End: 2024-06-13
Payer: MEDICARE

## 2024-06-13 DIAGNOSIS — E79.0 HYPERURICEMIA: ICD-10-CM

## 2024-06-13 LAB — URATE SERPL-MCNC: 6.3 MG/DL (ref 2.4–5.7)

## 2024-06-13 PROCEDURE — 84550 ASSAY OF BLOOD/URIC ACID: CPT

## 2024-06-13 PROCEDURE — 36415 COLL VENOUS BLD VENIPUNCTURE: CPT

## 2024-06-14 DIAGNOSIS — E79.0 HYPERURICEMIA: Primary | ICD-10-CM

## 2024-07-11 DIAGNOSIS — K21.9 GASTROESOPHAGEAL REFLUX DISEASE, UNSPECIFIED WHETHER ESOPHAGITIS PRESENT: ICD-10-CM

## 2024-07-11 DIAGNOSIS — I10 ESSENTIAL HYPERTENSION: ICD-10-CM

## 2024-07-12 RX ORDER — OMEPRAZOLE 20 MG/1
CAPSULE, DELAYED RELEASE ORAL
Qty: 80 CAPSULE | Refills: 3 | OUTPATIENT
Start: 2024-07-12

## 2024-07-12 RX ORDER — LISINOPRIL 20 MG/1
20 TABLET ORAL DAILY
Qty: 80 TABLET | Refills: 3 | OUTPATIENT
Start: 2024-07-12

## 2024-07-12 RX ORDER — METOPROLOL SUCCINATE 25 MG/1
25 TABLET, EXTENDED RELEASE ORAL DAILY
Qty: 80 TABLET | Refills: 3 | OUTPATIENT
Start: 2024-07-12

## 2024-07-12 RX ORDER — AMLODIPINE BESYLATE 5 MG/1
TABLET ORAL
Qty: 80 TABLET | Refills: 3 | OUTPATIENT
Start: 2024-07-12

## 2024-07-15 ENCOUNTER — HOSPITAL ENCOUNTER (OUTPATIENT)
Age: 84
Discharge: HOME | End: 2024-07-15
Payer: MEDICARE

## 2024-07-15 DIAGNOSIS — E79.0 HYPERURICEMIA: ICD-10-CM

## 2024-07-15 LAB — URATE SERPL-MCNC: 7.2 MG/DL (ref 2.4–5.7)

## 2024-07-15 PROCEDURE — 36415 COLL VENOUS BLD VENIPUNCTURE: CPT

## 2024-07-15 PROCEDURE — 84550 ASSAY OF BLOOD/URIC ACID: CPT

## 2024-07-18 DIAGNOSIS — E79.0 HYPERURICEMIA: ICD-10-CM

## 2024-07-19 NOTE — TELEPHONE ENCOUNTER
Adela called requesting a refill of the below medication which has been pended for you:     Requested Prescriptions     Pending Prescriptions Disp Refills    allopurinol (ZYLOPRIM) 100 MG tablet [Pharmacy Med Name: Allopurinol 100 MG Oral Tablet] 90 tablet 3     Sig: TAKE 1 TABLET BY MOUTH DAILY       Last Appointment Date: 4/19/2024  Next Appointment Date: 10/21/2024    No Known Allergies

## 2024-07-22 RX ORDER — ALLOPURINOL 100 MG/1
200 TABLET ORAL DAILY
Qty: 180 TABLET | Refills: 1 | Status: SHIPPED | OUTPATIENT
Start: 2024-07-22

## 2024-07-22 NOTE — TELEPHONE ENCOUNTER
Pt returning call, states she has been consistent with taking med every day, same time. States occasional swelling to big toe but no pain and swelling goes down when she rests with her feet up. Trying to watch eating habits also. OK to send to [facility] unless its something she needs to start right away then please send to [facility].

## 2024-07-22 NOTE — TELEPHONE ENCOUNTER
Uric acid level on 7/15 is still too high at 7.2 (up from 6.3).  Has pt been taking her Allopurinol 100 mg daily every day?  Is she currently having any gout sx's?

## 2024-07-22 NOTE — TELEPHONE ENCOUNTER
Will increase dose of Allopurinol to 200 mg daily (two of the 100 mg tabs) - pt can wait until she receives from [facility] to take this dose, or if she has some 100 mg tabs still (should have enough for approx 3 more weeks based on med list), she can start taking 2 of these once daily now.  Rx sent to [facility] now.    Will re-check uric acid level again in 4-6 weeks - ordered now.

## 2024-07-23 DIAGNOSIS — R60.0 BILATERAL LOWER EXTREMITY EDEMA: ICD-10-CM

## 2024-07-23 RX ORDER — TORSEMIDE 10 MG/1
10 TABLET ORAL DAILY
Qty: 100 TABLET | Refills: 2 | OUTPATIENT
Start: 2024-07-23

## 2024-09-03 ENCOUNTER — HOSPITAL ENCOUNTER (OUTPATIENT)
Age: 84
Discharge: HOME | End: 2024-09-03
Payer: MEDICARE

## 2024-09-03 DIAGNOSIS — E79.0 HYPERURICEMIA: ICD-10-CM

## 2024-09-03 LAB — URATE SERPL-MCNC: 4.9 MG/DL (ref 2.4–5.7)

## 2024-09-03 PROCEDURE — 84550 ASSAY OF BLOOD/URIC ACID: CPT

## 2024-09-03 PROCEDURE — 36415 COLL VENOUS BLD VENIPUNCTURE: CPT

## 2024-09-07 ENCOUNTER — APPOINTMENT (OUTPATIENT)
Dept: GENERAL RADIOLOGY | Age: 84
End: 2024-09-07
Payer: MEDICARE

## 2024-09-07 ENCOUNTER — HOSPITAL ENCOUNTER (EMERGENCY)
Age: 84
Discharge: HOME | End: 2024-09-07
Attending: STUDENT IN AN ORGANIZED HEALTH CARE EDUCATION/TRAINING PROGRAM
Payer: MEDICARE

## 2024-09-07 VITALS
BODY MASS INDEX: 25.34 KG/M2 | SYSTOLIC BLOOD PRESSURE: 123 MMHG | HEIGHT: 63 IN | DIASTOLIC BLOOD PRESSURE: 55 MMHG | TEMPERATURE: 99.1 F | HEART RATE: 91 BPM | RESPIRATION RATE: 18 BRPM | OXYGEN SATURATION: 100 % | WEIGHT: 143 LBS

## 2024-09-07 DIAGNOSIS — R19.7 DIARRHEA, UNSPECIFIED TYPE: Primary | ICD-10-CM

## 2024-09-07 DIAGNOSIS — E86.0 DEHYDRATION: ICD-10-CM

## 2024-09-07 DIAGNOSIS — A04.72 C. DIFFICILE DIARRHEA: ICD-10-CM

## 2024-09-07 DIAGNOSIS — E87.6 HYPOKALEMIA: ICD-10-CM

## 2024-09-07 LAB
ALBUMIN SERPL-MCNC: 3.5 G/DL (ref 3.5–5.2)
ALBUMIN/GLOB SERPL: 1.4 {RATIO} (ref 1–2.5)
ALP SERPL-CCNC: 83 U/L (ref 35–104)
ALT SERPL-CCNC: 10 U/L (ref 5–33)
ANION GAP SERPL CALCULATED.3IONS-SCNC: 9 MMOL/L (ref 9–17)
AST SERPL-CCNC: 16 U/L
BASOPHILS # BLD: 0.03 K/UL (ref 0–0.2)
BASOPHILS NFR BLD: 0 % (ref 0–2)
BILIRUB DIRECT SERPL-MCNC: 0.1 MG/DL
BILIRUB INDIRECT SERPL-MCNC: 0.3 MG/DL (ref 0–1)
BILIRUB SERPL-MCNC: 0.4 MG/DL (ref 0.3–1.2)
BILIRUB UR QL STRIP: NEGATIVE
BUN SERPL-MCNC: 22 MG/DL (ref 8–23)
BUN/CREAT SERPL: 15 (ref 9–20)
C DIFF GDH + TOXINS A+B STL QL IA.RAPID: ABNORMAL
CALCIUM SERPL-MCNC: 8.3 MG/DL (ref 8.6–10.4)
CHLORIDE SERPL-SCNC: 106 MMOL/L (ref 98–107)
CLARITY UR: CLEAR
CO2 SERPL-SCNC: 24 MMOL/L (ref 20–31)
COLOR UR: YELLOW
COMMENT: NORMAL
CREAT SERPL-MCNC: 1.5 MG/DL (ref 0.5–0.9)
EOSINOPHIL # BLD: 0.36 K/UL (ref 0–0.44)
EOSINOPHILS RELATIVE PERCENT: 3 % (ref 1–4)
ERYTHROCYTE [DISTWIDTH] IN BLOOD BY AUTOMATED COUNT: 14.5 % (ref 11.8–14.4)
GFR, ESTIMATED: 35 ML/MIN/1.73M2
GLOBULIN SER CALC-MCNC: 2.5 G/DL (ref 1.5–3.8)
GLUCOSE SERPL-MCNC: 148 MG/DL (ref 70–99)
GLUCOSE UR STRIP-MCNC: NEGATIVE MG/DL
HCT VFR BLD AUTO: 39.3 % (ref 36.3–47.1)
HGB BLD-MCNC: 12.7 G/DL (ref 11.9–15.1)
HGB UR QL STRIP.AUTO: NEGATIVE
IMM GRANULOCYTES # BLD AUTO: 0.08 K/UL (ref 0–0.3)
IMM GRANULOCYTES NFR BLD: 1 %
KETONES UR STRIP-MCNC: NEGATIVE MG/DL
LEUKOCYTE ESTERASE UR QL STRIP: NEGATIVE
LIPASE SERPL-CCNC: 20 U/L (ref 13–60)
LYMPHOCYTES NFR BLD: 2.22 K/UL (ref 1.1–3.7)
LYMPHOCYTES RELATIVE PERCENT: 18 % (ref 24–43)
MAGNESIUM SERPL-MCNC: 1.6 MG/DL (ref 1.6–2.6)
MCH RBC QN AUTO: 29.7 PG (ref 25.2–33.5)
MCHC RBC AUTO-ENTMCNC: 32.3 G/DL (ref 25.2–33.5)
MCV RBC AUTO: 92 FL (ref 82.6–102.9)
MONOCYTES NFR BLD: 0.86 K/UL (ref 0.1–1.2)
MONOCYTES NFR BLD: 7 % (ref 3–12)
NEUTROPHILS NFR BLD: 71 % (ref 36–65)
NEUTS SEG NFR BLD: 9.02 K/UL (ref 1.5–8.1)
NITRITE UR QL STRIP: NEGATIVE
NRBC BLD-RTO: 0 PER 100 WBC
PH UR STRIP: 6 [PH] (ref 5–6)
PLATELET # BLD AUTO: 299 K/UL (ref 138–453)
PMV BLD AUTO: 10 FL (ref 8.1–13.5)
POTASSIUM SERPL-SCNC: 3.4 MMOL/L (ref 3.7–5.3)
PROT SERPL-MCNC: 6 G/DL (ref 6.4–8.3)
PROT UR STRIP-MCNC: NEGATIVE MG/DL
RBC # BLD AUTO: 4.27 M/UL (ref 3.95–5.11)
RBC # BLD: ABNORMAL 10*6/UL
SODIUM SERPL-SCNC: 139 MMOL/L (ref 135–144)
SP GR UR STRIP: 1.01 (ref 1.01–1.02)
SPECIMEN DESCRIPTION: ABNORMAL
UROBILINOGEN UR STRIP-ACNC: NORMAL EU/DL (ref 0–1)
WBC OTHER # BLD: 12.6 K/UL (ref 3.5–11.3)

## 2024-09-07 PROCEDURE — 81003 URINALYSIS AUTO W/O SCOPE: CPT

## 2024-09-07 PROCEDURE — 2580000003 HC RX 258: Performed by: STUDENT IN AN ORGANIZED HEALTH CARE EDUCATION/TRAINING PROGRAM

## 2024-09-07 PROCEDURE — 6370000000 HC RX 637 (ALT 250 FOR IP): Performed by: SPECIALIST

## 2024-09-07 PROCEDURE — 80048 BASIC METABOLIC PNL TOTAL CA: CPT

## 2024-09-07 PROCEDURE — 87449 NOS EACH ORGANISM AG IA: CPT

## 2024-09-07 PROCEDURE — 99284 EMERGENCY DEPT VISIT MOD MDM: CPT

## 2024-09-07 PROCEDURE — 96360 HYDRATION IV INFUSION INIT: CPT

## 2024-09-07 PROCEDURE — 87324 CLOSTRIDIUM AG IA: CPT

## 2024-09-07 PROCEDURE — 80076 HEPATIC FUNCTION PANEL: CPT

## 2024-09-07 PROCEDURE — 83690 ASSAY OF LIPASE: CPT

## 2024-09-07 PROCEDURE — 74018 RADEX ABDOMEN 1 VIEW: CPT

## 2024-09-07 PROCEDURE — 85025 COMPLETE CBC W/AUTO DIFF WBC: CPT

## 2024-09-07 PROCEDURE — 2580000003 HC RX 258: Performed by: SPECIALIST

## 2024-09-07 PROCEDURE — 87506 IADNA-DNA/RNA PROBE TQ 6-11: CPT

## 2024-09-07 PROCEDURE — 83735 ASSAY OF MAGNESIUM: CPT

## 2024-09-07 PROCEDURE — 96361 HYDRATE IV INFUSION ADD-ON: CPT

## 2024-09-07 PROCEDURE — 6370000000 HC RX 637 (ALT 250 FOR IP)

## 2024-09-07 RX ORDER — VANCOMYCIN HYDROCHLORIDE 125 MG/1
125 CAPSULE ORAL ONCE
Status: COMPLETED | OUTPATIENT
Start: 2024-09-07 | End: 2024-09-07

## 2024-09-07 RX ORDER — POTASSIUM CHLORIDE 1500 MG/1
20 TABLET, EXTENDED RELEASE ORAL ONCE
Status: COMPLETED | OUTPATIENT
Start: 2024-09-07 | End: 2024-09-07

## 2024-09-07 RX ORDER — VANCOMYCIN HYDROCHLORIDE 125 MG/1
CAPSULE ORAL
Status: COMPLETED
Start: 2024-09-07 | End: 2024-09-07

## 2024-09-07 RX ORDER — 0.9 % SODIUM CHLORIDE 0.9 %
1000 INTRAVENOUS SOLUTION INTRAVENOUS ONCE
Status: COMPLETED | OUTPATIENT
Start: 2024-09-07 | End: 2024-09-07

## 2024-09-07 RX ORDER — VANCOMYCIN HYDROCHLORIDE 125 MG/1
125 CAPSULE ORAL 4 TIMES DAILY
Qty: 40 CAPSULE | Refills: 0 | Status: SHIPPED | OUTPATIENT
Start: 2024-09-07 | End: 2024-09-17

## 2024-09-07 RX ADMIN — SODIUM CHLORIDE 1000 ML: 9 INJECTION, SOLUTION INTRAVENOUS at 19:43

## 2024-09-07 RX ADMIN — SODIUM CHLORIDE 1000 ML: 9 INJECTION, SOLUTION INTRAVENOUS at 20:43

## 2024-09-07 RX ADMIN — VANCOMYCIN HYDROCHLORIDE 125 MG: 125 CAPSULE ORAL at 20:54

## 2024-09-07 RX ADMIN — POTASSIUM CHLORIDE 20 MEQ: 1500 TABLET, EXTENDED RELEASE ORAL at 20:56

## 2024-09-07 ASSESSMENT — ENCOUNTER SYMPTOMS
SHORTNESS OF BREATH: 0
NAUSEA: 0
ABDOMINAL PAIN: 0
VOMITING: 0
DIARRHEA: 1
BACK PAIN: 0

## 2024-09-07 ASSESSMENT — PAIN - FUNCTIONAL ASSESSMENT: PAIN_FUNCTIONAL_ASSESSMENT: NONE - DENIES PAIN

## 2024-09-08 NOTE — ED PROVIDER NOTES
Esterase, Urine NEGATIVE NEGATIVE    Comment       Microscopic exam not performed based on chemical results unless requested in original order.    Comment          Comment       Utilizing a urinalysis as the only screening method to exclude a potential uropathogen can be unreliable in many patient populations.  Rapid screening tests are less sensitive than culture and if UTI is a clinical possibility, culture should be considered despite a negative urinalysis.     Magnesium   Result Value Ref Range    Magnesium 1.6 1.6 - 2.6 mg/dL       RECENT VITALS:  BP: 126/68, Temp: 99.1 °F (37.3 °C), Pulse: 91, Respirations: 18     ED Course     The patient was given the following medications:  Orders Placed This Encounter   Medications    sodium chloride 0.9 % bolus 1,000 mL    sodium chloride 0.9 % bolus 1,000 mL    vancomycin (VANCOCIN) capsule 125 mg     Order Specific Question:   Antimicrobial Indications     Answer:   Intra-Abdominal Infection    vancomycin (VANCOCIN) 125 MG capsule     Ivon Vivas: cabinet override    potassium chloride (KLOR-CON M) extended release tablet 20 mEq    vancomycin (VANCOCIN) 125 MG capsule     Sig: Take 1 capsule by mouth 4 times daily for 10 days     Dispense:  40 capsule     Refill:  0     During the emergency department course, patient was given normal saline 2 L bolus and vancomycin 125 mg capsule orally.  She was also given potassium chloride 20 mEq orally.    Medical Decision Making      83-year-old female patient presents to the emergency department for evaluation of diarrhea for last 1 week.  Patient started having 10 episodes of diarrhea a day which slowed down once or twice to 3-4 times a day but then again recurred over last 24 hours.  Her appetite has been fair until today when it was decreased.  She denies any nausea, vomiting, melena or hematochezia.  She has slight abdominal pain during the diarrheal episodes.  She has not been on any antibiotics recently.  She denies any 
mis-transcribed.)    Samanta Rojas DO,(electronically signed)  Emergency Medicine Physician  09/07/24 7:22 PM        Samanta Rojas DO  09/07/24 1926

## 2024-09-08 NOTE — DISCHARGE INSTRUCTIONS
Please understand that at this time there is no evidence for a more serious underlying process, but that early in the process of an illness or injury, an emergency department workup can be falsely reassuring.  You should contact your family doctor within the next 48 hours for a follow up appointment    THANK YOU!!!    From [facility] and [facility]    On behalf of the Emergency Department staff at [facility], I would like to thank you for giving us the opportunity to address your health care needs and concerns.    We hope that during your visit, our service was delivered in a professional and caring manner. Please keep [facility] in mind as we walk with you down the path to your own personal wellness.     Please expect an automated text message or email from us so we can ask a few questions about your health and progress. Based on your answers, a clinician may call you back to offer help and instructions.    Please understand that early in the process of an illness or injury, an emergency department workup can be falsely reassuring.  If you notice any worsening, changing or persistent symptoms please call your family doctor or return to the ER immediately.     Tell us how we did during your visit at [email or web address]   and let us know about your experience

## 2024-09-09 ENCOUNTER — CARE COORDINATION (OUTPATIENT)
Dept: CARE COORDINATION | Age: 84
End: 2024-09-09

## 2024-09-09 ENCOUNTER — TELEPHONE (OUTPATIENT)
Dept: FAMILY MEDICINE CLINIC | Age: 84
End: 2024-09-09

## 2024-09-09 LAB
CAMPYLOBACTER DNA SPEC NAA+PROBE: NORMAL
ETEC ELTA+ESTB GENES STL QL NAA+PROBE: NORMAL
P SHIGELLOIDES DNA STL QL NAA+PROBE: NORMAL
SALMONELLA DNA SPEC QL NAA+PROBE: NORMAL
SHIGA TOXIN STX GENE SPEC NAA+PROBE: NORMAL
SHIGELLA DNA SPEC QL NAA+PROBE: NORMAL
SPECIMEN DESCRIPTION: NORMAL
V CHOL+PARA RFBL+TRKH+TNAA STL QL NAA+PR: NORMAL
Y ENTERO RECN STL QL NAA+PROBE: NORMAL

## 2024-09-10 ENCOUNTER — CARE COORDINATION (OUTPATIENT)
Dept: CARE COORDINATION | Age: 84
End: 2024-09-10

## 2024-09-18 ENCOUNTER — OFFICE VISIT (OUTPATIENT)
Dept: FAMILY MEDICINE CLINIC | Age: 84
End: 2024-09-18
Payer: MEDICARE

## 2024-09-18 VITALS
DIASTOLIC BLOOD PRESSURE: 60 MMHG | SYSTOLIC BLOOD PRESSURE: 122 MMHG | BODY MASS INDEX: 25.89 KG/M2 | HEART RATE: 69 BPM | HEIGHT: 63 IN | OXYGEN SATURATION: 98 % | WEIGHT: 146.1 LBS

## 2024-09-18 DIAGNOSIS — A04.72 C. DIFFICILE DIARRHEA: ICD-10-CM

## 2024-09-18 DIAGNOSIS — R60.0 BILATERAL LOWER EXTREMITY EDEMA: Primary | ICD-10-CM

## 2024-09-18 DIAGNOSIS — E79.0 HYPERURICEMIA: ICD-10-CM

## 2024-09-18 DIAGNOSIS — I10 ESSENTIAL HYPERTENSION: ICD-10-CM

## 2024-09-18 PROCEDURE — 99214 OFFICE O/P EST MOD 30 MIN: CPT | Performed by: FAMILY MEDICINE

## 2024-09-18 PROCEDURE — 3074F SYST BP LT 130 MM HG: CPT | Performed by: FAMILY MEDICINE

## 2024-09-18 PROCEDURE — G2211 COMPLEX E/M VISIT ADD ON: HCPCS | Performed by: FAMILY MEDICINE

## 2024-09-18 PROCEDURE — 1123F ACP DISCUSS/DSCN MKR DOCD: CPT | Performed by: FAMILY MEDICINE

## 2024-09-18 PROCEDURE — 3078F DIAST BP <80 MM HG: CPT | Performed by: FAMILY MEDICINE

## 2024-09-18 RX ORDER — AMLODIPINE BESYLATE 5 MG/1
TABLET ORAL
Qty: 90 TABLET | Refills: 3 | Status: SHIPPED | OUTPATIENT
Start: 2024-09-18

## 2024-09-24 ENCOUNTER — TELEMEDICINE (OUTPATIENT)
Dept: FAMILY MEDICINE CLINIC | Age: 84
End: 2024-09-24

## 2024-09-24 DIAGNOSIS — Z00.00 MEDICARE ANNUAL WELLNESS VISIT, SUBSEQUENT: Primary | ICD-10-CM

## 2024-09-24 SDOH — HEALTH STABILITY: PHYSICAL HEALTH: ON AVERAGE, HOW MANY MINUTES DO YOU ENGAGE IN EXERCISE AT THIS LEVEL?: 30 MIN

## 2024-09-24 SDOH — HEALTH STABILITY: PHYSICAL HEALTH: ON AVERAGE, HOW MANY DAYS PER WEEK DO YOU ENGAGE IN MODERATE TO STRENUOUS EXERCISE (LIKE A BRISK WALK)?: 2 DAYS

## 2024-09-24 ASSESSMENT — LIFESTYLE VARIABLES
HOW OFTEN DO YOU HAVE A DRINK CONTAINING ALCOHOL: 1
HOW MANY STANDARD DRINKS CONTAINING ALCOHOL DO YOU HAVE ON A TYPICAL DAY: 0
HOW OFTEN DO YOU HAVE A DRINK CONTAINING ALCOHOL: NEVER
HOW OFTEN DO YOU HAVE SIX OR MORE DRINKS ON ONE OCCASION: 1
HOW MANY STANDARD DRINKS CONTAINING ALCOHOL DO YOU HAVE ON A TYPICAL DAY: PATIENT DOES NOT DRINK

## 2024-09-24 ASSESSMENT — PATIENT HEALTH QUESTIONNAIRE - PHQ9
SUM OF ALL RESPONSES TO PHQ QUESTIONS 1-9: 0
SUM OF ALL RESPONSES TO PHQ QUESTIONS 1-9: 0
1. LITTLE INTEREST OR PLEASURE IN DOING THINGS: NOT AT ALL
2. FEELING DOWN, DEPRESSED OR HOPELESS: NOT AT ALL
SUM OF ALL RESPONSES TO PHQ QUESTIONS 1-9: 0
SUM OF ALL RESPONSES TO PHQ9 QUESTIONS 1 & 2: 0
SUM OF ALL RESPONSES TO PHQ QUESTIONS 1-9: 0

## 2024-09-24 NOTE — PATIENT INSTRUCTIONS
Learning About Being Active as an Older Adult  Why is being active important as you get older?     Being active is one of the best things you can do for your health. And it's never too late to start. Being active--or getting active, if you aren't already--has definite benefits. It can:  Give you more energy,  Keep your mind sharp.  Improve balance to reduce your risk of falls.  Help you manage chronic illness with fewer medicines.  No matter how old you are, how fit you are, or what health problems you have, there is a form of activity that will work for you. And the more physical activity you can do, the better your overall health will be.  What kinds of activity can help you stay healthy?  Being more active will make your daily activities easier. Physical activity includes planned exercise and things you do in daily life. There are four types of activity:  Aerobic.  Doing aerobic activity makes your heart and lungs strong.  Includes walking, dancing, and gardening.  Aim for at least 2½ hours spread throughout the week.  It improves your energy and can help you sleep better.  Muscle-strengthening.  This type of activity can help maintain muscle and strengthen bones.  Includes climbing stairs, using resistance bands, and lifting or carrying heavy loads.  Aim for at least twice a week.  It can help protect the knees and other joints.  Stretching.  Stretching gives you better range of motion in joints and muscles.  Includes upper arm stretches, calf stretches, and gentle yoga.  Aim for at least twice a week, preferably after your muscles are warmed up from other activities.  It can help you function better in daily life.  Balancing.  This helps you stay coordinated and have good posture.  Includes heel-to-toe walking, reinaldo chi, and certain types of yoga.  Aim for at least 3 days a week.  It can reduce your risk of falling.  Even if you have a hard time meeting the recommendations, it's better to be more active

## 2024-09-24 NOTE — PROGRESS NOTES
Medicare Annual Wellness Visit    Adela Mcfadden is here for Medicare AWV    Assessment & Plan   Medicare annual wellness visit, subsequent    Recommendations for Preventive Services Due: see orders and patient instructions/AVS.  Recommended screening schedule for the next 5-10 years is provided to the patient in written form: see Patient Instructions/AVS.     Return in 1 year (on 9/24/2025) for Medicare AWV.     Subjective     Patient's complete Health Risk Assessment and screening values have been reviewed and are found in Flowsheets. The following problems were reviewed today and where indicated follow up appointments were made and/or referrals ordered.    Positive Risk Factor Screenings with Interventions:                Inactivity:  On average, how many days per week do you engage in moderate to strenuous exercise (like a brisk walk)?: 2 days (!) Abnormal  On average, how many minutes do you engage in exercise at this level?: 30 min  Interventions:  Patient comments: Pt exercises at least twice a week, sometimes more as she can for about 30 min per day.      Dentist Screen:  Have you seen the dentist within the past year?: (!) No    Intervention:  Advised to schedule with their dentist     Vision Screen:  Do you have difficulty driving, watching TV, or doing any of your daily activities because of your eyesight?: No  Have you had an eye exam within the past year?: (!) No  Interventions:   Patient encouraged to make appointment with their eye specialist      Advanced Directives:  Do you have a Living Will?: (!) No    Intervention:  has NO advanced directive - information provided          Objective    Patient-Reported Vitals  No data recorded          No Known Allergies  Prior to Visit Medications    Medication Sig Taking? Authorizing Provider   amLODIPine (NORVASC) 5 MG tablet Take 1 tablet by mouth once daily  Sary Dao DO   allopurinol (ZYLOPRIM) 100 MG tablet Take 2 tablets by mouth daily  Black,

## 2024-09-25 ASSESSMENT — ENCOUNTER SYMPTOMS: DIARRHEA: 0

## 2024-10-15 DIAGNOSIS — K21.9 GASTROESOPHAGEAL REFLUX DISEASE, UNSPECIFIED WHETHER ESOPHAGITIS PRESENT: ICD-10-CM

## 2024-10-15 DIAGNOSIS — I10 ESSENTIAL HYPERTENSION: ICD-10-CM

## 2024-10-15 NOTE — TELEPHONE ENCOUNTER
Adela called requesting a refill of the below medication which has been pended for you:     Requested Prescriptions     Pending Prescriptions Disp Refills    metoprolol succinate (TOPROL XL) 25 MG extended release tablet [Pharmacy Med Name: Metoprolol Succinate ER 25 MG Oral Tablet Extended Release 24 Hour] 80 tablet 3     Sig: TAKE 1 TABLET BY MOUTH ONCE  DAILY    omeprazole (PRILOSEC) 20 MG delayed release capsule [Pharmacy Med Name: Omeprazole 20 MG Oral Capsule Delayed Release] 80 capsule 3     Sig: TAKE 1 CAPSULE BY MOUTH DAILY AS NEEDED FOR REFLUX    lisinopril (PRINIVIL;ZESTRIL) 20 MG tablet [Pharmacy Med Name: Lisinopril 20 MG Oral Tablet] 80 tablet 3     Sig: TAKE 1 TABLET BY MOUTH DAILY       Last Appointment Date: 9/24/2024  Next Appointment Date: 10/21/2024    No Known Allergies

## 2024-10-17 ENCOUNTER — HOSPITAL ENCOUNTER (OUTPATIENT)
Age: 84
Discharge: HOME | End: 2024-10-17
Payer: MEDICARE

## 2024-10-17 DIAGNOSIS — N18.31 STAGE 3A CHRONIC KIDNEY DISEASE (CKD) (HCC): ICD-10-CM

## 2024-10-17 DIAGNOSIS — I10 ESSENTIAL HYPERTENSION: ICD-10-CM

## 2024-10-17 LAB
ANION GAP SERPL CALCULATED.3IONS-SCNC: 7 MMOL/L (ref 9–17)
BUN SERPL-MCNC: 19 MG/DL (ref 8–23)
BUN/CREAT SERPL: 21 (ref 9–20)
CALCIUM SERPL-MCNC: 9.2 MG/DL (ref 8.6–10.4)
CHLORIDE SERPL-SCNC: 105 MMOL/L (ref 98–107)
CO2 SERPL-SCNC: 31 MMOL/L (ref 20–31)
CREAT SERPL-MCNC: 0.9 MG/DL (ref 0.5–0.9)
GFR, ESTIMATED: 64 ML/MIN/1.73M2
GLUCOSE SERPL-MCNC: 86 MG/DL (ref 70–99)
POTASSIUM SERPL-SCNC: 4.5 MMOL/L (ref 3.7–5.3)
SODIUM SERPL-SCNC: 143 MMOL/L (ref 135–144)

## 2024-10-17 PROCEDURE — 36415 COLL VENOUS BLD VENIPUNCTURE: CPT

## 2024-10-17 PROCEDURE — 80048 BASIC METABOLIC PNL TOTAL CA: CPT

## 2024-10-17 NOTE — TELEPHONE ENCOUNTER
Adela called requesting a refill of the below medication which has been pended for you:     Requested Prescriptions     Pending Prescriptions Disp Refills    metoprolol succinate (TOPROL XL) 25 MG extended release tablet 90 tablet 1     Sig: Take 1 tablet by mouth daily       Last Appointment Date: 9/24/2024  Next Appointment Date: 10/21/2024    No Known Allergies

## 2024-10-18 RX ORDER — METOPROLOL SUCCINATE 25 MG/1
25 TABLET, EXTENDED RELEASE ORAL DAILY
Qty: 90 TABLET | Refills: 3 | Status: SHIPPED | OUTPATIENT
Start: 2024-10-18

## 2024-10-18 RX ORDER — LISINOPRIL 20 MG/1
20 TABLET ORAL DAILY
Qty: 90 TABLET | Refills: 3 | Status: SHIPPED | OUTPATIENT
Start: 2024-10-18

## 2024-10-18 RX ORDER — METOPROLOL SUCCINATE 25 MG/1
25 TABLET, EXTENDED RELEASE ORAL DAILY
Qty: 15 TABLET | Refills: 0 | Status: SHIPPED | OUTPATIENT
Start: 2024-10-18

## 2024-10-21 ENCOUNTER — OFFICE VISIT (OUTPATIENT)
Dept: FAMILY MEDICINE CLINIC | Age: 84
End: 2024-10-21
Payer: MEDICARE

## 2024-10-21 VITALS
WEIGHT: 143.4 LBS | HEIGHT: 63 IN | TEMPERATURE: 97.1 F | HEART RATE: 65 BPM | RESPIRATION RATE: 16 BRPM | SYSTOLIC BLOOD PRESSURE: 136 MMHG | DIASTOLIC BLOOD PRESSURE: 60 MMHG | OXYGEN SATURATION: 100 % | BODY MASS INDEX: 25.41 KG/M2

## 2024-10-21 DIAGNOSIS — Z23 NEED FOR COVID-19 VACCINE: ICD-10-CM

## 2024-10-21 DIAGNOSIS — I10 ESSENTIAL HYPERTENSION: Primary | ICD-10-CM

## 2024-10-21 DIAGNOSIS — R60.0 BILATERAL LOWER EXTREMITY EDEMA: ICD-10-CM

## 2024-10-21 DIAGNOSIS — Z23 NEED FOR INFLUENZA VACCINATION: ICD-10-CM

## 2024-10-21 DIAGNOSIS — K21.9 GASTROESOPHAGEAL REFLUX DISEASE, UNSPECIFIED WHETHER ESOPHAGITIS PRESENT: ICD-10-CM

## 2024-10-21 DIAGNOSIS — R91.1 NODULE OF LOWER LOBE OF LEFT LUNG: ICD-10-CM

## 2024-10-21 PROCEDURE — 91320 SARSCV2 VAC 30MCG TRS-SUC IM: CPT | Performed by: FAMILY MEDICINE

## 2024-10-21 PROCEDURE — 90653 IIV ADJUVANT VACCINE IM: CPT | Performed by: FAMILY MEDICINE

## 2024-10-21 RX ORDER — TORSEMIDE 10 MG/1
10 TABLET ORAL DAILY
Qty: 90 TABLET | Refills: 3 | Status: SHIPPED | OUTPATIENT
Start: 2024-10-21

## 2024-10-21 NOTE — PROGRESS NOTES
[facility]  [address], [place] [address] [phone]      Adela Mcfadden is a 84 y.o. female who presents today for her medical conditions/complaints as noted below.  Adela Mcfadden is c/o of Hypertension, f/u uric acid, and f/u CKD      HPI:     Pt here today for follow-up of HTN and medications.    Result reviewed with pt during OV today - BMP shows normal K+, Calcium, and renal function now.  Likely improved after her diarrheal illness resolved.      Taking Lisinopril 20 mg daily, Norvasc 5 mg daily, and Toprol-XL 25 mg daily for HTN - stable.  BP well-controlled today - 136/60.  Does not check BP at home very often - states it is usually normal.     Seeing Dr. Ritter (Cardiology) yearly for HTN and tricuspid regurgitation - next visit 2/3/25.  Will have updated Echo after next visit for mild mitral regurgitation and mild-mod tricuspid regurgitation.     Taking Demadex 10 mg daily for lower extremity edema - stable; swelling has been well-controlled.     Taking Omeprazole 40 mg daily for GERD - stable; if she doesn't take it for a couple days, she will have nausea/indigestion.    Pt had CT chest in 3/2024 when admitted for multi-focal pneumonia that showed indeterminate 10 mm L lower lobe nodule, with recommendation for follow-up scan.     Plans to re-start taking Metamucil daily for BM's - had briefly stopped it when she was trying a daily probiotic, but would rather do the Metamucil.     Taking OTC anti-histamine (Claritin) as needed for allergy sx's - has not taken it often recently.     Taking daily Optivite eye vitamin, Vit D3 5000 IU daily, Calcium 600 mg BID, cinnamon daily, fish oil daily, CoQ-10 daily, Vit C daily, and Zinc daily.          Past Medical History:   Diagnosis Date    Bullous pemphigoid     Elevated fasting glucose     Former smoker     quit in 1987    GERD (gastroesophageal reflux disease)     Hypertension     Osteoarthritis     Osteopenia

## 2024-10-28 ENCOUNTER — HOSPITAL ENCOUNTER (OUTPATIENT)
Dept: CT IMAGING | Age: 84
Discharge: HOME | End: 2024-10-30
Attending: FAMILY MEDICINE
Payer: MEDICARE

## 2024-10-28 DIAGNOSIS — R91.1 NODULE OF LOWER LOBE OF LEFT LUNG: ICD-10-CM

## 2024-10-28 PROCEDURE — 71250 CT THORAX DX C-: CPT

## 2024-11-05 ENCOUNTER — TELEPHONE (OUTPATIENT)
Dept: FAMILY MEDICINE CLINIC | Age: 84
End: 2024-11-05

## 2024-11-05 DIAGNOSIS — R91.1 LUNG NODULE: Primary | ICD-10-CM

## 2024-11-05 NOTE — TELEPHONE ENCOUNTER
Patient notified and verbalized understanding. Agreeable to 6 month follow up Chest CT.  Orders placed and patient called to transfer

## 2024-11-05 NOTE — TELEPHONE ENCOUNTER
----- Message from Dr. Sary Dao, DO sent at 11/1/2024  3:43 PM EDT -----  CT chest shows 9 mm LLL nodule is still present but less prominent than previous; may have central punctate calcifications.  Please inform pt - nodule is slightly less dense/prominent than on last scan, but still approx same size; no new nodule seen.  Recommend re-check CT again in 6 months.

## 2024-11-13 NOTE — DISCHARGE INSTR - COC
Continuity of Care Form    Patient Name: Femi Weir   :  1940  MRN:  [id number]    Admit date:  8/3/2021  Discharge date:  ***    Code Status Order: Full Code   Advance Directives:   Advance Care Flowsheet Documentation     Date/Time Healthcare Directive Type of Healthcare Directive Copy in [address] Agent's Name Healthcare Agent's Phone Number    21 7423  No, patient does not have an advance directive for healthcare treatment  --  --  --  --  --    21 0631  No, patient does not have an advance directive for healthcare treatment  --  --  --  --  --          Admitting Physician:  Yesenia Marin MD  PCP: Yareli Perez MD    Discharging Nurse: [facility] Unit/Room#: 0211/0211-01  Discharging Unit Phone Number: ***    Emergency Contact:   Extended Emergency Contact Information  Primary Emergency Contact: Gila Goes  Home Phone: [phone]  Relation: Child  Secondary Emergency Contact: [address] of [address] [place] [address] Phone: [phone]  Relation: None    Past Surgical History:  Past Surgical History:   Procedure Laterality Date   Darcy Arapiraca     ectopic pregnancy, bilateral salpingectomy    CATARACT REMOVAL WITH IMPLANT Left 2018    per Dr James Rick      partial    [place] COLONOSCOPY W/BIOPSY SINGLE/MULTIPLE N/A 2017    COLONOSCOPY WITH BIOPSY performed by Zenaida Hernandez MD at [address] [place] [address], one tubular adenoma on pathology, repeat recommended in 5 years    TOTAL HIP ARTHROPLASTY Left 8/3/2021    Left Total Hip Arthroplasty performed by Yesenia Marin MD at [address] [place] [address]       Immunization History:   Immunization History   Administered Date(s) Administered    Influenza Vaccine, unspecified formulation 10/25/2013, 10/31/2014, 10/29/2015    Influenza Virus Vaccine 2009, 2011, 10/31/2014    Influenza Whole 2009, 2011    Influenza, High Dose (Fluzone 65 yrs and older) 10/25/2013, 10/29/2015, 11/15/2016, 10/26/2017, 10/19/2018    Influenza, Kay Reichmann, adjuvanted, 65 yrs +, IM, PF (Fluad) 09/30/2020    Influenza, Triv, inactivated, subunit, adjuvanted, IM (Fluad 65 yrs and older) 11/07/2019    Pneumococcal Conjugate 13-valent ([id number]) 06/04/2015    Pneumococcal Polysaccharide ([id number]) 10/30/2012    Tdap (Boostrix, Adacel) 01/30/2009       Active Problems:  Patient Active Problem List   Diagnosis Code    Essential hypertension I10    GERD (gastroesophageal reflux disease) K21.9    Elevated fasting glucose R73.01    Osteopenia M85.80    Overweight (BMI 25.0-29. 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       Isolation/Infection:   Isolation          No Isolation        Patient Infection Status     Infection Onset Added Last Indicated Last Indicated By Review Planned Expiration Resolved Resolved By    None active    Resolved    COVID-19 Rule Out 07/29/21 07/29/21 07/29/21 COVID-19 (Ordered)   07/30/21 Rule-Out Test Resulted    C-diff Rule Out 04/28/21 04/28/21 04/28/21 Clostridium Difficile Toxin/Antigen (Ordered)   04/29/21 Rule-Out Test Resulted          Nurse Assessment:  Last Vital Signs: BP (!) 96/50   Pulse 56   Temp 96.8 °F (36 °C) (Tympanic)   Resp 18   Ht 5' 3\" (1.6 m)   Wt 165 lb (74.8 kg)   SpO2 96%   BMI 29.23 kg/m²     Last documented pain score (0-10 scale): Pain Level: 0  Last Weight:   Wt Readings from Last 1 Encounters:   08/04/21 165 lb (74.8 kg)     Mental Status:  {IP PT MENTAL STATUS:99640}    IV Access:  {[facility] ACCESS:909658713}    Nursing Mobility/ADLs:  Walking   {STEPHANIE PERSONQL:997170903}  Transfer  {STEPHANIE PRICE TSIO:130418557}  Bathing {CHP DME MRJO:249015834}  Dressing  {CHP DME SPAH:711658747}  Toileting  {CHP DME LXIO:207038733}  Feeding  {CHP DME FAEM:478041442}  Med Admin  {CHP DME UZEO:844785671}  Med Delivery   { DOMINICK MED Delivery:694334352}    Wound Care Documentation and Therapy:        Elimination:  Continence:   · Bowel: {YES / BH:13628}  · Bladder: {YES / :50300}  Urinary Catheter: {Urinary Catheter:912007330}   Colostomy/Ileostomy/Ileal Conduit: {YES / YH:82532}       Date of Last BM: ***    Intake/Output Summary (Last 24 hours) at 2021 1143  Last data filed at 2021 0540  Gross per 24 hour   Intake 1688.09 ml   Output --   Net 1688.09 ml     I/O last 3 completed shifts:   In: 3488.1 [P.O.:560; I.V.:2928.1]  Out: 200 [Blood:200]    Safety Concerns:     [employer] Safety Concerns:442875641}    Impairments/Disabilities:      [employer] Impairments/Disabilities:865535682}    Nutrition Therapy:  Current Nutrition Therapy:   [employer] Diet List:329376081}    Routes of Feeding: {CHP DME Other Feedings:828125943}  Liquids: {Slp liquid thickness:30207}  Daily Fluid Restriction: {CHP DME Yes amt example:462336396}  Last Modified Barium Swallow with Video (Video Swallowing Test): {Done Not Done SFKV:522302231}    Treatments at the Time of Hospital Discharge:   Respiratory Treatments: ***  Oxygen Therapy:  {Therapy; copd oxygen:70973}  Ventilator:    { CC Vent VBXI:667154209}    Rehab Therapies: {THERAPEUTIC INTERVENTION:6895810479}  Weight Bearing Status/Restrictions: [employer] Weight Bearin}  Other Medical Equipment (for information only, NOT a DME order):  {EQUIPMENT:773149563}  Other Treatments: ***    Patient's personal belongings (please select all that are sent with patient):  {P DME Belongings:508443424}    RN SIGNATURE:  {Esignature:681595702}    CASE MANAGEMENT/SOCIAL WORK SECTION    Inpatient Status Date: ***    Readmission Risk Assessment Score:  Readmission Risk              Risk of Unplanned Readmission:  12 Discharging to Facility/ Agency   · Name:   · Address:  · Phone:  · Fax:    Dialysis Facility (if applicable)   · Name:  · Address:  · Dialysis Schedule:  · Phone:  · Fax:    / signature: {Esignature:112963421}    PHYSICIAN SECTION    Prognosis: {Prognosis:8950892122}    Condition at Discharge: Keo Vazquez Patient Condition:619142776}    Rehab Potential (if transferring to Rehab): {Prognosis:0934912952}    Recommended Labs or Other Treatments After Discharge: ***    Physician Certification: I certify the above information and transfer of Austin Cano  is necessary for the continuing treatment of the diagnosis listed and that she requires {Admit to Appropriate Level of Care:10114} for {GREATER/LESS:000345132} 30 days.      Update Admission H&P: {CHP DME Changes in UPUWB:314119588}    PHYSICIAN SIGNATURE:  {Esignature:737168889} English

## 2024-11-30 DIAGNOSIS — E79.0 HYPERURICEMIA: ICD-10-CM

## 2024-12-02 RX ORDER — ALLOPURINOL 100 MG/1
200 TABLET ORAL DAILY
Qty: 160 TABLET | Refills: 3 | OUTPATIENT
Start: 2024-12-02

## 2024-12-17 DIAGNOSIS — L29.9 PRURITUS: ICD-10-CM

## 2024-12-17 DIAGNOSIS — R21 RASH: ICD-10-CM

## 2024-12-17 DIAGNOSIS — L12.0 BULLOUS PEMPHIGOID: Primary | ICD-10-CM

## 2024-12-17 NOTE — TELEPHONE ENCOUNTER
Pt calling stating back when she saw Dr Goetz she was given pills for her itching, pt states it's bollous pemphigoid and pt states she's having the itching again and questions if you could call this in for her, pt uses pended pharmacy, please advise.

## 2024-12-21 RX ORDER — HYDROXYZINE HYDROCHLORIDE 25 MG/1
12.5 TABLET, FILM COATED ORAL EVERY 8 HOURS PRN
Qty: 90 TABLET | Refills: 0 | Status: SHIPPED | OUTPATIENT
Start: 2024-12-21

## 2024-12-21 NOTE — TELEPHONE ENCOUNTER
Will refill med, but with pt's current Cr clearance she can only take max dose of 12.5 mg at a time.  Rx refilled for her to take 1/2 of a 25 mg tab (12.5 mg) every 8 hours as needed for itching.

## 2024-12-31 ENCOUNTER — HOSPITAL ENCOUNTER (OUTPATIENT)
Dept: MAMMOGRAPHY | Age: 84
Discharge: HOME | End: 2025-01-02
Attending: FAMILY MEDICINE
Payer: MEDICARE

## 2024-12-31 VITALS — BODY MASS INDEX: 24.8 KG/M2 | WEIGHT: 140 LBS | HEIGHT: 63 IN

## 2024-12-31 DIAGNOSIS — Z12.31 SCREENING MAMMOGRAM, ENCOUNTER FOR: ICD-10-CM

## 2024-12-31 PROCEDURE — 77063 BREAST TOMOSYNTHESIS BI: CPT

## 2025-01-06 DIAGNOSIS — Z12.31 SCREENING MAMMOGRAM, ENCOUNTER FOR: Primary | ICD-10-CM

## 2025-01-24 ENCOUNTER — TELEPHONE (OUTPATIENT)
Dept: FAMILY MEDICINE CLINIC | Age: 85
End: 2025-01-24

## 2025-01-28 DIAGNOSIS — J20.8 ACUTE BRONCHITIS DUE TO OTHER SPECIFIED ORGANISMS: ICD-10-CM

## 2025-01-28 DIAGNOSIS — I10 ESSENTIAL HYPERTENSION: ICD-10-CM

## 2025-01-28 DIAGNOSIS — R60.0 BILATERAL LOWER EXTREMITY EDEMA: ICD-10-CM

## 2025-01-28 DIAGNOSIS — J43.9 MILD EMPHYSEMA (HCC): Primary | ICD-10-CM

## 2025-01-28 DIAGNOSIS — K21.9 GASTROESOPHAGEAL REFLUX DISEASE, UNSPECIFIED WHETHER ESOPHAGITIS PRESENT: ICD-10-CM

## 2025-01-28 NOTE — TELEPHONE ENCOUNTER
Adela called requesting a refill of the below medication which has been pended for you:     Requested Prescriptions     Pending Prescriptions Disp Refills    lisinopril (PRINIVIL;ZESTRIL) 20 MG tablet 90 tablet 3     Sig: Take 1 tablet by mouth daily    torsemide (DEMADEX) 10 MG tablet 90 tablet 3     Sig: Take 1 tablet by mouth daily    albuterol (PROVENTIL) (2.5 MG/3ML) 0.083% nebulizer solution 120 each 0     Sig: Take 3 mLs by nebulization every 6 hours    metoprolol succinate (TOPROL XL) 25 MG extended release tablet 90 tablet 3     Sig: Take 1 tablet by mouth daily    omeprazole (PRILOSEC) 20 MG delayed release capsule 90 capsule 3     Sig: Take 1 capsule by mouth daily as needed (REFLUX)       Last Appointment Date: 10/21/2024  Next Appointment Date: 4/22/2025    No Known Allergies

## 2025-02-03 ENCOUNTER — OFFICE VISIT (OUTPATIENT)
Dept: CARDIOLOGY | Age: 85
End: 2025-02-03
Payer: COMMERCIAL

## 2025-02-03 VITALS
SYSTOLIC BLOOD PRESSURE: 132 MMHG | HEIGHT: 63 IN | HEART RATE: 62 BPM | BODY MASS INDEX: 25.16 KG/M2 | DIASTOLIC BLOOD PRESSURE: 50 MMHG | WEIGHT: 142 LBS

## 2025-02-03 DIAGNOSIS — I36.1 NONRHEUMATIC TRICUSPID VALVE REGURGITATION: ICD-10-CM

## 2025-02-03 DIAGNOSIS — I34.0 NONRHEUMATIC MITRAL VALVE REGURGITATION: Primary | ICD-10-CM

## 2025-02-03 DIAGNOSIS — I10 ESSENTIAL HYPERTENSION: ICD-10-CM

## 2025-02-03 PROCEDURE — 3075F SYST BP GE 130 - 139MM HG: CPT | Performed by: SURGERY

## 2025-02-03 PROCEDURE — 3078F DIAST BP <80 MM HG: CPT | Performed by: SURGERY

## 2025-02-03 PROCEDURE — 93005 ELECTROCARDIOGRAM TRACING: CPT | Performed by: SURGERY

## 2025-02-03 PROCEDURE — 99214 OFFICE O/P EST MOD 30 MIN: CPT | Performed by: SURGERY

## 2025-02-03 PROCEDURE — 1123F ACP DISCUSS/DSCN MKR DOCD: CPT | Performed by: SURGERY

## 2025-02-03 PROCEDURE — 99213 OFFICE O/P EST LOW 20 MIN: CPT | Performed by: SURGERY

## 2025-02-03 PROCEDURE — 1159F MED LIST DOCD IN RCRD: CPT | Performed by: SURGERY

## 2025-02-03 PROCEDURE — 93010 ELECTROCARDIOGRAM REPORT: CPT | Performed by: SURGERY

## 2025-02-03 PROCEDURE — 1126F AMNT PAIN NOTED NONE PRSNT: CPT | Performed by: SURGERY

## 2025-02-03 NOTE — PROGRESS NOTES
Cardiology Consultation/Follow Up.  [facility]    Adela Mcfadden  1940  [id number]    Today: 2/3/25    CC: Patient is here for follow up for HTN.     HPI:   Adela Mcfadden is here for follow up for HTN.   She feels well.   denies  any chest pain or discomfort. No orthopnea or PND. Laura any palpitation, dizziness or syncope. Patient denies any problem with the blood pressure at home   Using cane for ambulation.     Past Medical:  Past Medical History:   Diagnosis Date    Bullous pemphigoid     Elevated fasting glucose     Former smoker     quit in 1987    GERD (gastroesophageal reflux disease)     Hypertension     Osteoarthritis     Osteopenia     took Boniva times 5 years, stopped 2012    Overweight(278.02)     wieght 174 pounds, height 65 inches, BMI 29, 3/31/2013       Past Surgical:  Past Surgical History:   Procedure Laterality Date    ABDOMINAL EXPLORATION SURGERY  1972    ectopic pregnancy, bilateral salpingectomy    CATARACT REMOVAL WITH IMPLANT Left 07/24/2018    per Dr Maldonado    HYSTERECTOMY (CERVIX STATUS UNKNOWN)      partial    OVARY REMOVAL      unsure which one removed, has one left     [facility] COLONOSCOPY W/BIOPSY SINGLE/MULTIPLE N/A 7/26/2017    COLONOSCOPY WITH BIOPSY performed by Quita Christiansen MD at [facility] OR, one tubular adenoma on pathology, repeat recommended in 5 years    TOTAL HIP ARTHROPLASTY Left 8/3/2021    Left Total Hip Arthroplasty performed by Junior Harrison MD at [facility] OR    TOTAL HIP ARTHROPLASTY Right 10/26/2021    Right Total Hip Arthroplasty performed by Junior Harrison MD at [facility] OR       Family History:  Family History   Problem Relation Age of Onset    Cancer Mother         unknown type of cancer    Diabetes Mother     Breast Cancer Mother 76    Heart Disease Father         CHF    No Known Problems Sister     Diabetes Brother     Diabetes Brother     No Known Problems Daughter     No Known Problems Maternal Aunt     No Known Problems Maternal Aunt

## 2025-02-04 RX ORDER — TORSEMIDE 10 MG/1
10 TABLET ORAL DAILY
Qty: 15 TABLET | Refills: 0 | Status: SHIPPED | OUTPATIENT
Start: 2025-02-04

## 2025-02-04 RX ORDER — ALBUTEROL SULFATE 0.83 MG/ML
2.5 SOLUTION RESPIRATORY (INHALATION) EVERY 6 HOURS PRN
Qty: 120 EACH | Refills: 3 | Status: SHIPPED | OUTPATIENT
Start: 2025-02-04

## 2025-02-04 RX ORDER — TORSEMIDE 10 MG/1
10 TABLET ORAL DAILY
Qty: 90 TABLET | Refills: 3 | Status: SHIPPED | OUTPATIENT
Start: 2025-02-04

## 2025-02-04 RX ORDER — LISINOPRIL 20 MG/1
20 TABLET ORAL DAILY
Qty: 15 TABLET | Refills: 0 | Status: SHIPPED | OUTPATIENT
Start: 2025-02-04

## 2025-02-04 RX ORDER — ALBUTEROL SULFATE 0.83 MG/ML
2.5 SOLUTION RESPIRATORY (INHALATION) EVERY 6 HOURS PRN
Qty: 120 EACH | Refills: 3 | Status: SHIPPED | OUTPATIENT
Start: 2025-02-04 | End: 2025-02-04 | Stop reason: SDUPTHER

## 2025-02-04 RX ORDER — LISINOPRIL 20 MG/1
20 TABLET ORAL DAILY
Qty: 90 TABLET | Refills: 3 | Status: SHIPPED | OUTPATIENT
Start: 2025-02-04

## 2025-02-04 RX ORDER — ALBUTEROL SULFATE 0.83 MG/ML
2.5 SOLUTION RESPIRATORY (INHALATION) EVERY 6 HOURS PRN
Qty: 30 EACH | Refills: 0 | Status: SHIPPED | OUTPATIENT
Start: 2025-02-04

## 2025-02-04 RX ORDER — METOPROLOL SUCCINATE 25 MG/1
25 TABLET, EXTENDED RELEASE ORAL DAILY
Qty: 15 TABLET | Refills: 0 | Status: SHIPPED | OUTPATIENT
Start: 2025-02-04

## 2025-02-04 RX ORDER — METOPROLOL SUCCINATE 25 MG/1
25 TABLET, EXTENDED RELEASE ORAL DAILY
Qty: 90 TABLET | Refills: 3 | Status: SHIPPED | OUTPATIENT
Start: 2025-02-04

## 2025-02-07 ENCOUNTER — HOSPITAL ENCOUNTER (OUTPATIENT)
Age: 85
Discharge: HOME | End: 2025-02-09
Payer: COMMERCIAL

## 2025-02-07 ENCOUNTER — TELEPHONE (OUTPATIENT)
Dept: CARDIOLOGY | Age: 85
End: 2025-02-07

## 2025-02-07 VITALS — WEIGHT: 142 LBS | BODY MASS INDEX: 25.16 KG/M2 | HEIGHT: 63 IN

## 2025-02-07 DIAGNOSIS — I10 ESSENTIAL HYPERTENSION: ICD-10-CM

## 2025-02-07 DIAGNOSIS — I36.1 NONRHEUMATIC TRICUSPID VALVE REGURGITATION: ICD-10-CM

## 2025-02-07 DIAGNOSIS — I34.0 NONRHEUMATIC MITRAL VALVE REGURGITATION: ICD-10-CM

## 2025-02-07 LAB
ECHO AO ROOT DIAM: 2.4 CM
ECHO AO ROOT INDEX: 1.44 CM/M2
ECHO AV AREA PEAK VELOCITY: 1.8 CM2
ECHO AV AREA VTI: 1.8 CM2
ECHO AV AREA/BSA PEAK VELOCITY: 1.1 CM2/M2
ECHO AV AREA/BSA VTI: 1.1 CM2/M2
ECHO AV MEAN GRADIENT: 6 MMHG
ECHO AV MEAN VELOCITY: 1.1 M/S
ECHO AV PEAK GRADIENT: 10 MMHG
ECHO AV PEAK GRADIENT: 10 MMHG
ECHO AV PEAK VELOCITY: 1.6 M/S
ECHO AV VELOCITY RATIO: 0.69
ECHO AV VTI: 40.7 CM
ECHO BSA: 1.69 M2
ECHO EST RA PRESSURE: 8 MMHG
ECHO IVC PROX: 2.2 CM
ECHO LA AREA 2C: 17 CM2
ECHO LA AREA 4C: 16.4 CM2
ECHO LA DIAMETER INDEX: 1.86 CM/M2
ECHO LA DIAMETER: 3.1 CM
ECHO LA MAJOR AXIS: 4.9 CM
ECHO LA MINOR AXIS: 5.2 CM
ECHO LA TO AORTIC ROOT RATIO: 1.29
ECHO LA VOL BP: 47 ML (ref 22–52)
ECHO LA VOL MOD A2C: 47 ML (ref 22–52)
ECHO LA VOL MOD A4C: 44 ML (ref 22–52)
ECHO LA VOL/BSA BIPLANE: 28 ML/M2 (ref 16–34)
ECHO LA VOLUME INDEX MOD A2C: 28 ML/M2 (ref 16–34)
ECHO LA VOLUME INDEX MOD A4C: 26 ML/M2 (ref 16–34)
ECHO LV E' LATERAL VELOCITY: 9.9 CM/S
ECHO LV E' SEPTAL VELOCITY: 5.77 CM/S
ECHO LV EDV A2C: 43 ML
ECHO LV EDV A4C: 51 ML
ECHO LV EDV INDEX A4C: 31 ML/M2
ECHO LV EDV NDEX A2C: 26 ML/M2
ECHO LV EJECTION FRACTION A2C: 75 %
ECHO LV EJECTION FRACTION A4C: 59 %
ECHO LV EJECTION FRACTION BIPLANE: 67 % (ref 55–100)
ECHO LV ESV A2C: 11 ML
ECHO LV ESV A4C: 21 ML
ECHO LV ESV INDEX A2C: 7 ML/M2
ECHO LV ESV INDEX A4C: 13 ML/M2
ECHO LV FRACTIONAL SHORTENING: 38 % (ref 28–44)
ECHO LV INTERNAL DIMENSION DIASTOLE INDEX: 2.4 CM/M2
ECHO LV INTERNAL DIMENSION DIASTOLIC: 4 CM (ref 3.9–5.3)
ECHO LV INTERNAL DIMENSION SYSTOLIC INDEX: 1.5 CM/M2
ECHO LV INTERNAL DIMENSION SYSTOLIC: 2.5 CM
ECHO LV IVSD: 1 CM (ref 0.6–0.9)
ECHO LV MASS 2D: 127.1 G (ref 67–162)
ECHO LV MASS INDEX 2D: 76.1 G/M2 (ref 43–95)
ECHO LV POSTERIOR WALL DIASTOLIC: 1 CM (ref 0.6–0.9)
ECHO LV RELATIVE WALL THICKNESS RATIO: 0.5
ECHO LVOT AREA: 2.5 CM2
ECHO LVOT AV VTI INDEX: 0.71
ECHO LVOT DIAM: 1.8 CM
ECHO LVOT MEAN GRADIENT: 3 MMHG
ECHO LVOT PEAK GRADIENT: 5 MMHG
ECHO LVOT PEAK VELOCITY: 1.1 M/S
ECHO LVOT STROKE VOLUME INDEX: 43.7 ML/M2
ECHO LVOT SV: 73 ML
ECHO LVOT VTI: 28.7 CM
ECHO MV A VELOCITY: 0.99 M/S
ECHO MV E DECELERATION TIME (DT): 222 MS
ECHO MV E VELOCITY: 0.82 M/S
ECHO MV E/A RATIO: 0.83
ECHO MV E/E' LATERAL: 8.28
ECHO MV E/E' RATIO (AVERAGED): 11.25
ECHO MV E/E' SEPTAL: 14.21
ECHO RA AREA 4C: 13.2 CM2
ECHO RA END SYSTOLIC VOLUME APICAL 4 CHAMBER INDEX BSA: 20 ML/M2
ECHO RA VOLUME: 33 ML
ECHO RIGHT VENTRICULAR SYSTOLIC PRESSURE (RVSP): 47 MMHG
ECHO RV TAPSE: 2.1 CM (ref 1.7–?)
ECHO TV REGURGITANT MAX VELOCITY: 3.11 M/S
ECHO TV REGURGITANT PEAK GRADIENT: 39 MMHG

## 2025-02-07 PROCEDURE — 93306 TTE W/DOPPLER COMPLETE: CPT | Performed by: INTERNAL MEDICINE

## 2025-02-07 PROCEDURE — 93306 TTE W/DOPPLER COMPLETE: CPT

## 2025-02-07 NOTE — TELEPHONE ENCOUNTER
Interpretation Summary  \    Left Ventricle: Normal left ventricular systolic function. EF by 2D Simpsons Biplane is 67%. Left ventricle size is normal. Normal wall thickness. Normal wall motion. Normal diastolic function.    Mitral Valve: Mild annular calcification at the posterior leaflet. Mild regurgitation.    Tricuspid Valve: Moderate regurgitation. Moderately elevated RVSP, consistent with moderate pulmonary hypertension.    Image quality is adequate.     Echo Findings    Left Ventricle Normal left ventricular systolic function. EF by 2D Simpsons Biplane is 67%. Left ventricle size is normal. Normal wall thickness. Normal wall motion. Normal diastolic function.   Left Atrium Left atrium size is normal. LA Vol Index is  28 ml/m2.   Right Ventricle Right ventricle size is normal. Normal systolic function. TAPSE is normal.   Right Atrium Right atrium size is normal.   Aortic Valve Mildly thickened cusps. No regurgitation. No stenosis.   Mitral Valve Mild annular calcification at the posterior leaflet. Mild regurgitation. No stenosis noted.   Tricuspid Valve Valve structure is normal. Moderate regurgitation. No stenosis noted. Moderately elevated RVSP, consistent with moderate pulmonary hypertension.   Pulmonic Valve The pulmonic valve visualization is suboptimal but appears to be functioning normally. Physiologically normal regurgitation. No stenosis noted.   Aorta Normal sized aortic root and ascending aorta.   IVC/Hepatic Veins IVC diameter is greater than 21 mm and decreases greater than 50% during inspiration; therefore the estimated right atrial pressure is intermediate (~8 mmHg). IVC size is normal.   Pericardium No pericardial effusion.

## 2025-02-10 NOTE — TELEPHONE ENCOUNTER
Patient notified of echo results. Pt was advised to keep all upcoming appts and to call the office with any problems or concerns. Pt verbalized understanding and had no further questions at the time.

## 2025-02-11 DIAGNOSIS — E79.0 HYPERURICEMIA: ICD-10-CM

## 2025-02-11 NOTE — TELEPHONE ENCOUNTER
Adela called requesting a refill of the below medication which has been pended for you:     Requested Prescriptions     Pending Prescriptions Disp Refills    allopurinol (ZYLOPRIM) 100 MG tablet 180 tablet 1     Sig: Take 2 tablets by mouth daily       Last Appointment Date: 10/21/2024  Next Appointment Date: 4/22/2025    No Known Allergies

## 2025-02-13 RX ORDER — ALLOPURINOL 100 MG/1
200 TABLET ORAL DAILY
Qty: 180 TABLET | Refills: 0 | Status: SHIPPED | OUTPATIENT
Start: 2025-02-13

## 2025-02-14 NOTE — TELEPHONE ENCOUNTER
Pt's last uric acid level was in normal range on 9/3 (4.9), so if she was taking at that time, would recommend she continue same dose.  Due to re-check uric acid level with other labwork around 4/15/25.  Med refilled now.

## 2025-02-14 NOTE — TELEPHONE ENCOUNTER
Pt was taking at time of last lab draw. Voiced understanding to continue and of recheck in April.

## 2025-04-18 ENCOUNTER — HOSPITAL ENCOUNTER (OUTPATIENT)
Age: 85
Discharge: HOME | End: 2025-04-18
Payer: COMMERCIAL

## 2025-04-18 DIAGNOSIS — E79.0 HYPERURICEMIA: ICD-10-CM

## 2025-04-18 DIAGNOSIS — N18.31 STAGE 3A CHRONIC KIDNEY DISEASE (CKD) (HCC): ICD-10-CM

## 2025-04-18 DIAGNOSIS — I10 ESSENTIAL HYPERTENSION: ICD-10-CM

## 2025-04-18 DIAGNOSIS — R73.01 ELEVATED FASTING GLUCOSE: ICD-10-CM

## 2025-04-18 LAB
ALBUMIN SERPL-MCNC: 4.1 G/DL (ref 3.5–5.2)
ALBUMIN/GLOB SERPL: 1.5 {RATIO} (ref 1–2.5)
ALP SERPL-CCNC: 80 U/L (ref 35–104)
ALT SERPL-CCNC: 14 U/L (ref 5–33)
ANION GAP SERPL CALCULATED.3IONS-SCNC: 10 MMOL/L (ref 9–17)
AST SERPL-CCNC: 20 U/L
BASOPHILS # BLD: 0.03 K/UL (ref 0–0.2)
BASOPHILS NFR BLD: 0 % (ref 0–2)
BILIRUB SERPL-MCNC: 0.5 MG/DL (ref 0.3–1.2)
BUN SERPL-MCNC: 28 MG/DL (ref 8–23)
BUN/CREAT SERPL: 28 (ref 9–20)
CALCIUM SERPL-MCNC: 9.8 MG/DL (ref 8.6–10.4)
CHLORIDE SERPL-SCNC: 103 MMOL/L (ref 98–107)
CHOLEST SERPL-MCNC: 188 MG/DL (ref 0–199)
CHOLESTEROL/HDL RATIO: 2.5
CO2 SERPL-SCNC: 28 MMOL/L (ref 20–31)
CREAT SERPL-MCNC: 1 MG/DL (ref 0.5–0.9)
EOSINOPHIL # BLD: 0.24 K/UL (ref 0–0.44)
EOSINOPHILS RELATIVE PERCENT: 3 % (ref 1–4)
ERYTHROCYTE [DISTWIDTH] IN BLOOD BY AUTOMATED COUNT: 14 % (ref 11.8–14.4)
EST. AVERAGE GLUCOSE BLD GHB EST-MCNC: 114 MG/DL
GFR, ESTIMATED: 56 ML/MIN/1.73M2
GLUCOSE SERPL-MCNC: 96 MG/DL (ref 70–99)
HBA1C MFR BLD: 5.6 % (ref 4–6)
HCT VFR BLD AUTO: 40.4 % (ref 36.3–47.1)
HDLC SERPL-MCNC: 74 MG/DL
HGB BLD-MCNC: 13.1 G/DL (ref 11.9–15.1)
IMM GRANULOCYTES # BLD AUTO: 0.03 K/UL (ref 0–0.3)
IMM GRANULOCYTES NFR BLD: 0 %
LDLC SERPL CALC-MCNC: 101 MG/DL (ref 0–100)
LYMPHOCYTES NFR BLD: 2.35 K/UL (ref 1.1–3.7)
LYMPHOCYTES RELATIVE PERCENT: 34 % (ref 24–43)
MCH RBC QN AUTO: 30.5 PG (ref 25.2–33.5)
MCHC RBC AUTO-ENTMCNC: 32.4 G/DL (ref 25.2–33.5)
MCV RBC AUTO: 94.2 FL (ref 82.6–102.9)
MONOCYTES NFR BLD: 0.67 K/UL (ref 0.1–1.2)
MONOCYTES NFR BLD: 10 % (ref 3–12)
NEUTROPHILS NFR BLD: 53 % (ref 36–65)
NEUTS SEG NFR BLD: 3.68 K/UL (ref 1.5–8.1)
NRBC BLD-RTO: 0 PER 100 WBC
PLATELET # BLD AUTO: 233 K/UL (ref 138–453)
PMV BLD AUTO: 10.3 FL (ref 8.1–13.5)
POTASSIUM SERPL-SCNC: 4.2 MMOL/L (ref 3.7–5.3)
PROT SERPL-MCNC: 6.8 G/DL (ref 6.4–8.3)
RBC # BLD AUTO: 4.29 M/UL (ref 3.95–5.11)
SODIUM SERPL-SCNC: 141 MMOL/L (ref 135–144)
TRIGL SERPL-MCNC: 64 MG/DL
URATE SERPL-MCNC: 4.3 MG/DL (ref 2.4–5.7)
VLDLC SERPL CALC-MCNC: 13 MG/DL (ref 1–30)
WBC OTHER # BLD: 7 K/UL (ref 3.5–11.3)

## 2025-04-18 PROCEDURE — 84550 ASSAY OF BLOOD/URIC ACID: CPT

## 2025-04-18 PROCEDURE — 80053 COMPREHEN METABOLIC PANEL: CPT

## 2025-04-18 PROCEDURE — 36415 COLL VENOUS BLD VENIPUNCTURE: CPT

## 2025-04-18 PROCEDURE — 83036 HEMOGLOBIN GLYCOSYLATED A1C: CPT

## 2025-04-18 PROCEDURE — 80061 LIPID PANEL: CPT

## 2025-04-18 PROCEDURE — 85025 COMPLETE CBC W/AUTO DIFF WBC: CPT

## 2025-04-22 ENCOUNTER — OFFICE VISIT (OUTPATIENT)
Dept: FAMILY MEDICINE CLINIC | Age: 85
End: 2025-04-22
Payer: COMMERCIAL

## 2025-04-22 VITALS
RESPIRATION RATE: 16 BRPM | OXYGEN SATURATION: 99 % | WEIGHT: 146 LBS | SYSTOLIC BLOOD PRESSURE: 132 MMHG | DIASTOLIC BLOOD PRESSURE: 56 MMHG | HEART RATE: 71 BPM | BODY MASS INDEX: 25.87 KG/M2 | HEIGHT: 63 IN | TEMPERATURE: 96.3 F

## 2025-04-22 DIAGNOSIS — E79.0 HYPERURICEMIA: Primary | ICD-10-CM

## 2025-04-22 DIAGNOSIS — M85.80 OSTEOPENIA, UNSPECIFIED LOCATION: ICD-10-CM

## 2025-04-22 DIAGNOSIS — R60.0 BILATERAL LOWER EXTREMITY EDEMA: ICD-10-CM

## 2025-04-22 DIAGNOSIS — I10 ESSENTIAL HYPERTENSION: ICD-10-CM

## 2025-04-22 DIAGNOSIS — R73.01 ELEVATED FASTING GLUCOSE: ICD-10-CM

## 2025-04-22 DIAGNOSIS — Z78.0 POST-MENOPAUSAL: ICD-10-CM

## 2025-04-22 DIAGNOSIS — E78.00 ELEVATED LDL CHOLESTEROL LEVEL: ICD-10-CM

## 2025-04-22 PROCEDURE — 1123F ACP DISCUSS/DSCN MKR DOCD: CPT | Performed by: FAMILY MEDICINE

## 2025-04-22 PROCEDURE — 3075F SYST BP GE 130 - 139MM HG: CPT | Performed by: FAMILY MEDICINE

## 2025-04-22 PROCEDURE — 1159F MED LIST DOCD IN RCRD: CPT | Performed by: FAMILY MEDICINE

## 2025-04-22 PROCEDURE — 1160F RVW MEDS BY RX/DR IN RCRD: CPT | Performed by: FAMILY MEDICINE

## 2025-04-22 PROCEDURE — 3078F DIAST BP <80 MM HG: CPT | Performed by: FAMILY MEDICINE

## 2025-04-22 PROCEDURE — 99214 OFFICE O/P EST MOD 30 MIN: CPT | Performed by: FAMILY MEDICINE

## 2025-04-22 PROCEDURE — G2211 COMPLEX E/M VISIT ADD ON: HCPCS | Performed by: FAMILY MEDICINE

## 2025-04-22 RX ORDER — ALLOPURINOL 100 MG/1
200 TABLET ORAL DAILY
Qty: 180 TABLET | Refills: 3 | Status: SHIPPED | OUTPATIENT
Start: 2025-04-22

## 2025-04-22 SDOH — ECONOMIC STABILITY: FOOD INSECURITY: WITHIN THE PAST 12 MONTHS, THE FOOD YOU BOUGHT JUST DIDN'T LAST AND YOU DIDN'T HAVE MONEY TO GET MORE.: NEVER TRUE

## 2025-04-22 SDOH — ECONOMIC STABILITY: FOOD INSECURITY: WITHIN THE PAST 12 MONTHS, YOU WORRIED THAT YOUR FOOD WOULD RUN OUT BEFORE YOU GOT MONEY TO BUY MORE.: NEVER TRUE

## 2025-04-22 ASSESSMENT — PATIENT HEALTH QUESTIONNAIRE - PHQ9
SUM OF ALL RESPONSES TO PHQ QUESTIONS 1-9: 0
1. LITTLE INTEREST OR PLEASURE IN DOING THINGS: NOT AT ALL
2. FEELING DOWN, DEPRESSED OR HOPELESS: NOT AT ALL
SUM OF ALL RESPONSES TO PHQ QUESTIONS 1-9: 0

## 2025-04-22 NOTE — PROGRESS NOTES
[facility]  [address] [id number]  [phone]      Adela Mcfadden is a 84 y.o. female who presents today for her medical conditions/complaints as noted below.  Adela Mcfadden is c/o of Hypertension      HPI:     History of Present Illness  The patient is an 84-year-old female who presents today for follow-up on blood pressure and labs.    No immediate need for medication refills is reported, as the pharmacy has already initiated the process. Recent lab results from 04/18/2025 indicate normal A1c and blood sugar levels, ruling out diabetes and prediabetes. Electrolytes, liver function, and uric acid levels are within normal ranges. Kidney function is slightly elevated at 1.0, which is 1/10 of a point from the ideal value of 0.9. Cholesterol levels are mostly perfect, with LDL at 101, a 20-point improvement from the previous year. Blood cell counts, including white blood cells, red blood cells, hemoglobin, and platelets, are normal.     She has not been using Motrin, Advil, or Aleve products recently. Tylenol was taken a couple of days ago for pain, but she usually does not take such medications.          Past Medical History:   Diagnosis Date    Bullous pemphigoid (HCC)     Elevated fasting glucose     Former smoker     quit in 1987    GERD (gastroesophageal reflux disease)     Hypertension     Osteoarthritis     Osteopenia     took Boniva times 5 years, stopped 2012    Overweight(278.02)     wieght 174 pounds, height 65 inches, BMI 29, 3/31/2013      Past Surgical History:   Procedure Laterality Date    ABDOMINAL EXPLORATION SURGERY  1972    ectopic pregnancy, bilateral salpingectomy    CATARACT REMOVAL WITH IMPLANT Left 07/24/2018    per Dr Maldonado    HYSTERECTOMY (CERVIX STATUS UNKNOWN)      partial    OVARY REMOVAL      unsure which one removed, has one left     [facility] COLONOSCOPY W/BIOPSY SINGLE/MULTIPLE N/A 7/26/2017    COLONOSCOPY WITH BIOPSY performed

## 2025-05-05 ENCOUNTER — HOSPITAL ENCOUNTER (OUTPATIENT)
Dept: CT IMAGING | Age: 85
Discharge: HOME | End: 2025-05-07
Attending: FAMILY MEDICINE
Payer: COMMERCIAL

## 2025-05-05 DIAGNOSIS — R91.1 LUNG NODULE: ICD-10-CM

## 2025-05-05 PROCEDURE — 71250 CT THORAX DX C-: CPT

## 2025-05-13 ENCOUNTER — RESULTS FOLLOW-UP (OUTPATIENT)
Dept: FAMILY MEDICINE CLINIC | Age: 85
End: 2025-05-13

## 2025-05-13 DIAGNOSIS — R91.1 LUNG NODULE: Primary | ICD-10-CM

## 2025-05-13 DIAGNOSIS — E27.9 ADRENAL NODULE: ICD-10-CM

## 2025-05-27 ENCOUNTER — HOSPITAL ENCOUNTER (OUTPATIENT)
Dept: BONE DENSITY | Age: 85
Discharge: HOME | End: 2025-05-29
Attending: FAMILY MEDICINE
Payer: COMMERCIAL

## 2025-05-27 DIAGNOSIS — Z78.0 POST-MENOPAUSAL: ICD-10-CM

## 2025-05-27 PROCEDURE — 77085 DXA BONE DENSITY AXL VRT FX: CPT

## 2025-06-03 ENCOUNTER — TELEPHONE (OUTPATIENT)
Dept: FAMILY MEDICINE CLINIC | Age: 85
End: 2025-06-03

## 2025-06-03 ENCOUNTER — HOSPITAL ENCOUNTER (OUTPATIENT)
Age: 85
Discharge: HOME | End: 2025-06-03
Payer: COMMERCIAL

## 2025-06-03 DIAGNOSIS — Z01.812 PRE-PROCEDURE LAB EXAM: ICD-10-CM

## 2025-06-03 DIAGNOSIS — Z01.812 PRE-PROCEDURE LAB EXAM: Primary | ICD-10-CM

## 2025-06-03 LAB
CREAT SERPL-MCNC: 1.2 MG/DL (ref 0.6–0.9)
GFR, ESTIMATED: 45 ML/MIN/1.73M2

## 2025-06-03 PROCEDURE — 36415 COLL VENOUS BLD VENIPUNCTURE: CPT

## 2025-06-03 PROCEDURE — 82565 ASSAY OF CREATININE: CPT

## 2025-06-03 NOTE — TELEPHONE ENCOUNTER
Call from lab, pt is having CT scan on 6/5, was told by KB that she would have some labs to do the day or 2 before. No orders found at this time. Net labs are due in 2026.   CT scan was ordered and is on the schedule for with and without contrast. STAT creat ordered at this time.

## 2025-06-05 ENCOUNTER — HOSPITAL ENCOUNTER (OUTPATIENT)
Dept: CT IMAGING | Age: 85
Discharge: HOME | End: 2025-06-07
Payer: COMMERCIAL

## 2025-06-05 DIAGNOSIS — E27.9 ADRENAL NODULE: ICD-10-CM

## 2025-06-05 PROCEDURE — 2709999900 CT ADRENALS W WO CONTRAST

## 2025-06-05 PROCEDURE — 6360000004 HC RX CONTRAST MEDICATION: Performed by: FAMILY MEDICINE

## 2025-06-05 RX ORDER — IOPAMIDOL 755 MG/ML
100 INJECTION, SOLUTION INTRAVASCULAR
Status: COMPLETED | OUTPATIENT
Start: 2025-06-05 | End: 2025-06-05

## 2025-06-05 RX ADMIN — IOPAMIDOL 100 ML: 755 INJECTION, SOLUTION INTRAVENOUS at 13:41

## 2025-06-20 DIAGNOSIS — I10 ESSENTIAL HYPERTENSION: ICD-10-CM

## 2025-06-20 NOTE — TELEPHONE ENCOUNTER
Adela called requesting a refill of the below medication which has been pended for you:   Pt using new pharmacy, needs new prescription.   Requested Prescriptions     Pending Prescriptions Disp Refills    amLODIPine (NORVASC) 5 MG tablet 90 tablet 3     Sig: Take 1 tablet by mouth once daily       Last Appointment Date: 4/22/2025  Next Appointment Date: 10/22/2025    No Known Allergies

## 2025-06-22 RX ORDER — AMLODIPINE BESYLATE 5 MG/1
TABLET ORAL
Qty: 90 TABLET | Refills: 3 | Status: SHIPPED | OUTPATIENT
Start: 2025-06-22

## (undated) DEVICE — SCRUB DRY SURG EZ SCRUB BRUSH PREOPERATIVE GRN

## (undated) DEVICE — STRYKER PERFORMANCE SERIES SAGITTAL BLADE: Brand: STRYKER PERFORMANCE SERIES

## (undated) DEVICE — PACK SURG PROC REMINDER N WVN DISPOSABLE BEAC TIME OUT

## (undated) DEVICE — GLOVE ORANGE PI 7 1/2   MSG9075

## (undated) DEVICE — GLOVE SURG SZ 85 L12IN FNGR THK13MIL WHT ISOLEX POLYISOPRENE

## (undated) DEVICE — GOWN,AURORA,NON-REINFORCED,2XL: Brand: MEDLINE

## (undated) DEVICE — GAUZE,SPONGE,4"X4",16PLY,XRAY,STRL,LF: Brand: MEDLINE

## (undated) DEVICE — CONNECTOR TBNG AUX H2O JET DISP FOR OLY 160/180 SER

## (undated) DEVICE — 3M™ TEGADERM™ TRANSPARENT FILM DRESSING FRAME STYLE, 1627, 4 IN X 10 IN (10 CM X 25 CM), 20/CT 4CT/CASE: Brand: 3M™ TEGADERM™

## (undated) DEVICE — SUTURE STRATAFIX SPRL SZ 1 L14IN ABSRB VLT L48CM CTX 1/2 SXPD2B405

## (undated) DEVICE — 450 ML BOTTLE OF 0.05% CHLORHEXIDINE GLUCONATE IN 99.95% STERILE WATER FOR IRRIGATION, USP AND APPLICATOR.: Brand: IRRISEPT ANTIMICROBIAL WOUND LAVAGE

## (undated) DEVICE — SYRINGE, LUER LOCK, 60ML: Brand: MEDLINE

## (undated) DEVICE — SKIN AFFIX SURG ADHESIVE 72/CS 0.55ML: Brand: MEDLINE

## (undated) DEVICE — NEEDLE SPNL L3.5IN PNK HUB S STL REG WALL FIT STYL W/ QNCKE

## (undated) DEVICE — SUTURE VCRL SZ 1 L36IN ABSRB UD L48MM CTXB 1/2 CIR BLNT PNT JB977H

## (undated) DEVICE — SUTURE ETHBND EXCEL SZ 5 L30IN NONABSORBABLE GRN L48MM CCS MB47G

## (undated) DEVICE — Z DISCONTINUED USE 2624852 GLOVE SURG 7 PF TEXT NEOPRNE BRN STRL NEOLON 2G LF

## (undated) DEVICE — 1200CC GUARDIAN II: Brand: GUARDIAN

## (undated) DEVICE — BLADE ES L6IN ELASTOMERIC COAT EXT DURABLE BEND UPTO 90DEG

## (undated) DEVICE — TOWEL,OR,DSP,ST,BLUE,STD,4/PK,20PK/CS: Brand: MEDLINE

## (undated) DEVICE — 3M™ WARMING BLANKET, UPPER BODY, 10 PER CASE, 42268: Brand: BAIR HUGGER™

## (undated) DEVICE — SUTURE STRATAFIX SPRL SZ 2-0 L9IN ABSRB VLT MH L36MM 1/2 SXPD2B408

## (undated) DEVICE — FORCEPS BX L240CM JAW DIA2.4MM ORNG L CAP W/ NDL DISP RAD

## (undated) DEVICE — DRAPE,U/ SHT,SPLIT,PLAS,STERIL: Brand: MEDLINE

## (undated) DEVICE — Device

## (undated) DEVICE — LINE SAMP O2 6.5FT W/FEMALE CONN F/ADULT CAPNOLINE PLUS

## (undated) DEVICE — GLOVE SURG SZ 9 THK91MIL LTX FREE SYN POLYISOPRENE ANTI

## (undated) DEVICE — COVER LT HNDL BLU PLAS

## (undated) DEVICE — 3M™ COBAN™ NL STERILE NON-LATEX SELF-ADHERENT WRAP, 2084S, 4 IN X 5 YD (10 CM X 4,5 M), 18 ROLLS/CASE: Brand: 3M™ COBAN™

## (undated) DEVICE — DRAPE,ORTHOMAX ,HIP,W/POUCHES: Brand: MEDLINE

## (undated) DEVICE — GLOVE ORANGE PI 8   MSG9080

## (undated) DEVICE — LINER GLOVEXL RED CUT RESIST STRL REPEL LT

## (undated) DEVICE — MULTIPLE BAND LIGATOR: Brand: SPEEDBAND SUPERVIEW SUPER 7

## (undated) DEVICE — CHLORAPREP 26ML ORANGE

## (undated) DEVICE — SYSTEM SKIN CLSR 22CM DERMBND PRINEO

## (undated) DEVICE — SOLUTION IV 1000ML 0.9% SOD CHL PH 5 INJ USP VIAFLX PLAS

## (undated) DEVICE — 3000CC GUARDIAN II: Brand: GUARDIAN

## (undated) DEVICE — 4-PORT MANIFOLD: Brand: NEPTUNE 2

## (undated) DEVICE — GAUZE,SPONGE,4"X4",12PLY,STERILE,LF,2'S: Brand: MEDLINE

## (undated) DEVICE — 9165 UNIVERSAL PATIENT PLATE: Brand: 3M™

## (undated) DEVICE — SYRINGE, LUER LOCK, 10ML: Brand: MEDLINE

## (undated) DEVICE — SUTURE VCRL SZ 2-0 L27IN ABSRB UD L36MM CP-1 1/2 CIR REV J266H

## (undated) DEVICE — CYSTO/BLADDER IRRIGATION SET, REGULATING CLAMP

## (undated) DEVICE — INTENDED FOR TISSUE SEPARATION, AND OTHER PROCEDURES THAT REQUIRE A SHARP SURGICAL BLADE TO PUNCTURE OR CUT.: Brand: BARD-PARKER ® CARBON RIB-BACK BLADES

## (undated) DEVICE — GLOVE ORANGE PI 7   MSG9070

## (undated) DEVICE — STRIP,CLOSURE,WOUND,MEDI-STRIP,1/2X4: Brand: MEDLINE

## (undated) DEVICE — GAMMEX® NON-LATEX SIZE 7.5, STERILE NEOPRENE POWDER-FREE SURGICAL GLOVE: Brand: GAMMEX

## (undated) DEVICE — DISCONTINUED USE 419147 KIT ENDOSCOPY CUSTOM PACK